# Patient Record
Sex: MALE | Race: WHITE | Employment: OTHER | URBAN - METROPOLITAN AREA
[De-identification: names, ages, dates, MRNs, and addresses within clinical notes are randomized per-mention and may not be internally consistent; named-entity substitution may affect disease eponyms.]

---

## 2018-04-24 ENCOUNTER — APPOINTMENT (OUTPATIENT)
Dept: CT IMAGING | Age: 63
DRG: 287 | End: 2018-04-24
Attending: PHYSICIAN ASSISTANT
Payer: MEDICARE

## 2018-04-24 ENCOUNTER — APPOINTMENT (OUTPATIENT)
Dept: GENERAL RADIOLOGY | Age: 63
DRG: 287 | End: 2018-04-24
Attending: EMERGENCY MEDICINE
Payer: MEDICARE

## 2018-04-24 ENCOUNTER — HOSPITAL ENCOUNTER (INPATIENT)
Age: 63
LOS: 3 days | Discharge: HOME | DRG: 287 | End: 2018-04-27
Attending: EMERGENCY MEDICINE | Admitting: INTERNAL MEDICINE
Payer: MEDICARE

## 2018-04-24 DIAGNOSIS — I21.4 NSTEMI (NON-ST ELEVATED MYOCARDIAL INFARCTION) (HCC): Primary | ICD-10-CM

## 2018-04-24 DIAGNOSIS — R79.89 ELEVATED SERUM CREATININE: ICD-10-CM

## 2018-04-24 PROBLEM — N18.30 CKD (CHRONIC KIDNEY DISEASE) STAGE 3, GFR 30-59 ML/MIN (HCC): Status: ACTIVE | Noted: 2018-04-24

## 2018-04-24 PROBLEM — R77.8 ELEVATED TROPONIN: Status: ACTIVE | Noted: 2018-04-24

## 2018-04-24 PROBLEM — J45.909 ASTHMA: Status: ACTIVE | Noted: 2018-04-24

## 2018-04-24 LAB
ALBUMIN SERPL-MCNC: 3.3 G/DL (ref 3.4–5)
ALBUMIN/GLOB SERPL: 0.9 {RATIO} (ref 0.8–1.7)
ALP SERPL-CCNC: 126 U/L (ref 45–117)
ALT SERPL-CCNC: 38 U/L (ref 16–61)
ANION GAP SERPL CALC-SCNC: 7 MMOL/L (ref 3–18)
APTT PPP: 27.4 SEC (ref 23–36.4)
AST SERPL-CCNC: 34 U/L (ref 15–37)
BASOPHILS # BLD: 0 K/UL (ref 0–0.06)
BASOPHILS NFR BLD: 0 % (ref 0–2)
BILIRUB SERPL-MCNC: 0.6 MG/DL (ref 0.2–1)
BNP SERPL-MCNC: 112 PG/ML (ref 0–900)
BUN SERPL-MCNC: 27 MG/DL (ref 7–18)
BUN/CREAT SERPL: 17 (ref 12–20)
CALCIUM SERPL-MCNC: 8.5 MG/DL (ref 8.5–10.1)
CHLORIDE SERPL-SCNC: 104 MMOL/L (ref 100–108)
CK MB CFR SERPL CALC: 0.9 % (ref 0–4)
CK MB SERPL-MCNC: 1.4 NG/ML (ref 5–25)
CK SERPL-CCNC: 160 U/L (ref 39–308)
CO2 SERPL-SCNC: 30 MMOL/L (ref 21–32)
CREAT SERPL-MCNC: 1.6 MG/DL (ref 0.6–1.3)
DIFFERENTIAL METHOD BLD: ABNORMAL
EOSINOPHIL # BLD: 0.1 K/UL (ref 0–0.4)
EOSINOPHIL NFR BLD: 1 % (ref 0–5)
ERYTHROCYTE [DISTWIDTH] IN BLOOD BY AUTOMATED COUNT: 14.4 % (ref 11.6–14.5)
EST. AVERAGE GLUCOSE BLD GHB EST-MCNC: 134 MG/DL
GLOBULIN SER CALC-MCNC: 3.6 G/DL (ref 2–4)
GLUCOSE SERPL-MCNC: 167 MG/DL (ref 74–99)
HBA1C MFR BLD: 6.3 % (ref 4.5–5.6)
HCT VFR BLD AUTO: 41.2 % (ref 36–48)
HGB BLD-MCNC: 13.8 G/DL (ref 13–16)
LYMPHOCYTES # BLD: 1.4 K/UL (ref 0.9–3.6)
LYMPHOCYTES NFR BLD: 18 % (ref 21–52)
MCH RBC QN AUTO: 30.5 PG (ref 24–34)
MCHC RBC AUTO-ENTMCNC: 33.5 G/DL (ref 31–37)
MCV RBC AUTO: 91.2 FL (ref 74–97)
MONOCYTES # BLD: 0.9 K/UL (ref 0.05–1.2)
MONOCYTES NFR BLD: 12 % (ref 3–10)
NEUTS SEG # BLD: 5.2 K/UL (ref 1.8–8)
NEUTS SEG NFR BLD: 69 % (ref 40–73)
PLATELET # BLD AUTO: 204 K/UL (ref 135–420)
PMV BLD AUTO: 9.2 FL (ref 9.2–11.8)
POTASSIUM SERPL-SCNC: 4.2 MMOL/L (ref 3.5–5.5)
PROT SERPL-MCNC: 6.9 G/DL (ref 6.4–8.2)
RBC # BLD AUTO: 4.52 M/UL (ref 4.7–5.5)
SODIUM SERPL-SCNC: 141 MMOL/L (ref 136–145)
TROPONIN I SERPL-MCNC: 0.59 NG/ML (ref 0–0.06)
WBC # BLD AUTO: 7.5 K/UL (ref 4.6–13.2)

## 2018-04-24 PROCEDURE — 65660000000 HC RM CCU STEPDOWN

## 2018-04-24 PROCEDURE — 80053 COMPREHEN METABOLIC PANEL: CPT | Performed by: EMERGENCY MEDICINE

## 2018-04-24 PROCEDURE — 85025 COMPLETE CBC W/AUTO DIFF WBC: CPT | Performed by: EMERGENCY MEDICINE

## 2018-04-24 PROCEDURE — 71045 X-RAY EXAM CHEST 1 VIEW: CPT

## 2018-04-24 PROCEDURE — 83036 HEMOGLOBIN GLYCOSYLATED A1C: CPT | Performed by: INTERNAL MEDICINE

## 2018-04-24 PROCEDURE — 74011250637 HC RX REV CODE- 250/637: Performed by: PHYSICIAN ASSISTANT

## 2018-04-24 PROCEDURE — 83880 ASSAY OF NATRIURETIC PEPTIDE: CPT | Performed by: PHYSICIAN ASSISTANT

## 2018-04-24 PROCEDURE — 74011636320 HC RX REV CODE- 636/320: Performed by: EMERGENCY MEDICINE

## 2018-04-24 PROCEDURE — 71275 CT ANGIOGRAPHY CHEST: CPT

## 2018-04-24 PROCEDURE — 80061 LIPID PANEL: CPT | Performed by: INTERNAL MEDICINE

## 2018-04-24 PROCEDURE — 99285 EMERGENCY DEPT VISIT HI MDM: CPT

## 2018-04-24 PROCEDURE — 84484 ASSAY OF TROPONIN QUANT: CPT | Performed by: EMERGENCY MEDICINE

## 2018-04-24 PROCEDURE — 74011250636 HC RX REV CODE- 250/636: Performed by: PHYSICIAN ASSISTANT

## 2018-04-24 PROCEDURE — 85730 THROMBOPLASTIN TIME PARTIAL: CPT | Performed by: PHYSICIAN ASSISTANT

## 2018-04-24 PROCEDURE — 36415 COLL VENOUS BLD VENIPUNCTURE: CPT | Performed by: INTERNAL MEDICINE

## 2018-04-24 PROCEDURE — 93005 ELECTROCARDIOGRAM TRACING: CPT

## 2018-04-24 RX ORDER — ACETAMINOPHEN 325 MG/1
650 TABLET ORAL
Status: DISCONTINUED | OUTPATIENT
Start: 2018-04-24 | End: 2018-04-27 | Stop reason: HOSPADM

## 2018-04-24 RX ORDER — SODIUM CHLORIDE 9 MG/ML
100 INJECTION, SOLUTION INTRAVENOUS CONTINUOUS
Status: DISCONTINUED | OUTPATIENT
Start: 2018-04-25 | End: 2018-04-27

## 2018-04-24 RX ORDER — NALOXONE HYDROCHLORIDE 0.4 MG/ML
0.4 INJECTION, SOLUTION INTRAMUSCULAR; INTRAVENOUS; SUBCUTANEOUS AS NEEDED
Status: DISCONTINUED | OUTPATIENT
Start: 2018-04-24 | End: 2018-04-27 | Stop reason: HOSPADM

## 2018-04-24 RX ORDER — DOCUSATE SODIUM 100 MG/1
100 CAPSULE, LIQUID FILLED ORAL
Status: DISCONTINUED | OUTPATIENT
Start: 2018-04-24 | End: 2018-04-27 | Stop reason: HOSPADM

## 2018-04-24 RX ORDER — ASPIRIN 81 MG/1
81 TABLET ORAL DAILY
Status: DISCONTINUED | OUTPATIENT
Start: 2018-04-25 | End: 2018-04-27 | Stop reason: HOSPADM

## 2018-04-24 RX ORDER — AMLODIPINE BESYLATE 5 MG/1
5 TABLET ORAL DAILY
Status: DISCONTINUED | OUTPATIENT
Start: 2018-04-25 | End: 2018-04-27 | Stop reason: HOSPADM

## 2018-04-24 RX ORDER — ONDANSETRON 2 MG/ML
4 INJECTION INTRAMUSCULAR; INTRAVENOUS
Status: DISCONTINUED | OUTPATIENT
Start: 2018-04-24 | End: 2018-04-27 | Stop reason: HOSPADM

## 2018-04-24 RX ORDER — MORPHINE SULFATE 4 MG/ML
1 INJECTION INTRAVENOUS
Status: DISCONTINUED | OUTPATIENT
Start: 2018-04-24 | End: 2018-04-27 | Stop reason: HOSPADM

## 2018-04-24 RX ORDER — NITROGLYCERIN 0.4 MG/1
0.4 TABLET SUBLINGUAL
Status: DISCONTINUED | OUTPATIENT
Start: 2018-04-24 | End: 2018-04-27 | Stop reason: HOSPADM

## 2018-04-24 RX ORDER — ASPIRIN 325 MG
325 TABLET ORAL
Status: COMPLETED | OUTPATIENT
Start: 2018-04-24 | End: 2018-04-24

## 2018-04-24 RX ORDER — HYDROCODONE BITARTRATE AND ACETAMINOPHEN 5; 325 MG/1; MG/1
1 TABLET ORAL
Status: DISCONTINUED | OUTPATIENT
Start: 2018-04-24 | End: 2018-04-27 | Stop reason: HOSPADM

## 2018-04-24 RX ORDER — HEPARIN SODIUM 10000 [USP'U]/100ML
18-36 INJECTION, SOLUTION INTRAVENOUS
Status: DISCONTINUED | OUTPATIENT
Start: 2018-04-24 | End: 2018-04-26

## 2018-04-24 RX ORDER — FLUTICASONE FUROATE AND VILANTEROL 100; 25 UG/1; UG/1
1 POWDER RESPIRATORY (INHALATION) DAILY
Status: DISCONTINUED | OUTPATIENT
Start: 2018-04-25 | End: 2018-04-27 | Stop reason: HOSPADM

## 2018-04-24 RX ORDER — SIMVASTATIN 20 MG/1
20 TABLET, FILM COATED ORAL
Status: DISCONTINUED | OUTPATIENT
Start: 2018-04-25 | End: 2018-04-27 | Stop reason: HOSPADM

## 2018-04-24 RX ORDER — HEPARIN SODIUM 1000 [USP'U]/ML
80 INJECTION, SOLUTION INTRAVENOUS; SUBCUTANEOUS ONCE
Status: COMPLETED | OUTPATIENT
Start: 2018-04-24 | End: 2018-04-24

## 2018-04-24 RX ORDER — MONTELUKAST SODIUM 10 MG/1
10 TABLET ORAL DAILY
Status: DISCONTINUED | OUTPATIENT
Start: 2018-04-25 | End: 2018-04-27 | Stop reason: HOSPADM

## 2018-04-24 RX ORDER — PANTOPRAZOLE SODIUM 40 MG/1
40 TABLET, DELAYED RELEASE ORAL DAILY
Status: DISCONTINUED | OUTPATIENT
Start: 2018-04-25 | End: 2018-04-27 | Stop reason: HOSPADM

## 2018-04-24 RX ADMIN — HEPARIN SODIUM 6170 UNITS: 1000 INJECTION, SOLUTION INTRAVENOUS; SUBCUTANEOUS at 21:18

## 2018-04-24 RX ADMIN — IOPAMIDOL 100 ML: 755 INJECTION, SOLUTION INTRAVENOUS at 22:36

## 2018-04-24 RX ADMIN — HEPARIN SODIUM 18 UNITS/KG/HR: 10000 INJECTION, SOLUTION INTRAVENOUS at 21:18

## 2018-04-24 RX ADMIN — SODIUM CHLORIDE 1000 ML: 900 INJECTION, SOLUTION INTRAVENOUS at 20:55

## 2018-04-24 RX ADMIN — ASPIRIN 325 MG ORAL TABLET 325 MG: 325 PILL ORAL at 20:10

## 2018-04-24 NOTE — IP AVS SNAPSHOT
[address] [address] 
[phone] Patient: Mansi Manuel MRN: [id number] A check chivo indicates which time of day the medication should be taken. My Medications CHANGE how you take these medications Instructions Each Dose to Equal  
 Morning Noon Evening Bedtime  
 amLODIPine 5 mg tablet Commonly known as:  Remonia Grates Start taking on:  4/28/2018 What changed:   
- medication strength 
- how much to take - when to take this Your next dose is:  Tomorrow morning Take 1 Tab by mouth daily. 5 mg CONTINUE taking these medications Instructions Each Dose to Equal  
 Morning Noon Evening Bedtime  
 aspirin 81 mg tablet Take 81 mg by mouth. 81 mg Biotin 2,500 mcg Cap Take  by mouth. FISH OIL 1,000 mg Cap Generic drug:  omega-3 fatty acids-vitamin e Take 2 Caps by mouth. 2 Cap  
    
   
   
   
  
 meclizine 25 mg tablet Commonly known as:  ANTIVERT Take  by mouth three (3) times daily as needed. NITROSTAT 0.4 mg SL tablet Generic drug:  nitroglycerin  
   
 by SubLINGual route every five (5) minutes as needed for Chest Pain. PROTONIX 40 mg tablet Generic drug:  pantoprazole Your next dose is:  Tomorrow morning Take 40 mg by mouth daily. 40 mg  
    
  
   
   
   
  
 SIMVASTATIN PO Take 40 mg by mouth. 40 mg  
    
   
   
   
  
 SINGULAIR 10 mg tablet Generic drug:  montelukast  
Your next dose is:  Tomorrow morning Take 10 mg by mouth daily. 10 mg  
    
  
   
   
   
  
 SPIRIVA WITH HANDIHALER 18 mcg inhalation capsule Generic drug:  tiotropium Your next dose is:  Tomorrow morning Take 1 Cap by inhalation daily. 1 Cap SYMBICORT 160-4.5 mcg/actuation Hfaa Generic drug:  budesonide-formoterol Your next dose is: Tonight Take 2 Puffs by inhalation two (2) times a day. 2 Puff VITAMIN D3 PO Take  by mouth. STOP taking these medications CeleBREX 100 mg capsule Generic drug:  celecoxib Where to Get Your Medications These medications were sent to [address] [place] [address] Hours:  24-hours Phone:  [phone]  
  amLODIPine 5 mg tablet

## 2018-04-24 NOTE — IP AVS SNAPSHOT
[address] [place] [address] 
[phone] Patient: Adonis Garzon MRN: [id number] About your hospitalization You were admitted on:  April 24, 2018 You last received care in the:  [address] [place] [address] You were discharged on:  April 27, 2018 Why you were hospitalized Your primary diagnosis was:  Chest Pain Your diagnoses also included:  Cad (Coronary Artery Disease), Chronic Obstructive Pulmonary Disease (Hcc), Hypertension, Elevated Troponin, Ckd (Chronic Kidney Disease) Stage 3, Gfr 30-59 Ml/Min, Asthma, Nstemi (Non-St Elevated Myocardial Infarction) ([facility]) Follow-up Information Follow up With Details Comments Contact Info Jeffery Mccracken MD Schedule an appointment as soon as possible for a visit in 1 week  [address] [place] [address] [address] [facility] [address] 
[phone] Danni Andrews MD Go on 5/25/2018 Follow up appointment scheduled for May 25, 2018 at 10:20 a.m. [address] [place] [address] [address] [place] [address] 
[phone] Care Mgmt contacted MD's office (Dr. Kristian Leyden) on 4/27/18 to assist with f/u appointmnet. Office not answering phones, unable to contact for appointment. Patient please contact MD's office on April 30, 2018 to arrange f/u appointment date/time. Discharge Orders None A check chivo indicates which time of day the medication should be taken. My Medications CHANGE how you take these medications Instructions Each Dose to Equal  
 Morning Noon Evening Bedtime  
 amLODIPine 5 mg tablet Commonly known as:  Unknown Jolley Start taking on:  4/28/2018 What changed:   
- medication strength 
- how much to take - when to take this Your next dose is:  Tomorrow morning Take 1 Tab by mouth daily. 5 mg CONTINUE taking these medications Instructions Each Dose to Equal  
 Morning Noon Evening Bedtime  
 aspirin 81 mg tablet Take 81 mg by mouth. 81 mg Biotin 2,500 mcg Cap Take  by mouth. FISH OIL 1,000 mg Cap Generic drug:  omega-3 fatty acids-vitamin e Take 2 Caps by mouth. 2 Cap  
    
   
   
   
  
 meclizine 25 mg tablet Commonly known as:  ANTIVERT Take  by mouth three (3) times daily as needed. NITROSTAT 0.4 mg SL tablet Generic drug:  nitroglycerin  
   
 by SubLINGual route every five (5) minutes as needed for Chest Pain. PROTONIX 40 mg tablet Generic drug:  pantoprazole Your next dose is:  Tomorrow morning Take 40 mg by mouth daily. 40 mg  
    
  
   
   
   
  
 SIMVASTATIN PO Take 40 mg by mouth. 40 mg  
    
   
   
   
  
 SINGULAIR 10 mg tablet Generic drug:  montelukast  
Your next dose is:  Tomorrow morning Take 10 mg by mouth daily. 10 mg  
    
  
   
   
   
  
 SPIRIVA WITH HANDIHALER 18 mcg inhalation capsule Generic drug:  tiotropium Your next dose is:  Tomorrow morning Take 1 Cap by inhalation daily. 1 Cap SYMBICORT 160-4.5 mcg/actuation Hfaa Generic drug:  budesonide-formoterol Your next dose is: Tonight Take 2 Puffs by inhalation two (2) times a day. 2 Puff VITAMIN D3 PO Take  by mouth. STOP taking these medications CeleBREX 100 mg capsule Generic drug:  celecoxib Where to Get Your Medications These medications were sent to [address] [place] [address] Hours:  24-hours Phone:  [phone]  
  amLODIPine 5 mg tablet Discharge Instructions Chest Pain: Care Instructions Your Care Instructions There are many things that can cause chest pain. Some are not serious and will get better on their own in a few days. But some kinds of chest pain need more testing and treatment. Your doctor may have recommended a follow-up visit in the next 8 to 12 hours. If you are not getting better, you may need more tests or treatment. Even though your doctor has released you, you still need to watch for any problems. The doctor carefully checked you, but sometimes problems can develop later. If you have new symptoms or if your symptoms do not get better, get medical care right away. If you have worse or different chest pain or pressure that lasts more than 5 minutes or you passed out (lost consciousness), call 911 or seek other emergency help right away. A medical visit is only one step in your treatment. Even if you feel better, you still need to do what your doctor recommends, such as going to all suggested follow-up appointments and taking medicines exactly as directed. This will help you recover and help prevent future problems. How can you care for yourself at home? · Rest until you feel better. · Take your medicine exactly as prescribed. Call your doctor if you think you are having a problem with your medicine. · Do not drive after taking a prescription pain medicine. When should you call for help? Call 911 if: 
? · You passed out (lost consciousness). ? · You have severe difficulty breathing. ? · You have symptoms of a heart attack. These may include: ¨ Chest pain or pressure, or a strange feeling in your chest. 
¨ Sweating. ¨ Shortness of breath. ¨ Nausea or vomiting. ¨ Pain, pressure, or a strange feeling in your back, neck, jaw, or upper belly or in one or both shoulders or arms. ¨ Lightheadedness or sudden weakness. ¨ A fast or irregular heartbeat. After you call 911, the  may tell you to chew 1 adult-strength or 2 to 4 low-dose aspirin. Wait for an ambulance.  Do not try to drive yourself. ?Call your doctor today if: 
? · You have any trouble breathing. ? · Your chest pain gets worse. ? · You are dizzy or lightheaded, or you feel like you may faint. ? · You are not getting better as expected. ? · You are having new or different chest pain. Where can you learn more? Go to http://levi-hank.info/. Enter A120 in the search box to learn more about \"Chest Pain: Care Instructions. \" Current as of: March 20, 2017 Content Version: 11.4 © 9161-4182 [employer]. Care instructions adapted under license by [employer] (which disclaims liability or warranty for this information). If you have questions about a medical condition or this instruction, always ask your healthcare professional. [address] any warranty or liability for your use of this information. Chronic Obstructive Pulmonary Disease (COPD): Care Instructions Your Care Instructions Chronic obstructive pulmonary disease (COPD) is a general term for a group of lung diseases, including emphysema and chronic bronchitis. People with COPD have decreased airflow in and out of the lungs, which makes it hard to breathe. The airways also can get clogged with thick mucus. Cigarette smoking is a major cause of COPD. Although there is no cure for COPD, you can slow its progress. Following your treatment plan and taking care of yourself can help you feel better and live longer. Follow-up care is a key part of your treatment and safety. Be sure to make and go to all appointments, and call your doctor if you are having problems. It's also a good idea to know your test results and keep a list of the medicines you take. How can you care for yourself at home? ?Staying healthy ? · Do not smoke. This is the most important step you can take to prevent more damage to your lungs.  If you need help quitting, talk to your doctor about stop-smoking programs and medicines. These can increase your chances of quitting for good. ? · Avoid colds and flu. Get a pneumococcal vaccine shot. If you have had one before, ask your doctor whether you need a second dose. Get the flu vaccine every fall. If you must be around people with colds or the flu, wash your hands often. ? · Avoid secondhand smoke, air pollution, and high altitudes. Also avoid cold, dry air and hot, humid air. Stay at home with your windows closed when air pollution is bad. ?Medicines and oxygen therapy ? · Take your medicines exactly as prescribed. Call your doctor if you think you are having a problem with your medicine. ? · You may be taking medicines such as: ¨ Bronchodilators. These help open your airways and make breathing easier. Bronchodilators are either short-acting (work for 6 to 9 hours) or long-acting (work for 24 hours). You inhale most bronchodilators, so they start to act quickly. Always carry your quick-relief inhaler with you in case you need it while you are away from home. ¨ Corticosteroids (prednisone, budesonide). These reduce airway inflammation. They come in pill or inhaled form. You must take these medicines every day for them to work well. ? · A spacer may help you get more inhaled medicine to your lungs. Ask your doctor or pharmacist if a spacer is right for you. If it is, ask how to use it properly. ? · Do not take any vitamins, over-the-counter medicine, or herbal products without talking to your doctor first.  
? · If your doctor prescribed antibiotics, take them as directed. Do not stop taking them just because you feel better. You need to take the full course of antibiotics. ? · Oxygen therapy boosts the amount of oxygen in your blood and helps you breathe easier. Use the flow rate your doctor has recommended, and do not change it without talking to your doctor first.  
Activity ? · Get regular exercise. Walking is an easy way to get exercise. Start out slowly, and walk a little more each day. ? · Pay attention to your breathing. You are exercising too hard if you cannot talk while you are exercising. ? · Take short rest breaks when doing household chores and other activities. ? · Learn breathing methods-such as breathing through pursed lips-to help you become less short of breath. ? · If your doctor has not set you up with a pulmonary rehabilitation program, talk to him or her about whether rehab is right for you. Rehab includes exercise programs, education about your disease and how to manage it, help with diet and other changes, and emotional support. Diet ? · Eat regular, healthy meals. Use bronchodilators about 1 hour before you eat to make it easier to eat. Eat several small meals instead of three large ones. Drink beverages at the end of the meal. Avoid foods that are hard to chew. ? · Eat foods that contain protein so that you do not lose muscle mass. ? · Talk with your doctor if you gain too much weight or if you lose weight without trying. ?Mental health ? · Talk to your family, friends, or a therapist about your feelings. It is normal to feel frightened, angry, hopeless, helpless, and even guilty. Talking openly about bad feelings can help you cope. If these feelings last, talk to your doctor. When should you call for help? Call 911 anytime you think you may need emergency care. For example, call if: 
? · You have severe trouble breathing. ?Call your doctor now or seek immediate medical care if: 
? · You have new or worse trouble breathing. ? · You cough up blood. ? · You have a fever. ? Watch closely for changes in your health, and be sure to contact your doctor if: 
? · You cough more deeply or more often, especially if you notice more mucus or a change in the color of your mucus. ? · You have new or worse swelling in your legs or belly. ? · You are not getting better as expected. Where can you learn more? Go to http://levi-hank.info/. Jaylen Webb in the search box to learn more about \"Chronic Obstructive Pulmonary Disease (COPD): Care Instructions. \" Current as of: May 12, 2017 Content Version: 11.4 © 4708-3314 [employer]. Care instructions adapted under license by [employer] (which disclaims liability or warranty for this information). If you have questions about a medical condition or this instruction, always ask your healthcare professional. [address] any warranty or liability for your use of this information. DISCHARGE SUMMARY from Nurse PATIENT INSTRUCTIONS: 
 
 
F-face looks uneven A-arms unable to move or move unevenly S-speech slurred or non-existent T-time-call 911 as soon as signs and symptoms begin-DO NOT go Back to bed or wait to see if you get better-TIME IS BRAIN. Warning Signs of HEART ATTACK Call 911 if you have these symptoms: 
? Chest discomfort. Most heart attacks involve discomfort in the center of the chest that lasts more than a few minutes, or that goes away and comes back. It can feel like uncomfortable pressure, squeezing, fullness, or pain. ? Discomfort in other areas of the upper body. Symptoms can include pain or discomfort in one or both arms, the back, neck, jaw, or stomach. ? Shortness of breath with or without chest discomfort. ? Other signs may include breaking out in a cold sweat, nausea, or lightheadedness. Don't wait more than five minutes to call [address]! Fast action can save your life.  Calling 911 is almost always the fastest way to get lifesaving treatment. Emergency Medical Services staff can begin treatment when they arrive  up to an hour sooner than if someone gets to the hospital by car. The discharge information has been reviewed with the patient. The patient verbalized understanding. Discharge medications reviewed with the patient and appropriate educational materials and side effects teaching were provided. Patient armband removed and shredded. ___________________________________________________________________________________________________________________________________ Introducing [facility]! [facility] introduces [email or web address].tv patient portal. Now you can access parts of your medical record, email your doctor's office, and request medication refills online. 1. In your internet browser, go to [email or web address] 2. Click on the First Time User? Click Here link in the Sign In box. You will see the New Member Sign Up page. 3. Enter your [email or web address] Access Code exactly as it appears below. You will not need to use this code after youve completed the sign-up process. If you do not sign up before the expiration date, you must request a new code. · [email or web address] Access Code: [email or web address] Expires: 7/23/2018  6:23 PM 
 
4. Enter the last four digits of your Social Security Number (xxxx) and Date of Birth (mm/dd/yyyy) as indicated and click Submit. You will be taken to the next sign-up page. 5. Create a SnappCloudt ID. This will be your SnappCloudt login ID and cannot be changed, so think of one that is secure and easy to remember. 6. Create a SnappCloudt password. You can change your password at any time. 7. Enter your Password Reset Question and Answer. This can be used at a later time if you forget your password. 8. Enter your e-mail address. You will receive e-mail notification when new information is available in [address] 9. Click Sign Up. You can now view and download portions of your medical record. 10. Click the Download Summary menu link to download a portable copy of your medical information. If you have questions, please visit the Frequently Asked Questions section of the [employer] website. Remember, [employer] is NOT to be used for urgent needs. For medical emergencies, dial 911. Now available from your iPhone and Android! Introducing Blane Mendes As a [facility] patient, I wanted to make you aware of our electronic visit tool called Blane Mendes. Remitly 24/7 allows you to connect within minutes with a medical provider 24 hours a day, seven days a week via a mobile device or tablet or logging into a secure website from your computer. You can access Blane Mendes from anywhere in the [place]. A virtual visit might be right for you when you have a simple condition and feel like you just dont want to get out of bed, or cant get away from work for an appointment, when your regular [facility] provider is not available (evenings, weekends or holidays), or when youre out of town and need minor care. Electronic visits cost only $49 and if the Remitly 24/7 provider determines a prescription is needed to treat your condition, one can be electronically transmitted to a nearby pharmacy*. Please take a moment to enroll today if you have not already done so. The enrollment process is free and takes just a few minutes. To enroll, please download the Remitly 24/[employer] erasmo to your tablet or phone, or visit [email or web address] to enroll on your computer. And, as an [address] [place] [address] patient with a [employer] account, the results of your visits will be scanned into your electronic medical record and your primary care provider will be able to view the scanned results.    
We urge you to continue to see your regular [facility] provider for your ongoing medical care. And while your primary care provider may not be the one available when you seek a Blane Ramsesfin virtual visit, the peace of mind you get from getting a real diagnosis real time can be priceless. For more information on Blane YouRenewcrowfin, view our Frequently Asked Questions (FAQs) at [email or web address]. Sincerely, 
 
Carmen Botello MD 
Chief Medical Officer Chi Alexandre *:  certain medications cannot be prescribed via [employer]hel[employer] Unresulted Labs-Please follow up with your PCP about these lab tests Order Current Status EKG, 12 LEAD, INITIAL Preliminary result EKG, 12 LEAD, INITIAL Preliminary result Providers Seen During Your Hospitalization Provider Specialty Primary office phone Starleen Goodpasture, MD Emergency Medicine [phone] Maddy Negron MD Internal Medicine [phone] Your Primary Care Physician (PCP) Primary Care Physician Office Phone Office Fax Liam Gomez, [phone] You are allergic to the following Allergen Reactions Ciprofloxacin Other (comments) PT states that he turns red. Tape (Adhesive) Itching Recent Documentation Height Weight BMI Smoking Status 1.651 m 81.5 kg 29.9 kg/m2 Former Smoker Emergency Contacts Name Discharge Info Relation Home Work Mobile Baldemar Bright DISCHARGE CAREGIVER [3] Brother [24] [phone] JeovanyDina DISCHARGE CAREGIVER [3] Sister [23] [phone] Patient Belongings The following personal items are in your possession at time of discharge: 
  Dental Appliances: None  Visual Aid: None      Home Medications: None   Jewelry: None  Clothing: Footwear, Jacket/Coat, Pants, Shirt    Other Valuables: [employer], Money (comment) ($100/credit cards, pt will give to family in am, ) Please provide this summary of care documentation to your next provider. Signatures-by signing, you are acknowledging that this After Visit Summary has been reviewed with you and you have received a copy. Patient Signature:  ____________________________________________________________ Date:  ____________________________________________________________  
  
Sigmund [place] Provider Signature:  ____________________________________________________________ Date:  ____________________________________________________________

## 2018-04-24 NOTE — ED NOTES
Verbal shift change report given to Chadwick Reveles RN (oncoming nurse) by Cortney Lim (offgoing nurse). Report included the following information SBAR.

## 2018-04-24 NOTE — ED PROVIDER NOTES
EMERGENCY DEPARTMENT HISTORY AND PHYSICAL EXAM    Date: 4/24/2018  Patient Name: Saul Clay    History of Presenting Illness     Chief Complaint   Patient presents with    Abnormal Lab Results         History Provided By: Patient    Chief Complaint: CP  Duration: 3 Days  Timing:  Acute  Location: central CP  Quality: Aching  Severity: Moderate  Modifying Factors: Hx of MI  Associated Symptoms: nausea and cold sweats    Additional History (Context):   7:26 PM  Saul Clay is a 57 y.o. male with PMHX of MI who presents to the emergency department C/O CP onset 3 days ago after cutting grass. Associated sxs include nausea and cold sweats. Pt took 2 nitros right after CP started. Pt went to [facility] after sxs onset but due to 1.5 hour wait he left. Pt was seen by PCP today and blood was drawn, d-dimer was . 88 so pt was called tonight and told to come to ED. Pt is on baby ASA daily. Today pt states the chest pressure is 3/10. Pt denies taking nitro today, leg swelling, smoking, and any other sxs or complaints.      PCP: Kunal Salazar MD   Cardiologist:     Current Facility-Administered Medications   Medication Dose Route Frequency Provider Last Rate Last Dose    nitroglycerin (NITROBID) 2 % ointment 1 Inch  1 Inch Topical NOW SURENDRA Barnett        umeclidinium (INCRUSE ELLIPTA) 62.5 mcg/actuation  1 Puff Inhalation DAILY Yareli Burnette MD        pharmacy and Nursing- Vitamin D please clarify strength & frrequency  1 Each Other DAILY Yareli Burnette MD        aspirin delayed-release tablet 81 mg  81 mg Oral DAILY Yareli Burnette MD        simvastatin (ZOCOR) tablet 20 mg  20 mg Oral QHS Leonel Socrates Fritz MD   Stopped at 04/25/18 0000    pantoprazole (PROTONIX) tablet 40 mg  40 mg Oral DAILY Yareli Burnette MD        nitroglycerin (NITROSTAT) tablet 0.4 mg  0.4 mg SubLINGual Q5MIN PRN Yareli Burnette MD        fluticasone-vilanterol (BREO ELLIPTA) 100mcg-25mcg/puff  1 Puff Inhalation DAILY MD Rose Hurtado montelukast (SINGULAIR) tablet 10 mg  10 mg Oral DAILY Antoinette Clemons MD        amLODIPine (NORVASC) tablet 5 mg  5 mg Oral DAILY Antoinette Clemons MD        0.9% sodium chloride infusion  100 mL/hr IntraVENous CONTINUOUS Antoinette Clemons  mL/hr at 04/25/18 0004 100 mL/hr at 04/25/18 0004    acetaminophen (TYLENOL) tablet 650 mg  650 mg Oral Q4H PRN Antoinette Clemons MD        HYDROcodone-acetaminophen (NORCO) 5-325 mg per tablet 1 Tab  1 Tab Oral Q4H PRN Antoinette Clemons MD        morphine injection 1 mg  1 mg IntraVENous Q4H PRN Antoinette Clemons MD        naloxone (NARCAN) injection 0.4 mg  0.4 mg IntraVENous PRN Antoinette Clemons MD        ondansetron (ZOFRAN) injection 4 mg  4 mg IntraVENous Q4H PRN Antoinette Clemons MD        docusate sodium (COLACE) capsule 100 mg  100 mg Oral BID PRN Antoinette Clemons MD        heparin 25,000 units in D5W 250 ml infusion  18-36 Units/kg/hr IntraVENous TITRATE SURENDRA Vega 13.9 mL/hr at 04/24/18 2257 18 Units/kg/hr at 04/24/18 2257       Past History     Past Medical History:  Past Medical History:   Diagnosis Date    Asthma     CAD (coronary artery disease)     COPD     Hypertension        Past Surgical History:  Past Surgical History:   Procedure Laterality Date    ABDOMEN SURGERY PROC UNLISTED      instestinal rupture    HX COLOSTOMY      HX COLOSTOMY TAKE DOWN      HX CORONARY STENT PLACEMENT         Family History:  No family history on file. Social History:  Social History   Substance Use Topics    Smoking status: Former Smoker    Smokeless tobacco: Not on file    Alcohol use Not on file       Allergies: Allergies   Allergen Reactions    Ciprofloxacin Other (comments)     PT states that he turns red. Review of Systems   Review of Systems   Constitutional: Positive for diaphoresis. Cardiovascular: Positive for chest pain. Negative for leg swelling. Gastrointestinal: Positive for nausea. All other systems reviewed and are negative.       Physical Exam     Vitals: 04/24/18 1930 04/24/18 2130 04/24/18 2200 04/24/18 2340   BP: (!) 146/111 151/86 142/82 (!) 143/96   Pulse: 71 68 67 71   Resp: 15 19 19 16   Temp:    98.2 °F (36.8 °C)   SpO2: 99% 98% 98% 97%   Weight:    81.2 kg (179 lb)   Height:    5' 5\" (1.651 m)     Physical Exam   Constitutional: He is oriented to person, place, and time. He appears well-developed and well-nourished. No distress. Alert, interactive, NAD, no increased work of breathing    HENT:   Head: Normocephalic and atraumatic. Neck: Normal range of motion. Neck supple. Cardiovascular: Normal rate, regular rhythm, normal heart sounds and intact distal pulses. No murmur heard. Pulmonary/Chest: Effort normal and breath sounds normal. No respiratory distress. He has no wheezes. He has no rales. Abdominal: Soft. Bowel sounds are normal. There is no tenderness. Musculoskeletal:   No leg swelling or tenderness    Neurological: He is alert and oriented to person, place, and time. Skin: Skin is warm and dry. He is not diaphoretic. Psychiatric: He has a normal mood and affect. Judgment normal.   Nursing note and vitals reviewed.         Diagnostic Study Results     Labs -     Recent Results (from the past 12 hour(s))   EKG, 12 LEAD, INITIAL    Collection Time: 04/24/18  6:44 PM   Result Value Ref Range    Ventricular Rate 69 BPM    Atrial Rate 69 BPM    P-R Interval 142 ms    QRS Duration 80 ms    Q-T Interval 384 ms    QTC Calculation (Bezet) 411 ms    Calculated P Axis 50 degrees    Calculated R Axis 41 degrees    Calculated T Axis 65 degrees    Diagnosis       Sinus rhythm with premature atrial complexes  Otherwise normal ECG  When compared with ECG of 14-AUG-2016 09:26,  premature atrial complexes are now present     CBC WITH AUTOMATED DIFF    Collection Time: 04/24/18  7:00 PM   Result Value Ref Range    WBC 7.5 4.6 - 13.2 K/uL    RBC 4.52 (L) 4.70 - 5.50 M/uL    HGB 13.8 13.0 - 16.0 g/dL    HCT 41.2 36.0 - 48.0 %    MCV 91.2 74.0 - 97.0 FL MCH 30.5 24.0 - 34.0 PG    MCHC 33.5 31.0 - 37.0 g/dL    RDW 14.4 11.6 - 14.5 %    PLATELET 745 246 - 572 K/uL    MPV 9.2 9.2 - 11.8 FL    NEUTROPHILS 69 40 - 73 %    LYMPHOCYTES 18 (L) 21 - 52 %    MONOCYTES 12 (H) 3 - 10 %    EOSINOPHILS 1 0 - 5 %    BASOPHILS 0 0 - 2 %    ABS. NEUTROPHILS 5.2 1.8 - 8.0 K/UL    ABS. LYMPHOCYTES 1.4 0.9 - 3.6 K/UL    ABS. MONOCYTES 0.9 0.05 - 1.2 K/UL    ABS. EOSINOPHILS 0.1 0.0 - 0.4 K/UL    ABS. BASOPHILS 0.0 0.0 - 0.06 K/UL    DF AUTOMATED     CARDIAC PANEL,(CK, CKMB & TROPONIN)    Collection Time: 04/24/18  7:00 PM   Result Value Ref Range     39 - 308 U/L    CK - MB 1.4 <3.6 ng/ml    CK-MB Index 0.9 0.0 - 4.0 %    Troponin-I, Qt. 0.59 (H) 0.00 - 7.55 NG/ML   METABOLIC PANEL, COMPREHENSIVE    Collection Time: 04/24/18  7:00 PM   Result Value Ref Range    Sodium 141 136 - 145 mmol/L    Potassium 4.2 3.5 - 5.5 mmol/L    Chloride 104 100 - 108 mmol/L    CO2 30 21 - 32 mmol/L    Anion gap 7 3.0 - 18 mmol/L    Glucose 167 (H) 74 - 99 mg/dL    BUN 27 (H) 7.0 - 18 MG/DL    Creatinine 1.60 (H) 0.6 - 1.3 MG/DL    BUN/Creatinine ratio 17 12 - 20      GFR est AA 53 (L) >60 ml/min/1.73m2    GFR est non-AA 44 (L) >60 ml/min/1.73m2    Calcium 8.5 8.5 - 10.1 MG/DL    Bilirubin, total 0.6 0.2 - 1.0 MG/DL    ALT (SGPT) 38 16 - 61 U/L    AST (SGOT) 34 15 - 37 U/L    Alk.  phosphatase 126 (H) 45 - 117 U/L    Protein, total 6.9 6.4 - 8.2 g/dL    Albumin 3.3 (L) 3.4 - 5.0 g/dL    Globulin 3.6 2.0 - 4.0 g/dL    A-G Ratio 0.9 0.8 - 1.7     NT-PRO BNP    Collection Time: 04/24/18  7:00 PM   Result Value Ref Range    NT pro- 0 - 900 PG/ML   PTT    Collection Time: 04/24/18  7:00 PM   Result Value Ref Range    aPTT 27.4 23.0 - 36.4 SEC   LIPID PANEL    Collection Time: 04/24/18 11:20 PM   Result Value Ref Range    LIPID PROFILE          Cholesterol, total 111 <200 MG/DL    Triglyceride 18 <150 MG/DL    HDL Cholesterol 80 (H) 40 - 60 MG/DL    LDL, calculated 27.4 0 - 100 MG/DL    VLDL, calculated 3.6 MG/DL    CHOL/HDL Ratio 1.4 0 - 5.0     HEMOGLOBIN A1C WITH EAG    Collection Time: 04/24/18 11:20 PM   Result Value Ref Range    Hemoglobin A1c 6.3 (H) 4.5 - 5.6 %    Est. average glucose 134 mg/dL   CARDIAC PANEL,(CK, CKMB & TROPONIN)    Collection Time: 04/24/18 11:20 PM   Result Value Ref Range     39 - 308 U/L    CK - MB 1.3 <3.6 ng/ml    CK-MB Index 1.0 0.0 - 4.0 %    Troponin-I, Qt. 0.63 (H) 0.00 - 0.06 NG/ML       Radiologic Studies -   8:42 PM  RADIOLOGY FINDINGS  chest X-ray shows NAP  Pending review by Radiologist  Recorded by Fidelia Bonilla ED Scribe, as dictated by Anna Valle PA-C   CTA CHEST W OR W WO CONT   Final Result      XR CHEST PORT    (Results Pending)     CT Results  (Last 48 hours)               04/24/18 2248  CTA CHEST W OR W WO CONT Final result    Impression:  IMPRESSION:       No evidence of pulmonary embolus or other acute cardiopulmonary process. Narrative:  CTA CHEST WITH IV CONTRAST       INDICATION: Chest pain       TECHNIQUE: Volumetric scanning with IV contrast. Thin overlapping coronal and   sagittal MIP reconstructions. One or more dose reduction techniques were used on   this CT: automated exposure control, adjustment of the mAs and/or kVp according   to patient's size, and iterative reconstruction techniques. The specific   techniques utilized on this CT exam have been documented in the patient's   electronic medical record. COMPARISON: Chest x-ray of the same day       FINDINGS:       General comment regarding exam quality:   Overall quality: Satisfactory. Adequacy of bolus: Satisfactory. Adequacy of suspended respiration: Satisfactory. Any other factors affecting exam quality: None. Heart and vasculature: No evidence of pulmonary embolus. No aortic aneurysm. No   pericardial effusion. Aortic and coronary artery calcifications       Remaining mediastinum: No endobronchial lesions. No thyroid masses are seen.  No   enlarged mediastinal lymph nodes. Lungs and pleural spaces: No infiltrates, effusions or pneumothorax. Chest wall: No acute or aggressive bony abnormalities. Mild spondylosis. Visualized upper abdomen: No acute findings. Small low density masses of the   liver. The largest is at the left lobe and measures 2 cm, the attenuation of a   simple cyst. Status post cholecystectomy.                CXR Results  (Last 48 hours)    None          Medications given in the ED-  Medications   nitroglycerin (NITROBID) 2 % ointment 1 Inch (1 Inch Topical Refused 4/24/18 1943)   umeclidinium (INCRUSE ELLIPTA) 62.5 mcg/actuation (not administered)   pharmacy and Nursing- Vitamin D please clarify strength & frrequency (not administered)   aspirin delayed-release tablet 81 mg (not administered)   simvastatin (ZOCOR) tablet 20 mg (0 mg Oral Held 4/25/18 0000)   pantoprazole (PROTONIX) tablet 40 mg (not administered)   nitroglycerin (NITROSTAT) tablet 0.4 mg (not administered)   fluticasone-vilanterol (BREO ELLIPTA) 100mcg-25mcg/puff (not administered)   montelukast (SINGULAIR) tablet 10 mg (not administered)   amLODIPine (NORVASC) tablet 5 mg (not administered)   0.9% sodium chloride infusion (100 mL/hr IntraVENous New Bag 4/25/18 0004)   acetaminophen (TYLENOL) tablet 650 mg (not administered)   HYDROcodone-acetaminophen (NORCO) 5-325 mg per tablet 1 Tab (not administered)   morphine injection 1 mg (not administered)   naloxone (NARCAN) injection 0.4 mg (not administered)   ondansetron (ZOFRAN) injection 4 mg (not administered)   docusate sodium (COLACE) capsule 100 mg (not administered)   heparin 25,000 units in D5W 250 ml infusion (18 Units/kg/hr × 77.1 kg IntraVENous Rate Verify 4/24/18 2257)   aspirin (ASPIRIN) tablet 325 mg (325 mg Oral Given 4/24/18 2010)   sodium chloride 0.9 % bolus infusion 1,000 mL (1,000 mL IntraVENous New Bag 4/24/18 2055)   heparin (porcine) 1,000 unit/mL injection 6,170 Units (6,170 Units IntraVENous Given 4/24/18 2118)   iopamidol (ISOVUE-370) 76 % injection 100 mL (100 mL IntraVENous Given 4/24/18 2236)         Medical Decision Making   I am the first provider for this patient. I reviewed the vital signs, available nursing notes, past medical history, past surgical history, family history and social history. Vital Signs-Reviewed the patient's vital signs. Pulse Oximetry Analysis - 98% on RA     Cardiac Monitor:  Rate: 69 bpm  Rhythm: sinus rhythm with premature atrial complexes    EKG interpretation: (Preliminary)  7:23 PM   Rate 69 bpm. Sinus rhythm with premature atrial complexes  EKG read by Louis Ba PA-C at 18:44     Records Reviewed: Nursing Notes and Old Medical Records    Provider Notes (Medical Decision Making):  ACS, MI, Arrhthymia, pericarditis, pneumonia, bronchitis, musculoskeletal pain, Ptx, PE       Procedures:  Procedures    ED Course:   7:26 PM Initial assessment performed. The patients presenting problems have been discussed, and they are in agreement with the care plan formulated and outlined with them. I have encouraged them to ask questions as they arise throughout their visit.    8:01 PM Discussed patient's history, exam, and available diagnostics results with Susie Shay. Bee Muhammad MD, ED attending, who agree with admitting pt.     8:02 Pm Pt refused nitro because he does not want headache    8:25 PM Discussed patient's history, exam, and available diagnostics results with Jason Heredia MD, hospitalist, who would with admitting. He would like cardiologist consulted. He recommends heparin drip for NSTEMI pending CTA.    8:31 PM Discussed patient's history, exam, and available diagnostics results with Kasi Fisher MD, cardiology, who agree with consulting. He reccomends heparin drip and bolus according to PE protocol.      Diagnosis and Disposition     Critical Care Time:   8:30 PM  I have spent 60 minutes of critical care time involved in lab review, consultations with specialist, family decision-making, and documentation. During this entire length of time I was immediately available to the patient. Critical Care: The reason for providing this level of medical care for this critically ill patient was due a critical illness that impaired one or more vital organ systems such that there was a high probability of imminent or life threatening deterioration in the patients condition. This care involved high complexity decision making to assess, manipulate, and support vital system functions, to treat this degreee vital organ system failure and to prevent further life threatening deterioration of the patients condition. Core Measures:  For Hospitalized Patients:    1. Hospitalization Decision Time:  The decision to hospitalize the patient was made by Sara Grady PA-C at 8:00 PM on 4/24/2018    2. Aspirin: Aspirin was given    8:30 PM  Patient is being admitted to the hospital by Rocco Merrill MD. The results of their tests and reasons for their admission have been discussed with them and/or available family. They convey agreement and understanding for the need to be admitted and for their admission diagnosis. CONDITIONS ON ADMISSION:  Sepsis is not present at the time of admission. Urinary Tract Infection is not present at the time of admission. Pneumonia is not present at the time of admission. MRSA is not present at the time of admission. Wound infection is not present at the time of admission. Pressure Ulcer is not present at the time of admission. CLINICAL IMPRESSION:    1. NSTEMI (non-ST elevated myocardial infarction) (HCC)    2. Elevated serum creatinine      _______________________________    Attestations: This note is prepared by Breanna Evans, acting as Scribe for Sara Grady PA-C . Sara Grady PA-C:  The scribe's documentation has been prepared under my direction and personally reviewed by me in its entirety.   I confirm that the note above accurately reflects all work, treatment, procedures, and medical decision making performed by me.  _______________________________

## 2018-04-24 NOTE — ED TRIAGE NOTES
Sent here today by his family doctor in [place] for elevated d dimer: 0.88 (per pt) after having blood work done today. Pt states he is short of breath after walking to ED from [address] parking lot. Pt states he was having chest pain, nausea and cold sweats on Saturday, went to [facility] ED but left after waiting for 1.5 hours. Sepsis Screening completed    (  )Patient meets SIRS criteria. (x  )Patient does not meet SIRS criteria.       SIRS Criteria is achieved when two or more of the following are present   Temperature < 96.8°F (36°C) or > 100.9°F (38.3°C)   Heart Rate > 90 beats per minute   Respiratory Rate > 20 breaths per minute   WBC count > 12,000 or <4,000 or > 10% bands

## 2018-04-25 LAB
ALBUMIN SERPL-MCNC: 2.6 G/DL (ref 3.4–5)
ALBUMIN/GLOB SERPL: 0.9 {RATIO} (ref 0.8–1.7)
ALP SERPL-CCNC: 92 U/L (ref 45–117)
ALT SERPL-CCNC: 32 U/L (ref 16–61)
ANION GAP SERPL CALC-SCNC: 5 MMOL/L (ref 3–18)
APTT PPP: 71.1 SEC (ref 23–36.4)
APTT PPP: >180 SEC (ref 23–36.4)
APTT PPP: >180 SEC (ref 23–36.4)
AST SERPL-CCNC: 29 U/L (ref 15–37)
BILIRUB SERPL-MCNC: 0.5 MG/DL (ref 0.2–1)
BUN SERPL-MCNC: 22 MG/DL (ref 7–18)
BUN/CREAT SERPL: 16 (ref 12–20)
CALCIUM SERPL-MCNC: 8.1 MG/DL (ref 8.5–10.1)
CHLORIDE SERPL-SCNC: 107 MMOL/L (ref 100–108)
CHOLEST SERPL-MCNC: 111 MG/DL
CK MB CFR SERPL CALC: 1 % (ref 0–4)
CK MB CFR SERPL CALC: 1 % (ref 0–4)
CK MB SERPL-MCNC: 1.1 NG/ML (ref 5–25)
CK MB SERPL-MCNC: 1.3 NG/ML (ref 5–25)
CK SERPL-CCNC: 115 U/L (ref 39–308)
CK SERPL-CCNC: 129 U/L (ref 39–308)
CO2 SERPL-SCNC: 28 MMOL/L (ref 21–32)
CREAT SERPL-MCNC: 1.35 MG/DL (ref 0.6–1.3)
ERYTHROCYTE [DISTWIDTH] IN BLOOD BY AUTOMATED COUNT: 14.3 % (ref 11.6–14.5)
GLOBULIN SER CALC-MCNC: 3 G/DL (ref 2–4)
GLUCOSE SERPL-MCNC: 118 MG/DL (ref 74–99)
HCT VFR BLD AUTO: 37.5 % (ref 36–48)
HDLC SERPL-MCNC: 80 MG/DL (ref 40–60)
HDLC SERPL: 1.4 {RATIO} (ref 0–5)
HGB BLD-MCNC: 12.2 G/DL (ref 13–16)
LDLC SERPL CALC-MCNC: 27.4 MG/DL (ref 0–100)
LIPID PROFILE,FLP: ABNORMAL
MCH RBC QN AUTO: 29.8 PG (ref 24–34)
MCHC RBC AUTO-ENTMCNC: 32.5 G/DL (ref 31–37)
MCV RBC AUTO: 91.5 FL (ref 74–97)
PLATELET # BLD AUTO: 183 K/UL (ref 135–420)
PMV BLD AUTO: 9.2 FL (ref 9.2–11.8)
POTASSIUM SERPL-SCNC: 4.8 MMOL/L (ref 3.5–5.5)
PROT SERPL-MCNC: 5.6 G/DL (ref 6.4–8.2)
RBC # BLD AUTO: 4.1 M/UL (ref 4.7–5.5)
SODIUM SERPL-SCNC: 140 MMOL/L (ref 136–145)
TRIGL SERPL-MCNC: 18 MG/DL (ref ?–150)
TROPONIN I SERPL-MCNC: 0.48 NG/ML (ref 0–0.06)
TROPONIN I SERPL-MCNC: 0.63 NG/ML (ref 0–0.06)
VLDLC SERPL CALC-MCNC: 3.6 MG/DL
WBC # BLD AUTO: 8.1 K/UL (ref 4.6–13.2)

## 2018-04-25 PROCEDURE — 85730 THROMBOPLASTIN TIME PARTIAL: CPT | Performed by: INTERNAL MEDICINE

## 2018-04-25 PROCEDURE — 74011250636 HC RX REV CODE- 250/636: Performed by: INTERNAL MEDICINE

## 2018-04-25 PROCEDURE — 82550 ASSAY OF CK (CPK): CPT | Performed by: INTERNAL MEDICINE

## 2018-04-25 PROCEDURE — 80053 COMPREHEN METABOLIC PANEL: CPT | Performed by: INTERNAL MEDICINE

## 2018-04-25 PROCEDURE — 85027 COMPLETE CBC AUTOMATED: CPT | Performed by: INTERNAL MEDICINE

## 2018-04-25 PROCEDURE — 65660000000 HC RM CCU STEPDOWN

## 2018-04-25 PROCEDURE — 74011250637 HC RX REV CODE- 250/637: Performed by: INTERNAL MEDICINE

## 2018-04-25 PROCEDURE — 36415 COLL VENOUS BLD VENIPUNCTURE: CPT | Performed by: INTERNAL MEDICINE

## 2018-04-25 PROCEDURE — C8929 TTE W OR WO FOL WCON,DOPPLER: HCPCS

## 2018-04-25 PROCEDURE — 85730 THROMBOPLASTIN TIME PARTIAL: CPT | Performed by: PHYSICIAN ASSISTANT

## 2018-04-25 PROCEDURE — 93005 ELECTROCARDIOGRAM TRACING: CPT

## 2018-04-25 PROCEDURE — 74011000250 HC RX REV CODE- 250: Performed by: INTERNAL MEDICINE

## 2018-04-25 RX ORDER — HEPARIN SODIUM 1000 [USP'U]/ML
40 INJECTION, SOLUTION INTRAVENOUS; SUBCUTANEOUS ONCE
Status: COMPLETED | OUTPATIENT
Start: 2018-04-25 | End: 2018-04-25

## 2018-04-25 RX ADMIN — SODIUM CHLORIDE 1 ML: 9 INJECTION INTRAMUSCULAR; INTRAVENOUS; SUBCUTANEOUS at 11:12

## 2018-04-25 RX ADMIN — SODIUM CHLORIDE 100 ML/HR: 900 INJECTION, SOLUTION INTRAVENOUS at 21:14

## 2018-04-25 RX ADMIN — MONTELUKAST SODIUM 10 MG: 10 TABLET, FILM COATED ORAL at 18:30

## 2018-04-25 RX ADMIN — ASPIRIN 81 MG: 81 TABLET, COATED ORAL at 12:15

## 2018-04-25 RX ADMIN — AMLODIPINE BESYLATE 5 MG: 5 TABLET ORAL at 12:15

## 2018-04-25 RX ADMIN — SIMVASTATIN 20 MG: 20 TABLET, FILM COATED ORAL at 21:08

## 2018-04-25 RX ADMIN — PANTOPRAZOLE SODIUM 40 MG: 40 TABLET, DELAYED RELEASE ORAL at 11:36

## 2018-04-25 RX ADMIN — SODIUM CHLORIDE 100 ML/HR: 900 INJECTION, SOLUTION INTRAVENOUS at 00:04

## 2018-04-25 RX ADMIN — HEPARIN SODIUM 3250 UNITS: 1000 INJECTION, SOLUTION INTRAVENOUS; SUBCUTANEOUS at 21:00

## 2018-04-25 RX ADMIN — SODIUM CHLORIDE 100 ML/HR: 900 INJECTION, SOLUTION INTRAVENOUS at 11:17

## 2018-04-25 NOTE — PROGRESS NOTES
Hospitalist Progress Note    Patient: Samantha Colin MRN: [id number]  CSN: [id number]    YOB: 1955  Age: 57 y.o. Sex: male    DOA: 4/24/2018 LOS:  LOS: 1 day          Chief Complaint:  acs        Assessment/Plan       Disposition :  Patient Active Problem List   Diagnosis Code    CAD (coronary artery disease) I25.10    Hypertension I10    Chronic obstructive pulmonary disease (HCC) J44.9    Chest pain R07.9    Elevated troponin R74.8    CKD (chronic kidney disease) stage 3, GFR 30-59 ml/min N18.3    Asthma J45.909    NSTEMI (non-ST elevated myocardial infarction) ([facility] [address] I21.4     1. Atypical Chest Pain; NSTEMI and/or PE. 2. CAD s/p Multiple Stents and MI.  3. Elevated Trops . 4. Hx of CKD stage III. 5. Hx of Asthma . 6. HTN. Continue heparin gtt. Hydrate via iv for impending cath. DVTpx. Dispo TBD. Subjective:    No cp. No sob. On heparin gtt and ivf. Cath planned for tomorrow.     Review of systems:    Constitutional: denies fevers, chills, myalgias  Respiratory: denies SOB, cough  Cardiovascular: denies chest pain, palpitations  Gastrointestinal: denies nausea, vomiting, diarrhea      Vital signs/Intake and Output:  Visit Vitals    /87    Pulse 68    Temp 97.3 °F (36.3 °C)    Resp 18    Ht 5' 5\" (1.651 m)    Wt 81.2 kg (179 lb)    SpO2 97%    BMI 29.79 kg/m2     Current Shift:  04/25 0701 - 04/25 1900  In: -   Out: 200 [Urine:200]  Last three shifts:  04/23 1901 - 04/25 0700  In: -   Out: 300 [Urine:300]    Exam:    General: nad  Head/Neck: mmm  CVS:s1s2  Lungs:Ctab/l  Abdomen: Soft, bs+  Extremities: No C/C/E,                Labs: Results:       Chemistry Recent Labs      04/25/18   0423  04/24/18 1900   GLU  118*  167*   NA  140  141   K  4.8  4.2   CL  107  104   CO2  28  30   BUN  22*  27*   CREA  1.35*  1.60*   CA  8.1*  8.5   AGAP  5  7   BUCR  16  17   AP  92  126*   TP  5.6*  6.9   ALB  2.6*  3.3*   GLOB  3.0  3.6   AGRAT  0.9  0.9      CBC w/Diff Recent Labs      04/25/18   0423  04/24/18   1900   WBC  8.1  7.5   RBC  4.10*  4.52*   HGB  12.2*  13.8   HCT  37.5  41.2   PLT  183  204   GRANS   --   69   LYMPH   --   18*   EOS   --   1      Cardiac Enzymes Recent Labs      04/25/18   0423  04/24/18   2320   CPK  115  129   CKND1  1.0  1.0      Coagulation Recent Labs      04/25/18   1006  04/25/18 0423   APTT  >180.0*  >180.0*       Lipid Panel Lab Results   Component Value Date/Time    Cholesterol, total 111 04/24/2018 11:20 PM    HDL Cholesterol 80 (H) 04/24/2018 11:20 PM    LDL, calculated 27.4 04/24/2018 11:20 PM    VLDL, calculated 3.6 04/24/2018 11:20 PM    Triglyceride 18 04/24/2018 11:20 PM    CHOL/HDL Ratio 1.4 04/24/2018 11:20 PM      BNP No results for input(s): BNPP in the last 72 hours.    Liver Enzymes Recent Labs      04/25/18 0423   TP  5.6*   ALB  2.6*   AP  92   SGOT  29      Thyroid Studies Lab Results   Component Value Date/Time    TSH 1.730 11/07/2014 04:23 AM        Procedures/imaging: see electronic medical records for all procedures/Xrays and details which were not copied into this note but were reviewed prior to creation of Rachelle Hickman MD

## 2018-04-25 NOTE — PROGRESS NOTES
Problem: Falls - Risk of  Goal: *Absence of Falls  Document Karyn Fall Risk and appropriate interventions in the flowsheet.    Outcome: Progressing Towards Goal  Fall Risk Interventions:            Medication Interventions: Patient to call before getting OOB    Elimination Interventions: Call light in reach, Patient to call for help with toileting needs, Toileting schedule/hourly rounds, Urinal in reach    History of Falls Interventions: Door open when patient unattended

## 2018-04-25 NOTE — CONSULTS
[facility] Consult Note      Patient: Kimberly Roy MRN: [id number]  SSN: [id number]    YOB: 1955  Age: 57 y.o. Sex: male    Date of Consultation: 04/25/2018  Referring Physician: SURENDRA Pathak  Reason for Consultation: chest pain and elevated troponin and history multiple stents in past and recent was in 2014    HPI:  I was asked ER to see this pleasant gentleman with chest pain, elevated troponin and now pt is chest pain  Kimberly Roy is a 57 y.o. male who came to the ER with chest pain. Patient states he had been having pressure like chest pain for the past 4 days. IChest pain started on Saturday after cutting grass at home, pressure 10/10 with exertional sob and nausea. pain was in center of chest without radiation. He went to [facility] ED but due to long wait he returned home. He had taken Nitro x2 prior to going to the ED therefore his pain had decreased from 10/10 to 3/10. At that time he decided to wait and see his PCP on Tuesday. During this time his pain persisted at 3/10 without any exacerbating factors. Today at his PCP, routine lab work along with CXR was done but his D Dimer came back elevated therefore was sent to the ED. Pt CTA chest is negative for PE  Trop elevated. CKD stage III noted.     Patient has had mutliple MI's in the past with stents. He tells me he had a major MI back in 2005 causing him to have cardiac arrest and subsequently getting multiple stents. He used to follow with Dr Jenny Dukes. His last stress was about a year ago. His last MI leading to have his last stent was back in 2014, when he actually had similar symptoms of chest pain.    Pt denied fever, cough, pleuritic chest pain and leg swelling                 Past Medical History:   Diagnosis Date    Asthma     CAD (coronary artery disease)     COPD     Hypertension      Past Surgical History:   Procedure Laterality Date    ABDOMEN SURGERY PROC UNLISTED      instestinal rupture    HX COLOSTOMY      HX COLOSTOMY TAKE DOWN      HX CORONARY STENT PLACEMENT       Current Facility-Administered Medications   Medication Dose Route Frequency    umeclidinium (INCRUSE ELLIPTA) 62.5 mcg/actuation  1 Puff Inhalation DAILY    pharmacy and Nursing- Vitamin D please clarify strength & frrequency  1 Each Other DAILY    aspirin delayed-release tablet 81 mg  81 mg Oral DAILY    simvastatin (ZOCOR) tablet 20 mg  20 mg Oral QHS    pantoprazole (PROTONIX) tablet 40 mg  40 mg Oral DAILY    nitroglycerin (NITROSTAT) tablet 0.4 mg  0.4 mg SubLINGual Q5MIN PRN    fluticasone-vilanterol (BREO ELLIPTA) 100mcg-25mcg/puff  1 Puff Inhalation DAILY    montelukast (SINGULAIR) tablet 10 mg  10 mg Oral DAILY    amLODIPine (NORVASC) tablet 5 mg  5 mg Oral DAILY    0.9% sodium chloride infusion  100 mL/hr IntraVENous CONTINUOUS    acetaminophen (TYLENOL) tablet 650 mg  650 mg Oral Q4H PRN    HYDROcodone-acetaminophen (NORCO) 5-325 mg per tablet 1 Tab  1 Tab Oral Q4H PRN    morphine injection 1 mg  1 mg IntraVENous Q4H PRN    naloxone (NARCAN) injection 0.4 mg  0.4 mg IntraVENous PRN    ondansetron (ZOFRAN) injection 4 mg  4 mg IntraVENous Q4H PRN    docusate sodium (COLACE) capsule 100 mg  100 mg Oral BID PRN    heparin 25,000 units in D5W 250 ml infusion  18-36 Units/kg/hr IntraVENous TITRATE       Allergies and Intolerances: Allergies   Allergen Reactions    Ciprofloxacin Other (comments)     PT states that he turns red. Family History:   No family history on file. Social History:   He  reports that he has quit smoking. He does not have any smokeless tobacco history on file. He  has no alcohol history on file. Review of Systems:     Review of Systems  Gen: No fever, chills, malaise, weight loss/gain. Heent: No headache, rhinorrhea, epistaxis, ear pain, hearing loss, sinus pain, neck pain/stiffness, sore throat. Heart: No chest pain, palpitations, VIRK, pnd, or orthopnea.    Resp: No cough, hemoptysis, wheezing and shortness of breath. GI: No nausea, vomiting, diarrhea, constipation, melena or hematochezia. : No urinary obstruction, dysuria or hematuria. Derm: No rash, new skin lesion or pruritis. Musc/skeletal: no bone or joint complains. Vasc: No edema, cyanosis or claudication. Endo: No heat/cold intolerance, no polyuria,polydipsia or polyphagia. Neuro: No unilateral weakness, numbness, tingling. No seizures. Heme: No easy bruising or bleeding. Physical:   Patient Vitals for the past 6 hrs:   Temp Pulse Resp BP SpO2   04/25/18 0755 97.6 °F (36.4 °C) 74 17 112/75 98 %   04/25/18 0349 98.2 °F (36.8 °C) 68 16 148/86 -         Exam:   General Appearance: Comfortable, not using accessory muscles of respiration. HEENT: VERNON. HEAD: Atraumatic  NECK: No JVD, no thyroidomeglay. CAROTIDS:  LUNGS: Clear bilaterally. HEART: S1+S2     ABD: Non-tender, BS Audible    EXT: No edema, and no cysnosis. VASCULAR EXAM: Pulses are intact. PSYCHIATRIC EXAM: Mood is appropriate. MUSCULOSKELETAL: Grossly no joint deformity. NEUROLOGICAL: Motor and sensory sytem intact and Cranial nerves II-XII intact.     Review of Data:   LABS:   Lab Results   Component Value Date/Time    WBC 8.1 04/25/2018 04:23 AM    HGB 12.2 (L) 04/25/2018 04:23 AM    HCT 37.5 04/25/2018 04:23 AM    PLATELET 150 48/33/4044 04:23 AM     Lab Results   Component Value Date/Time    Sodium 140 04/25/2018 04:23 AM    Potassium 4.8 04/25/2018 04:23 AM    Chloride 107 04/25/2018 04:23 AM    CO2 28 04/25/2018 04:23 AM    Glucose 118 (H) 04/25/2018 04:23 AM    BUN 22 (H) 04/25/2018 04:23 AM    Creatinine 1.35 (H) 04/25/2018 04:23 AM     Lab Results   Component Value Date/Time    Cholesterol, total 111 04/24/2018 11:20 PM    HDL Cholesterol 80 (H) 04/24/2018 11:20 PM    LDL, calculated 27.4 04/24/2018 11:20 PM    Triglyceride 18 04/24/2018 11:20 PM     No results found for: GPT  Lab Results   Component Value Date/Time    Hemoglobin A1c 6.3 (H) 04/24/2018 11:20 PM         Cardiology Procedures:   Results for orders placed or performed during the hospital encounter of 04/24/18   EKG, 12 LEAD, INITIAL   Result Value Ref Range    Ventricular Rate 68 BPM    Atrial Rate 68 BPM    P-R Interval 156 ms    QRS Duration 86 ms    Q-T Interval 438 ms    QTC Calculation (Bezet) 465 ms    Calculated P Axis 72 degrees    Calculated R Axis 82 degrees    Calculated T Axis 74 degrees    Diagnosis       Normal sinus rhythm  Normal ECG  When compared with ECG of 24-APR-2018 18:44,  premature atrial complexes are no longer present  QT has lengthened             Impression / Plan:    Patient Active Problem List   Diagnosis Code    CAD (coronary artery disease) I25.10    Hypertension I10    Chronic obstructive pulmonary disease ([facility]) J44.9    Chest pain R07.9    Elevated troponin R74.8    CKD (chronic kidney disease) stage 3, GFR 30-59 ml/min N18.3    Asthma J45.909    NSTEMI (non-ST elevated myocardial infarction) ([facility]) I21.4       A/P  1- NSTEMI, PE is ruled out with CTA  2- CAD with multiple stents  3- acute on chronic renal failure    Plan:  1- start Heparin  2- start Hydration  Pt is chest pain free at this, trop trending down  3- will get echo  [facility] possible PCI tomorrow if renal function stable.   Discussed with patient and agree with above        Signed By: Anirudh Calderon MD     April 25, 2018

## 2018-04-25 NOTE — PROGRESS NOTES
Problem: Falls - Risk of  Goal: *Absence of Falls  Document Karyn Fall Risk and appropriate interventions in the flowsheet.   Outcome: Progressing Towards Goal  Fall Risk Interventions:

## 2018-04-25 NOTE — ED NOTES
Primary Nurse Javed Hernandez and Talha Valenzuela, RN performed a dual skin assessment on this patient No impairment noted  Alirio score is 23

## 2018-04-25 NOTE — H&P
History & Physical    Patient: Moises Muniz MRN: [id number]  CSN: [id number]    YOB: 1955  Age: 57 y.o. Sex: male      DOA: 4/24/2018  Primary Care Provider:  Betsy Banks MD      Assessment/Plan     Patient Active Problem List   Diagnosis Code    CAD (coronary artery disease) I25.10    Hypertension I10    Chronic obstructive pulmonary disease ([facility] J44.9    Chest pain R07.9    Elevated troponin R74.8    CKD (chronic kidney disease) stage 3, GFR 30-59 ml/min N18.3    Asthma J45.909    NSTEMI (non-ST elevated myocardial infarction) ([facility] I21.4       1. Atypical Chest Pain; NSTEMI and/or PE  2. CAD s/p Multiple Stents and MI  3. Elevated Trops   4. Hx of CKD stage III  5. Hx of Asthma   6. HTN  FULL CODE     -admit for further care to tele   -IVF, trend CE, check lipid panel and A1c  -ASA given in ED, heparin drip started - should help with PE/ACS. -Will keep him npo in case if he needs further cardiac intervention tomorrow. If not, should atleast get echo.   -repeat ekg in am.   -CTA chest ordered, pending at this time. IVF for hydration given his renal function   -closely monitor Cr.   -patient refusing nitro for chest pain. Possible needs to be on Ranexa as outpatient if he has chronic angina- defer to cardiology   -supportive care     Estimated length of stay : 2-3 days     CC: chest pain        HPI:     Moises Muniz is a 57 y.o. male who came to the ed with complaints of chest pain. Patient states he had been having pressure like chest pain for the past 4 days. It started on Saturday after cutting grass at home. His pain at the time was 10/10 with exertional sob and nausea. He went to [facility] ED but due to long wait he returned home. He had taken Nitro x2 prior to going to the ED therefore his pain had decreased from 10/10 to 3/10. At that time he decided to wait and see his PCP on Tuesday. During this time his pain persisted at 3/10 without any exacerbating factors.  Today at his PCP, routine lab work along with CXR was done but his D Dimer came back elevated therefore was sent to the ED. His chest pain has persisted and reports of exertional sob. His vitals have been stable, ekg - no acute changes. Trop elevated. CKD stage III noted. CTA chest ordered. ASA ordered and patient refused nitro. Patient has had mutliple MI's in the past with stents. He tells me he had a major MI back in 2005 causing him to have cardiac arrest and subsequently getting multiple stents. He used to follow with Dr Fadi Myles but later started following Dr Dayanna Payton here at [facility].. His last stress was about a year ago. His last MI leading to have his last stent was back in 2014, when he actually had similar symptoms of chest pain. Quit smoking 25 yrs ago, no alcohol or IVDA. Past Medical History:   Diagnosis Date    Asthma     CAD (coronary artery disease)     COPD     Hypertension        Past Surgical History:   Procedure Laterality Date    ABDOMEN SURGERY PROC UNLISTED      instestinal rupture    HX COLOSTOMY      HX COLOSTOMY TAKE DOWN      HX CORONARY STENT PLACEMENT         No family history on file. Social History     Social History    Marital status:      Spouse name: N/A    Number of children: N/A    Years of education: N/A     Social History Main Topics    Smoking status: Former Smoker    Smokeless tobacco: Not on file    Alcohol use Not on file    Drug use: Not on file    Sexual activity: Not on file     Other Topics Concern    Not on file     Social History Narrative       Prior to Admission medications    Medication Sig Start Date End Date Taking? Authorizing Provider   amlodipine besylate (NORVASC PO) Take  by mouth. Yes Moira Denney MD   Biotin 2,500 mcg cap Take  by mouth. Yes Historical Provider   budesonide-formoterol (SYMBICORT) 160-4.5 mcg/actuation HFA inhaler Take 2 Puffs by inhalation two (2) times a day.     Moira Denney MD   celecoxib (CELEBREX) 100 mg capsule Take 100 mg by mouth two (2) times a day. Moira Denney MD   montelukast (SINGULAIR) 10 mg tablet Take 10 mg by mouth daily. Moira Denney MD   pantoprazole (PROTONIX) 40 mg tablet Take 40 mg by mouth daily. Historical Provider   meclizine (ANTIVERT) 25 mg tablet Take  by mouth three (3) times daily as needed. Historical Provider   nitroglycerin (NITROSTAT) 0.4 mg SL tablet by SubLINGual route every five (5) minutes as needed for Chest Pain. Historical Provider   omega-3 fatty acids-vitamin e (FISH OIL) 1,000 mg cap Take 1 Cap by mouth. Historical Provider   tiotropium (SPIRIVA WITH HANDIHALER) 18 mcg inhalation capsule Take 1 Cap by inhalation daily. Moira Denney MD   CHOLECALCIFEROL, VITAMIN D3, (VITAMIN D3 PO) Take  by mouth. Moira Denney MD   aspirin 81 mg tablet Take 81 mg by mouth. Moira Denney MD   SIMVASTATIN PO Take  by mouth. Moira Denney MD       Allergies   Allergen Reactions    Ciprofloxacin Other (comments)     PT states that he turns red. Review of Systems  Gen: No fever, chills, malaise, weight loss/gain. Heent: No headache, rhinorrhea, epistaxis, ear pain, hearing loss, sinus pain, neck pain/stiffness, sore throat. Heart: No palpitations,  pnd, or orthopnea. Resp: No cough, hemoptysis, wheezing and shortness of breath. GI: No nausea, vomiting, diarrhea, constipation, melena or hematochezia. : No urinary obstruction, dysuria or hematuria. Derm: No rash, new skin lesion or pruritis. Musc/skeletal: no bone or joint complains. Vasc: No edema, cyanosis or claudication. Endo: No heat/cold intolerance, no polyuria,polydipsia or polyphagia. Neuro: No unilateral weakness, numbness, tingling. No seizures. Heme: No easy bruising or bleeding.           Physical Exam:     Physical Exam:  Visit Vitals    /82    Pulse 67    Temp 99 °F (37.2 °C)    Resp 19    Ht 5' 5\" (1.651 m)    Wt 77.1 kg (170 lb)    SpO2 98%    BMI 28.29 kg/m2 O2 Device: Room air    Temp (24hrs), Av °F (37.2 °C), Min:99 °F (37.2 °C), Max:99 °F (37.2 °C)             General:  Awake, cooperative, no distress. Head:  Normocephalic, without obvious abnormality, atraumatic. Eyes:  Conjunctivae/corneas clear, sclera anicteric, PERRL, EOMs intact. Nose: Nares normal. No drainage or sinus tenderness. Throat: Lips, mucosa, and tongue normal.    Neck: Supple, symmetrical, trachea midline, no adenopathy. Lungs:   Clear to auscultation bilaterally. Heart:  Regular rate and rhythm, S1, S2 normal, no murmur, click, rub or gallop. Abdomen: Soft, non-tender. Bowel sounds normal. No masses,  No organomegaly. Extremities: Extremities normal, atraumatic, no cyanosis or edema. Capillary refill normal.   Pulses: 2+ and symmetric all extremities. Skin: Skin color pink/pale/mottled/flushed, turgor normal. No rashes or lesions   Neurologic: CNII-XII intact. No focal motor or sensory deficit.        Labs Reviewed:      Recent Results (from the past 24 hour(s))   EKG, 12 LEAD, INITIAL    Collection Time: 18  6:44 PM   Result Value Ref Range    Ventricular Rate 69 BPM    Atrial Rate 69 BPM    P-R Interval 142 ms    QRS Duration 80 ms    Q-T Interval 384 ms    QTC Calculation (Bezet) 411 ms    Calculated P Axis 50 degrees    Calculated R Axis 41 degrees    Calculated T Axis 65 degrees    Diagnosis       Sinus rhythm with premature atrial complexes  Otherwise normal ECG  When compared with ECG of 14-AUG-2016 09:26,  premature atrial complexes are now present     CBC WITH AUTOMATED DIFF    Collection Time: 18  7:00 PM   Result Value Ref Range    WBC 7.5 4.6 - 13.2 K/uL    RBC 4.52 (L) 4.70 - 5.50 M/uL    HGB 13.8 13.0 - 16.0 g/dL    HCT 41.2 36.0 - 48.0 %    MCV 91.2 74.0 - 97.0 FL    MCH 30.5 24.0 - 34.0 PG    MCHC 33.5 31.0 - 37.0 g/dL    RDW 14.4 11.6 - 14.5 %    PLATELET 236 215 - 832 K/uL    MPV 9.2 9.2 - 11.8 FL    NEUTROPHILS 69 40 - 73 % LYMPHOCYTES 18 (L) 21 - 52 %    MONOCYTES 12 (H) 3 - 10 %    EOSINOPHILS 1 0 - 5 %    BASOPHILS 0 0 - 2 %    ABS. NEUTROPHILS 5.2 1.8 - 8.0 K/UL    ABS. LYMPHOCYTES 1.4 0.9 - 3.6 K/UL    ABS. MONOCYTES 0.9 0.05 - 1.2 K/UL    ABS. EOSINOPHILS 0.1 0.0 - 0.4 K/UL    ABS. BASOPHILS 0.0 0.0 - 0.06 K/UL    DF AUTOMATED     CARDIAC PANEL,(CK, CKMB & TROPONIN)    Collection Time: 04/24/18  7:00 PM   Result Value Ref Range     39 - 308 U/L    CK - MB 1.4 <3.6 ng/ml    CK-MB Index 0.9 0.0 - 4.0 %    Troponin-I, Qt. 0.59 (H) 0.00 - 0.32 NG/ML   METABOLIC PANEL, COMPREHENSIVE    Collection Time: 04/24/18  7:00 PM   Result Value Ref Range    Sodium 141 136 - 145 mmol/L    Potassium 4.2 3.5 - 5.5 mmol/L    Chloride 104 100 - 108 mmol/L    CO2 30 21 - 32 mmol/L    Anion gap 7 3.0 - 18 mmol/L    Glucose 167 (H) 74 - 99 mg/dL    BUN 27 (H) 7.0 - 18 MG/DL    Creatinine 1.60 (H) 0.6 - 1.3 MG/DL    BUN/Creatinine ratio 17 12 - 20      GFR est AA 53 (L) >60 ml/min/1.73m2    GFR est non-AA 44 (L) >60 ml/min/1.73m2    Calcium 8.5 8.5 - 10.1 MG/DL    Bilirubin, total 0.6 0.2 - 1.0 MG/DL    ALT (SGPT) 38 16 - 61 U/L    AST (SGOT) 34 15 - 37 U/L    Alk. phosphatase 126 (H) 45 - 117 U/L    Protein, total 6.9 6.4 - 8.2 g/dL    Albumin 3.3 (L) 3.4 - 5.0 g/dL    Globulin 3.6 2.0 - 4.0 g/dL    A-G Ratio 0.9 0.8 - 1.7     NT-PRO BNP    Collection Time: 04/24/18  7:00 PM   Result Value Ref Range    NT pro- 0 - 900 PG/ML   PTT    Collection Time: 04/24/18  7:00 PM   Result Value Ref Range    aPTT 27.4 23.0 - 36.4 SEC       Procedures/imaging: see electronic medical records for all procedures/Xrays and details which were not copied into this note but were reviewed prior to creation of Plan    50 minutes ofcare time spent in the direct evaluation and treatment of this high risk patient.        CC: Rachel Guan MD

## 2018-04-25 NOTE — ED NOTES
Pt received from previous nurse. Pt alert and oriented x4. No signs of acute distress. Pt on cardiac monitor. VS stable. No dyspnea at this time. Pt with dyspnea on exertion. Pt denies any chest pain at this time. COntinue frequent monitoring.

## 2018-04-25 NOTE — PROGRESS NOTES
Pt with increased bruising to right arm, tiny open area with scant bleeding, less than 1/4 inch in size, clean area and applied band aid, notified Dr. Shannen Ingram, orders to continue to monitor site,elevated arm on pillow.

## 2018-04-25 NOTE — ED NOTES
TRANSFER - OUT REPORT:    Verbal report given to Yola Ann RN(name) on Waqas Bin  being transferred to tele(unit) for routine progression of care       Report consisted of patients Situation, Background, Assessment and   Recommendations(SBAR). Information from the following report(s) SBAR, ED Summary, Procedure Summary, MAR, Recent Results and Cardiac Rhythm nsr was reviewed with the receiving nurse. Lines:   Peripheral IV 04/24/18 Left Antecubital (Active)        Opportunity for questions and clarification was provided.       Patient transported with:   Monitor  Registered Nurse  Tech

## 2018-04-25 NOTE — ROUTINE PROCESS
6152  Assumed care of pt after bedside verbal report was given by off going nurse, pt resting in bed awake, no acute distress noted, heparin gtt infusing at 15 units/kg/hr, normal saline infusing at 100 ml/hr, will monitor     0957 pt off unit for 2D-echo, no distress noted    1121 pt back form echo, no distress noted, will continue to monitor     1132 rcved notification of aPTT >180 by , held heparin gtt as per protocol    1237 resumed heparin gtt at 12 units per hour, next aPTT check at 1830 as per protocol    1440 pt resting in bed quietly, no changes noted    1642 Bedside and Verbal shift change report given to RUI Weber (oncoming nurse) by Blane Mendes RN (offgoing nurse). Report included the following information SBAR, Kardex and MAR.

## 2018-04-25 NOTE — PROGRESS NOTES
Reason for Admission:   Dayton Angulo is a 57 y.o. male with PMHX of MI who presents to the emergency department C/O CP onset 3 days ago after cutting grass. Associated sxs include nausea and cold sweats. Pt took 2 nitros right after CP started. Pt went to [facility] after sxs onset but due to 1.5 hour wait he left. Pt was seen by PCP today and blood was drawn, d-dimer was . 88 so pt was called tonight and told to come to ED. Pt is on baby ASA daily. Today pt states the chest pressure is 3/10. Pt denies taking nitro today, leg swelling, smoking, and any other sxs or complaints. Patient admitted for medical management of chest pain               RRAT Score:     22             Resources/supports as identified by patient/family:   Lives with son.states he is independent                 Top Challenges facing patient (as identified by patient/family and CM): Finances/Medication cost?      Patient has medicare for medical needs              Transportation? Son will pick him up when ready for d/c              Support system or lack thereof? Living arrangements? Lives with son            Self-care/ADLs/Cognition?  independent          Current Advanced Directive/Advance Care Plan:                            Plan for utilizing home health:    TBD                      Likelihood of readmission: TBD                 Transition of Care Plan:        Patient lives with his son and would like to return home when d/c    Met with patient at bedside. patient lives with son stat heis independent denies needs from cm.  Patient to have cardiac cath tomorrow

## 2018-04-25 NOTE — PROGRESS NOTES
26 Pt received from offgoing nurse without any signs or symptoms of distress. Pt vitals are stable and within normal limits. Pt bed in low position with wheels locked and call bell within reach. 1630 Assessment completed and documented in flow sheet. Pt denies any further needs at this time. Pt in NAD with bed in low position, wheels locked and call bell within reach. 1830 Scheduled medications administered as ordered. 2059   IV heparin rate has been adjusted based on the most recent PTT results. Lab Results   Component Value Date/Time    aPTT 71.1 (H) 04/25/2018 07:36 PM     2101 Assessment completed and documented in flow sheet. Pt denies any further needs at this time. Pt in NAD with bed in low position, wheels locked and call bell within reach. 2108 Scheduled medications administered as ordered. 2230 Shift summary: Patient spent uneventful shift. No issues noted. See flow sheet and MAR.    0000 Bedside and Verbal shift change report given to [facility] (oncoming nurse) by Loretta Laird RN (offgoing nurse). Report given with SBAR, Intake/Output, MAR and Recent Results.

## 2018-04-25 NOTE — PROGRESS NOTES
NUTRITION SCREENING    Recommendations: Adv diet per MD    RD ASSESSMENT/PLAN:     Diet:  NPO Height: 5' 5\" (165.1 cm)     Food Allergies: NKFA  Weight: 81.2 kg (179 lb)    PO Intake:  No data found. BMI: 29.8 kg/m^2 is  overweight (25.0%-29.9% BMI)      PMH: asthma, COPD, CAD, HTN    Current Hospital Problems: Pt admitted w/ chest pain and SOB. Nutrition intervention not currently indicated. Pt is not at nutritional risk at this time. Will rescreen per policy.      REASON FOR ASSESSMENT:   []  RN Referral:    [x] MST score >/=2  Malnutrition Screening Tool (MST):  Recently Lost Weight Without Trying: No  If Yes, How Much Weight Loss: Unsure  Eating Poorly Due to Decreased Appetite: No  MST Score: Kathie Gotti [address], RD  Pager: [phone]

## 2018-04-26 LAB
ACT BLD: 149 SECS (ref 79–138)
ACT BLD: 485 SECS (ref 79–138)
ANION GAP SERPL CALC-SCNC: 6 MMOL/L (ref 3–18)
APTT PPP: >180 SEC (ref 23–36.4)
BUN SERPL-MCNC: 16 MG/DL (ref 7–18)
BUN/CREAT SERPL: 11 (ref 12–20)
CALCIUM SERPL-MCNC: 8.9 MG/DL (ref 8.5–10.1)
CHLORIDE SERPL-SCNC: 109 MMOL/L (ref 100–108)
CO2 SERPL-SCNC: 27 MMOL/L (ref 21–32)
CREAT SERPL-MCNC: 1.45 MG/DL (ref 0.6–1.3)
GLUCOSE BLD STRIP.AUTO-MCNC: 83 MG/DL (ref 70–110)
GLUCOSE SERPL-MCNC: 120 MG/DL (ref 74–99)
POTASSIUM SERPL-SCNC: 5.3 MMOL/L (ref 3.5–5.5)
SODIUM SERPL-SCNC: 142 MMOL/L (ref 136–145)

## 2018-04-26 PROCEDURE — 85347 COAGULATION TIME ACTIVATED: CPT

## 2018-04-26 PROCEDURE — 36415 COLL VENOUS BLD VENIPUNCTURE: CPT | Performed by: INTERNAL MEDICINE

## 2018-04-26 PROCEDURE — 74011250636 HC RX REV CODE- 250/636

## 2018-04-26 PROCEDURE — B2111ZZ FLUOROSCOPY OF MULTIPLE CORONARY ARTERIES USING LOW OSMOLAR CONTRAST: ICD-10-PCS | Performed by: INTERNAL MEDICINE

## 2018-04-26 PROCEDURE — 82962 GLUCOSE BLOOD TEST: CPT

## 2018-04-26 PROCEDURE — 80048 BASIC METABOLIC PNL TOTAL CA: CPT | Performed by: INTERNAL MEDICINE

## 2018-04-26 PROCEDURE — 74011250636 HC RX REV CODE- 250/636: Performed by: INTERNAL MEDICINE

## 2018-04-26 PROCEDURE — 74011000250 HC RX REV CODE- 250: Performed by: INTERNAL MEDICINE

## 2018-04-26 PROCEDURE — 74011250637 HC RX REV CODE- 250/637: Performed by: INTERNAL MEDICINE

## 2018-04-26 PROCEDURE — 85730 THROMBOPLASTIN TIME PARTIAL: CPT | Performed by: INTERNAL MEDICINE

## 2018-04-26 PROCEDURE — 74011000258 HC RX REV CODE- 258: Performed by: INTERNAL MEDICINE

## 2018-04-26 PROCEDURE — 4A033BC MEASUREMENT OF ARTERIAL PRESSURE, CORONARY, PERCUTANEOUS APPROACH: ICD-10-PCS | Performed by: INTERNAL MEDICINE

## 2018-04-26 PROCEDURE — 74011636320 HC RX REV CODE- 636/320: Performed by: INTERNAL MEDICINE

## 2018-04-26 PROCEDURE — 4A023N7 MEASUREMENT OF CARDIAC SAMPLING AND PRESSURE, LEFT HEART, PERCUTANEOUS APPROACH: ICD-10-PCS | Performed by: INTERNAL MEDICINE

## 2018-04-26 PROCEDURE — 74011250636 HC RX REV CODE- 250/636: Performed by: PHYSICIAN ASSISTANT

## 2018-04-26 PROCEDURE — C1894 INTRO/SHEATH, NON-LASER: HCPCS

## 2018-04-26 PROCEDURE — 74011000250 HC RX REV CODE- 250

## 2018-04-26 PROCEDURE — 65660000000 HC RM CCU STEPDOWN

## 2018-04-26 RX ORDER — FENTANYL CITRATE 50 UG/ML
INJECTION, SOLUTION INTRAMUSCULAR; INTRAVENOUS
Status: COMPLETED
Start: 2018-04-26 | End: 2018-04-26

## 2018-04-26 RX ORDER — SODIUM CHLORIDE 0.9 % (FLUSH) 0.9 %
5-10 SYRINGE (ML) INJECTION EVERY 8 HOURS
Status: DISCONTINUED | OUTPATIENT
Start: 2018-04-26 | End: 2018-04-27 | Stop reason: HOSPADM

## 2018-04-26 RX ORDER — ADENOSINE 3 MG/ML
0.03 INJECTION, SOLUTION INTRAVENOUS
Status: DISCONTINUED | OUTPATIENT
Start: 2018-04-26 | End: 2018-04-26 | Stop reason: HOSPADM

## 2018-04-26 RX ORDER — HEPARIN SODIUM 1000 [USP'U]/ML
1000-5000 INJECTION, SOLUTION INTRAVENOUS; SUBCUTANEOUS
Status: DISCONTINUED | OUTPATIENT
Start: 2018-04-26 | End: 2018-04-26 | Stop reason: HOSPADM

## 2018-04-26 RX ORDER — LIDOCAINE HYDROCHLORIDE 10 MG/ML
3-20 INJECTION INFILTRATION; PERINEURAL
Status: DISCONTINUED | OUTPATIENT
Start: 2018-04-26 | End: 2018-04-26 | Stop reason: HOSPADM

## 2018-04-26 RX ORDER — SODIUM CHLORIDE 900 MG/100ML
INJECTION INTRAVENOUS
Status: DISPENSED
Start: 2018-04-26 | End: 2018-04-27

## 2018-04-26 RX ORDER — FENTANYL CITRATE 50 UG/ML
25-100 INJECTION, SOLUTION INTRAMUSCULAR; INTRAVENOUS
Status: DISCONTINUED | OUTPATIENT
Start: 2018-04-26 | End: 2018-04-26 | Stop reason: HOSPADM

## 2018-04-26 RX ORDER — HEPARIN SODIUM 200 [USP'U]/100ML
INJECTION, SOLUTION INTRAVENOUS
Status: COMPLETED
Start: 2018-04-26 | End: 2018-04-26

## 2018-04-26 RX ORDER — ENOXAPARIN SODIUM 100 MG/ML
40 INJECTION SUBCUTANEOUS EVERY 24 HOURS
Status: DISCONTINUED | OUTPATIENT
Start: 2018-04-26 | End: 2018-04-27 | Stop reason: HOSPADM

## 2018-04-26 RX ORDER — ONDANSETRON 2 MG/ML
INJECTION INTRAMUSCULAR; INTRAVENOUS
Status: DISPENSED
Start: 2018-04-26 | End: 2018-04-27

## 2018-04-26 RX ORDER — ADENOSINE 3 MG/ML
140 INJECTION, SOLUTION INTRAVENOUS ONCE
Status: DISCONTINUED | OUTPATIENT
Start: 2018-04-26 | End: 2018-04-26 | Stop reason: HOSPADM

## 2018-04-26 RX ORDER — LORAZEPAM 2 MG/ML
1 INJECTION INTRAMUSCULAR
Status: COMPLETED | OUTPATIENT
Start: 2018-04-26 | End: 2018-04-26

## 2018-04-26 RX ORDER — BIVALIRUDIN 250 MG/5ML
INJECTION, POWDER, LYOPHILIZED, FOR SOLUTION INTRAVENOUS
Status: DISPENSED
Start: 2018-04-26 | End: 2018-04-27

## 2018-04-26 RX ORDER — MIDAZOLAM HYDROCHLORIDE 1 MG/ML
INJECTION, SOLUTION INTRAMUSCULAR; INTRAVENOUS
Status: COMPLETED
Start: 2018-04-26 | End: 2018-04-26

## 2018-04-26 RX ORDER — HEPARIN SODIUM 1000 [USP'U]/ML
INJECTION, SOLUTION INTRAVENOUS; SUBCUTANEOUS
Status: COMPLETED
Start: 2018-04-26 | End: 2018-04-26

## 2018-04-26 RX ORDER — HEPARIN SODIUM 200 [USP'U]/100ML
500 INJECTION, SOLUTION INTRAVENOUS
Status: DISCONTINUED | OUTPATIENT
Start: 2018-04-26 | End: 2018-04-26 | Stop reason: HOSPADM

## 2018-04-26 RX ORDER — LIDOCAINE HYDROCHLORIDE 10 MG/ML
INJECTION, SOLUTION EPIDURAL; INFILTRATION; INTRACAUDAL; PERINEURAL
Status: COMPLETED
Start: 2018-04-26 | End: 2018-04-26

## 2018-04-26 RX ORDER — VERAPAMIL HYDROCHLORIDE 2.5 MG/ML
INJECTION, SOLUTION INTRAVENOUS
Status: COMPLETED
Start: 2018-04-26 | End: 2018-04-26

## 2018-04-26 RX ORDER — LIDOCAINE HYDROCHLORIDE 10 MG/ML
30-100 INJECTION, SOLUTION EPIDURAL; INFILTRATION; INTRACAUDAL; PERINEURAL ONCE
Status: COMPLETED | OUTPATIENT
Start: 2018-04-26 | End: 2018-04-26

## 2018-04-26 RX ORDER — MIDAZOLAM HYDROCHLORIDE 1 MG/ML
.5-2 INJECTION, SOLUTION INTRAMUSCULAR; INTRAVENOUS
Status: DISCONTINUED | OUTPATIENT
Start: 2018-04-26 | End: 2018-04-26 | Stop reason: HOSPADM

## 2018-04-26 RX ORDER — ADENOSINE 3 MG/ML
INJECTION, SOLUTION INTRAVENOUS
Status: COMPLETED
Start: 2018-04-26 | End: 2018-04-26

## 2018-04-26 RX ORDER — SODIUM CHLORIDE 0.9 % (FLUSH) 0.9 %
5-10 SYRINGE (ML) INJECTION AS NEEDED
Status: DISCONTINUED | OUTPATIENT
Start: 2018-04-26 | End: 2018-04-27 | Stop reason: HOSPADM

## 2018-04-26 RX ADMIN — ONDANSETRON 4 MG: 2 INJECTION INTRAMUSCULAR; INTRAVENOUS at 12:39

## 2018-04-26 RX ADMIN — SIMVASTATIN 20 MG: 20 TABLET, FILM COATED ORAL at 21:20

## 2018-04-26 RX ADMIN — MIDAZOLAM HYDROCHLORIDE 1 MG: 1 INJECTION, SOLUTION INTRAMUSCULAR; INTRAVENOUS at 13:28

## 2018-04-26 RX ADMIN — Medication 1000 UNITS: at 13:00

## 2018-04-26 RX ADMIN — MIDAZOLAM HYDROCHLORIDE 1 MG: 1 INJECTION, SOLUTION INTRAMUSCULAR; INTRAVENOUS at 12:55

## 2018-04-26 RX ADMIN — VERAPAMIL HYDROCHLORIDE 3 ML: 2.5 INJECTION INTRAVENOUS at 12:38

## 2018-04-26 RX ADMIN — FENTANYL CITRATE 50 MCG: 50 INJECTION, SOLUTION INTRAMUSCULAR; INTRAVENOUS at 12:43

## 2018-04-26 RX ADMIN — MONTELUKAST SODIUM 10 MG: 10 TABLET, FILM COATED ORAL at 17:20

## 2018-04-26 RX ADMIN — FENTANYL CITRATE 50 MCG: 50 INJECTION, SOLUTION INTRAMUSCULAR; INTRAVENOUS at 12:33

## 2018-04-26 RX ADMIN — HEPARIN SODIUM 14 UNITS/KG/HR: 10000 INJECTION, SOLUTION INTRAVENOUS at 00:10

## 2018-04-26 RX ADMIN — FENTANYL CITRATE 50 MCG: 50 INJECTION, SOLUTION INTRAMUSCULAR; INTRAVENOUS at 13:27

## 2018-04-26 RX ADMIN — LIDOCAINE HYDROCHLORIDE 5 ML: 10 INJECTION, SOLUTION EPIDURAL; INFILTRATION; INTRACAUDAL; PERINEURAL at 12:33

## 2018-04-26 RX ADMIN — MIDAZOLAM HYDROCHLORIDE 1 MG: 1 INJECTION, SOLUTION INTRAMUSCULAR; INTRAVENOUS at 12:33

## 2018-04-26 RX ADMIN — PANTOPRAZOLE SODIUM 40 MG: 40 TABLET, DELAYED RELEASE ORAL at 08:38

## 2018-04-26 RX ADMIN — ADENOSINE 0.01 MG: 3 INJECTION, SOLUTION INTRAVENOUS at 13:27

## 2018-04-26 RX ADMIN — BIVALIRUDIN 1.75 MG/KG/HR: 250 INJECTION, POWDER, LYOPHILIZED, FOR SOLUTION INTRAVENOUS at 12:56

## 2018-04-26 RX ADMIN — FENTANYL CITRATE 50 MCG: 50 INJECTION, SOLUTION INTRAMUSCULAR; INTRAVENOUS at 13:15

## 2018-04-26 RX ADMIN — ASPIRIN 81 MG: 81 TABLET, COATED ORAL at 08:38

## 2018-04-26 RX ADMIN — ENOXAPARIN SODIUM 40 MG: 40 INJECTION SUBCUTANEOUS at 21:38

## 2018-04-26 RX ADMIN — MIDAZOLAM HYDROCHLORIDE 1 MG: 1 INJECTION, SOLUTION INTRAMUSCULAR; INTRAVENOUS at 13:15

## 2018-04-26 RX ADMIN — FENTANYL CITRATE 50 MCG: 50 INJECTION, SOLUTION INTRAMUSCULAR; INTRAVENOUS at 12:55

## 2018-04-26 RX ADMIN — Medication 10 ML: at 22:00

## 2018-04-26 RX ADMIN — LORAZEPAM 1 MG: 2 INJECTION INTRAMUSCULAR at 11:41

## 2018-04-26 RX ADMIN — BIVALIRUDIN 1.75 MG/KG/HR: 250 INJECTION, POWDER, LYOPHILIZED, FOR SOLUTION INTRAVENOUS at 13:22

## 2018-04-26 RX ADMIN — ADENOSINE 0.01 MG: 3 INJECTION, SOLUTION INTRAVENOUS at 13:08

## 2018-04-26 RX ADMIN — Medication 1000 UNITS: at 13:20

## 2018-04-26 RX ADMIN — FENTANYL CITRATE 50 MCG: 50 INJECTION, SOLUTION INTRAMUSCULAR; INTRAVENOUS at 12:38

## 2018-04-26 RX ADMIN — AMLODIPINE BESYLATE 5 MG: 5 TABLET ORAL at 08:38

## 2018-04-26 RX ADMIN — Medication 1000 UNITS: at 13:10

## 2018-04-26 RX ADMIN — ADENOSINE 0.01 MG: 3 INJECTION, SOLUTION INTRAVENOUS at 13:07

## 2018-04-26 RX ADMIN — HEPARIN SODIUM 4000 UNITS: 1000 INJECTION, SOLUTION INTRAVENOUS; SUBCUTANEOUS at 12:39

## 2018-04-26 RX ADMIN — IOPAMIDOL 125 ML: 510 INJECTION, SOLUTION INTRAVASCULAR at 13:32

## 2018-04-26 RX ADMIN — MIDAZOLAM HYDROCHLORIDE 1 MG: 1 INJECTION, SOLUTION INTRAMUSCULAR; INTRAVENOUS at 12:38

## 2018-04-26 RX ADMIN — VERAPAMIL HYDROCHLORIDE 2.5 MG: 2.5 INJECTION INTRAVENOUS at 12:39

## 2018-04-26 NOTE — PROGRESS NOTES
Tr band removed without incident, 2x2 and tegaderm applied to site,  Neuro/vasc assessment of right hand and arm WNL

## 2018-04-26 NOTE — PROGRESS NOTES
0710 Assumed patient care from off going nurse BRE Durán RN. Patient is resting quietly in bed and appears to be in no sign of distress. Heparin drip rate verified with off going nurse. Whiteboard updated, bed left in lowset position with call bell left within reach. 1620 Patient returned from care unit. Right radial cath site is clean, dry, and intact with immobilizer present. Bed left in lowest position, patient instructed to call for assistance.

## 2018-04-26 NOTE — ROUTINE PROCESS
Bedside and Verbal shift change report given to MICHAEL Soto (oncoming nurse) by DERIK Andrea (offgoing nurse). Report included the following information SBAR, Kardex, Intake/Output and MAR.

## 2018-04-26 NOTE — PROGRESS NOTES
1734: Shift Summary: Vital signs remained stable; No C/o pain or discomfort; Cardiac rhythm: NSR; Heparin gtt infusing per guidelines; Pt. NPO overnight for scheduled cath this afternoon; CHG wipe down performed; Call bell left at reach; bed at lowest position; and wheels locked    0742: Bedside shift change report given to DERIK Marie  (oncoming nurse) by Jory Ureña (offgoing nurse). Report included the following information SBAR and Kardex.

## 2018-04-26 NOTE — PROGRESS NOTES
Returned from cath lab. Pt sleeping but arousable.  Tr band intact to right wirst area, neuro/vasc assessment WNL

## 2018-04-26 NOTE — ROUTINE PROCESS
TRANSFER - OUT REPORT:  Verbal report given to Rere FABIAN(name) on Reatha Najjar being transferred to care(unit) for routine progression of care   Report consisted of patients Situation, Background, Assessment and   Recommendations(SBAR). Information from the following report(s) Procedure Summary, Med Rec Status and Cardiac Rhythm NSR was reviewed with the receiving nurse.     Cath Lab Report:    Procedure:  Hannah ] LHC  [ ] RHC  [ ] PTCA   [ ] Peripheral   [ ] Pacemaker [ ] ALEKS  [ ] [facility]    Access site:   Hannah ] Right [ ] Left  SharriMary ] Radial [ ] Brachial [ ] Femoral [ ] Jugular [ ] Chest Wall      Sheath:           [X] Pulled in Cath Lab   [ ] In place   [ ] To be pulled after:         Closure:          Hannah ] Radial Band  [ ] Manual Pressure     [ ] Krystin Matters     [ ] Star Close    [ ] Per Close    [ ] Safe Guard    Site Assessment:   Hannah ] Clean, Dry, No bleeding    [ ] Minor oozing          [ ] Hematoma: Description:    Stents(s) Placement:  [ ] Left Main:                 [ ] LAD:                [ ] Circ:                [ ] RCA:                [ ] EF:     [ ] Peripheral:      Hannah ] N/A    Infusion [X ]Angiomax d/c'd: @1046    Intra procedure Medications:    Fentanyl:300 MCG  Versed:5 MG  Heparin:4000 U    Lines:        Peripheral IV 04/24/18 Left Antecubital (Active)   Site Assessment Clean, dry, & intact 4/26/2018  8:00 AM   Phlebitis Assessment 0 4/26/2018  8:00 AM   Infiltration Assessment 0 4/26/2018  8:00 AM   Dressing Status Clean, dry, & intact 4/26/2018  8:00 AM   Dressing Type Tape;Transparent 4/26/2018  8:00 AM   Hub Color/Line Status Pink;Flushed;Capped 4/26/2018  8:00 AM   Action Taken Open ports on tubing capped 4/26/2018  8:00 AM   Alcohol Cap Used Yes 4/26/2018  8:00 AM       Peripheral IV 04/25/18 Right Wrist (Active)   Site Assessment Clean, dry, & intact 4/26/2018  8:00 AM   Phlebitis Assessment 0 4/26/2018  8:00 AM   Infiltration Assessment 0 4/26/2018  8:00 AM   Dressing Status Clean, dry, & intact 4/26/2018  8:00 AM   Dressing Type Tape;Transparent 4/26/2018  8:00 AM   Hub Color/Line Status Blue;Flushed; Infusing 4/26/2018  8:00 AM   Action Taken Open ports on tubing capped 4/26/2018  8:00 AM   Alcohol Cap Used Yes 4/26/2018  8:00 AM          Patient Vitals for the past 4 hrs:   Temp Pulse Resp BP SpO2   04/26/18 1329 98 °F (36.7 °C) 65 20 121/80 98 %   04/26/18 1200 - 69 17 140/78 94 %   04/26/18 1145 - 69 17 141/79 94 %   04/26/18 1130 - 69 14 143/82 96 %   04/26/18 1115 - 63 17 128/74 94 %   04/26/18 1100 - 66 18 136/76 94 %   04/26/18 1045 - 67 19 141/82 95 %   04/26/18 1030 - 66 8 140/84 97 %   04/26/18 1015 - 65 16 142/81 96 %   04/26/18 1000 - 69 20 144/80 -   04/26/18 0959 98.4 °F (36.9 °C) 68 18 146/84 96 %   04/26/18 0945 - 70 11 146/84 96 %   04/26/18 0936 98 °F (36.7 °C) - - - -         Extended / Orthostatic Vitals:    Vital Signs  Level of Consciousness: Alert (04/26/18 1329)  Temp: 98 °F (36.7 °C) (04/26/18 1329)  Temp Source: Oral (04/26/18 1329)  Pulse (Heart Rate): 65 (04/26/18 1329)  Heart Rate Source: Monitor (04/26/18 1329)  Cardiac Rhythm: Normal sinus rhythm (04/26/18 1329)  Resp Rate: 20 (04/26/18 1329)  BP: 121/80 (04/26/18 1329)  MAP (Monitor): 93 (04/26/18 1200)  MAP (Calculated): 94 (04/26/18 1329)  BP 1 Location: Left arm (04/26/18 1329)  BP 1 Method: Automatic (04/26/18 1329)  BP Patient Position: At rest (04/26/18 1329)  MEWS Score: 1 (04/26/18 1329)         Oxygen Therapy  O2 Sat (%): 98 % (04/26/18 1329)  Pulse via Oximetry: 66 beats per minute (04/26/18 1200)  O2 Device: Room air (04/26/18 1200)          Opportunity for questions and clarification was provided.

## 2018-04-26 NOTE — ROUTINE PROCESS
Cardiac Cath Lab:  Pre Procedure Chart Check     Patients chart was accessed and reviewed for possible and/or scheduled procedure. Creatinine Clearance:  CREATININE: 1.45 MG/DL ABNORMAL (04/26/18 0305)  Estimated creatinine clearance: 51 mL/min    Total Contrast  Load:  3 x estimated clearance amount=  153ml    75% of Contrast Load:  0.75 x Total Contrast Load=   114.7ml    Recent Labs      04/26/18   0305   04/25/18   0423   WBC   --    --   8.1   RBC   --    --   4.10*   HCT   --    --   37.5   HGB   --    --   12.2*   PLT   --    --   183   APTT  >180.0*   < >  >180.0*   NA  142   --   140   K  5.3   --   4.8   BUN  16   --   22*   CREA  1.45*   --   1.35*   GFRAA  59*   --   >60   GFRNA  49*   --   53*   CA  8.9   --   8.1*   CPK   --    --   115   CKMB   --    --   1.1   CKND1   --    --   1.0   TROIQ   --    --   0.48*    < > = values in this interval not displayed. BMI: Body mass index is 30.34 kg/(m^2). ALLERGIES:   Allergies   Allergen Reactions    Ciprofloxacin Other (comments)     PT states that he turns red. Lines:        Peripheral IV 04/24/18 Left Antecubital (Active)   Site Assessment Intact 4/26/2018 12:10 AM   Phlebitis Assessment 0 4/26/2018 12:10 AM   Infiltration Assessment 0 4/26/2018 12:10 AM   Dressing Status Old drainage 4/26/2018 12:10 AM   Dressing Type Transparent 4/26/2018 12:10 AM   Hub Color/Line Status Pink;Capped;Flushed 4/26/2018 12:10 AM   Action Taken Open ports on tubing capped 4/25/2018  8:32 AM   Alcohol Cap Used Yes 4/25/2018  4:30 PM       Peripheral IV 04/25/18 Right Wrist (Active)   Site Assessment Clean, dry, & intact 4/26/2018 12:10 AM   Phlebitis Assessment 0 4/26/2018 12:10 AM   Infiltration Assessment 0 4/26/2018 12:10 AM   Dressing Status Intact 4/26/2018 12:10 AM   Dressing Type Transparent 4/26/2018 12:10 AM   Hub Color/Line Status Blue; Infusing 4/26/2018 12:10 AM   Alcohol Cap Used Yes 4/26/2018 12:10 AM          History:    Past Medical History:   Diagnosis Date    Asthma     CAD (coronary artery disease)     COPD     Hypertension      Past Surgical History:   Procedure Laterality Date    ABDOMEN SURGERY PROC UNLISTED      instestinal rupture    HX COLOSTOMY      HX COLOSTOMY TAKE DOWN      HX CORONARY STENT PLACEMENT       Patient Active Problem List   Diagnosis Code    CAD (coronary artery disease) I25.10    Hypertension I10    Chronic obstructive pulmonary disease (HCC) J44.9    Chest pain R07.9    Elevated troponin R74.8    CKD (chronic kidney disease) stage 3, GFR 30-59 ml/min N18.3    Asthma J45.909    NSTEMI (non-ST elevated myocardial infarction) ([facility] [address] I21.4

## 2018-04-26 NOTE — PROGRESS NOTES
Hospitalist Progress Note    Patient: Kristen Odonnell MRN: [id number]  CSN: [id number]    YOB: 1955  Age: 57 y.o. Sex: male    DOA: 4/24/2018 LOS:  LOS: 2 days          Chief Complaint:    ACS      Assessment/Plan     1. ACS  2. CAD s/p Multiple Stents and MI in past  3. Elevated Troponins   4. CKD stage III  5. Asthma /COPD  6.  HTN    S/p cath  No PTCA    Final recs per cardiology    Continue statin, asa, norvasc for now    To tele, ambulate when tolerated    Follow up with PCP regarding memory issues  Disposition :  Patient Active Problem List   Diagnosis Code    CAD (coronary artery disease) I25.10    Hypertension I10    Chronic obstructive pulmonary disease ([facility] [address] J44.9    Chest pain R07.9    Elevated troponin R74.8    CKD (chronic kidney disease) stage 3, GFR 30-59 ml/min N18.3    Asthma J45.909    NSTEMI (non-ST elevated myocardial infarction) ([facility] [address] I21.4       Subjective:    Feeling ok    Worried about his memory issues  States he saw neuro about it and PCP but feels he has something else going on  Now he has no new complaints, resting in recovery area    Review of systems:      Respiratory: denies SOB, cough  Cardiovascular: denies chest pain, palpitations  Gastrointestinal: denies nausea, vomiting, diarrhea      Vital signs/Intake and Output:  Visit Vitals    BP (!) 127/92    Pulse 70    Temp 98 °F (36.7 °C)    Resp 16    Ht 5' 5\" (1.651 m)    Wt 82.7 kg (182 lb 4.8 oz)    SpO2 98%    BMI 30.34 kg/m2     Current Shift:  04/26 0701 - 04/26 1900  In: 0   Out: 700 [Urine:700]  Last three shifts:  04/24 1901 - 04/26 0700  In: 1889.9 [P.O.:360; I.V.:1529.9]  Out: 2925 [Urine:2925]    Exam:    General: Well developed, alert, NAD, OX3  Head/Neck: NCAT, supple, No masses, No lymphadenopathy  CVS:Regular rate and rhythm, no M/R/G, S1/S2 heard, no thrill  Lungs:Clear to auscultation bilaterally, no wheezes, rhonchi, or rales  Abdomen: Soft, Nontender, No distention, Normal Bowel sounds, No hepatomegaly  Extremities: No C/C/E, pulses palpable 2+  Neuro:grossly normal , follows commands  Psych:appropriate                Labs: Results:       Chemistry Recent Labs      04/26/18   0305 04/25/18 0423 04/24/18   1900   GLU  120*  118*  167*   NA  142  140  141   K  5.3  4.8  4.2   CL  109*  107  104   CO2  27  28  30   BUN  16  22*  27*   CREA  1.45*  1.35*  1.60*   CA  8.9  8.1*  8.5   AGAP  6  5  7   BUCR  11*  16  17   AP   --   92  126*   TP   --   5.6*  6.9   ALB   --   2.6*  3.3*   GLOB   --   3.0  3.6   AGRAT   --   0.9  0.9      CBC w/Diff Recent Labs      04/25/18 0423 04/24/18   1900   WBC  8.1  7.5   RBC  4.10*  4.52*   HGB  12.2*  13.8   HCT  37.5  41.2   PLT  183  204   GRANS   --   69   LYMPH   --   18*   EOS   --   1      Cardiac Enzymes Recent Labs      04/25/18   0423  04/24/18   2320   CPK  115  129   CKND1  1.0  1.0      Coagulation Recent Labs      04/26/18   0305 04/25/18   1936   APTT  >180.0*  71.1*       Lipid Panel Lab Results   Component Value Date/Time    Cholesterol, total 111 04/24/2018 11:20 PM    HDL Cholesterol 80 (H) 04/24/2018 11:20 PM    LDL, calculated 27.4 04/24/2018 11:20 PM    VLDL, calculated 3.6 04/24/2018 11:20 PM    Triglyceride 18 04/24/2018 11:20 PM    CHOL/HDL Ratio 1.4 04/24/2018 11:20 PM      BNP No results for input(s): BNPP in the last 72 hours.    Liver Enzymes Recent Labs      04/25/18 0423   TP  5.6*   ALB  2.6*   AP  92   SGOT  29      Thyroid Studies Lab Results   Component Value Date/Time    TSH 1.730 11/07/2014 04:23 AM        Procedures/imaging: see electronic medical records for all procedures/Xrays and details which were not copied into this note but were reviewed prior to creation of Alejandro Enamorado MD

## 2018-04-26 NOTE — PROGRESS NOTES
Cardiology Progress Note        Patient: Dellis Councilman        Sex: male          DOA: 4/24/2018  YOB: 1955      Age:  57 y.o.        LOS:  LOS: 2 days   Assessment/Plan     Principal Problem:    Chest pain (8/14/2016)    Active Problems:    CAD (coronary artery disease) ()      Hypertension ()      Chronic obstructive pulmonary disease (HCC) ()      Elevated troponin (4/24/2018)      CKD (chronic kidney disease) stage 3, GFR 30-59 ml/min (4/24/2018)      Asthma (4/24/2018)      NSTEMI (non-ST elevated myocardial infarction) ([facility] [address] (4/24/2018)        Plan:  NSTEMI with history of multiple stents  Plan cath possible PCI  R/B/A discussed & patient agree  Risk of contrast nephropathy discussed with elevated risk                    Subjective:    cc:  CP  NSTEMI        REVIEW OF SYSTEMS:     General: No fevers or chills. Cardiovascular: No chest pain or pressure. No palpitations. No ankle swelling  Pulmonary: No SOB, orthopnea, PND  Gastrointestinal: No nausea, vomiting or diarrhea      Objective:      Visit Vitals    /84 (BP 1 Location: Left arm, BP Patient Position: At rest)    Pulse 68    Temp 98 °F (36.7 °C)    Resp 18    Ht 5' 5\" (1.651 m)    Wt 82.7 kg (182 lb 4.8 oz)    SpO2 96%    BMI 30.34 kg/m2     Body mass index is 30.34 kg/(m^2). Physical Exam:  General Appearance: Comfortable, not using accessory muscles of respiration. NECK: No JVD, no thyroidomeglay. LUNGS: Clear bilaterally. HEART: S1+S2 audible,    ABD: Non-tender, BS Audible    EXT: No edema, and no cysnosis. VASCULAR EXAM: Pulses are intact. PSYCHIATRIC EXAM: Mood is appropriate. MUSCULOSKELETAL: Grossly no joint deformity. NEUROLOGICAL: No motor and sensory deficit, Cranial nerves II-XII intact.   Medication:  Current Facility-Administered Medications   Medication Dose Route Frequency    umeclidinium (INCRUSE ELLIPTA) 62.5 mcg/actuation  1 Puff Inhalation DAILY    aspirin delayed-release tablet 81 mg  81 mg Oral DAILY    simvastatin (ZOCOR) tablet 20 mg  20 mg Oral QHS    pantoprazole (PROTONIX) tablet 40 mg  40 mg Oral DAILY    nitroglycerin (NITROSTAT) tablet 0.4 mg  0.4 mg SubLINGual Q5MIN PRN    fluticasone-vilanterol (BREO ELLIPTA) 100mcg-25mcg/puff  1 Puff Inhalation DAILY    montelukast (SINGULAIR) tablet 10 mg  10 mg Oral DAILY    amLODIPine (NORVASC) tablet 5 mg  5 mg Oral DAILY    0.9% sodium chloride infusion  100 mL/hr IntraVENous CONTINUOUS    acetaminophen (TYLENOL) tablet 650 mg  650 mg Oral Q4H PRN    HYDROcodone-acetaminophen (NORCO) 5-325 mg per tablet 1 Tab  1 Tab Oral Q4H PRN    morphine injection 1 mg  1 mg IntraVENous Q4H PRN    naloxone (NARCAN) injection 0.4 mg  0.4 mg IntraVENous PRN    ondansetron (ZOFRAN) injection 4 mg  4 mg IntraVENous Q4H PRN    docusate sodium (COLACE) capsule 100 mg  100 mg Oral BID PRN    heparin 25,000 units in D5W 250 ml infusion  18-36 Units/kg/hr IntraVENous TITRATE               Lab/Data Reviewed:  Procedures/imaging: see electronic medical records for all procedures/Xrays   and details which were not copied into this note but were reviewed prior to creation of Plan       All lab results for the last 24 hours reviewed.      Recent Labs      04/25/18   0423  04/24/18   1900   WBC  8.1  7.5   HGB  12.2*  13.8   HCT  37.5  41.2   PLT  183  204     Recent Labs      04/26/18   0305  04/25/18   0423  04/24/18   1900   NA  142  140  141   K  5.3  4.8  4.2   CL  109*  107  104   CO2  27  28  30   GLU  120*  118*  167*   BUN  16  22*  27*   CREA  1.45*  1.35*  1.60*   CA  8.9  8.1*  8.5       Signed By: Lv Ramirez MD     April 26, 2018

## 2018-04-27 VITALS
BODY MASS INDEX: 29.94 KG/M2 | SYSTOLIC BLOOD PRESSURE: 135 MMHG | RESPIRATION RATE: 20 BRPM | DIASTOLIC BLOOD PRESSURE: 77 MMHG | OXYGEN SATURATION: 97 % | HEIGHT: 65 IN | HEART RATE: 74 BPM | WEIGHT: 179.68 LBS | TEMPERATURE: 98.3 F

## 2018-04-27 PROCEDURE — 74011250637 HC RX REV CODE- 250/637: Performed by: INTERNAL MEDICINE

## 2018-04-27 PROCEDURE — 74011250636 HC RX REV CODE- 250/636: Performed by: INTERNAL MEDICINE

## 2018-04-27 RX ORDER — AMLODIPINE BESYLATE 5 MG/1
5 TABLET ORAL DAILY
Qty: 30 TAB | Refills: 0 | Status: SHIPPED | OUTPATIENT
Start: 2018-04-28 | End: 2019-06-17

## 2018-04-27 RX ADMIN — PANTOPRAZOLE SODIUM 40 MG: 40 TABLET, DELAYED RELEASE ORAL at 09:38

## 2018-04-27 RX ADMIN — Medication 10 ML: at 06:14

## 2018-04-27 RX ADMIN — SODIUM CHLORIDE 100 ML/HR: 900 INJECTION, SOLUTION INTRAVENOUS at 06:14

## 2018-04-27 RX ADMIN — ASPIRIN 81 MG: 81 TABLET, COATED ORAL at 09:38

## 2018-04-27 RX ADMIN — AMLODIPINE BESYLATE 5 MG: 5 TABLET ORAL at 09:38

## 2018-04-27 NOTE — ROUTINE PROCESS
Dual AVS reviewed with Vipin Parmar RN. All medications reviewed individually with patient. Opportunities for questions and concerns provided. Patient discharged via (mode of transport ie. Car, ambulance or air transport) car. Patient's arm band appropriately discarded.

## 2018-04-27 NOTE — DISCHARGE SUMMARY
[address]    Pam Yarbrough.  MR#: [id number]  : 1955  ACCOUNT #: [de-identified]   ADMIT DATE: 2018  DISCHARGE DATE: 2018    DIAGNOSES AT DISCHARGE:  1. Coronary disease, status post cardiac cath. 2.  Acute coronary syndrome. 3.  Chronic kidney disease stage III. 4.  Asthma and COPD. 5.  Hypertension. HOSPITAL CONSULTANTS:  Dr. Burt Cuevas, cardiology. PROCEDURES:  Cardiac catheterization. HOSPITAL SUMMARY:  This is a 55-year-old gentleman who has a history of MI in the past.  He has stent placement. His last major MI was in . He apparently had a cardiac arrest subsequent to that. He follows with Dr. Kolby Rhodes. His last stress test was about a year ago. His last stent was placed back in  and he came into the hospital with chest pain, it was pressure like in sensation and it started after cutting grass at home. He had exertional shortness of breath and nausea associated. He had gone to [facility] Emergency Room, but due to a long wait time he returned home. He took nitro, but then had recurrent pain and so came over to [facility] Emergency Room. Apparently on the day of admission to the Emergency Room here, he had been at his PCP, he had had routine lab work along with a chest x-ray, but his D-dimer came back elevated and that is when he was actually sent to the Emergency Room. A CT angiogram was negative for PE. He was seen in consultation by cardiology. His serial troponins were elevated. No further chest pain since admission and he was set up for cardiac cath. Overview of his labs, blood glucose was normal.  CBC was within normal range. He was placed on a heparin drip and we followed his PTTs. His creatinine is 1.45. His GFR is 49 consistent with his history of chronic kidney disease. His hemoglobin A1c is 6.3%, which is elevated.   CT angiogram of the chest as noted already showed no evidence for acute cardiopulmonary process or pulmonary embolism. He had a chest x-ray which showed no acute pulmonary process and ultimately underwent the cardiac cath which did not show any new obstructive lesions. No stent placements or procedures were performed and he was stable to discharge home per cardiology recommendation on his same medications he had been on at home. His EF shows 55-60% on echo with grade I diastolic dysfunction and of note, his troponins were at 0.59, 0.63 and 0.48 on admission from 04/24. He has had no further chest pain, shortness of breath since admission. Last night, he did well post procedure. He had no chest pain or shortness of breath overnight. He feels well today. Denies any new complaints. PHYSICAL EXAMINATION:  VITAL SIGNS:  His temp is 98.3, pulse 74, blood pressure 135/77, respiratory rate 20, SaO2 97%. LUNGS:  Clear. CARDIAC:  Regular rate and rhythm. No murmur, rub or gallop. ABDOMEN:  Benign, soft, nontender. LOWER EXTREMITIES:  Distal pulses palpable. No edema. PLAN:  Discharge home today to follow up with Dr. Micki Mccoy on his scheduled appointment, which I believe is on 05/25. Follow up with Dr. Justino Boas his PCP in 1 week. He will resume his usual medications per cardiology's recommendations which is  Norvasc 5 mg daily, aspirin 81 mg daily, Symbicort 1 puff daily, vitamin D 1 tablet daily, Antivert 25 mg 3 times daily as needed, Singulair 10 mg daily, Nitrostat tablets 0.4 mg every 5 minutes as needed for chest pain, Protonix 40 mg daily, Zocor 20 mg every night time, Spiriva 1 puff inhalation daily. He is stable for discharge from the hospital today. I have reviewed the case with Dr. Micki Mccoy who stated no new plans for medications or interventions. Otherwise, he feels good and is ready to discharge. He has a followup with Dr. Micki Mccoy scheduled already.       MD TAINA Carter/  D: 04/27/2018 10:40     T: 04/27/2018 16:07  JOB #: [id number]

## 2018-04-27 NOTE — DISCHARGE INSTRUCTIONS
Chest Pain: Care Instructions  Your Care Instructions    There are many things that can cause chest pain. Some are not serious and will get better on their own in a few days. But some kinds of chest pain need more testing and treatment. Your doctor may have recommended a follow-up visit in the next 8 to 12 hours. If you are not getting better, you may need more tests or treatment. Even though your doctor has released you, you still need to watch for any problems. The doctor carefully checked you, but sometimes problems can develop later. If you have new symptoms or if your symptoms do not get better, get medical care right away. If you have worse or different chest pain or pressure that lasts more than 5 minutes or you passed out (lost consciousness), call 911 or seek other emergency help right away. A medical visit is only one step in your treatment. Even if you feel better, you still need to do what your doctor recommends, such as going to all suggested follow-up appointments and taking medicines exactly as directed. This will help you recover and help prevent future problems. How can you care for yourself at home? · Rest until you feel better. · Take your medicine exactly as prescribed. Call your doctor if you think you are having a problem with your medicine. · Do not drive after taking a prescription pain medicine. When should you call for help? Call 911 if:  ? · You passed out (lost consciousness). ? · You have severe difficulty breathing. ? · You have symptoms of a heart attack. These may include:  ¨ Chest pain or pressure, or a strange feeling in your chest.  ¨ Sweating. ¨ Shortness of breath. ¨ Nausea or vomiting. ¨ Pain, pressure, or a strange feeling in your back, neck, jaw, or upper belly or in one or both shoulders or arms. ¨ Lightheadedness or sudden weakness. ¨ A fast or irregular heartbeat.   After you call 911, the  may tell you to chew 1 adult-strength or 2 to 4 low-dose aspirin. Wait for an ambulance. Do not try to drive yourself. ?Call your doctor today if:  ? · You have any trouble breathing. ? · Your chest pain gets worse. ? · You are dizzy or lightheaded, or you feel like you may faint. ? · You are not getting better as expected. ? · You are having new or different chest pain. Where can you learn more? Go to http://levi-hank.info/. Enter A120 in the search box to learn more about \"Chest Pain: Care Instructions. \"  Current as of: March 20, 2017  Content Version: 11.4  © 9142-6328 [employer]. Care instructions adapted under license by [employer] (which disclaims liability or warranty for this information). If you have questions about a medical condition or this instruction, always ask your healthcare professional. [address] any warranty or liability for your use of this information. Chronic Obstructive Pulmonary Disease (COPD): Care Instructions  Your Care Instructions    Chronic obstructive pulmonary disease (COPD) is a general term for a group of lung diseases, including emphysema and chronic bronchitis. People with COPD have decreased airflow in and out of the lungs, which makes it hard to breathe. The airways also can get clogged with thick mucus. Cigarette smoking is a major cause of COPD. Although there is no cure for COPD, you can slow its progress. Following your treatment plan and taking care of yourself can help you feel better and live longer. Follow-up care is a key part of your treatment and safety. Be sure to make and go to all appointments, and call your doctor if you are having problems. It's also a good idea to know your test results and keep a list of the medicines you take. How can you care for yourself at home? ?Staying healthy  ? · Do not smoke. This is the most important step you can take to prevent more damage to your lungs.  If you need help quitting, talk to your doctor about stop-smoking programs and medicines. These can increase your chances of quitting for good. ? · Avoid colds and flu. Get a pneumococcal vaccine shot. If you have had one before, ask your doctor whether you need a second dose. Get the flu vaccine every fall. If you must be around people with colds or the flu, wash your hands often. ? · Avoid secondhand smoke, air pollution, and high altitudes. Also avoid cold, dry air and hot, humid air. Stay at home with your windows closed when air pollution is bad. ?Medicines and oxygen therapy  ? · Take your medicines exactly as prescribed. Call your doctor if you think you are having a problem with your medicine. ? · You may be taking medicines such as:  ¨ Bronchodilators. These help open your airways and make breathing easier. Bronchodilators are either short-acting (work for 6 to 9 hours) or long-acting (work for 24 hours). You inhale most bronchodilators, so they start to act quickly. Always carry your quick-relief inhaler with you in case you need it while you are away from home. ¨ Corticosteroids (prednisone, budesonide). These reduce airway inflammation. They come in pill or inhaled form. You must take these medicines every day for them to work well. ? · A spacer may help you get more inhaled medicine to your lungs. Ask your doctor or pharmacist if a spacer is right for you. If it is, ask how to use it properly. ? · Do not take any vitamins, over-the-counter medicine, or herbal products without talking to your doctor first.   ? · If your doctor prescribed antibiotics, take them as directed. Do not stop taking them just because you feel better. You need to take the full course of antibiotics. ? · Oxygen therapy boosts the amount of oxygen in your blood and helps you breathe easier. Use the flow rate your doctor has recommended, and do not change it without talking to your doctor first.   Activity  ? · Get regular exercise.  Walking is an easy way to get exercise. Start out slowly, and walk a little more each day. ? · Pay attention to your breathing. You are exercising too hard if you cannot talk while you are exercising. ? · Take short rest breaks when doing household chores and other activities. ? · Learn breathing methods-such as breathing through pursed lips-to help you become less short of breath. ? · If your doctor has not set you up with a pulmonary rehabilitation program, talk to him or her about whether rehab is right for you. Rehab includes exercise programs, education about your disease and how to manage it, help with diet and other changes, and emotional support. Diet  ? · Eat regular, healthy meals. Use bronchodilators about 1 hour before you eat to make it easier to eat. Eat several small meals instead of three large ones. Drink beverages at the end of the meal. Avoid foods that are hard to chew. ? · Eat foods that contain protein so that you do not lose muscle mass. ? · Talk with your doctor if you gain too much weight or if you lose weight without trying. ?Mental health  ? · Talk to your family, friends, or a therapist about your feelings. It is normal to feel frightened, angry, hopeless, helpless, and even guilty. Talking openly about bad feelings can help you cope. If these feelings last, talk to your doctor. When should you call for help? Call 911 anytime you think you may need emergency care. For example, call if:  ? · You have severe trouble breathing. ?Call your doctor now or seek immediate medical care if:  ? · You have new or worse trouble breathing. ? · You cough up blood. ? · You have a fever. ? Watch closely for changes in your health, and be sure to contact your doctor if:  ? · You cough more deeply or more often, especially if you notice more mucus or a change in the color of your mucus. ? · You have new or worse swelling in your legs or belly. ? · You are not getting better as expected. Where can you learn more? Go to http://levi-hank.info/. Andres Sanchez in the search box to learn more about \"Chronic Obstructive Pulmonary Disease (COPD): Care Instructions. \"  Current as of: May 12, 2017  Content Version: 11.4  © 6886-1479 [employer]. Care instructions adapted under license by [employer] (which disclaims liability or warranty for this information). If you have questions about a medical condition or this instruction, always ask your healthcare professional. [place] [address] any warranty or liability for your use of this information. DISCHARGE SUMMARY from Nurse    PATIENT INSTRUCTIONS:    After general anesthesia or intravenous sedation, for 24 hours or while taking prescription Narcotics:  · Limit your activities  · Do not drive and operate hazardous machinery  · Do not make important personal or business decisions  · Do  not drink alcoholic beverages  · If you have not urinated within 8 hours after discharge, please contact your surgeon on call. Report the following to your surgeon:  · Excessive pain, swelling, redness or odor of or around the surgical area  · Temperature over 100.5  · Nausea and vomiting lasting longer than 4 hours or if unable to take medications  · Any signs of decreased circulation or nerve impairment to extremity: change in color, persistent  numbness, tingling, coldness or increase pain  · Any questions    What to do at Home:  Recommended activity: Activity as tolerated. If you experience any of the following symptoms new or worsening symtoms, please follow up with your primary care provider. *  Please give a list of your current medications to your Primary Care Provider. *  Please update this list whenever your medications are discontinued, doses are      changed, or new medications (including over-the-counter products) are added.     *  Please carry medication information at all times in case of emergency situations. These are general instructions for a healthy lifestyle:    No smoking/ No tobacco products/ Avoid exposure to second hand smoke  Surgeon General's Warning:  Quitting smoking now greatly reduces serious risk to your health. Obesity, smoking, and sedentary lifestyle greatly increases your risk for illness    A healthy diet, regular physical exercise & weight monitoring are important for maintaining a healthy lifestyle    You may be retaining fluid if you have a history of heart failure or if you experience any of the following symptoms:  Weight gain of 3 pounds or more overnight or 5 pounds in a week, increased swelling in our hands or feet or shortness of breath while lying flat in bed. Please call your doctor as soon as you notice any of these symptoms; do not wait until your next office visit. Recognize signs and symptoms of STROKE:    F-face looks uneven    A-arms unable to move or move unevenly    S-speech slurred or non-existent    T-time-call 911 as soon as signs and symptoms begin-DO NOT go       Back to bed or wait to see if you get better-TIME IS BRAIN. Warning Signs of HEART ATTACK     Call 911 if you have these symptoms:   Chest discomfort. Most heart attacks involve discomfort in the center of the chest that lasts more than a few minutes, or that goes away and comes back. It can feel like uncomfortable pressure, squeezing, fullness, or pain.  Discomfort in other areas of the upper body. Symptoms can include pain or discomfort in one or both arms, the back, neck, jaw, or stomach.  Shortness of breath with or without chest discomfort.  Other signs may include breaking out in a cold sweat, nausea, or lightheadedness. Don't wait more than five minutes to call 911 - MINUTES MATTER! Fast action can save your life. Calling 911 is almost always the fastest way to get lifesaving treatment.  Emergency Medical Services staff can begin treatment when they arrive -- up to an hour sooner than if someone gets to the hospital by car. The discharge information has been reviewed with the patient. The patient verbalized understanding. Discharge medications reviewed with the patient and appropriate educational materials and side effects teaching were provided. Patient armband removed and shredded.     ___________________________________________________________________________________________________________________________________

## 2018-04-27 NOTE — PROGRESS NOTES
Problem: Falls - Risk of  Goal: *Absence of Falls  Document Karyn Fall Risk and appropriate interventions in the flowsheet.    Outcome: Progressing Towards Goal  Fall Risk Interventions:            Medication Interventions: Patient to call before getting OOB    Elimination Interventions: Urinal in reach    History of Falls Interventions: Bed/chair exit alarm

## 2018-04-27 NOTE — PROGRESS NOTES
Transition of Care ([facility]) Plan:  Discharge home with family assistance and MD follow up    [place]:     How is patient being transported at discharge? He will drive     When? Is transport scheduled? no    Follow-up appointment and transportation:     PCP/Specialist? PCP Charles Kelley, Cardiology MD Guerrero     Time/Date? Who is transporting to the follow-up appointment? Patient will drive      Is transport for follow up appointment scheduled? Communication plan (with patient/family): Who is being called? Patient or Next of Kin? Responsible party? What number(s) is to be used? What service provider is calling for [facility] services? When are they calling? Readmission Risk? (Green/Low; Yellow/Moderate; Red/High): red, high      1015  Met with patient at bedside. Chart reviewed. Discharge order noted on chart. Pt states he will drive himself home at discharge. Pt lives with his son (states son does not drive). Pt is independent. Pt states he has an appt with MD Guerrero (cardiology). James Sinclair will assist in confirming follow up appt. Pt refuses home health. Pt has no concerns expressed pertaining to discharge. CM will cont to be available.

## 2018-04-27 NOTE — PROCEDURES
[facility] [place]  TILT TABLE    Nai Miller  MR#: [id number]  : 1955  ACCOUNT #: [de-identified]   DATE OF SERVICE: 2018    PROCEDURES PERFORMED:  1. Left heart catheterization. 2.  Selective coronary angiogram.  3.  FFR of the left circumflex artery, which was nonischemic. 4.  FFR of the mid RCA, which was also nonischemic. INDICATIONS FOR PROCEDURE:  Chest pain with a history of multiple cardiac stents and troponin elevation and mild non-STEMI.    BLOOD LOSS:  Less than 100 mL    SPECIMEN REMOVED:  None. PROCEDURE AND TECHNIQUE:  The patient was brought to the cardiac catheterization lab after the informed consent. Risks, benefits and alternatives were discussed in detail with the patient. The patient agreed to proceed for the above procedure. The patient was brought to the cardiac catheterization lab in a fasting and non-sedated state. The patient was prepped and draped in the usual sterilized fashion. The right radial area was anesthetized with local anesthetic. A 6-Tristanian sheath was placed under fluoroscopic guidance without any complication and 5-Tristanian JR4 catheter was used to perform the selective angiography of the right coronary artery and same catheter was used to perform the left heart catheterization. LVEDP was 10 mmHg. A 5-Tristanian JL3 diagnostic catheter was used to perform the selective angiography of the left coronary system and multiple angiographic views were acquired. The patient remained hemodynamically stable. After diagnostic angiography we decided to proceed with FFR of the left circumflex system and RCA. 6-Tristanian EBU 3.0 guide catheter was used to perform the FFR of the left circumflex system. Standard protocol was used. The patient's IFFR was measured, then FFR with the incremental dose of intracoronary adenosine was used, that was nonischemic, 0.95 on the left circumflex artery.   Please note that the FFR wire was placed in the OM branch, that was also nonischemic and also in the left circumflex artery, after giving the OM branch, which had 40-50% disease, nonischemic FFR. A 6-Occitan JR4 guide catheter was used to perform the FFR of the mid RCA. A standard protocol was used. Intracoronary adenosine was given. Patient had 0.87 FFR, nonischemic and post-FFR angiography was normal without any complication. Radial sheath was pulled out, TR band was applied. FINDINGS:  This is a right dominant coronary anatomy. LVEDP is 10 mmHg. Left main artery is short in length and medium size in diameter, patent without any disease. Divides into LAD and left circumflex artery. LAD is patent ostium proximal area. In the mid area there is a stent with mild in-stent restenosis, mild LAD bridging with minimal luminal irregularity. D1, D2 are also patent without any disease. Small vessel, tortuous vessel. Left circumflex artery having the ostium at 20-30% lesion, which is nonsignificant, gives rise to high OM, which has a proximal stent, which is widely patent. Distally OM branch is patent. After giving the high OM branch, the left circumflex artery had 50% disease angiographically and then distal stent in the left circumflex artery was also patent. Very distal mild luminal irregularity. RCA has mild luminal irregularity in the ostium and mid RCA stent is patent. Distal mid RCA has 40-50% lesion with nonischemic FFR. PDA and PLV branches are small branches and patent without any significant disease. CONCLUSION:  1. Mild to moderate nonobstructive coronary artery disease. 2.  Patent left main and patent LAD system with mild in-stent restenosis in the mid LAD stent. 3.  Mild disease in the ostial left circumflex artery. 4.  Patent stent in the high OM1 branch.   5.  Moderate disease in the proximal left circumflex artery 50% with nonischemic FFR of 0.95.  6.  Mild to moderate disease in the distal mid RCA 40-50% with nonischemic FFR of 0.86%.    RECOMMENDATIONS:  The patient's LV function is normal.  Continue aggressive risk factor management.         Carola Amos MD MA / [place]  D: 04/26/2018 18:25     T: 04/26/2018 21:36  JOB #: [id number]

## 2018-04-27 NOTE — PROGRESS NOTES
1930 Received bedside report from Rosa Garcia, Assumed patient care. Patient arm immobilised after cath today. No sign of hematoma or bleeding. Family with patient. 2000 Assessment completed and meds administered. No other complains reported or observed. 0600 Patient had uneventful night. Bedside and Verbal shift change report given to Joya Velasco RN (oncoming nurse) by Tamara Rodriguez (offgoing nurse). Report included the following information SBAR, Kardex and MAR.

## 2018-04-29 LAB
ATRIAL RATE: 68 BPM
ATRIAL RATE: 69 BPM
CALCULATED P AXIS, ECG09: 50 DEGREES
CALCULATED P AXIS, ECG09: 72 DEGREES
CALCULATED R AXIS, ECG10: 41 DEGREES
CALCULATED R AXIS, ECG10: 82 DEGREES
CALCULATED T AXIS, ECG11: 65 DEGREES
CALCULATED T AXIS, ECG11: 74 DEGREES
DIAGNOSIS, 93000: NORMAL
DIAGNOSIS, 93000: NORMAL
P-R INTERVAL, ECG05: 142 MS
P-R INTERVAL, ECG05: 156 MS
Q-T INTERVAL, ECG07: 384 MS
Q-T INTERVAL, ECG07: 438 MS
QRS DURATION, ECG06: 80 MS
QRS DURATION, ECG06: 86 MS
QTC CALCULATION (BEZET), ECG08: 411 MS
QTC CALCULATION (BEZET), ECG08: 465 MS
VENTRICULAR RATE, ECG03: 68 BPM
VENTRICULAR RATE, ECG03: 69 BPM

## 2018-08-06 ENCOUNTER — APPOINTMENT (OUTPATIENT)
Dept: GENERAL RADIOLOGY | Age: 63
End: 2018-08-06
Attending: PHYSICIAN ASSISTANT
Payer: MEDICARE

## 2018-08-06 ENCOUNTER — HOSPITAL ENCOUNTER (EMERGENCY)
Age: 63
Discharge: HOME | End: 2018-08-06
Attending: EMERGENCY MEDICINE
Payer: MEDICARE

## 2018-08-06 VITALS
BODY MASS INDEX: 29.16 KG/M2 | HEART RATE: 62 BPM | WEIGHT: 175 LBS | DIASTOLIC BLOOD PRESSURE: 80 MMHG | RESPIRATION RATE: 15 BRPM | TEMPERATURE: 98.7 F | OXYGEN SATURATION: 97 % | HEIGHT: 65 IN | SYSTOLIC BLOOD PRESSURE: 144 MMHG

## 2018-08-06 DIAGNOSIS — R53.83 FATIGUE, UNSPECIFIED TYPE: Primary | ICD-10-CM

## 2018-08-06 DIAGNOSIS — R19.5 LOOSE STOOLS: ICD-10-CM

## 2018-08-06 DIAGNOSIS — R06.09 DYSPNEA ON EXERTION: ICD-10-CM

## 2018-08-06 DIAGNOSIS — E87.6 HYPOKALEMIA: ICD-10-CM

## 2018-08-06 LAB
ALBUMIN SERPL-MCNC: 3.1 G/DL (ref 3.4–5)
ALBUMIN/GLOB SERPL: 0.9 {RATIO} (ref 0.8–1.7)
ALP SERPL-CCNC: 90 U/L (ref 45–117)
ALT SERPL-CCNC: 50 U/L (ref 16–61)
ANION GAP SERPL CALC-SCNC: 4 MMOL/L (ref 3–18)
APPEARANCE UR: CLEAR
AST SERPL-CCNC: 38 U/L (ref 15–37)
BASOPHILS # BLD: 0 K/UL (ref 0–0.1)
BASOPHILS NFR BLD: 0 % (ref 0–2)
BILIRUB DIRECT SERPL-MCNC: 0.2 MG/DL (ref 0–0.2)
BILIRUB SERPL-MCNC: 0.8 MG/DL (ref 0.2–1)
BILIRUB UR QL: NEGATIVE
BNP SERPL-MCNC: 82 PG/ML (ref 0–900)
BUN SERPL-MCNC: 29 MG/DL (ref 7–18)
BUN/CREAT SERPL: 19 (ref 12–20)
CALCIUM SERPL-MCNC: 8.8 MG/DL (ref 8.5–10.1)
CHLORIDE SERPL-SCNC: 101 MMOL/L (ref 100–108)
CK MB CFR SERPL CALC: NORMAL % (ref 0–4)
CK MB SERPL-MCNC: <1 NG/ML (ref 5–25)
CK SERPL-CCNC: 73 U/L (ref 39–308)
CO2 SERPL-SCNC: 32 MMOL/L (ref 21–32)
COLOR UR: YELLOW
CREAT SERPL-MCNC: 1.55 MG/DL (ref 0.6–1.3)
DIFFERENTIAL METHOD BLD: ABNORMAL
EOSINOPHIL # BLD: 0.1 K/UL (ref 0–0.4)
EOSINOPHIL NFR BLD: 1 % (ref 0–5)
ERYTHROCYTE [DISTWIDTH] IN BLOOD BY AUTOMATED COUNT: 14 % (ref 11.6–14.5)
GLOBULIN SER CALC-MCNC: 3.6 G/DL (ref 2–4)
GLUCOSE SERPL-MCNC: 126 MG/DL (ref 74–99)
GLUCOSE UR STRIP.AUTO-MCNC: NEGATIVE MG/DL
HCT VFR BLD AUTO: 44.3 % (ref 36–48)
HGB BLD-MCNC: 15 G/DL (ref 13–16)
HGB UR QL STRIP: NEGATIVE
KETONES UR QL STRIP.AUTO: NEGATIVE MG/DL
LEUKOCYTE ESTERASE UR QL STRIP.AUTO: NEGATIVE
LYMPHOCYTES # BLD: 1 K/UL (ref 0.9–3.6)
LYMPHOCYTES NFR BLD: 13 % (ref 21–52)
MAGNESIUM SERPL-MCNC: 1.9 MG/DL (ref 1.6–2.6)
MCH RBC QN AUTO: 30.5 PG (ref 24–34)
MCHC RBC AUTO-ENTMCNC: 33.9 G/DL (ref 31–37)
MCV RBC AUTO: 90 FL (ref 74–97)
MONOCYTES # BLD: 1.2 K/UL (ref 0.05–1.2)
MONOCYTES NFR BLD: 15 % (ref 3–10)
NEUTS SEG # BLD: 5.6 K/UL (ref 1.8–8)
NEUTS SEG NFR BLD: 71 % (ref 40–73)
NITRITE UR QL STRIP.AUTO: NEGATIVE
PH UR STRIP: 5 [PH] (ref 5–8)
PLATELET # BLD AUTO: 167 K/UL (ref 135–420)
PMV BLD AUTO: 9.1 FL (ref 9.2–11.8)
POTASSIUM SERPL-SCNC: 3.3 MMOL/L (ref 3.5–5.5)
PROT SERPL-MCNC: 6.7 G/DL (ref 6.4–8.2)
PROT UR STRIP-MCNC: NEGATIVE MG/DL
RBC # BLD AUTO: 4.92 M/UL (ref 4.7–5.5)
SODIUM SERPL-SCNC: 137 MMOL/L (ref 136–145)
SP GR UR REFRACTOMETRY: 1.01 (ref 1–1.03)
TROPONIN I SERPL-MCNC: <0.02 NG/ML (ref 0–0.06)
UROBILINOGEN UR QL STRIP.AUTO: 0.2 EU/DL (ref 0.2–1)
WBC # BLD AUTO: 7.8 K/UL (ref 4.6–13.2)

## 2018-08-06 PROCEDURE — 74011250637 HC RX REV CODE- 250/637: Performed by: PHYSICIAN ASSISTANT

## 2018-08-06 PROCEDURE — 80048 BASIC METABOLIC PNL TOTAL CA: CPT | Performed by: PHYSICIAN ASSISTANT

## 2018-08-06 PROCEDURE — 81003 URINALYSIS AUTO W/O SCOPE: CPT | Performed by: PHYSICIAN ASSISTANT

## 2018-08-06 PROCEDURE — 83880 ASSAY OF NATRIURETIC PEPTIDE: CPT | Performed by: PHYSICIAN ASSISTANT

## 2018-08-06 PROCEDURE — 85025 COMPLETE CBC W/AUTO DIFF WBC: CPT | Performed by: PHYSICIAN ASSISTANT

## 2018-08-06 PROCEDURE — 83735 ASSAY OF MAGNESIUM: CPT | Performed by: PHYSICIAN ASSISTANT

## 2018-08-06 PROCEDURE — 96360 HYDRATION IV INFUSION INIT: CPT

## 2018-08-06 PROCEDURE — 74011250636 HC RX REV CODE- 250/636: Performed by: PHYSICIAN ASSISTANT

## 2018-08-06 PROCEDURE — 80076 HEPATIC FUNCTION PANEL: CPT | Performed by: PHYSICIAN ASSISTANT

## 2018-08-06 PROCEDURE — 93005 ELECTROCARDIOGRAM TRACING: CPT

## 2018-08-06 PROCEDURE — 82550 ASSAY OF CK (CPK): CPT | Performed by: PHYSICIAN ASSISTANT

## 2018-08-06 PROCEDURE — 74011250637 HC RX REV CODE- 250/637: Performed by: EMERGENCY MEDICINE

## 2018-08-06 PROCEDURE — 71045 X-RAY EXAM CHEST 1 VIEW: CPT

## 2018-08-06 PROCEDURE — 99285 EMERGENCY DEPT VISIT HI MDM: CPT

## 2018-08-06 RX ORDER — CELECOXIB 200 MG/1
200 CAPSULE ORAL 2 TIMES DAILY
COMMUNITY
End: 2019-09-03

## 2018-08-06 RX ORDER — POTASSIUM CHLORIDE 20 MEQ/1
40 TABLET, EXTENDED RELEASE ORAL
Status: COMPLETED | OUTPATIENT
Start: 2018-08-06 | End: 2018-08-06

## 2018-08-06 RX ORDER — GUAIFENESIN 100 MG/5ML
243 LIQUID (ML) ORAL
Status: DISCONTINUED | OUTPATIENT
Start: 2018-08-06 | End: 2018-08-06

## 2018-08-06 RX ORDER — GUAIFENESIN 100 MG/5ML
243 LIQUID (ML) ORAL
Status: COMPLETED | OUTPATIENT
Start: 2018-08-06 | End: 2018-08-06

## 2018-08-06 RX ADMIN — POTASSIUM CHLORIDE 40 MEQ: 1500 TABLET, EXTENDED RELEASE ORAL at 15:36

## 2018-08-06 RX ADMIN — SODIUM CHLORIDE 250 ML: 900 INJECTION, SOLUTION INTRAVENOUS at 14:16

## 2018-08-06 RX ADMIN — ASPIRIN 81 MG 243 MG: 81 TABLET ORAL at 14:16

## 2018-08-06 NOTE — DISCHARGE INSTRUCTIONS
Fatigue: Care Instructions  Your Care Instructions    Fatigue is a feeling of tiredness, exhaustion, or lack of energy. You may feel fatigue because of too much or not enough activity. It can also come from stress, lack of sleep, boredom, and poor diet. Many medical problems, such as viral infections, can cause fatigue. Emotional problems, especially depression, are often the cause of fatigue. Fatigue is most often a symptom of another problem. Treatment for fatigue depends on the cause. For example, if you have fatigue because you have a certain health problem, treating this problem also treats your fatigue. If depression or anxiety is the cause, treatment may help. Follow-up care is a key part of your treatment and safety. Be sure to make and go to all appointments, and call your doctor if you are having problems. It's also a good idea to know your test results and keep a list of the medicines you take. How can you care for yourself at home? · Get regular exercise. But don't overdo it. Go back and forth between rest and exercise. · Get plenty of rest.  · Eat a healthy diet. Do not skip meals, especially breakfast.  · Reduce your use of caffeine, tobacco, and alcohol. Caffeine is most often found in coffee, tea, cola drinks, and chocolate. · Limit medicines that can cause fatigue. This includes tranquilizers and cold and allergy medicines. When should you call for help? Watch closely for changes in your health, and be sure to contact your doctor if:    · You have new symptoms such as fever or a rash.     · Your fatigue gets worse.     · You have been feeling down, depressed, or hopeless. Or you may have lost interest in things that you usually enjoy.     · You are not getting better as expected. Where can you learn more? Go to http://levi-hank.info/. Enter C385 in the search box to learn more about \"Fatigue: Care Instructions. \"  Current as of: November 20, 2017  Content Version: 11.7  © 9696-3211 [employer]. Care instructions adapted under license by [employer] (which disclaims liability or warranty for this information). If you have questions about a medical condition or this instruction, always ask your healthcare professional. [address] any warranty or liability for your use of this information. Hypokalemia: Care Instructions  Your Care Instructions    Hypokalemia (say \"pf-sl-gib-REFUGIO-crystal-uh\") is a low level of potassium. The heart, muscles, kidneys, and nervous system all need potassium to work well. This problem has many different causes. Kidney problems, diet, and medicines like diuretics and laxatives can cause it. So can vomiting or diarrhea. In some cases, cancer is the cause. Your doctor may do tests to find the cause of your low potassium levels. You may need medicines to bring your potassium levels back to normal. You may also need regular blood tests to check your potassium. If you have very low potassium, you may need intravenous (IV) medicines. You also may need tests to check the electrical activity of your heart. Heart problems caused by low potassium levels can be very serious. Follow-up care is a key part of your treatment and safety. Be sure to make and go to all appointments, and call your doctor if you are having problems. It's also a good idea to know your test results and keep a list of the medicines you take. How can you care for yourself at home? · If your doctor recommends it, eat foods that have a lot of potassium. These include fresh fruits, juices, and vegetables. They also include nuts, beans, and milk. · Be safe with medicines. If your doctor prescribes medicines or potassium supplements, take them exactly as directed. Call your doctor if you have any problems with your medicines. · Get your potassium levels tested as often as your doctor tells you. When should you call for help?   Call 911 anytime you think you may need emergency care. For example, call if:    · You feel like your heart is missing beats. Heart problems caused by low potassium can cause death.     · You passed out (lost consciousness).     · You have a seizure.    Call your doctor now or seek immediate medical care if:    · You feel weak or unusually tired.     · You have severe arm or leg cramps.     · You have tingling or numbness.     · You feel sick to your stomach, or you vomit.     · You have belly cramps.     · You feel bloated or constipated.     · You have to urinate a lot.     · You feel very thirsty most of the time.     · You are dizzy or lightheaded, or you feel like you may faint.     · You feel depressed, or you lose touch with reality.    Watch closely for changes in your health, and be sure to contact your doctor if:    · You do not get better as expected. Where can you learn more? Go to http://levi-hank.info/. Enter G358 in the search box to learn more about \"Hypokalemia: Care Instructions. \"  Current as of: May 12, 2017  Content Version: 11.7  © 3457-7238 [employer]. Care instructions adapted under license by [employer] (which disclaims liability or warranty for this information). If you have questions about a medical condition or this instruction, always ask your healthcare professional. [place] [address] any warranty or liability for your use of this information. Shortness of Breath: Care Instructions  Your Care Instructions  Shortness of breath has many causes. Sometimes conditions such as anxiety can lead to shortness of breath. Some people get mild shortness of breath when they exercise. Trouble breathing also can be a symptom of a serious problem, such as asthma, lung disease, emphysema, heart problems, and pneumonia. If your shortness of breath continues, you may need tests and treatment.  Watch for any changes in your breathing and other symptoms. Follow-up care is a key part of your treatment and safety. Be sure to make and go to all appointments, and call your doctor if you are having problems. It's also a good idea to know your test results and keep a list of the medicines you take. How can you care for yourself at home? · Do not smoke or allow others to smoke around you. If you need help quitting, talk to your doctor about stop-smoking programs and medicines. These can increase your chances of quitting for good. · Get plenty of rest and sleep. · Take your medicines exactly as prescribed. Call your doctor if you think you are having a problem with your medicine. · Find healthy ways to deal with stress. ¨ Exercise daily. ¨ Get plenty of sleep. ¨ Eat regularly and well. When should you call for help? Call 911 anytime you think you may need emergency care. For example, call if:    · You have severe shortness of breath.     · You have symptoms of a heart attack. These may include:  ¨ Chest pain or pressure, or a strange feeling in the chest.  ¨ Sweating. ¨ Shortness of breath. ¨ Nausea or vomiting. ¨ Pain, pressure, or a strange feeling in the back, neck, jaw, or upper belly or in one or both shoulders or arms. ¨ Lightheadedness or sudden weakness. ¨ A fast or irregular heartbeat. After you call 911, the  may tell you to chew 1 adult-strength or 2 to 4 low-dose aspirin. Wait for an ambulance. Do not try to drive yourself.    Call your doctor now or seek immediate medical care if:    · Your shortness of breath gets worse or you start to wheeze. Wheezing is a high-pitched sound when you breathe.     · You wake up at night out of breath or have to prop your head up on several pillows to breathe.     · You are short of breath after only light activity or while at rest.    Watch closely for changes in your health, and be sure to contact your doctor if:    · You do not get better over the next 1 to 2 days.    Where can you learn more? Go to http://levi-hank.info/. Enter S780 in the search box to learn more about \"Shortness of Breath: Care Instructions. \"  Current as of: December 6, 2017  Content Version: 11.7  © 5511-5935 [employer]. Care instructions adapted under license by [employer] (which disclaims liability or warranty for this information). If you have questions about a medical condition or this instruction, always ask your healthcare professional. [place] [address] any warranty or liability for your use of this information.

## 2018-08-06 NOTE — ED PROVIDER NOTES
EMERGENCY DEPARTMENT HISTORY AND PHYSICAL EXAM    Date: 8/6/2018  Patient Name: Shelley Fenton    History of Presenting Illness     Chief Complaint   Patient presents with    Fatigue         History Provided By: Patient    Chief Complaint: Fatigue  Duration: 1 Days  Timing:  Worsening  Severity: N/A  Modifying Factors: No modifying factors   Associated Symptoms: SOB, diarrhea, rhinorrhea, chest pressure ( different from hx of MI)    Additional History (Context):   1:44 PM  Shelley Fenton is a 57 y.o. male with PMHx of HTN, COPD, asthma, CAD, MI and SHx of coronary stent placement x 5, history of ventricular fibrillation who presents to the emergency department C/O fatigue onset yesterday. Associated sxs include SOB that has been ongoing for \"a while now\" but worsened yesterday, loose stools (8 episodes since yesterday and usually only has 1 BM per day), rhinorrhea, chest pressure (different from hx of MI). Pt states that he has been feeling \"wiped out\" today. Pt indicates that he initially started to feel fatigue yesterday and it has worsened since then. Additionally, the patient has been feeling SOB that is abnormal from his hx of COPD and asthma as well as chest pressure that the patient associates with his SOB. Also reporting VIRK with walking that worsened yesterday (but has been occurring for some time now). Pt is currently taking 81 mg aspirin daily, has taken his dose today. Pt denies cough, change in activity,orthopnea, leg swelling, sore throat, recent abx use, hx of CHF, recent travels, fever, dysuria, wheezing, recent prolonged travel, and any other sxs or complaints.      PCP: Bouchra Richardson MD    Current Facility-Administered Medications   Medication Dose Route Frequency Provider Last Rate Last Dose    sodium chloride 0.9 % bolus infusion 250 mL  250 mL IntraVENous CONTINUOUS Harry Gonzáles PA-C   Stopped at 08/06/18 4329     Current Outpatient Prescriptions   Medication Sig Dispense Refill    celecoxib (CELEBREX) 200 mg capsule Take 200 mg by mouth two (2) times a day.  amLODIPine (NORVASC) 5 mg tablet Take 1 Tab by mouth daily. 30 Tab 0    budesonide-formoterol (SYMBICORT) 160-4.5 mcg/actuation HFA inhaler Take 2 Puffs by inhalation two (2) times a day.  montelukast (SINGULAIR) 10 mg tablet Take 10 mg by mouth daily.  pantoprazole (PROTONIX) 40 mg tablet Take 40 mg by mouth daily.  nitroglycerin (NITROSTAT) 0.4 mg SL tablet by SubLINGual route every five (5) minutes as needed for Chest Pain.  Biotin 2,500 mcg cap Take  by mouth.  aspirin 81 mg tablet Take 81 mg by mouth.  SIMVASTATIN PO Take 40 mg by mouth.  meclizine (ANTIVERT) 25 mg tablet Take  by mouth three (3) times daily as needed.  omega-3 fatty acids-vitamin e (FISH OIL) 1,000 mg cap Take 2 Caps by mouth.  CHOLECALCIFEROL, VITAMIN D3, (VITAMIN D3 PO) Take  by mouth. Past History     Past Medical History:  Past Medical History:   Diagnosis Date    Asthma     CAD (coronary artery disease)     COPD     Hypertension        Past Surgical History:  Past Surgical History:   Procedure Laterality Date    ABDOMEN SURGERY PROC UNLISTED      instestinal rupture    FRACTIONAL FLOW RESERVE  4/29/2018    FRACTIONAL FLOW RESERVE ADDL  4/29/2018    HX COLOSTOMY      HX COLOSTOMY TAKE DOWN      HX CORONARY STENT PLACEMENT      HX ORTHOPAEDIC  2013    Back surgery    LEFT HEART PERCUTANEOUS  4/29/2018    JH CORONARY ARTERIOGRAPH  4/29/2018       Family History:  History reviewed. No pertinent family history. Social History:  Social History   Substance Use Topics    Smoking status: Former Smoker    Smokeless tobacco: Never Used    Alcohol use No       Allergies: Allergies   Allergen Reactions    Ciprofloxacin Other (comments)     PT states that he turns red.     Tape [Adhesive] Other (comments)     Pt states, \"it rips my skin\"         Review of Systems   Review of Systems Constitutional: Positive for fatigue. Negative for chills and fever. HENT: Positive for rhinorrhea. Negative for sore throat. Respiratory: Positive for shortness of breath. Negative for cough and wheezing. Cardiovascular: Positive for chest pain (pressure). Negative for leg swelling. Gastrointestinal: Positive for diarrhea. Negative for nausea and vomiting. Genitourinary: Negative for dysuria. All other systems reviewed and are negative. Physical Exam     Vitals:    08/06/18 1317 08/06/18 1430   BP: 135/90 144/80   Pulse: 64 62   Resp: 16 15   Temp: 98.7 °F (37.1 °C)    SpO2: 99% 97%   Weight: 79.4 kg (175 lb)    Height: 5' 5\" (1.651 m)      Physical Exam   Constitutional: He appears well-developed and well-nourished. No distress. HENT:   Head: Normocephalic and atraumatic. Right Ear: External ear normal.   Left Ear: External ear normal.   Nose: Nose normal.   Eyes: Conjunctivae are normal.   Neck: Normal range of motion. Cardiovascular: Normal rate, regular rhythm and normal heart sounds. Pulmonary/Chest: Effort normal and breath sounds normal. No respiratory distress. He has no wheezes. He has no rales. Abdominal: Soft. Bowel sounds are normal. There is no tenderness. There is no rebound and no guarding. Neurological: He is alert. Skin: Skin is warm and dry. He is not diaphoretic. Psychiatric: He has a normal mood and affect. Nursing note and vitals reviewed.         Diagnostic Study Results     Labs -     Recent Results (from the past 12 hour(s))   URINALYSIS W/ RFLX MICROSCOPIC    Collection Time: 08/06/18  1:45 PM   Result Value Ref Range    Color YELLOW      Appearance CLEAR      Specific gravity 1.013 1.005 - 1.030      pH (UA) 5.0 5.0 - 8.0      Protein NEGATIVE  NEG mg/dL    Glucose NEGATIVE  NEG mg/dL    Ketone NEGATIVE  NEG mg/dL    Bilirubin NEGATIVE  NEG      Blood NEGATIVE  NEG      Urobilinogen 0.2 0.2 - 1.0 EU/dL    Nitrites NEGATIVE  NEG      Leukocyte Esterase NEGATIVE  NEG     EKG, 12 LEAD, INITIAL    Collection Time: 08/06/18  1:51 PM   Result Value Ref Range    Ventricular Rate 61 BPM    Atrial Rate 61 BPM    P-R Interval 134 ms    QRS Duration 84 ms    Q-T Interval 420 ms    QTC Calculation (Bezet) 422 ms    Calculated P Axis 69 degrees    Calculated R Axis 51 degrees    Calculated T Axis 71 degrees    Diagnosis       Normal sinus rhythm  Normal ECG  When compared with ECG of 25-APR-2018 07:07,  No significant change was found     CBC WITH AUTOMATED DIFF    Collection Time: 08/06/18  2:09 PM   Result Value Ref Range    WBC 7.8 4.6 - 13.2 K/uL    RBC 4.92 4.70 - 5.50 M/uL    HGB 15.0 13.0 - 16.0 g/dL    HCT 44.3 36.0 - 48.0 %    MCV 90.0 74.0 - 97.0 FL    MCH 30.5 24.0 - 34.0 PG    MCHC 33.9 31.0 - 37.0 g/dL    RDW 14.0 11.6 - 14.5 %    PLATELET 785 797 - 330 K/uL    MPV 9.1 (L) 9.2 - 11.8 FL    NEUTROPHILS 71 40 - 73 %    LYMPHOCYTES 13 (L) 21 - 52 %    MONOCYTES 15 (H) 3 - 10 %    EOSINOPHILS 1 0 - 5 %    BASOPHILS 0 0 - 2 %    ABS. NEUTROPHILS 5.6 1.8 - 8.0 K/UL    ABS. LYMPHOCYTES 1.0 0.9 - 3.6 K/UL    ABS. MONOCYTES 1.2 0.05 - 1.2 K/UL    ABS. EOSINOPHILS 0.1 0.0 - 0.4 K/UL    ABS.  BASOPHILS 0.0 0.0 - 0.1 K/UL    DF AUTOMATED     METABOLIC PANEL, BASIC    Collection Time: 08/06/18  2:09 PM   Result Value Ref Range    Sodium 137 136 - 145 mmol/L    Potassium 3.3 (L) 3.5 - 5.5 mmol/L    Chloride 101 100 - 108 mmol/L    CO2 32 21 - 32 mmol/L    Anion gap 4 3.0 - 18 mmol/L    Glucose 126 (H) 74 - 99 mg/dL    BUN 29 (H) 7.0 - 18 MG/DL    Creatinine 1.55 (H) 0.6 - 1.3 MG/DL    BUN/Creatinine ratio 19 12 - 20      GFR est AA 55 (L) >60 ml/min/1.73m2    GFR est non-AA 45 (L) >60 ml/min/1.73m2    Calcium 8.8 8.5 - 10.1 MG/DL   CARDIAC PANEL,(CK, CKMB & TROPONIN)    Collection Time: 08/06/18  2:09 PM   Result Value Ref Range    CK 73 39 - 308 U/L    CK - MB <1.0 <3.6 ng/ml    CK-MB Index  0.0 - 4.0 %     CALCULATION NOT PERFORMED WHEN RESULT IS BELOW LINEAR LIMIT Troponin-I, Qt. <0.02 0.00 - 0.06 NG/ML   NT-PRO BNP    Collection Time: 08/06/18  2:09 PM   Result Value Ref Range    NT pro-BNP 82 0 - 900 PG/ML   HEPATIC FUNCTION PANEL    Collection Time: 08/06/18  2:09 PM   Result Value Ref Range    Protein, total 6.7 6.4 - 8.2 g/dL    Albumin 3.1 (L) 3.4 - 5.0 g/dL    Globulin 3.6 2.0 - 4.0 g/dL    A-G Ratio 0.9 0.8 - 1.7      Bilirubin, total 0.8 0.2 - 1.0 MG/DL    Bilirubin, direct 0.2 0.0 - 0.2 MG/DL    Alk. phosphatase 90 45 - 117 U/L    AST (SGOT) 38 (H) 15 - 37 U/L    ALT (SGPT) 50 16 - 61 U/L   MAGNESIUM    Collection Time: 08/06/18  2:09 PM   Result Value Ref Range    Magnesium 1.9 1.6 - 2.6 mg/dL       Radiologic Studies -   XR CHEST PORT   Final Result        CT Results  (Last 48 hours)    None        CXR Results  (Last 48 hours)               08/06/18 1435  XR CHEST PORT Final result    Impression:  Impression:       1. No acute pulmonary disease. Narrative:  Portable chest 8/6/2018. History: Weakness with progressive severity over the past 2 days. History of   prior coronary artery stent placement x5. Technique: A semierect portable radiograph of the chest was obtained. Comparison:  4/24/2018. Findings: The cardiomediastinal contours are unchanged. Inspiratory lung   volumes are slightly decreased but the lungs remain clear. There is no   pneumothorax or pleural effusion. Medical Decision Making   I am the first provider for this patient. I reviewed the vital signs, available nursing notes, past medical history, past surgical history, family history and social history. Vital Signs-Reviewed the patient's vital signs.     Pulse Oximetry Analysis - 99% on RA     Cardiac Monitor:  Rate: 64 bpm  Rhythm: NSR    EKG interpretation: (Preliminary)  1:51 PM   61 bpm NSR No STEMI  EKG read by Natalie Steven PA-C at 1:51 PM     Records Reviewed: Nursing Notes and Old Medical Records    Provider Notes (Medical Decision Making): Appears well and non-toxic, presenting with report of fatigue and loose stool onset yesterday. Labs are reassuring here, negative cardiac panel and normal EKG, normal BNP, CXR without acute finding, no leukocytosis or bands, VSS. Perhaps early presentation of viral etiology? Will advise he follow up with cardiology as instructed. Based on today's assessment, I feel the patient is stable for discharge to home with outpatient follow up. Return precautions and referrals provided. Procedures:  Procedures    ED Course:   1:44 PM Initial assessment performed. The patients presenting problems have been discussed, and they are in agreement with the care plan formulated and outlined with them. I have encouraged them to ask questions as they arise throughout their visit. 3:11 PM Pt updated on all labs. Upon entry to the room he is lying flat with 1 pillow, resting comfortably in no evidence of respiratory distress, oxygen 98% on RA. Pt states that he has felt this way before when his BP was lower than systolic of 947. Pt has just had an appointment with his cardiologist, Kenia Estrella MD and is not supposed to see him until September or October. Pt states that he is feeling better now and is anxious to go home. Currently, the patient is giving a urine sample now. Pt ambulated to the bathroom with assistance or difficulty. Diagnosis and Disposition       DISCHARGE NOTE:  3:52 PM  Beckie Bright's  results have been reviewed with him. He has been counseled regarding his diagnosis, treatment, and plan. He verbally conveys understanding and agreement of the signs, symptoms, diagnosis, treatment and prognosis and additionally agrees to follow up as discussed. He also agrees with the care-plan and conveys that all of his questions have been answered.   I have also provided discharge instructions for him that include: educational information regarding their diagnosis and treatment, and list of reasons why they would want to return to the ED prior to their follow-up appointment, should his condition change. He has been provided with education for proper emergency department utilization. CLINICAL IMPRESSION:    1. Fatigue, unspecified type    2. Hypokalemia    3. Dyspnea on exertion    4. Loose stools        Discussion:    PLAN:  1. D/C Home  2. Current Discharge Medication List        3. Follow-up Information     Follow up With Details Comments Contact Info    Pedro Keenan MD Schedule an appointment as soon as possible for a visit  Lash[address] Alexandre  [phone]      Johnie Rolon MD Schedule an appointment as soon as possible for a visit  [address] [place] [address]  [address] [place] [address] [address]      [facility] EMERGENCY DEPT  As needed, If symptoms worsen [address] [id number]  [phone]        _______________________________    Attestations: This note is prepared by Yulia Rene, acting as Scribe for Cyn Hilton PA-C. Cyn Hilton PA-C:  The scribe's documentation has been prepared under my direction and personally reviewed by me in its entirety.   I confirm that the note above accurately reflects all work, treatment, procedures, and medical decision making performed by me.  _______________________________

## 2018-08-24 LAB
ATRIAL RATE: 61 BPM
CALCULATED P AXIS, ECG09: 69 DEGREES
CALCULATED R AXIS, ECG10: 51 DEGREES
CALCULATED T AXIS, ECG11: 71 DEGREES
DIAGNOSIS, 93000: NORMAL
P-R INTERVAL, ECG05: 134 MS
Q-T INTERVAL, ECG07: 420 MS
QRS DURATION, ECG06: 84 MS
QTC CALCULATION (BEZET), ECG08: 422 MS
VENTRICULAR RATE, ECG03: 61 BPM

## 2018-11-15 ENCOUNTER — DOCUMENTATION ONLY (OUTPATIENT)
Dept: PULMONOLOGY | Age: 63
End: 2018-11-15

## 2018-11-15 NOTE — PROGRESS NOTES
PATIENT:       Judie Hahn   YOB: 1955  DATE:       11/14/2018 10:30 AM  VISIT TYPE:       Office Visit      Completed Orders (this encounter)  Order Interpretation Result Next Lab Date   Giving encouragement to exercise      Dietary management education, guidance, and counseling        Assessment/Plan  # Detail Type Description    1. Assessment COPD (J44.9). Impression Patient with extensive smoking history, normal PFT, normal spirometry for obstruction however reduction in the DLCO suggest emphysema. Overall clinical picture suggests possible exertional bronchospasm related to obstructive airway disease of emphysema. Restriction on the PFT noted however similar restrictions was noted in the past with normal TLC suggest extra parenchymal restriction. Chest x-ray did not demonstrate any evidence of interstitial lung disease. Will get a 6 minute walk test in the office today to evaluate for any exertional hypoxemia and also do lexi 1 level in the office.     10/5/2017:  -Patient's PFT did not reveal significant obstructive airway disease to explain patient's symptoms  -We will continue with the Symbicort/Spiriva/albuterol  -Symptoms are out of proportion to the PFT or objective findings  -F SHAILESH done in the office today also did not reveal any evidence of active inflammation  -Cardio pulmonary exercise test/chest CT offered to evaluate for it  -Patient is to be reevaluated by cardiologist to see whether this is angina equivalent symptoms (negative nuclear stress test and recent past)  5-2018:  -PFT done August 2017  -FEV1 of 1.76 or 67% predicted  FEV1 to FVC ratio of 72  TLC of 99% predicted  DLCO was slightly low but corrected two-volume is normal  -PFT not suggestive Dipika obstructive airway disease or restrictive airway disease  -Echo did not reveal significant pulmonary hypertension  -Angiogram was essentially okay    Plan:  -Symptoms are out of proportion to the PFT, CT imaging, cardiac workup  -Patient has extensive smoking in the past 22-pack-year however she he tells me that he had asthma in the past, also has cough as a predominant symptom  -Robbie in office today is 10  -We'll consider de-escalating therapy as currently he is on all the inhalers  -We'll DC Spiriva, continue with Symbicort/Singulair/albuterol when necessary  11/14/2018  -Came for follow-up  -Still has all of shortness of breath  -As per the patient after she stops using Spiriva he started having more symptoms  -[facility] 2018: Normal  -PFT November 2018:  -FEV1 to FVC ratio of 63 with FEV1 of 1.74 or 71% predicted  -TLC of 4.59 or 89% per dictated slightly reduced from before  -DLCO of 11.0 or 52% predicted decrease from before    Results and on moderate obstructive airway disease/COPD/emphysema  -Myke 1 level done in the past was MM  -Eosinophil levels in the past was less than 150  -Again patient's symptoms are out of proportion however current PFT does suggest moderate COPD  -We'll continue with Symbicort/Spiriva/albuterol  -Patient has received flu shot  -Follow-up in 6 months will notify anything changes  -Patient does not qualify for lung nodule screening  -Plan of care discussed with the patient at length  -Pulmonary we have referral discussed with the patient patient declined. 2. Assessment SOB (shortness of breath) (R06.02). Impression Extensive cardiac workup has been done is negative, echo did not demonstrate any significant pulmonary hypertension, if patient continues to have issues will recommend another echo to specifically looking at the right-sided pressure and will ask cardiology to see if any further workup needed even with a negative nuclear stress test.    Continue close follow-up with cardiology if continued to have issues will need further workup to evaluate for pulmonary hypertension plus minus coronary artery disease. .    Patient Plan 10/5/2017:  Elijah Castillo with cardiologist we will reevaluate the patient  -Echo was done in recent past however if continued to have symptoms we might need to repeat to look for it RIGHT at the pulmonary pressures  -If continued to have issues will benefit from cardiology pulmonary exercise testing. November 2018-Status post extensive cardiac workup including echo, stress test, angiogram  -Still complains of chest pain with exertion  -continue inhaler as outlined above         3. Assessment Fatigue (R53.83). Impression Ongoing fatigue noted, history of some snoring but no Dipika and excessive daytime sleepiness. Plan:  Consider evaluating thyroid function if it's not done in recent past will defer to primary care physician however at times hyperthyroidism can demonstrate similar exertional shortness of breath and fatigue as a primary symptoms. .    Patient Plan 10/5/2017:  -Some fatigue might be related to elevated hemoglobin A1c  -Currently not on any medication for diabetes  -ESS is essentially okay  -Denies any other symptoms of sleep apnea however report patient continues to have symptoms might require further evaluation    November 2018: Tells me that his ex-girlfriend used to tell him that he used to snore a lot however does not snore anymore  -Does notwish to pursuesleep apnea evaluation         4. Assessment Body mass index (BMI) 32.0-32.9, adult (W56.31). Plan Orders Today's instructions / counseling include(s) Dietary management education, guidance, and counseling. Giving encouragement to exercise            Assessment  -Cough/22-pack-year smoker/chest pain and shortness of breath with exertion  -Moderate COPD with FEV1 of 1.76 or 67 predicted%    Plan  -We'll resume Spiriva continue Symbicort and albuterol and Singulair  -Close follow-up with cardiology however all the workup looks okay  -Follow-up in 6 months  I reviewed patient's prior studies available to me including sleep studies, compliance download, PFT if available, chest x-ray if available. I reviewed patient's medication and adjusted the medication. All the questions related to patient's management plan discussed and answered to patient's satisfaction. I [id number] of my time, more than 50% of time spent on face-to-face evaluation, for diagnosis pathology and discussion of various treatment options patient understood the plan all questions were on start. Voice-recognition software may have been used to generate this report, which may have resulted in some phonetic-based errors in grammar and contents. Even though attempts were made to correct all the mistakes, some may have been missed, and remained in the body of the document. This 57year old male presents for HPI and COPD (follow up). History of Present Illness:  1.  HPI   Patient referred for abnormal spirometric:    Patient with a 22-pack-year smoking history. Since 24 years history of occasional cough mainly shortness of breath especially with exertion mainly in summertime. No history of any GERD symptoms sputum production fever or weight loss, no history of any recent exacerbation or history of mechanical ventilation. No history of any connective tissue disease lupus scleroderma no history of sarcoidosis on no history of any chemotherapy or radiation of bouts of 5 animals. Lives with her son no history of any sick contact, worked as a  in the past any things that there was some asbestos exposure for the same no history of any TB any big region history. Up-to-date with influenza and pneumococcal vaccine.     10/5/2017:  -As per the patient he still having shortness of breath with exertion  -Occasional dry cough denies any wheezing  -Denies any chest pain  -Extensive workup as outlined above were negative    5-2-18  -Came for 6 month follow-up  -Was elevated by cardiology November 2017 status post stress test and echo was okay  -Apri of 2018 ended up in [facility] with chest pain, status post angiogram normal coronaries as per the notes  -Still complains of exertional chest pain, currently on Symbicort/Spiriva  -Occasional cough no wheezing no shortness of breath no symptoms of emergency room visit or exacerbation of obstructive airway disease  -Does complain of asthma-like symptoms since he was Child     11/14/2018  -Came for follow-up  -Still has all of shortness of breath  -As per the patient after she stops using Spiriva he started having more symptoms  -[facility]ray August 2018: Normal  -PFT November 2018:  -FEV1 to FVC ratio of 63 with FEV1 of 1.74 or 71% predicted  -TLC of 4.59 or 89% per dictated slightly reduced from before  -DLCO of 11.0 or 52% predicted decrease from before    Results and on moderate obstructive airway disease/COPD/emphysema    Investigation:    -6 minute walk test May 2017 good distance no evidence of hypoxemia  -PFT done August 2017  -FEV1 of 1.76 or 67% predicted  FEV1 to FVC ratio of 72  TLC of 99% predicted  DLCO was slightly low but corrected two-volume is normal    -Essentially normal PFT, mild obstructive disease cannot be excluded    -F SHAILESH done in the office today was normal 13 suggest no evidence of airway inflammation  -Hemoglobin A1c was 6.6, TSH was 2    Spirometry 2017: FEV1 of 67% predicted FVC of 66% predicted lung edge of more than 84 years. New    PFT done 2014 FEV1 of 1.92 or 64% predicted FVC of 2.54 or 64% predicted no Dipika  response TLC of 5.12 or 85% predicted DLCO of 13 or 50% predicted. -CT chest Apri 2018:[facility]  Lungs and pleural spaces: No infiltrates, effusions or pneumothorax. Chest wall: No acute or aggressive bony abnormalities. Mild spondylosis. -Echo Apri 2018 [facility]:  Left ventricle: Systolic function was normal by EF (biplane method of disks).    Ejection fraction was estimated to be 58  % in the range of 55 % to 60 %. There were no regional wall motion   abnormalities. Doppler parameters were consistent  with abnormal left ventricular relaxation (grade 1 diastolic dysfunction). Right ventricle: The size was normal. Systolic function was normal.    -As per the note from [facility] angiogram of the heart was essentially okay, or visual report not available    Echo 2016 normal EF with normal RV and RA. New    Stress test normal with EF of 60%. New    Chest x-ray 3/29/2017 normal no evidence of any air space disease questionable emphysema.

## 2019-05-14 ENCOUNTER — HOSPITAL ENCOUNTER (OUTPATIENT)
Age: 64
Setting detail: OUTPATIENT SURGERY
End: 2019-05-14
Attending: ORTHOPAEDIC SURGERY | Admitting: ORTHOPAEDIC SURGERY

## 2019-05-14 DIAGNOSIS — M17.12 PRIMARY LOCALIZED OSTEOARTHRITIS OF LEFT KNEE: Primary | Chronic | ICD-10-CM

## 2019-06-17 ENCOUNTER — HOSPITAL ENCOUNTER (OUTPATIENT)
Dept: INFUSION THERAPY | Age: 64
Discharge: HOME | End: 2019-06-17
Payer: MEDICARE

## 2019-06-17 ENCOUNTER — HOSPITAL ENCOUNTER (OUTPATIENT)
Dept: CT IMAGING | Age: 64
Discharge: HOME | End: 2019-06-17
Attending: ORTHOPAEDIC SURGERY
Payer: MEDICARE

## 2019-06-17 VITALS
SYSTOLIC BLOOD PRESSURE: 135 MMHG | BODY MASS INDEX: 30.32 KG/M2 | WEIGHT: 182 LBS | DIASTOLIC BLOOD PRESSURE: 86 MMHG | OXYGEN SATURATION: 93 % | HEART RATE: 71 BPM | RESPIRATION RATE: 20 BRPM | TEMPERATURE: 98.1 F | HEIGHT: 65 IN

## 2019-06-17 DIAGNOSIS — M17.12 OSTEOARTHRITIS OF LEFT KNEE: ICD-10-CM

## 2019-06-17 PROCEDURE — 74011250636 HC RX REV CODE- 250/636: Performed by: INTERNAL MEDICINE

## 2019-06-17 PROCEDURE — 96372 THER/PROPH/DIAG INJ SC/IM: CPT

## 2019-06-17 PROCEDURE — 73700 CT LOWER EXTREMITY W/O DYE: CPT

## 2019-06-17 RX ORDER — LEFLUNOMIDE 10 MG/1
10 TABLET ORAL DAILY
COMMUNITY
End: 2019-09-08 | Stop reason: DRUGHIGH

## 2019-06-17 RX ORDER — PROPRANOLOL HYDROCHLORIDE 20 MG/1
20 TABLET ORAL 2 TIMES DAILY
COMMUNITY
End: 2019-09-08 | Stop reason: DRUGHIGH

## 2019-06-17 RX ADMIN — MEPOLIZUMAB 100 MG: 100 INJECTION, POWDER, FOR SOLUTION SUBCUTANEOUS at 14:33

## 2019-06-17 NOTE — PROGRESS NOTES
[facility] Progress Note    Date: 2019    Name: Verona Orr    MRN: [id number]         : 1955     [address]       Mr. Jam Carter was assessed and education was provided. Mr. Collier vitals were reviewed and patient was observed for 5 minutes prior to treatment. Visit Vitals  /86 (BP 1 Location: Left arm, BP Patient Position: Sitting)   Pulse 71   Temp 98.1 °F (36.7 °C)   Resp 20   Ht 5' 5\" (1.651 m)   Wt 82.6 kg (182 lb)   SpO2 93%   BMI 30.29 kg/m²         Nucala 100 mg was administered subcutaneously to the back of patient's right arm. Band aid applied to site. Patient was observed for 30 minutes post injection and found to have no allergic reactions. Nucala care notes given to patient. Full understanding noted. Mr. Jam Carter tolerated well, and had no complaints. Patient armband removed and shredded. Mr. Jam Carter was discharged from [place] [address] in stable condition at 1530. He is to return on 7/15/19 at 1400 for his next appointment.     Relda Midget  2019  3:38 PM

## 2019-06-21 ENCOUNTER — HOSPITAL ENCOUNTER (OUTPATIENT)
Dept: PREADMISSION TESTING | Age: 64
Discharge: HOME | End: 2019-06-21
Attending: ORTHOPAEDIC SURGERY
Payer: MEDICARE

## 2019-06-21 DIAGNOSIS — Z01.818 OTHER SPECIFIED PRE-OPERATIVE EXAMINATION: ICD-10-CM

## 2019-06-21 DIAGNOSIS — M17.12 UNILATERAL PRIMARY OSTEOARTHRITIS, LEFT KNEE: ICD-10-CM

## 2019-06-21 DIAGNOSIS — M17.12 OSTEOARTHRITIS OF LEFT KNEE: ICD-10-CM

## 2019-06-21 DIAGNOSIS — Z01.812 BLOOD TESTS PRIOR TO TREATMENT OR PROCEDURE: ICD-10-CM

## 2019-06-21 LAB
ALBUMIN SERPL-MCNC: 3.3 G/DL (ref 3.4–5)
ALBUMIN/GLOB SERPL: 1 {RATIO} (ref 0.8–1.7)
ALP SERPL-CCNC: 105 U/L (ref 45–117)
ALT SERPL-CCNC: 33 U/L (ref 16–61)
ANION GAP SERPL CALC-SCNC: 5 MMOL/L (ref 3–18)
APPEARANCE UR: CLEAR
APTT PPP: 24.3 SEC (ref 23–36.4)
AST SERPL-CCNC: 39 U/L (ref 15–37)
ATRIAL RATE: 86 BPM
BACTERIA SPEC CULT: NORMAL
BASOPHILS # BLD: 0 K/UL (ref 0–0.1)
BASOPHILS NFR BLD: 0 % (ref 0–2)
BILIRUB SERPL-MCNC: 0.6 MG/DL (ref 0.2–1)
BILIRUB UR QL: NEGATIVE
BUN SERPL-MCNC: 29 MG/DL (ref 7–18)
BUN/CREAT SERPL: 17 (ref 12–20)
CALCIUM SERPL-MCNC: 9.1 MG/DL (ref 8.5–10.1)
CALCULATED P AXIS, ECG09: 67 DEGREES
CALCULATED R AXIS, ECG10: 53 DEGREES
CALCULATED T AXIS, ECG11: 91 DEGREES
CHLORIDE SERPL-SCNC: 97 MMOL/L (ref 100–108)
CO2 SERPL-SCNC: 35 MMOL/L (ref 21–32)
COLOR UR: YELLOW
CREAT SERPL-MCNC: 1.73 MG/DL (ref 0.6–1.3)
DIAGNOSIS, 93000: NORMAL
DIFFERENTIAL METHOD BLD: ABNORMAL
EOSINOPHIL # BLD: 0.1 K/UL (ref 0–0.4)
EOSINOPHIL NFR BLD: 1 % (ref 0–5)
ERYTHROCYTE [DISTWIDTH] IN BLOOD BY AUTOMATED COUNT: 15.7 % (ref 11.6–14.5)
ERYTHROCYTE [SEDIMENTATION RATE] IN BLOOD: 32 MM/HR (ref 0–20)
EST. AVERAGE GLUCOSE BLD GHB EST-MCNC: 137 MG/DL
GLOBULIN SER CALC-MCNC: 3.4 G/DL (ref 2–4)
GLUCOSE SERPL-MCNC: 112 MG/DL (ref 74–99)
GLUCOSE UR STRIP.AUTO-MCNC: NEGATIVE MG/DL
HBA1C MFR BLD: 6.4 % (ref 4.2–5.6)
HCT VFR BLD AUTO: 42 % (ref 36–48)
HGB BLD-MCNC: 13.9 G/DL (ref 13–16)
HGB UR QL STRIP: NEGATIVE
INR PPP: 0.9 (ref 0.8–1.2)
KETONES UR QL STRIP.AUTO: NEGATIVE MG/DL
LEUKOCYTE ESTERASE UR QL STRIP.AUTO: NEGATIVE
LYMPHOCYTES # BLD: 1.5 K/UL (ref 0.9–3.6)
LYMPHOCYTES NFR BLD: 17 % (ref 21–52)
MCH RBC QN AUTO: 30.3 PG (ref 24–34)
MCHC RBC AUTO-ENTMCNC: 33.1 G/DL (ref 31–37)
MCV RBC AUTO: 91.7 FL (ref 74–97)
MONOCYTES # BLD: 1.5 K/UL (ref 0.05–1.2)
MONOCYTES NFR BLD: 17 % (ref 3–10)
NEUTS SEG # BLD: 5.7 K/UL (ref 1.8–8)
NEUTS SEG NFR BLD: 65 % (ref 40–73)
NITRITE UR QL STRIP.AUTO: NEGATIVE
P-R INTERVAL, ECG05: 164 MS
PH UR STRIP: 5 [PH] (ref 5–8)
PLATELET # BLD AUTO: 219 K/UL (ref 135–420)
PMV BLD AUTO: 8.9 FL (ref 9.2–11.8)
POTASSIUM SERPL-SCNC: 3.2 MMOL/L (ref 3.5–5.5)
PROT SERPL-MCNC: 6.7 G/DL (ref 6.4–8.2)
PROT UR STRIP-MCNC: NEGATIVE MG/DL
PROTHROMBIN TIME: 11.9 SEC (ref 11.5–15.2)
Q-T INTERVAL, ECG07: 348 MS
QRS DURATION, ECG06: 90 MS
QTC CALCULATION (BEZET), ECG08: 416 MS
RBC # BLD AUTO: 4.58 M/UL (ref 4.7–5.5)
SERVICE CMNT-IMP: NORMAL
SODIUM SERPL-SCNC: 137 MMOL/L (ref 136–145)
SP GR UR REFRACTOMETRY: 1.01 (ref 1–1.03)
UROBILINOGEN UR QL STRIP.AUTO: 0.2 EU/DL (ref 0.2–1)
VENTRICULAR RATE, ECG03: 86 BPM
WBC # BLD AUTO: 8.8 K/UL (ref 4.6–13.2)

## 2019-06-21 PROCEDURE — 85730 THROMBOPLASTIN TIME PARTIAL: CPT

## 2019-06-21 PROCEDURE — 85025 COMPLETE CBC W/AUTO DIFF WBC: CPT

## 2019-06-21 PROCEDURE — 83036 HEMOGLOBIN GLYCOSYLATED A1C: CPT

## 2019-06-21 PROCEDURE — 87641 MR-STAPH DNA AMP PROBE: CPT

## 2019-06-21 PROCEDURE — 93005 ELECTROCARDIOGRAM TRACING: CPT

## 2019-06-21 PROCEDURE — 80053 COMPREHEN METABOLIC PANEL: CPT

## 2019-06-21 PROCEDURE — 85652 RBC SED RATE AUTOMATED: CPT

## 2019-06-21 PROCEDURE — 81003 URINALYSIS AUTO W/O SCOPE: CPT

## 2019-06-21 PROCEDURE — 36415 COLL VENOUS BLD VENIPUNCTURE: CPT

## 2019-06-21 PROCEDURE — 85610 PROTHROMBIN TIME: CPT

## 2019-07-12 ENCOUNTER — APPOINTMENT (OUTPATIENT)
Dept: INFUSION THERAPY | Age: 64
End: 2019-07-12

## 2019-07-12 PROBLEM — M17.12 PRIMARY LOCALIZED OSTEOARTHRITIS OF LEFT KNEE: Chronic | Status: ACTIVE | Noted: 2019-07-12

## 2019-07-12 RX ORDER — ACETAMINOPHEN 325 MG/1
650 TABLET ORAL EVERY 6 HOURS
Status: CANCELLED | OUTPATIENT
Start: 2019-07-12

## 2019-07-12 RX ORDER — PROPRANOLOL HYDROCHLORIDE 20 MG/1
20 TABLET ORAL 2 TIMES DAILY
Status: CANCELLED | OUTPATIENT
Start: 2019-07-12

## 2019-07-12 RX ORDER — SODIUM CHLORIDE 0.9 % (FLUSH) 0.9 %
5-40 SYRINGE (ML) INJECTION EVERY 8 HOURS
Status: CANCELLED | OUTPATIENT
Start: 2019-07-12

## 2019-07-12 RX ORDER — LANOLIN ALCOHOL/MO/W.PET/CERES
1 CREAM (GRAM) TOPICAL 3 TIMES DAILY
Status: CANCELLED | OUTPATIENT
Start: 2019-07-12

## 2019-07-12 RX ORDER — OXYCODONE HYDROCHLORIDE 5 MG/1
5-10 TABLET ORAL
Status: CANCELLED | OUTPATIENT
Start: 2019-07-12

## 2019-07-12 RX ORDER — DOCUSATE SODIUM 100 MG/1
100 CAPSULE, LIQUID FILLED ORAL 2 TIMES DAILY
Status: CANCELLED | OUTPATIENT
Start: 2019-07-12

## 2019-07-12 RX ORDER — NALOXONE HYDROCHLORIDE 0.4 MG/ML
0.4 INJECTION, SOLUTION INTRAMUSCULAR; INTRAVENOUS; SUBCUTANEOUS AS NEEDED
Status: CANCELLED | OUTPATIENT
Start: 2019-07-12

## 2019-07-12 RX ORDER — LEFLUNOMIDE 10 MG/1
10 TABLET ORAL DAILY
Status: CANCELLED | OUTPATIENT
Start: 2019-07-12

## 2019-07-12 RX ORDER — PANTOPRAZOLE SODIUM 40 MG/1
40 TABLET, DELAYED RELEASE ORAL DAILY
Status: CANCELLED | OUTPATIENT
Start: 2019-07-12

## 2019-07-12 RX ORDER — MECLIZINE HCL 12.5 MG 12.5 MG/1
25 TABLET ORAL
Status: CANCELLED | OUTPATIENT
Start: 2019-07-12

## 2019-07-12 RX ORDER — MONTELUKAST SODIUM 10 MG/1
10 TABLET ORAL
Status: CANCELLED | OUTPATIENT
Start: 2019-07-12

## 2019-07-12 RX ORDER — NITROGLYCERIN 0.4 MG/1
0.4 TABLET SUBLINGUAL
Status: CANCELLED | OUTPATIENT
Start: 2019-07-12

## 2019-07-12 RX ORDER — SODIUM CHLORIDE 9 MG/ML
125 INJECTION, SOLUTION INTRAVENOUS CONTINUOUS
Status: CANCELLED | OUTPATIENT
Start: 2019-07-12 | End: 2019-07-13

## 2019-07-12 RX ORDER — SODIUM CHLORIDE 9 MG/ML
300 INJECTION, SOLUTION INTRAVENOUS CONTINUOUS
Status: CANCELLED | OUTPATIENT
Start: 2019-07-12 | End: 2019-07-12

## 2019-07-12 RX ORDER — DEXAMETHASONE SODIUM PHOSPHATE 4 MG/ML
8 INJECTION, SOLUTION INTRA-ARTICULAR; INTRALESIONAL; INTRAMUSCULAR; INTRAVENOUS; SOFT TISSUE ONCE
Status: CANCELLED | OUTPATIENT
Start: 2019-07-12 | End: 2019-07-12

## 2019-07-12 RX ORDER — ONDANSETRON 2 MG/ML
4 INJECTION INTRAMUSCULAR; INTRAVENOUS
Status: CANCELLED | OUTPATIENT
Start: 2019-07-12

## 2019-07-12 RX ORDER — ZOLPIDEM TARTRATE 5 MG/1
5-10 TABLET ORAL
Status: CANCELLED | OUTPATIENT
Start: 2019-07-12

## 2019-07-12 RX ORDER — DIPHENHYDRAMINE HYDROCHLORIDE 50 MG/ML
12.5 INJECTION, SOLUTION INTRAMUSCULAR; INTRAVENOUS
Status: CANCELLED | OUTPATIENT
Start: 2019-07-12

## 2019-07-12 RX ORDER — SODIUM CHLORIDE 0.9 % (FLUSH) 0.9 %
5-40 SYRINGE (ML) INJECTION AS NEEDED
Status: CANCELLED | OUTPATIENT
Start: 2019-07-12

## 2019-07-12 RX ORDER — METOCLOPRAMIDE HYDROCHLORIDE 5 MG/ML
10 INJECTION INTRAMUSCULAR; INTRAVENOUS
Status: CANCELLED | OUTPATIENT
Start: 2019-07-12

## 2019-07-12 RX ORDER — CEFAZOLIN SODIUM/WATER 2 G/20 ML
2 SYRINGE (ML) INTRAVENOUS EVERY 8 HOURS
Status: CANCELLED | OUTPATIENT
Start: 2019-07-12 | End: 2019-07-12

## 2019-07-12 RX ORDER — TRANEXAMIC ACID 650 1/1
1950 TABLET ORAL ONCE
Status: CANCELLED | OUTPATIENT
Start: 2019-07-12 | End: 2019-07-12

## 2019-07-12 NOTE — DISCHARGE INSTRUCTIONS
[address] [place] [address] Sports Medicine   Patient Discharge Instructions    Adriana Sweet / [id number] : 1955    Admitted (Not on file) Discharged: 2019     IF YOU HAVE ANY PROBLEMS ONCE YOU ARE  [facility] FOLLOWING NUMBERS:   Main office number: [phone]    Your follow up appointment to see either Dr. Damaris Hameed PA-C, or Bing Arroyo PA-C as scheduled in 2 weeks. If you are unsure of your appointment date call the office at [phone]. Medication Instructions     · Resume your home medictions as directed, you may have directed not to resume supplements until after your follow up. · A prescription for pain medication has been given   · It is important that you take the medication exactly as they are prescribed. · Keep your medication in the bottles provided by the pharmacist and keep a list of the medication names, dosages, and times to be taken in your wallet. · Do not take other medications without consulting your doctor. What to do at [address] [place] [address] Ave your prehospital diet. If you have excessive nausea or vomitting call your doctor's office. Be sure to maintain adequate fluid intake. Some pain medications may cause constipation. Remember to drink fluids, stay as active as possible, and eat plenty of fiber-rich foods. Begin In-Home Physical Therapy; 3 times a week to work on gait training, range of motion, strengthening, and weight bearing exercises as tolerable. Continue to use your walker or cane when walking. May progress from the walker to a cane to complete total bearing as tolerable. Patient may shower. Wrap incision with plastic wrap/covering to prevent incision from getting wet. Avoid complete immersion. YOUR DRESSING SHOULD BE CHANGED IN 3-5 DAYS BY [facility] NURSE.       When to Call    - Call if you have a temperature greater then 101  - Unable to keep food down  - Are unable to bear any wieght   - Need a pain medication refill     Information obtained by :  I understand that if any problems occur once I am at home I am to contact my physician. I understand and acknowledge receipt of the instructions indicated above.                                                                                                                                            Physician's or R.N.'s Signature                                                                  Date/Time                                                                                                                                              Patient or Representative Signature                                                          Date/Time

## 2019-07-12 NOTE — H&P
[address] [facility] [address] Medicine  History and Physical Exam    Patient: Belkis Reyes MRN: [id number]  SSN: [id number]    YOB: 1955  Age: 70 y.o. Sex: male      Subjective:      Chief Complaint: Left knee pain    History of Present Illness:  Patient complains of pain to the Left knee and difficulty ambulating, which has progressively worsened over several months. X-rays showed osteoarthritis of the joint, localized primarily to the lateral compartment. The patient's pain has persisted and progressed despite conservative treatments and therapies. The patient has been previously treated with nsaids. The patient has at this time opted for surgical intervention.        Past Medical History:   Diagnosis Date    Arthritis     Asthma     CAD (coronary artery disease)     stents x 5    Cancer ([address]     melanoma on left side of face    Chronic pain     left knee    COPD     DVT (deep venous thrombosis) ([facility] 2001    left leg    Hypertension     Myocardial infarction [facility]) 2005    Primary localized osteoarthritis of left knee 7/12/2019    Pulmonary embolism ([facility] [address] [facility] 2017    Rheumatoid arthritis ([facility] [address]      Past Surgical History:   Procedure Laterality Date    ABDOMEN SURGERY PROC UNLISTED      Laparotomy bowel resection instestinal rupture, colostomy    ABDOMEN SURGERY PROC UNLISTED  1999    Revision of colostomy    FRACTIONAL FLOW RESERVE  4/29/2018    FRACTIONAL FLOW RESERVE ADDL  4/29/2018    HX COLOSTOMY  1999    HX COLOSTOMY TAKE DOWN      HX CORONARY STENT PLACEMENT      HX HEART CATHETERIZATION  2005    stents x 4    HX HEART CATHETERIZATION  2014    stent x 1    HX HERNIA REPAIR      x 4    HX LUMBAR DISKECTOMY  2014    LEFT HEART PERCUTANEOUS  4/29/2018    JH CORONARY ARTERIOGRAPH  4/29/2018     Social History     Occupational History    Not on file   Tobacco Use    Smoking status: Former Smoker     Last attempt to quit: 1/1/1992     Years since quitting: 27.5    Smokeless tobacco: Never Used   Substance and Sexual Activity    Alcohol use: Not Currently     Comment: last 1996    Drug use: Never    Sexual activity: Not Currently     Prior to Admission medications    Medication Sig Start Date End Date Taking? Authorizing Provider   mepolizumab (NUCALA) 100 mg solr 100 mg by SubCUTAneous route once. Given in infusion clinic    Provider, Historical   leflunomide (ARAVA) 10 mg tablet Take 10 mg by mouth daily. Provider, Historical   propranolol (INDERAL) 20 mg tablet Take 20 mg by mouth two (2) times a day. Provider, Historical   celecoxib (CELEBREX) 200 mg capsule Take 200 mg by mouth two (2) times a day. OtherMoira MD   montelukast (SINGULAIR) 10 mg tablet Take 10 mg by mouth nightly. Moira Denney MD   pantoprazole (PROTONIX) 40 mg tablet Take 40 mg by mouth daily. Provider, Historical   meclizine (ANTIVERT) 25 mg tablet Take  by mouth three (3) times daily as needed. Provider, Historical   nitroglycerin (NITROSTAT) 0.4 mg SL tablet by SubLINGual route every five (5) minutes as needed for Chest Pain. Provider, Historical   Biotin 2,500 mcg cap Take 5,000 mcg by mouth daily. Provider, Historical   omega-3 fatty acids-vitamin e (FISH OIL) 1,000 mg cap Take 2 Caps by mouth daily. Provider, Historical   aspirin 81 mg tablet Take 81 mg by mouth daily. Moira Denney MD   SIMVASTATIN PO Take 40 mg by mouth nightly. Moira Denney MD       Allergies: Allergies   Allergen Reactions    Ciprofloxacin Other (comments)     PT states that he turns red.  Tape [Adhesive] Other (comments)     Pt states, \"it rips my skin\"        Review of Systems:  A comprehensive review of systems was negative except for that written in the History of Present Illness.     Objective:       Physical Exam:  HEENT: Normocephalic, atraumatic  Lungs:  Clear to auscultation  Heart:   Regular rate and rhythm  Abdomen: Soft  Extremities:  Pain with range of motion of the left knee. Active extension full, active flexion full. Tenderness medial knee. No deformity. No effusion. positive crepitus. Antalgic gait. Assessment:      Arthritis of the left knee. Plan:       Proceed with scheduled LEFT LATERAL UNICONDYLAR VS TOTAL KNEE ARTHROPLASTY . The various methods of treatment have been discussed with the patient and family. After consideration of risks, benefits, and other options for treatment, the patient has consented to surgical interventions. Questions were answered and preoperative teaching was done by Dr Chandrakant Segal.      Signed By: SURENDRA Delgado     July 12, 2019

## 2019-07-14 ENCOUNTER — ANESTHESIA EVENT (OUTPATIENT)
Dept: SURGERY | Age: 64
End: 2019-07-14

## 2019-07-14 RX ORDER — DIPHENHYDRAMINE HYDROCHLORIDE 50 MG/ML
12.5 INJECTION, SOLUTION INTRAMUSCULAR; INTRAVENOUS
Status: CANCELLED | OUTPATIENT
Start: 2019-07-14

## 2019-07-14 RX ORDER — ONDANSETRON 2 MG/ML
4 INJECTION INTRAMUSCULAR; INTRAVENOUS
Status: CANCELLED | OUTPATIENT
Start: 2019-07-14

## 2019-07-14 RX ORDER — ACETAMINOPHEN 500 MG
1000 TABLET ORAL
Status: CANCELLED | OUTPATIENT
Start: 2019-07-14 | End: 2019-07-14

## 2019-07-14 RX ORDER — SODIUM CHLORIDE 0.9 % (FLUSH) 0.9 %
5-40 SYRINGE (ML) INJECTION AS NEEDED
Status: CANCELLED | OUTPATIENT
Start: 2019-07-14

## 2019-07-14 RX ORDER — MAGNESIUM SULFATE 100 %
4 CRYSTALS MISCELLANEOUS AS NEEDED
Status: CANCELLED | OUTPATIENT
Start: 2019-07-14

## 2019-07-14 RX ORDER — FLUMAZENIL 0.1 MG/ML
0.2 INJECTION INTRAVENOUS
Status: CANCELLED | OUTPATIENT
Start: 2019-07-14

## 2019-07-14 RX ORDER — HYDROMORPHONE HYDROCHLORIDE 2 MG/ML
0.5 INJECTION, SOLUTION INTRAMUSCULAR; INTRAVENOUS; SUBCUTANEOUS
Status: CANCELLED | OUTPATIENT
Start: 2019-07-14

## 2019-07-14 RX ORDER — SODIUM CHLORIDE 0.9 % (FLUSH) 0.9 %
5-40 SYRINGE (ML) INJECTION EVERY 8 HOURS
Status: CANCELLED | OUTPATIENT
Start: 2019-07-14

## 2019-07-14 RX ORDER — CELECOXIB 100 MG/1
400 CAPSULE ORAL
Status: CANCELLED | OUTPATIENT
Start: 2019-07-14 | End: 2019-07-14

## 2019-07-14 RX ORDER — FENTANYL CITRATE 50 UG/ML
50 INJECTION, SOLUTION INTRAMUSCULAR; INTRAVENOUS AS NEEDED
Status: CANCELLED | OUTPATIENT
Start: 2019-07-14 | End: 2019-07-18

## 2019-07-14 RX ORDER — PREGABALIN 25 MG/1
25 CAPSULE ORAL
Status: CANCELLED | OUTPATIENT
Start: 2019-07-15 | End: 2019-07-15

## 2019-07-14 RX ORDER — NALOXONE HYDROCHLORIDE 0.4 MG/ML
0.1 INJECTION, SOLUTION INTRAMUSCULAR; INTRAVENOUS; SUBCUTANEOUS AS NEEDED
Status: CANCELLED | OUTPATIENT
Start: 2019-07-14

## 2019-07-14 RX ORDER — LORATADINE 10 MG/1
10 TABLET ORAL DAILY PRN
Status: CANCELLED | OUTPATIENT
Start: 2019-07-14

## 2019-07-15 ENCOUNTER — ANESTHESIA (OUTPATIENT)
Dept: SURGERY | Age: 64
End: 2019-07-15

## 2019-08-29 ENCOUNTER — HOSPITAL ENCOUNTER (INPATIENT)
Age: 64
LOS: 5 days | Discharge: HOME HEALTH | DRG: 202 | End: 2019-09-03
Attending: EMERGENCY MEDICINE | Admitting: INTERNAL MEDICINE
Payer: MEDICARE

## 2019-08-29 ENCOUNTER — APPOINTMENT (OUTPATIENT)
Dept: CT IMAGING | Age: 64
DRG: 202 | End: 2019-08-29
Attending: EMERGENCY MEDICINE
Payer: MEDICARE

## 2019-08-29 DIAGNOSIS — R06.09 DYSPNEA ON EXERTION: Primary | ICD-10-CM

## 2019-08-29 DIAGNOSIS — R00.0 TACHYCARDIA: ICD-10-CM

## 2019-08-29 DIAGNOSIS — R07.9 CHEST PAIN AT REST: ICD-10-CM

## 2019-08-29 DIAGNOSIS — I25.10 CAD (CORONARY ARTERY DISEASE): ICD-10-CM

## 2019-08-29 DIAGNOSIS — R06.09 DOE (DYSPNEA ON EXERTION): ICD-10-CM

## 2019-08-29 DIAGNOSIS — I25.119 CORONARY ARTERY DISEASE INVOLVING NATIVE HEART WITH ANGINA PECTORIS, UNSPECIFIED VESSEL OR LESION TYPE (HCC): ICD-10-CM

## 2019-08-29 PROBLEM — R10.13 DYSPEPSIA: Status: ACTIVE | Noted: 2019-08-29

## 2019-08-29 LAB
ALBUMIN SERPL-MCNC: 3.2 G/DL (ref 3.4–5)
ALBUMIN/GLOB SERPL: 1.1 {RATIO} (ref 0.8–1.7)
ALP SERPL-CCNC: 84 U/L (ref 45–117)
ALT SERPL-CCNC: 26 U/L (ref 16–61)
ANION GAP SERPL CALC-SCNC: 9 MMOL/L (ref 3–18)
ARTERIAL PATENCY WRIST A: ABNORMAL
AST SERPL-CCNC: 41 U/L (ref 10–38)
BASE EXCESS BLD CALC-SCNC: 3 MMOL/L
BASOPHILS # BLD: 0 K/UL (ref 0–0.1)
BASOPHILS NFR BLD: 1 % (ref 0–2)
BDY SITE: ABNORMAL
BILIRUB SERPL-MCNC: 0.8 MG/DL (ref 0.2–1)
BODY TEMPERATURE: 97.6
BUN SERPL-MCNC: 14 MG/DL (ref 7–18)
BUN/CREAT SERPL: 9 (ref 12–20)
CALCIUM SERPL-MCNC: 8.4 MG/DL (ref 8.5–10.1)
CHLORIDE SERPL-SCNC: 100 MMOL/L (ref 100–111)
CK MB CFR SERPL CALC: 1.5 % (ref 0–4)
CK MB SERPL-MCNC: 1.4 NG/ML (ref 5–25)
CK SERPL-CCNC: 93 U/L (ref 39–308)
CO2 SERPL-SCNC: 28 MMOL/L (ref 21–32)
CREAT SERPL-MCNC: 1.6 MG/DL (ref 0.6–1.3)
DIFFERENTIAL METHOD BLD: ABNORMAL
EOSINOPHIL # BLD: 0.1 K/UL (ref 0–0.4)
EOSINOPHIL NFR BLD: 2 % (ref 0–5)
ERYTHROCYTE [DISTWIDTH] IN BLOOD BY AUTOMATED COUNT: 15.7 % (ref 11.6–14.5)
GAS FLOW.O2 O2 DELIVERY SYS: ABNORMAL L/MIN
GLOBULIN SER CALC-MCNC: 3 G/DL (ref 2–4)
GLUCOSE SERPL-MCNC: 157 MG/DL (ref 74–99)
HCO3 BLD-SCNC: 27 MMOL/L (ref 22–26)
HCT VFR BLD AUTO: 39.1 % (ref 36–48)
HGB BLD-MCNC: 12.5 G/DL (ref 13–16)
LYMPHOCYTES # BLD: 1.1 K/UL (ref 0.9–3.6)
LYMPHOCYTES NFR BLD: 20 % (ref 21–52)
MAGNESIUM SERPL-MCNC: 1.5 MG/DL (ref 1.6–2.6)
MCH RBC QN AUTO: 30.9 PG (ref 24–34)
MCHC RBC AUTO-ENTMCNC: 32 G/DL (ref 31–37)
MCV RBC AUTO: 96.5 FL (ref 74–97)
MONOCYTES # BLD: 0.9 K/UL (ref 0.05–1.2)
MONOCYTES NFR BLD: 16 % (ref 3–10)
NEUTS SEG # BLD: 3.5 K/UL (ref 1.8–8)
NEUTS SEG NFR BLD: 61 % (ref 40–73)
O2/TOTAL GAS SETTING VFR VENT: 0.21 %
PCO2 BLD: 38.4 MMHG (ref 35–45)
PH BLD: 7.45 [PH] (ref 7.35–7.45)
PLATELET # BLD AUTO: 192 K/UL (ref 135–420)
PMV BLD AUTO: 9.9 FL (ref 9.2–11.8)
PO2 BLD: 75 MMHG (ref 80–100)
POTASSIUM SERPL-SCNC: 3.2 MMOL/L (ref 3.5–5.5)
PROT SERPL-MCNC: 6.2 G/DL (ref 6.4–8.2)
RBC # BLD AUTO: 4.05 M/UL (ref 4.7–5.5)
SAO2 % BLD: 96 % (ref 92–97)
SERVICE CMNT-IMP: ABNORMAL
SODIUM SERPL-SCNC: 137 MMOL/L (ref 136–145)
SPECIMEN TYPE: ABNORMAL
TOTAL RESP. RATE, ITRR: 16
TROPONIN I SERPL-MCNC: <0.02 NG/ML (ref 0–0.04)
WBC # BLD AUTO: 5.6 K/UL (ref 4.6–13.2)

## 2019-08-29 PROCEDURE — 71275 CT ANGIOGRAPHY CHEST: CPT

## 2019-08-29 PROCEDURE — 80053 COMPREHEN METABOLIC PANEL: CPT

## 2019-08-29 PROCEDURE — 82803 BLOOD GASES ANY COMBINATION: CPT

## 2019-08-29 PROCEDURE — 85025 COMPLETE CBC W/AUTO DIFF WBC: CPT

## 2019-08-29 PROCEDURE — 74011636320 HC RX REV CODE- 636/320: Performed by: EMERGENCY MEDICINE

## 2019-08-29 PROCEDURE — 36600 WITHDRAWAL OF ARTERIAL BLOOD: CPT

## 2019-08-29 PROCEDURE — 65270000029 HC RM PRIVATE

## 2019-08-29 PROCEDURE — 93005 ELECTROCARDIOGRAM TRACING: CPT

## 2019-08-29 PROCEDURE — 99285 EMERGENCY DEPT VISIT HI MDM: CPT

## 2019-08-29 PROCEDURE — 82550 ASSAY OF CK (CPK): CPT

## 2019-08-29 PROCEDURE — 83735 ASSAY OF MAGNESIUM: CPT

## 2019-08-29 RX ADMIN — IOPAMIDOL 100 ML: 755 INJECTION, SOLUTION INTRAVENOUS at 21:24

## 2019-08-29 NOTE — Clinical Note
Contrast Dose Calculator:  
Patient's age: 70.  
Patient's sex: Male. Patient weight (kg) = 83. Creatinine level (mg/dL) = 1.6. Creatinine clearance (mL/min): 54. Max Contrast dose per Creatinine Cl calculator = 121.5 mL.

## 2019-08-29 NOTE — Clinical Note
TRANSFER - OUT REPORT:  
 
Verbal report given to: Kimber FABIAN. Report consisted of patient's Situation, Background, Assessment and  
Recommendations(SBAR). Opportunity for questions and clarification was provided. Patient transported with a Registered Nurse and Monitor.

## 2019-08-29 NOTE — Clinical Note
Bilateral groin, right radial and right brachial clipped, prepped with ChloraPrep and draped. Wet prep solution applied at: 953. Wet prep solution dried at: 959. Wet prep elapsed drying time: 6 mins.

## 2019-08-29 NOTE — ED PROVIDER NOTES
EMERGENCY DEPARTMENT HISTORY AND PHYSICAL EXAM    Date: 8/29/2019  Patient Name: Charito Witt    History of Presenting Illness     Chief Complaint   Patient presents with    Shortness of Breath         History Provided By: Patient    Chief Complaint: SOB  Duration: Few Days  Timing:  Gradual  Location: Lungs  Quality: Pressure  Severity: Mild  Modifying Factors: No modifying factors  Associated Symptoms: dry cough, lower leg soreness, and generalized weakness    Additional History (Context):   7:28 PM  Charito Witt is a 70 y.o. male with PMHX of HTN, COPD, Asthma, Arthritis, Cancer, CAD, MI, DVT, PE. Rheumatoid arthritis,  who presents via EMS to the emergency department C/O SOB onset few days ago. Associated sxs include dry cough, lower leg soreness, and generalized weakness. The pt states that his sxs worsened today when he was taking a shower. Pt states he has to stop and catch his breath every 10 feet. Pt denies fever, sore throat, nausea, vomiting, dysuria, blood in stool, sick contact, and any other sxs or complaints. PCP: Alex Valadez MD    Current Outpatient Medications   Medication Sig Dispense Refill    mepolizumab (NUCALA) 100 mg solr 100 mg by SubCUTAneous route once. Given in infusion clinic      leflunomide (ARAVA) 10 mg tablet Take 10 mg by mouth daily.  propranolol (INDERAL) 20 mg tablet Take 20 mg by mouth two (2) times a day.  celecoxib (CELEBREX) 200 mg capsule Take 200 mg by mouth two (2) times a day.  montelukast (SINGULAIR) 10 mg tablet Take 10 mg by mouth nightly.  pantoprazole (PROTONIX) 40 mg tablet Take 40 mg by mouth daily.  meclizine (ANTIVERT) 25 mg tablet Take  by mouth three (3) times daily as needed.  nitroglycerin (NITROSTAT) 0.4 mg SL tablet by SubLINGual route every five (5) minutes as needed for Chest Pain.  Biotin 2,500 mcg cap Take 5,000 mcg by mouth daily.       omega-3 fatty acids-vitamin e (FISH OIL) 1,000 mg cap Take 2 Caps by mouth daily.  aspirin 81 mg tablet Take 81 mg by mouth daily.  SIMVASTATIN PO Take 40 mg by mouth nightly. Past History     Past Medical History:  Past Medical History:   Diagnosis Date    Arthritis     Asthma     CAD (coronary artery disease)     stents x 5    Cancer ([facility] [address]     melanoma on left side of face    Chronic pain     left knee    COPD     DVT (deep venous thrombosis) ([facility] [address] 2001    left leg    Hypertension     Myocardial infarction [facility])     Primary localized osteoarthritis of left knee 2019    Pulmonary embolism ([facility] [address] 2017    Rheumatoid arthritis ([facility] [address]        Past Surgical History:  Past Surgical History:   Procedure Laterality Date    ABDOMEN SURGERY PROC UNLISTED      Laparotomy bowel resection instestinal rupture, colostomy    ABDOMEN SURGERY PROC UNLISTED      Revision of colostomy    FRACTIONAL FLOW RESERVE  2018    FRACTIONAL FLOW RESERVE ADDL  2018    HX COLOSTOMY      HX COLOSTOMY TAKE DOWN      HX CORONARY STENT PLACEMENT      HX HEART CATHETERIZATION  2005    stents x 4    HX HEART CATHETERIZATION  2014    stent x 1    HX HERNIA REPAIR      x 4    HX LUMBAR DISKECTOMY  2014    LEFT HEART PERCUTANEOUS  2018    [facility] CORONARY ARTERIOGRAPH  2018       Family History:  History reviewed. No pertinent family history. Social History:  Social History     Tobacco Use    Smoking status: Former Smoker     Last attempt to quit: 1992     Years since quittin.6    Smokeless tobacco: Never Used   Substance Use Topics    Alcohol use: Not Currently     Comment: last     Drug use: Never       Allergies: Allergies   Allergen Reactions    Ciprofloxacin Other (comments)     PT states that he turns red.  Tape [Adhesive] Other (comments)     Pt states, \"it rips my skin\"         Review of Systems   Review of Systems   Constitutional: Negative for fever. HENT: Negative for sore throat.     Respiratory: Positive for cough and shortness of breath. Gastrointestinal: Negative for blood in stool, nausea and vomiting. Genitourinary: Negative for dysuria. Musculoskeletal: Positive for myalgias (bilateral leg swelling). Neurological: Positive for weakness (generalized). All other systems reviewed and are negative. Physical Exam     Vitals:    08/29/19 2136 08/29/19 2200 08/29/19 2300 08/29/19 2330   BP:  132/82 134/84 (!) 138/94   Pulse: 86 79 83 83   Resp: 16 13 11 8   Temp:       SpO2: 97% 95% 94% 94%   Weight:       Height:         Physical Exam   Constitutional: He is oriented to person, place, and time. He appears well-developed and well-nourished. No distress. Overweight well appearing nontoxic but dyspneic with speech and after respiratory exam   speech varies between 3 and 7 word sentences with accessory muscle use. HENT:   Head: Normocephalic and atraumatic. Right Ear: External ear normal.   Left Ear: External ear normal.   Mouth/Throat: Oropharynx is clear and moist. No oropharyngeal exudate. Tongue mild pallor    Eyes: Pupils are equal, round, and reactive to light. Conjunctivae and EOM are normal. No scleral icterus. No pallor   Neck: Normal range of motion. Neck supple. No JVD present. No tracheal deviation present. No thyromegaly present. Cardiovascular: Normal rate, regular rhythm and normal heart sounds. Pulmonary/Chest: Effort normal and breath sounds normal. No stridor. No respiratory distress. Audible bronchospastic cough   Abdominal: Soft. Bowel sounds are normal. He exhibits no distension. There is no tenderness. There is no rebound and no guarding. Musculoskeletal: Normal range of motion. He exhibits no edema or tenderness. No soft tissue injuries   Lymphadenopathy:     He has no cervical adenopathy. Neurological: He is alert and oriented to person, place, and time. He has normal reflexes. No cranial nerve deficit. Coordination normal.   Skin: Skin is warm and dry.  No rash noted. He is not diaphoretic. No erythema. Psychiatric: He has a normal mood and affect. His behavior is normal. Judgment and thought content normal.   Nursing note and vitals reviewed. Diagnostic Study Results     Labs -     Recent Results (from the past 12 hour(s))   EKG, 12 LEAD, INITIAL    Collection Time: 08/29/19  7:09 PM   Result Value Ref Range    Ventricular Rate 78 BPM    Atrial Rate 78 BPM    P-R Interval 160 ms    QRS Duration 80 ms    Q-T Interval 386 ms    QTC Calculation (Bezet) 440 ms    Calculated P Axis 65 degrees    Calculated R Axis 51 degrees    Calculated T Axis 95 degrees    Diagnosis       Normal sinus rhythm  Normal ECG  When compared with ECG of 21-JUN-2019 13:12,  No significant change was found     CBC WITH AUTOMATED DIFF    Collection Time: 08/29/19  7:14 PM   Result Value Ref Range    WBC 5.6 4.6 - 13.2 K/uL    RBC 4.05 (L) 4.70 - 5.50 M/uL    HGB 12.5 (L) 13.0 - 16.0 g/dL    HCT 39.1 36.0 - 48.0 %    MCV 96.5 74.0 - 97.0 FL    MCH 30.9 24.0 - 34.0 PG    MCHC 32.0 31.0 - 37.0 g/dL    RDW 15.7 (H) 11.6 - 14.5 %    PLATELET 594 838 - 644 K/uL    MPV 9.9 9.2 - 11.8 FL    NEUTROPHILS 61 40 - 73 %    LYMPHOCYTES 20 (L) 21 - 52 %    MONOCYTES 16 (H) 3 - 10 %    EOSINOPHILS 2 0 - 5 %    BASOPHILS 1 0 - 2 %    ABS. NEUTROPHILS 3.5 1.8 - 8.0 K/UL    ABS. LYMPHOCYTES 1.1 0.9 - 3.6 K/UL    ABS. MONOCYTES 0.9 0.05 - 1.2 K/UL    ABS. EOSINOPHILS 0.1 0.0 - 0.4 K/UL    ABS.  BASOPHILS 0.0 0.0 - 0.1 K/UL    DF AUTOMATED     CARDIAC PANEL,(CK, CKMB & TROPONIN)    Collection Time: 08/29/19  7:14 PM   Result Value Ref Range    CK 93 39 - 308 U/L    CK - MB 1.4 <3.6 ng/ml    CK-MB Index 1.5 0.0 - 4.0 %    Troponin-I, QT <0.02 0.0 - 7.059 NG/ML   METABOLIC PANEL, COMPREHENSIVE    Collection Time: 08/29/19  7:14 PM   Result Value Ref Range    Sodium 137 136 - 145 mmol/L    Potassium 3.2 (L) 3.5 - 5.5 mmol/L    Chloride 100 100 - 111 mmol/L    CO2 28 21 - 32 mmol/L    Anion gap 9 3.0 - 18 mmol/L Glucose 157 (H) 74 - 99 mg/dL    BUN 14 7.0 - 18 MG/DL    Creatinine 1.60 (H) 0.6 - 1.3 MG/DL    BUN/Creatinine ratio 9 (L) 12 - 20      GFR est AA 53 (L) >60 ml/min/1.73m2    GFR est non-AA 44 (L) >60 ml/min/1.73m2    Calcium 8.4 (L) 8.5 - 10.1 MG/DL    Bilirubin, total 0.8 0.2 - 1.0 MG/DL    ALT (SGPT) 26 16 - 61 U/L    AST (SGOT) 41 (H) 10 - 38 U/L    Alk. phosphatase 84 45 - 117 U/L    Protein, total 6.2 (L) 6.4 - 8.2 g/dL    Albumin 3.2 (L) 3.4 - 5.0 g/dL    Globulin 3.0 2.0 - 4.0 g/dL    A-G Ratio 1.1 0.8 - 1.7     MAGNESIUM    Collection Time: 08/29/19  7:14 PM   Result Value Ref Range    Magnesium 1.5 (L) 1.6 - 2.6 mg/dL   POC G3    Collection Time: 08/29/19  8:16 PM   Result Value Ref Range    Device: ROOM AIR      FIO2 (POC) 0.21 %    pH (POC) 7.452 (H) 7.35 - 7.45      pCO2 (POC) 38.4 35.0 - 45.0 MMHG    pO2 (POC) 75 (L) 80 - 100 MMHG    HCO3 (POC) 27.0 (H) 22 - 26 MMOL/L    sO2 (POC) 96 92 - 97 %    Base excess (POC) 3 mmol/L    Allens test (POC) N/A      Total resp. rate 16      Site RIGHT RADIAL      Patient temp. 97.6      Specimen type (POC) ARTERIAL      Performed by Wyatt Cum[employer]icyess[employer]    EKG, 12 LEAD, SUBSEQUENT    Collection Time: 08/29/19 11:44 PM   Result Value Ref Range    Ventricular Rate 83 BPM    Atrial Rate 83 BPM    P-R Interval 172 ms    QRS Duration 76 ms    Q-T Interval 376 ms    QTC Calculation (Bezet) 441 ms    Calculated P Axis 72 degrees    Calculated R Axis 60 degrees    Calculated T Axis 98 degrees    Diagnosis       Normal sinus rhythm  Normal ECG  When compared with ECG of 29-AUG-2019 19:09,  No significant change was found         Radiologic Studies -   CTA CHEST W OR W WO CONT   Final Result   IMPRESSION:      1. No evidence of pulmonary embolism. CT Results  (Last 48 hours)               08/29/19 2132  CTA CHEST W OR W WO CONT Final result    Impression:  IMPRESSION:       1. No evidence of pulmonary embolism.                Narrative:  EXAM: CTA CHEST W OR W WO CONT       CLINICAL INDICATION/HISTORY: Chest pain, acute, pulmonary embolism (PE)   suspected       COMPARISON: April 24, 2018. TECHNIQUE: Axial CT imaging from the thoracic inlet through the diaphragm with   intravenous contrast. Coronal and sagittal MIP reformats were generated. One or   more dose reduction techniques were used on this CT: automated exposure control,   adjustment of the mAs and/or kVp according to patient size, and iterative   reconstruction techniques. The specific techniques used on this CT exam have   been documented in the patient's electronic medical record. Digital Imaging and   Communications in Medicine (DICOM) format image data are available to   nonaffiliated external healthcare facilities or entities on a secure, media   free, reciprocally searchable basis with patient authorization for at least a   12-month period after this study. _______________       FINDINGS:       EXAM QUALITY: Non-diagnostic/Adequate/Excellent       PULMONARY ARTERIES: No evidence of pulmonary embolism. MEDIASTINUM: Normal heart size. No evidence of right heart strain. Aorta is   unremarkable. No pericardial effusion. Multiple coronary artery calcifications   are noted. LUNGS: No suspicious nodule or mass. Bilateral areas of scarring and   subsegmental atelectasis. PLEURA: No effusion or pneumothorax. AIRWAY: Normal.       LYMPH NODES: No enlarged nodes. UPPER ABDOMEN: Several low-density hepatic cysts are noted, unchanged. Prior   cholecystectomy. Diverticulosis. OTHER: No acute or aggressive osseous abnormalities identified. Mild   spondylosis. _______________               CXR Results  (Last 48 hours)    None          Medications given in the ED-  Medications   iopamidol (ISOVUE-370) 76 % injection  mL (100 mL IntraVENous Given 8/29/19 2124)         Medical Decision Making   I am the first provider for this patient.     I reviewed the vital signs, available nursing notes, past medical history, past surgical history, family history and social history. Vital Signs-Reviewed the patient's vital signs. Pulse Oximetry Analysis - 97% on RA     Cardiac Monitor:  Rate: 78 bpm  Rhythm: NSR    EKG interpretation: (Preliminary)  7:09 PM   NSR, 78 bpm, QTc 440 ms  EKG read by Cristal. Chrissie Lopez MD at 9:16 PM     EKG interpretation: (Preliminary)  12:07 AM   NSR, 83 bpm, QTc 441 ms  EKG read by Cristal. Chrissie Lopez MD at 11:44 PM      Records Reviewed: Nursing Notes and Old Medical Records    Provider Notes (Medical Decision Making): Ddx: VIRK would consider CHF, PE, PNA, anemia, bronchitis. However, exam and history suggest a combination of multi-factorial issues creating a synergistic compliant of VIRK. Procedures:  Procedures    ED Course:   7:28 PM Initial assessment performed. The patients presenting problems have been discussed, and they are in agreement with the care plan formulated and outlined with them. I have encouraged them to ask questions as they arise throughout their visit. 11:39 PM Discussed patient's history, exam, and available diagnostics results with Dr. Godwin Castellano MD (Hospitalist), internal medicine, who agrees with admission. Diagnosis and Disposition     Critical Care Time: none    Core Measures:  For Hospitalized Patients:    1. Hospitalization Decision Time:  The decision to hospitalize the patient was made by Cristal. Chrissie Lopez MD at 11:59 PM on 8/29/2019    2. Aspirin: Pt takes ASA    12:00 AM  Patient is being admitted to the hospital by Dr. Godwin Castellano MD (Hospitalist). The results of their tests and reasons for their admission have been discussed with them and/or available family. They convey agreement and understanding for the need to be admitted and for their admission diagnosis. CONDITIONS ON ADMISSION:  Sepsis is not present at the time of admission. Deep Vein Thrombosis is not present at the time of admission. Thrombosis is not present at the time of admission. Urinary Tract Infection is not present at the time of admission. Pneumonia is not present at the time of admission. MRSA is not present at the time of admission. Wound infection is not present at the time of admission. Pressure Ulcer is not present at the time of admission. CLINICAL IMPRESSION:    1. Dyspnea on exertion    2. Tachycardia    3. Coronary artery disease involving native heart with angina pectoris, unspecified vessel or lesion type ([address]        PLAN:  1. ADMIT TO TELEMETRY  _______________________________    Attestations: This note is prepared by [facility], acting as Scribe for Cristal. Meliza Harrison MD.    Cristal. Meliza Harrison MD:  The scribe's documentation has been prepared under my direction and personally reviewed by me in its entirety.   I confirm that the note above accurately reflects all work, treatment, procedures, and medical decision making performed by me.  _______________________________

## 2019-08-29 NOTE — Clinical Note
Sheath #1: Closed using manual compression. Site secured by transparent dressing. Pressure held for: 6 minutes.

## 2019-08-29 NOTE — Clinical Note
TRANSFER - IN REPORT:  
 
Verbal report received from: KELLY. Report consisted of patient's Situation, Background, Assessment and  
Recommendations(SBAR). Opportunity for questions and clarification was provided. Assessment completed upon patient's arrival to unit and care assumed. Patient transported with a Registered Nurse and [address] [place] [address] / [facility].

## 2019-08-29 NOTE — ED TRIAGE NOTES
Pt arrives per EMS from home w/ c/o VIRK x5 days. Pt reports \"feeling worn ou and not right. \" Pt reports starting Anoro Ellipta x1 week ago and started feeling worse and stopped and notified Dr. Harish Mueller. Pt able to speak in complete sentences and in NAD.

## 2019-08-30 ENCOUNTER — APPOINTMENT (OUTPATIENT)
Dept: NON INVASIVE DIAGNOSTICS | Age: 64
DRG: 202 | End: 2019-08-30
Attending: INTERNAL MEDICINE
Payer: MEDICARE

## 2019-08-30 PROBLEM — J20.9 COPD (CHRONIC OBSTRUCTIVE PULMONARY DISEASE) WITH ACUTE BRONCHITIS (HCC): Status: ACTIVE | Noted: 2019-08-30

## 2019-08-30 PROBLEM — J44.0 COPD (CHRONIC OBSTRUCTIVE PULMONARY DISEASE) WITH ACUTE BRONCHITIS (HCC): Status: ACTIVE | Noted: 2019-08-30

## 2019-08-30 PROBLEM — J45.901 ASTHMA WITH COPD WITH EXACERBATION (HCC): Status: ACTIVE | Noted: 2019-08-30

## 2019-08-30 PROBLEM — R06.09 EXERTIONAL DYSPNEA: Status: ACTIVE | Noted: 2019-08-30

## 2019-08-30 PROBLEM — J96.91 RESPIRATORY FAILURE WITH HYPOXIA (HCC): Status: ACTIVE | Noted: 2019-08-30

## 2019-08-30 PROBLEM — M06.9 RHEUMATOID ARTHRITIS (HCC): Status: ACTIVE | Noted: 2019-08-30

## 2019-08-30 PROBLEM — J44.1 ASTHMA WITH COPD WITH EXACERBATION (HCC): Status: ACTIVE | Noted: 2019-08-30

## 2019-08-30 LAB
CK MB CFR SERPL CALC: 1.3 % (ref 0–4)
CK MB CFR SERPL CALC: 1.6 % (ref 0–4)
CK MB CFR SERPL CALC: 1.8 % (ref 0–4)
CK MB SERPL-MCNC: 1.1 NG/ML (ref 5–25)
CK MB SERPL-MCNC: 1.2 NG/ML (ref 5–25)
CK MB SERPL-MCNC: 1.4 NG/ML (ref 5–25)
CK SERPL-CCNC: 74 U/L (ref 39–308)
CK SERPL-CCNC: 77 U/L (ref 39–308)
CK SERPL-CCNC: 82 U/L (ref 39–308)
ECHO AO ASC DIAM: 3.1 CM
ECHO AO ROOT DIAM: 3.2 CM
ECHO AV AREA PEAK VELOCITY: 3.5 CM2
ECHO AV AREA VTI: 3.9 CM2
ECHO AV AREA/BSA PEAK VELOCITY: 1.8 CM2/M2
ECHO AV AREA/BSA VTI: 2 CM2/M2
ECHO AV MEAN GRADIENT: 2.5 MMHG
ECHO AV PEAK GRADIENT: 3.8 MMHG
ECHO AV PEAK VELOCITY: 97.59 CM/S
ECHO AV VTI: 21.17 CM
ECHO LA MAJOR AXIS: 3.85 CM
ECHO LV E' LATERAL VELOCITY: 5 CM/S
ECHO LV E' SEPTAL VELOCITY: 6 CM/S
ECHO LV EDV A2C: 41.8 ML
ECHO LV EDV A4C: 38.4 ML
ECHO LV EDV BP: 41.8 ML (ref 67–155)
ECHO LV EDV INDEX A4C: 20.2 ML/M2
ECHO LV EDV INDEX BP: 21.9 ML/M2
ECHO LV EDV NDEX A2C: 21.9 ML/M2
ECHO LV EJECTION FRACTION A2C: 74 %
ECHO LV EJECTION FRACTION A4C: 60 %
ECHO LV EJECTION FRACTION BIPLANE: 68 % (ref 55–100)
ECHO LV ESV A2C: 11 ML
ECHO LV ESV A4C: 15.2 ML
ECHO LV ESV BP: 13.4 ML (ref 22–58)
ECHO LV ESV INDEX A2C: 5.8 ML/M2
ECHO LV ESV INDEX A4C: 8 ML/M2
ECHO LV ESV INDEX BP: 7 ML/M2
ECHO LV INTERNAL DIMENSION DIASTOLIC: 4.76 CM (ref 4.2–5.9)
ECHO LV INTERNAL DIMENSION SYSTOLIC: 2.96 CM
ECHO LV IVSD: 0.84 CM (ref 0.6–1)
ECHO LV MASS 2D: 150.5 G (ref 88–224)
ECHO LV MASS INDEX 2D: 79 G/M2 (ref 49–115)
ECHO LV POSTERIOR WALL DIASTOLIC: 0.83 CM (ref 0.6–1)
ECHO LVOT DIAM: 2.29 CM
ECHO LVOT PEAK GRADIENT: 2.7 MMHG
ECHO LVOT PEAK VELOCITY: 82.57 CM/S
ECHO LVOT VTI: 19.98 CM
ECHO MV A VELOCITY: 99.03 CM/S
ECHO MV AREA PHT: 4.4 CM2
ECHO MV E DECELERATION TIME (DT): 171.4 MS
ECHO MV E VELOCITY: 64.73 CM/S
ECHO MV E/A RATIO: 0.65
ECHO MV E/E' LATERAL: 12.95
ECHO MV E/E' RATIO (AVERAGED): 11.87
ECHO MV E/E' SEPTAL: 10.79
ECHO MV PRESSURE HALF TIME (PHT): 49.7 MS
ECHO PV MAX VELOCITY: 117.48 CM/S
ECHO PV PEAK GRADIENT: 5.5 MMHG
ECHO RA AREA 4C: 17.52 CM2
ECHO RV INTERNAL DIMENSION: 3.73 CM
ECHO TRICUSPID ANNULAR PEAK SYSTOLIC VELOCITY: 2.2 CM/S
EST. AVERAGE GLUCOSE BLD GHB EST-MCNC: 114 MG/DL
GLUCOSE BLD STRIP.AUTO-MCNC: 142 MG/DL (ref 70–110)
GLUCOSE BLD STRIP.AUTO-MCNC: 195 MG/DL (ref 70–110)
GLUCOSE BLD STRIP.AUTO-MCNC: 264 MG/DL (ref 70–110)
GLUCOSE BLD STRIP.AUTO-MCNC: 304 MG/DL (ref 70–110)
HBA1C MFR BLD: 5.6 % (ref 4.2–5.6)
TROPONIN I SERPL-MCNC: <0.02 NG/ML (ref 0–0.04)

## 2019-08-30 PROCEDURE — 74011000258 HC RX REV CODE- 258: Performed by: INTERNAL MEDICINE

## 2019-08-30 PROCEDURE — 83036 HEMOGLOBIN GLYCOSYLATED A1C: CPT

## 2019-08-30 PROCEDURE — 93306 TTE W/DOPPLER COMPLETE: CPT

## 2019-08-30 PROCEDURE — 65660000000 HC RM CCU STEPDOWN

## 2019-08-30 PROCEDURE — 74011000250 HC RX REV CODE- 250: Performed by: INTERNAL MEDICINE

## 2019-08-30 PROCEDURE — 36415 COLL VENOUS BLD VENIPUNCTURE: CPT

## 2019-08-30 PROCEDURE — 74011250637 HC RX REV CODE- 250/637: Performed by: INTERNAL MEDICINE

## 2019-08-30 PROCEDURE — 82962 GLUCOSE BLOOD TEST: CPT

## 2019-08-30 PROCEDURE — 82550 ASSAY OF CK (CPK): CPT

## 2019-08-30 PROCEDURE — 77010033678 HC OXYGEN DAILY

## 2019-08-30 PROCEDURE — 74011636637 HC RX REV CODE- 636/637: Performed by: INTERNAL MEDICINE

## 2019-08-30 PROCEDURE — 94760 N-INVAS EAR/PLS OXIMETRY 1: CPT

## 2019-08-30 PROCEDURE — 94640 AIRWAY INHALATION TREATMENT: CPT

## 2019-08-30 PROCEDURE — 74011250636 HC RX REV CODE- 250/636: Performed by: INTERNAL MEDICINE

## 2019-08-30 RX ORDER — LEFLUNOMIDE 10 MG/1
10 TABLET ORAL DAILY
Status: DISCONTINUED | OUTPATIENT
Start: 2019-08-30 | End: 2019-09-03 | Stop reason: HOSPADM

## 2019-08-30 RX ORDER — MAGNESIUM SULFATE 100 %
4 CRYSTALS MISCELLANEOUS AS NEEDED
Status: DISCONTINUED | OUTPATIENT
Start: 2019-08-30 | End: 2019-09-03 | Stop reason: HOSPADM

## 2019-08-30 RX ORDER — PANTOPRAZOLE SODIUM 40 MG/1
40 TABLET, DELAYED RELEASE ORAL DAILY
Status: DISCONTINUED | OUTPATIENT
Start: 2019-08-30 | End: 2019-09-03 | Stop reason: HOSPADM

## 2019-08-30 RX ORDER — FUROSEMIDE 20 MG/1
20 TABLET ORAL DAILY
COMMUNITY

## 2019-08-30 RX ORDER — MECLIZINE HCL 12.5 MG 12.5 MG/1
25 TABLET ORAL
Status: DISCONTINUED | OUTPATIENT
Start: 2019-08-30 | End: 2019-09-03 | Stop reason: HOSPADM

## 2019-08-30 RX ORDER — DULOXETIN HYDROCHLORIDE 20 MG/1
20 CAPSULE, DELAYED RELEASE ORAL DAILY
COMMUNITY
End: 2019-09-08 | Stop reason: DRUGHIGH

## 2019-08-30 RX ORDER — IPRATROPIUM BROMIDE AND ALBUTEROL SULFATE 2.5; .5 MG/3ML; MG/3ML
3 SOLUTION RESPIRATORY (INHALATION)
Status: DISCONTINUED | OUTPATIENT
Start: 2019-08-30 | End: 2019-08-31

## 2019-08-30 RX ORDER — DULOXETIN HYDROCHLORIDE 20 MG/1
20 CAPSULE, DELAYED RELEASE ORAL
Status: DISCONTINUED | OUTPATIENT
Start: 2019-08-30 | End: 2019-08-30

## 2019-08-30 RX ORDER — SIMVASTATIN 40 MG/1
40 TABLET, FILM COATED ORAL
Status: DISCONTINUED | OUTPATIENT
Start: 2019-08-30 | End: 2019-09-03 | Stop reason: HOSPADM

## 2019-08-30 RX ORDER — CELECOXIB 100 MG/1
200 CAPSULE ORAL 2 TIMES DAILY
Status: DISCONTINUED | OUTPATIENT
Start: 2019-08-30 | End: 2019-09-03 | Stop reason: HOSPADM

## 2019-08-30 RX ORDER — DULOXETIN HYDROCHLORIDE 20 MG/1
20 CAPSULE, DELAYED RELEASE ORAL DAILY
Status: DISCONTINUED | OUTPATIENT
Start: 2019-08-31 | End: 2019-09-03 | Stop reason: HOSPADM

## 2019-08-30 RX ORDER — PROPRANOLOL HYDROCHLORIDE 20 MG/1
40 TABLET ORAL 2 TIMES DAILY
Status: DISCONTINUED | OUTPATIENT
Start: 2019-08-30 | End: 2019-09-03 | Stop reason: HOSPADM

## 2019-08-30 RX ORDER — HEPARIN SODIUM 5000 [USP'U]/ML
5000 INJECTION, SOLUTION INTRAVENOUS; SUBCUTANEOUS EVERY 8 HOURS
Status: DISCONTINUED | OUTPATIENT
Start: 2019-08-30 | End: 2019-09-03 | Stop reason: HOSPADM

## 2019-08-30 RX ORDER — NITROGLYCERIN 0.4 MG/1
0.4 TABLET SUBLINGUAL AS NEEDED
Status: DISCONTINUED | OUTPATIENT
Start: 2019-08-30 | End: 2019-09-03 | Stop reason: HOSPADM

## 2019-08-30 RX ORDER — GUAIFENESIN 100 MG/5ML
81 LIQUID (ML) ORAL DAILY
Status: DISCONTINUED | OUTPATIENT
Start: 2019-08-30 | End: 2019-09-03 | Stop reason: HOSPADM

## 2019-08-30 RX ORDER — PROPRANOLOL HYDROCHLORIDE 20 MG/1
20 TABLET ORAL 2 TIMES DAILY
Status: DISCONTINUED | OUTPATIENT
Start: 2019-08-30 | End: 2019-08-30

## 2019-08-30 RX ORDER — MONTELUKAST SODIUM 10 MG/1
10 TABLET ORAL
Status: DISCONTINUED | OUTPATIENT
Start: 2019-08-30 | End: 2019-09-03 | Stop reason: HOSPADM

## 2019-08-30 RX ORDER — INSULIN LISPRO 100 [IU]/ML
INJECTION, SOLUTION INTRAVENOUS; SUBCUTANEOUS
Status: DISCONTINUED | OUTPATIENT
Start: 2019-08-30 | End: 2019-09-03 | Stop reason: HOSPADM

## 2019-08-30 RX ADMIN — MONTELUKAST 10 MG: 10 TABLET, FILM COATED ORAL at 02:24

## 2019-08-30 RX ADMIN — INSULIN LISPRO 8 UNITS: 100 INJECTION, SOLUTION INTRAVENOUS; SUBCUTANEOUS at 17:17

## 2019-08-30 RX ADMIN — INSULIN LISPRO 2 UNITS: 100 INJECTION, SOLUTION INTRAVENOUS; SUBCUTANEOUS at 12:40

## 2019-08-30 RX ADMIN — PIPERACILLIN, TAZOBACTAM 4.5 G: 4; .5 INJECTION, POWDER, LYOPHILIZED, FOR SOLUTION INTRAVENOUS at 09:14

## 2019-08-30 RX ADMIN — PANTOPRAZOLE SODIUM 40 MG: 40 TABLET, DELAYED RELEASE ORAL at 09:14

## 2019-08-30 RX ADMIN — INSULIN LISPRO 6 UNITS: 100 INJECTION, SOLUTION INTRAVENOUS; SUBCUTANEOUS at 22:18

## 2019-08-30 RX ADMIN — METHYLPREDNISOLONE SODIUM SUCCINATE 60 MG: 40 INJECTION, POWDER, FOR SOLUTION INTRAMUSCULAR; INTRAVENOUS at 21:02

## 2019-08-30 RX ADMIN — HEPARIN SODIUM 5000 UNITS: 5000 INJECTION INTRAVENOUS; SUBCUTANEOUS at 02:25

## 2019-08-30 RX ADMIN — METHYLPREDNISOLONE SODIUM SUCCINATE 60 MG: 40 INJECTION, POWDER, FOR SOLUTION INTRAMUSCULAR; INTRAVENOUS at 09:14

## 2019-08-30 RX ADMIN — METHYLPREDNISOLONE SODIUM SUCCINATE 60 MG: 40 INJECTION, POWDER, FOR SOLUTION INTRAMUSCULAR; INTRAVENOUS at 14:43

## 2019-08-30 RX ADMIN — IPRATROPIUM BROMIDE AND ALBUTEROL SULFATE 3 ML: .5; 3 SOLUTION RESPIRATORY (INHALATION) at 07:35

## 2019-08-30 RX ADMIN — SIMVASTATIN 40 MG: 40 TABLET, FILM COATED ORAL at 22:19

## 2019-08-30 RX ADMIN — LEFLUNOMIDE 10 MG: 10 TABLET ORAL at 09:21

## 2019-08-30 RX ADMIN — IPRATROPIUM BROMIDE AND ALBUTEROL SULFATE 3 ML: .5; 3 SOLUTION RESPIRATORY (INHALATION) at 20:43

## 2019-08-30 RX ADMIN — PROPRANOLOL HYDROCHLORIDE 20 MG: 20 TABLET ORAL at 09:14

## 2019-08-30 RX ADMIN — HEPARIN SODIUM 5000 UNITS: 5000 INJECTION INTRAVENOUS; SUBCUTANEOUS at 09:14

## 2019-08-30 RX ADMIN — IPRATROPIUM BROMIDE AND ALBUTEROL SULFATE 3 ML: .5; 3 SOLUTION RESPIRATORY (INHALATION) at 15:23

## 2019-08-30 RX ADMIN — ASPIRIN 81 MG 81 MG: 81 TABLET ORAL at 09:14

## 2019-08-30 RX ADMIN — PIPERACILLIN, TAZOBACTAM 4.5 G: 4; .5 INJECTION, POWDER, LYOPHILIZED, FOR SOLUTION INTRAVENOUS at 02:25

## 2019-08-30 RX ADMIN — METHYLPREDNISOLONE SODIUM SUCCINATE 60 MG: 40 INJECTION, POWDER, FOR SOLUTION INTRAMUSCULAR; INTRAVENOUS at 02:24

## 2019-08-30 RX ADMIN — PROPRANOLOL HYDROCHLORIDE 40 MG: 20 TABLET ORAL at 21:02

## 2019-08-30 RX ADMIN — HEPARIN SODIUM 5000 UNITS: 5000 INJECTION INTRAVENOUS; SUBCUTANEOUS at 18:34

## 2019-08-30 RX ADMIN — CELECOXIB 200 MG: 100 CAPSULE ORAL at 22:16

## 2019-08-30 RX ADMIN — IPRATROPIUM BROMIDE AND ALBUTEROL SULFATE 3 ML: .5; 3 SOLUTION RESPIRATORY (INHALATION) at 11:12

## 2019-08-30 RX ADMIN — MONTELUKAST 10 MG: 10 TABLET, FILM COATED ORAL at 21:02

## 2019-08-30 NOTE — PROGRESS NOTES
Reason for Admission:   C/o SOB             RRAT Score:   22             TBD                Top Challenges facing patient (as identified by patient/family and CM): Finances/Medication cost?   Has medicare                 Transportation? Support system or lack thereof? Living arrangements? [address] daughter            Self-care/ADLs/Cognition?  independent        Current Advanced Directive/Advance Care Plan:  Not on chart                        Plan for utilizing home health: Would benefit from [facility]                 Chart reviewed pt arrived via EMS with c/i SOB    Hx includes asthma, COPD,DVT,MI, HTN , cardiology on case, PT order has been placed,cm will cont to review for d/c planning, at this time waiting on PT recommendations for safe d/c planning, weekend cm will be available for immediate needs,  Care Management Interventions  PCP Verified by CM: Yes  Physical Therapy Consult:  Yes

## 2019-08-30 NOTE — PROGRESS NOTES
0040: Patient arrives to telemetry with ED tech. Patient is alert and oriented and in no apparent distress. He has O2 nasal cannula at 2L/min. Admission assessment completed as well as admission database. 0730: Shift uneventful. Bedside and Verbal shift change report given to Angela Stanley RN (oncoming nurse) by Leonie Singh RN (offgoing nurse). Report included the following information SBAR, Kardex, Intake/Output, MAR, Accordion and Recent Results.

## 2019-08-30 NOTE — H&P
History & Physical    Patient: Geni Moreau MRN: [id number]  CSN: [id number]    YOB: 1955  Age: 70 y.o. Sex: male      DOA: 8/29/2019    Chief Complaint:   Chief Complaint   Patient presents with    Shortness of Breath          HPI:     Geni Moreau is a 70 y.o.  male who has hx of COPD,Asthma, CAD with stents presents to ER with complaints of dyspnea with minimal exertion  In ER attempted to discharge patient but he developed chest pain tachypnea, hypoxemia and tachycardia to 130  Asked to admit for further eval  Patient has had chronic dyspnea for over a year but recently saw his pulmonologist and had spiriva and symbicort changed to [address]  Patient noticed worsened dyspnea and has been off of inhalers  Altogether for 4 days. Patient has chronic cough in morning but noticed today some sputum production no f/c/n/s  Chest pain started when He was ambulating in ER   Currently no chest pain at rest and heart rate 80-90 asked to admit for further eval.    Last cardiac catherization done 4/2018  Mild to moderate nonobstructive coronary artery disease. 2.  Patent left main and patent LAD system with mild in-stent restenosis in the mid LAD stent. 3.  Mild disease in the ostial left circumflex artery. 4.  Patent stent in the high OM1 branch.   5.  Moderate disease in the proximal left circumflex artery 50% with nonischemic FFR of 0.95.  6.  Mild to moderate disease in the distal mid RCA 40-50% with nonischemic FFR of 0.86%.         Past Medical History:   Diagnosis Date    Arthritis     Asthma     CAD (coronary artery disease)     stents x 5    Cancer ([address]     melanoma on left side of face    Chronic pain     left knee    COPD     DVT (deep venous thrombosis) ([facility] [address] 2001    left leg    Hypertension     Myocardial infarction [facility]) 2005    Primary localized osteoarthritis of left knee 7/12/2019    Pulmonary embolism ([facility] [address] 2017    Rheumatoid arthritis [facility])        Past Surgical History:   Procedure Laterality Date    ABDOMEN SURGERY PROC UNLISTED      Laparotomy bowel resection instestinal rupture, colostomy    ABDOMEN SURGERY PROC UNLISTED      Revision of colostomy    FRACTIONAL FLOW RESERVE  2018    FRACTIONAL FLOW RESERVE ADDL  2018    HX COLOSTOMY      HX COLOSTOMY TAKE DOWN      HX CORONARY STENT PLACEMENT      HX HEART CATHETERIZATION  2005    stents x 4    HX HEART CATHETERIZATION  2014    stent x 1    HX HERNIA REPAIR      x 4    HX LUMBAR DISKECTOMY  2014    LEFT HEART PERCUTANEOUS  2018    [facility] CORONARY ARTERIOGRAPH  2018       History reviewed. No pertinent family history. Social History     Socioeconomic History    Marital status:      Spouse name: Not on file    Number of children: Not on file    Years of education: Not on file    Highest education level: Not on file   Tobacco Use    Smoking status: Former Smoker     Last attempt to quit: 1992     Years since quittin.6    Smokeless tobacco: Never Used   Substance and Sexual Activity    Alcohol use: Not Currently     Comment: last     Drug use: Never    Sexual activity: Not Currently   Other Topics Concern       Prior to Admission medications    Medication Sig Start Date End Date Taking? Authorizing Provider   mepolizumab (NUCALA) 100 mg solr 100 mg by SubCUTAneous route once. Given in infusion clinic    Provider, Historical   leflunomide (ARAVA) 10 mg tablet Take 10 mg by mouth daily. Provider, Historical   propranolol (INDERAL) 20 mg tablet Take 20 mg by mouth two (2) times a day. Provider, Historical   celecoxib (CELEBREX) 200 mg capsule Take 200 mg by mouth two (2) times a day. Other, MD Moira   montelukast (SINGULAIR) 10 mg tablet Take 10 mg by mouth nightly. Other, MD Moira   pantoprazole (PROTONIX) 40 mg tablet Take 40 mg by mouth daily.     Provider, Historical   meclizine (ANTIVERT) 25 mg tablet Take  by mouth three (3) times daily as needed. Provider, Historical   nitroglycerin (NITROSTAT) 0.4 mg SL tablet by SubLINGual route every five (5) minutes as needed for Chest Pain. Provider, Historical   Biotin 2,500 mcg cap Take 5,000 mcg by mouth daily. Provider, Historical   omega-3 fatty acids-vitamin e (FISH OIL) 1,000 mg cap Take 2 Caps by mouth daily. Provider, Historical   aspirin 81 mg tablet Take 81 mg by mouth daily. Олег, MD Moira   SIMVASTATIN PO Take 40 mg by mouth nightly. Moira Denney MD       Allergies   Allergen Reactions    Ciprofloxacin Other (comments)     PT states that he turns red.  Tape [Adhesive] Other (comments)     Pt states, \"it rips my skin\"         Review of Systems  GENERAL: Patient alert, awake and oriented times 3, able to communicate full sentences and not in distress. HEENT: No change in vision, no earache, tinnitus, sore throat or sinus congestion. NECK: No pain or stiffness. PULMONARY:+ shortness of breath, +cough or wheeze. Cardiovascular: no pnd or orthopnea, + CP  GASTROINTESTINAL: No abdominal pain, nausea, vomiting or diarrhea, melena or bright red blood per rectum. GENITOURINARY: No urinary frequency, urgency, hesitancy or dysuria. MUSCULOSKELETAL: No joint or muscle pain, no back pain, no recent trauma. DERMATOLOGIC: No rash, no itching, no lesions. ENDOCRINE: No polyuria, polydipsia, no heat or cold intolerance. No recent change in weight. HEMATOLOGICAL: + anemia or easy bruising or bleeding. NEUROLOGIC: No headache, seizures, numbness, tingling or weakness. Physical Exam:     Physical Exam:  Visit Vitals  BP (!) 138/94   Pulse 83   Temp 97.6 °F (36.4 °C)   Resp 8   Ht 5' 5\" (1.651 m)   Wt 81.6 kg (180 lb)   SpO2 94%   BMI 29.95 kg/m²      O2 Device: Room air    Temp (24hrs), Av.6 °F (36.4 °C), Min:97.6 °F (36.4 °C), Max:97.6 °F (36.4 °C)    No intake/output data recorded. No intake/output data recorded.     General:  Alert, cooperative, no distress, appears stated age. Head: Normocephalic, without obvious abnormality, atraumatic. Eyes:  Conjunctivae/corneas clear. PERRL, EOMs intact. Nose: Nares normal. No drainage or sinus tenderness. Neck: Supple, symmetrical, trachea midline, no adenopathy, thyroid: no enlargement, no carotid bruit and no JVD. Lungs:   Clear to auscultation bilaterally. Heart:  Regular rate and rhythm, S1, S2 normal.     Abdomen: Soft, non-tender. Bowel sounds normal.    Extremities: Extremities normal, atraumatic, no cyanosis or edema. Pulses: 2+ and symmetric all extremities. Skin:  No rashes or lesions   Neurologic: AAOx3, No focal motor or sensory deficit. CT Results  (Last 48 hours)               08/29/19 2132  CTA CHEST W OR W WO CONT Final result    Impression:  IMPRESSION:       1. No evidence of pulmonary embolism. Narrative:  EXAM: CTA CHEST W OR W WO CONT       CLINICAL INDICATION/HISTORY: Chest pain, acute, pulmonary embolism (PE)   suspected       COMPARISON: April 24, 2018. TECHNIQUE: Axial CT imaging from the thoracic inlet through the diaphragm with   intravenous contrast. Coronal and sagittal MIP reformats were generated. One or   more dose reduction techniques were used on this CT: automated exposure control,   adjustment of the mAs and/or kVp according to patient size, and iterative   reconstruction techniques. The specific techniques used on this CT exam have   been documented in the patient's electronic medical record. Digital Imaging and   Communications in Medicine (DICOM) format image data are available to   nonaffiliated external healthcare facilities or entities on a secure, media   free, reciprocally searchable basis with patient authorization for at least a   12-month period after this study. _______________       FINDINGS:       EXAM QUALITY: Non-diagnostic/Adequate/Excellent       PULMONARY ARTERIES: No evidence of pulmonary embolism. MEDIASTINUM: Normal heart size. No evidence of right heart strain. Aorta is   unremarkable. No pericardial effusion. Multiple coronary artery calcifications   are noted. LUNGS: No suspicious nodule or mass. Bilateral areas of scarring and   subsegmental atelectasis. PLEURA: No effusion or pneumothorax. AIRWAY: Normal.       LYMPH NODES: No enlarged nodes. UPPER ABDOMEN: Several low-density hepatic cysts are noted, unchanged. Prior   cholecystectomy. Diverticulosis. OTHER: No acute or aggressive osseous abnormalities identified. Mild   spondylosis. _______________               Labs Reviewed: All lab results for the last 24 hours reviewed. Recent Results (from the past 24 hour(s))   EKG, 12 LEAD, INITIAL    Collection Time: 08/29/19  7:09 PM   Result Value Ref Range    Ventricular Rate 78 BPM    Atrial Rate 78 BPM    P-R Interval 160 ms    QRS Duration 80 ms    Q-T Interval 386 ms    QTC Calculation (Bezet) 440 ms    Calculated P Axis 65 degrees    Calculated R Axis 51 degrees    Calculated T Axis 95 degrees    Diagnosis       Normal sinus rhythm  Normal ECG  When compared with ECG of 21-JUN-2019 13:12,  No significant change was found     CBC WITH AUTOMATED DIFF    Collection Time: 08/29/19  7:14 PM   Result Value Ref Range    WBC 5.6 4.6 - 13.2 K/uL    RBC 4.05 (L) 4.70 - 5.50 M/uL    HGB 12.5 (L) 13.0 - 16.0 g/dL    HCT 39.1 36.0 - 48.0 %    MCV 96.5 74.0 - 97.0 FL    MCH 30.9 24.0 - 34.0 PG    MCHC 32.0 31.0 - 37.0 g/dL    RDW 15.7 (H) 11.6 - 14.5 %    PLATELET 993 615 - 732 K/uL    MPV 9.9 9.2 - 11.8 FL    NEUTROPHILS 61 40 - 73 %    LYMPHOCYTES 20 (L) 21 - 52 %    MONOCYTES 16 (H) 3 - 10 %    EOSINOPHILS 2 0 - 5 %    BASOPHILS 1 0 - 2 %    ABS. NEUTROPHILS 3.5 1.8 - 8.0 K/UL    ABS. LYMPHOCYTES 1.1 0.9 - 3.6 K/UL    ABS. MONOCYTES 0.9 0.05 - 1.2 K/UL    ABS. EOSINOPHILS 0.1 0.0 - 0.4 K/UL    ABS.  BASOPHILS 0.0 0.0 - 0.1 K/UL    DF AUTOMATED     CARDIAC PANEL,(CK, CKMB & TROPONIN)    Collection Time: 08/29/19  7:14 PM   Result Value Ref Range    CK 93 39 - 308 U/L    CK - MB 1.4 <3.6 ng/ml    CK-MB Index 1.5 0.0 - 4.0 %    Troponin-I, QT <0.02 0.0 - 0.054 NG/ML   METABOLIC PANEL, COMPREHENSIVE    Collection Time: 08/29/19  7:14 PM   Result Value Ref Range    Sodium 137 136 - 145 mmol/L    Potassium 3.2 (L) 3.5 - 5.5 mmol/L    Chloride 100 100 - 111 mmol/L    CO2 28 21 - 32 mmol/L    Anion gap 9 3.0 - 18 mmol/L    Glucose 157 (H) 74 - 99 mg/dL    BUN 14 7.0 - 18 MG/DL    Creatinine 1.60 (H) 0.6 - 1.3 MG/DL    BUN/Creatinine ratio 9 (L) 12 - 20      GFR est AA 53 (L) >60 ml/min/1.73m2    GFR est non-AA 44 (L) >60 ml/min/1.73m2    Calcium 8.4 (L) 8.5 - 10.1 MG/DL    Bilirubin, total 0.8 0.2 - 1.0 MG/DL    ALT (SGPT) 26 16 - 61 U/L    AST (SGOT) 41 (H) 10 - 38 U/L    Alk. phosphatase 84 45 - 117 U/L    Protein, total 6.2 (L) 6.4 - 8.2 g/dL    Albumin 3.2 (L) 3.4 - 5.0 g/dL    Globulin 3.0 2.0 - 4.0 g/dL    A-G Ratio 1.1 0.8 - 1.7     MAGNESIUM    Collection Time: 08/29/19  7:14 PM   Result Value Ref Range    Magnesium 1.5 (L) 1.6 - 2.6 mg/dL   POC G3    Collection Time: 08/29/19  8:16 PM   Result Value Ref Range    Device: ROOM AIR      FIO2 (POC) 0.21 %    pH (POC) 7.452 (H) 7.35 - 7.45      pCO2 (POC) 38.4 35.0 - 45.0 MMHG    pO2 (POC) 75 (L) 80 - 100 MMHG    HCO3 (POC) 27.0 (H) 22 - 26 MMOL/L    sO2 (POC) 96 92 - 97 %    Base excess (POC) 3 mmol/L    Allens test (POC) N/A      Total resp.  rate 16      Site RIGHT RADIAL      Patient temp. 97.6      Specimen type (POC) ARTERIAL      Performed by [facility] [employer]    EKG, 12 LEAD, SUBSEQUENT    Collection Time: 08/29/19 11:44 PM   Result Value Ref Range    Ventricular Rate 83 BPM    Atrial Rate 83 BPM    P-R Interval 172 ms    QRS Duration 76 ms    Q-T Interval 376 ms    QTC Calculation (Bezet) 441 ms    Calculated P Axis 72 degrees    Calculated R Axis 60 degrees    Calculated T Axis 98 degrees    Diagnosis       Normal sinus rhythm  Normal ECG  When compared with ECG of 29-AUG-2019 19:09,  No significant change was found      and EKG    Procedures/imaging: see electronic medical records for all procedures/Xrays and details which were not copied into this note but were reviewed prior to creation of Plan  CT Results  (Last 48 hours)               08/29/19 2132  CTA CHEST W OR W WO CONT Final result    Impression:  IMPRESSION:       1. No evidence of pulmonary embolism. Narrative:  EXAM: CTA CHEST W OR W WO CONT       CLINICAL INDICATION/HISTORY: Chest pain, acute, pulmonary embolism (PE)   suspected       COMPARISON: April 24, 2018. TECHNIQUE: Axial CT imaging from the thoracic inlet through the diaphragm with   intravenous contrast. Coronal and sagittal MIP reformats were generated. One or   more dose reduction techniques were used on this CT: automated exposure control,   adjustment of the mAs and/or kVp according to patient size, and iterative   reconstruction techniques. The specific techniques used on this CT exam have   been documented in the patient's electronic medical record. Digital Imaging and   Communications in Medicine (DICOM) format image data are available to   nonaffiliated external healthcare facilities or entities on a secure, media   free, reciprocally searchable basis with patient authorization for at least a   12-month period after this study. _______________       FINDINGS:       EXAM QUALITY: Non-diagnostic/Adequate/Excellent       PULMONARY ARTERIES: No evidence of pulmonary embolism. MEDIASTINUM: Normal heart size. No evidence of right heart strain. Aorta is   unremarkable. No pericardial effusion. Multiple coronary artery calcifications   are noted. LUNGS: No suspicious nodule or mass. Bilateral areas of scarring and   subsegmental atelectasis. PLEURA: No effusion or pneumothorax. AIRWAY: Normal.       LYMPH NODES: No enlarged nodes.        UPPER ABDOMEN: Several low-density hepatic cysts are noted, unchanged. Prior   cholecystectomy. Diverticulosis. OTHER: No acute or aggressive osseous abnormalities identified. Mild   spondylosis. _______________                 Assessment/Plan     Active Problems:    * No active hospital problems. *  1. COPD/Asthma exerbation  Start Steroid/Duoneb/Zosyn for now as well as singulair  Hx of NUCALA no improvement    Obtain pulmonary consult    2. Chest pain/cad  With hx of multiple stents  Will consult cardio  Check enzymes  And check echo  Continue on cardiac regimen aspirin/propanol/simvastatin       3. Hypokalemia  Will replace with po    GERD  Resume home Protonix    RA   Continue home leflunominde    Hx of DVT and PE  Negative CTA  Will use heparin for prevention    DVT/GI Prophylaxis: Hep SQ    Discussed with patient at bedside about hospital admission and my plan care, who understood and agree with my plan care.     Michael Craft MD  8/29/2019 11:46 PM

## 2019-08-30 NOTE — PROGRESS NOTES
Problem: Falls - Risk of  Goal: *Absence of Falls  Description  Document Ghazal Martinez Fall Risk and appropriate interventions in the flowsheet. Outcome: Progressing Towards Goal  Note:   Fall Risk Interventions:            Medication Interventions: Teach patient to arise slowly, Patient to call before getting OOB                   Problem: Patient Education: Go to Patient Education Activity  Goal: Patient/Family Education  Outcome: Progressing Towards Goal     Problem: Pain  Goal: *Control of Pain  Outcome: Progressing Towards Goal  Goal: *PALLIATIVE CARE:  Alleviation of Pain  Outcome: Progressing Towards Goal     Problem: Patient Education: Go to Patient Education Activity  Goal: Patient/Family Education  Outcome: Progressing Towards Goal     Problem:  Activity Intolerance  Goal: *Oxygen saturation during activity within specified parameters  Outcome: Progressing Towards Goal  Goal: *Able to remain out of bed as prescribed  Outcome: Progressing Towards Goal     Problem: Patient Education: Go to Patient Education Activity  Goal: Patient/Family Education  Outcome: Progressing Towards Goal

## 2019-08-30 NOTE — PROGRESS NOTES
Pharmacy Dosing Services: Renal    Zosyn 4.5 g IV every 8 hours was automatically dose-adjusted to Zosyn 4.5 g IV every 6 hours per [facility] P&T Committee Protocol, with respect to renal function. Dosing provided for this   70 y.o. , male , for the indication of Upper respiratory infection. Pt Weight:   Wt Readings from Last 1 Encounters:   08/29/19 81.6 kg (180 lb)       Serum Creatinine Lab Results   Component Value Date/Time    Creatinine 1.60 (H) 08/29/2019 07:14 PM         Creatinine Clearance Estimated Creatinine Clearance: 45.2 mL/min (A) (based on SCr of 1.6 mg/dL (H)). BUN Lab Results   Component Value Date/Time    BUN 14 08/29/2019 07:14 PM           Pharmacy to continue to monitor patient daily. Will make dosage adjustments based upon changing renal function.   Signed Mnoa Aleman

## 2019-08-30 NOTE — CONSULTS
[facility]  Pulmonary, Critical Care, and Sleep Medicine    Initial Patient Consult    Name: Stephon Lewis MRN: [id number]   : 1955 Hospital: [facility] [place]   Date: 2019  Room: [facility]     Subjective: This patient has been seen and evaluated at the request of Dr. Renetta Winston for worsening dyspnea. Patient is a 70 y.o. male - known to me, with hx of Asthma/COPD. He had been tried on several inhalers - either they do not work, or expensive. Recently, tried nebulized meds - perforomist and yupelri as well - patient could not afford. He had elevated eosinophils and tried anti-IL injection immunotherapy with Nucala injections - patient developed headaches. Patient has come to ER with worsening SOB on minimal exertion. CTA chest was neg for PEs. Patient developed tachycardia in the ER with heart rate in the 130s. Patient feels better now. C/o normal breathing at rest, but SOB on walking. No CP or hemoptysis. Cough + and mostly non-productive. Hx of CAD and stents in past.   Hx of chronic RA on Arava med.        Past Medical History:   Diagnosis Date    Arthritis     Asthma     CAD (coronary artery disease)     stents x 5    Cancer ([address]     melanoma on left side of face    Chronic pain     left knee    COPD     DVT (deep venous thrombosis) ([address]     left leg    Hypertension     Myocardial infarction [facility]) 2005    Primary localized osteoarthritis of left knee 2019    Pulmonary embolism ([address] 2017    Rheumatoid arthritis ([address]       Past Surgical History:   Procedure Laterality Date    ABDOMEN SURGERY PROC UNLISTED      Laparotomy bowel resection instestinal rupture, colostomy    ABDOMEN SURGERY PROC UNLISTED      Revision of colostomy    FRACTIONAL FLOW RESERVE  2018    FRACTIONAL FLOW RESERVE ADDL  2018    HX COLOSTOMY      HX COLOSTOMY TAKE DOWN      HX CORONARY STENT PLACEMENT      [address] HEART CATHETERIZATION   stents x 4    HX HEART CATHETERIZATION  2014    stent x 1    HX HERNIA REPAIR      x 4    HX LUMBAR DISKECTOMY  2014    LEFT HEART PERCUTANEOUS  4/29/2018    [facility] CORONARY ARTERIOGRAPH  4/29/2018      Prior to Admission medications    Medication Sig Start Date End Date Taking? Authorizing Provider   DULoxetine (CYMBALTA) 20 mg capsule Take 20 mg by mouth daily. Yes Provider, Historical   furosemide (LASIX) 20 mg tablet Take  by mouth daily. Yes Provider, Historical   leflunomide (ARAVA) 10 mg tablet Take 10 mg by mouth daily. Yes Provider, Historical   propranolol (INDERAL) 20 mg tablet Take 20 mg by mouth two (2) times a day. Yes Provider, Historical   celecoxib (CELEBREX) 200 mg capsule Take 200 mg by mouth two (2) times a day. Yes Other, MD Moira   montelukast (SINGULAIR) 10 mg tablet Take 10 mg by mouth nightly. Yes Other, MD Moira   pantoprazole (PROTONIX) 40 mg tablet Take 40 mg by mouth daily. Yes Provider, Historical   meclizine (ANTIVERT) 25 mg tablet Take  by mouth three (3) times daily as needed. Yes Provider, Historical   omega-3 fatty acids-vitamin e (FISH OIL) 1,000 mg cap Take 2 Caps by mouth daily. Yes Provider, Historical   aspirin 81 mg tablet Take 81 mg by mouth daily. Yes Other, MD Moira   SIMVASTATIN PO Take 40 mg by mouth nightly. Yes Other, MD Moira   mepolizumab (NUCALA) 100 mg solr 100 mg by SubCUTAneous route once. Given in infusion clinic    Provider, Historical   nitroglycerin (NITROSTAT) 0.4 mg SL tablet by SubLINGual route every five (5) minutes as needed for Chest Pain. Provider, Historical   Biotin 2,500 mcg cap Take 5,000 mcg by mouth daily. Provider, Historical     Allergies   Allergen Reactions    Ciprofloxacin Other (comments)     PT states that he turns red.     Tape [Adhesive] Other (comments)     Pt states, \"it rips my skin\"      Social History     Tobacco Use    Smoking status: Former Smoker     Last attempt to quit: 1/1/1992     Years since quittin.6    Smokeless tobacco: Never Used   Substance Use Topics    Alcohol use: Not Currently     Comment: last       History reviewed. No pertinent family history.      Current Facility-Administered Medications   Medication Dose Route Frequency    albuterol-ipratropium (DUO-NEB) 2.5 MG-0.5 MG/3 ML  3 mL Nebulization Q4H RT    methylPREDNISolone (PF) (SOLU-MEDROL) injection 60 mg  60 mg IntraVENous Q6H    piperacillin-tazobactam (ZOSYN) 4.5 g in 0.9% sodium chloride (MBP/ADV) 100 mL MBP  4.5 g IntraVENous Q6H    aspirin chewable tablet 81 mg  81 mg Oral DAILY    leflunomide (ARAVA) tablet 10 mg  10 mg Oral DAILY    montelukast (SINGULAIR) tablet 10 mg  10 mg Oral QHS    pantoprazole (PROTONIX) tablet 40 mg  40 mg Oral DAILY    propranolol (INDERAL) tablet 20 mg  20 mg Oral BID    simvastatin (ZOCOR) tablet 40 mg  40 mg Oral QHS    insulin lispro (HUMALOG) injection   SubCUTAneous AC&HS    heparin (porcine) injection 5,000 Units  5,000 Units SubCUTAneous Q8H       Review of Systems:  Ears, nose, mouth, throat, and face: No epistaxis, No nasal drainage, no difficulty in swallowing  Respiratory: as above  Cardiovascular: no chest pain or palpitations, no chronic leg edema, no syncope  Gastrointestinal: no abd pain, vomitting or diarrhea, no bleeding symptoms  Genitourinary: No urinary symptoms  Integument/breast: No ulcers  Musculoskeletal:Neg  Neurological: No focal weakness or seizures or headaches  Behvioral/Psych: No anxiety or depression  Constitutional: No fever or chills or weight loss or night sweats       Objective:   Vital Signs:    Visit Vitals  /63   Pulse 81   Temp 97.9 °F (36.6 °C)   Resp 12   Ht 5' 5\" (1.651 m)   Wt 83 kg (183 lb)   SpO2 97%   BMI 30.45 kg/m²       O2 Device: Nasal cannula   O2 Flow Rate (L/min): 1 l/min   Temp (24hrs), Av.7 °F (36.5 °C), Min:97.1 °F (36.2 °C), Max:98.2 °F (36.8 °C)       Intake/Output:   Last shift:      No intake/output data recorded. Last 3 shifts: 08/28 1901 - 08/30 0700  In: 340 [P.O.:240; I.V.:100]  Out: -     Intake/Output Summary (Last 24 hours) at 8/30/2019 1129  Last data filed at 8/30/2019 0435  Gross per 24 hour   Intake 340 ml   Output --   Net 340 ml         Physical Exam:   Comfortable; on nc O2; acyanotic  HEENT: pupils not dilated, no scleral jaundice, moist oral mucosa  Neck: No adenopathy or thyroid swelling  CVS: S1S2 no murmurs; JVD not elevated  RS: Mod air entry bilaterally, decreased BS at bases with some crackle, no wheezes, normal respirations  Abd: soft, non tender, no hepatosplenomegaly, no abd distension  Neuro: awake, alert, non focal exam  Extrm: no leg edema or swelling or clubbing  Skin: no rash  Lymphatic: no cervical or supraclavicular adenopathy    Data review:     Recent Results (from the past 24 hour(s))   EKG, 12 LEAD, INITIAL    Collection Time: 08/29/19  7:09 PM   Result Value Ref Range    Ventricular Rate 78 BPM    Atrial Rate 78 BPM    P-R Interval 160 ms    QRS Duration 80 ms    Q-T Interval 386 ms    QTC Calculation (Bezet) 440 ms    Calculated P Axis 65 degrees    Calculated R Axis 51 degrees    Calculated T Axis 95 degrees    Diagnosis       Normal sinus rhythm  Normal ECG  When compared with ECG of 21-JUN-2019 13:12,  No significant change was found     CBC WITH AUTOMATED DIFF    Collection Time: 08/29/19  7:14 PM   Result Value Ref Range    WBC 5.6 4.6 - 13.2 K/uL    RBC 4.05 (L) 4.70 - 5.50 M/uL    HGB 12.5 (L) 13.0 - 16.0 g/dL    HCT 39.1 36.0 - 48.0 %    MCV 96.5 74.0 - 97.0 FL    MCH 30.9 24.0 - 34.0 PG    MCHC 32.0 31.0 - 37.0 g/dL    RDW 15.7 (H) 11.6 - 14.5 %    PLATELET 946 926 - 059 K/uL    MPV 9.9 9.2 - 11.8 FL    NEUTROPHILS 61 40 - 73 %    LYMPHOCYTES 20 (L) 21 - 52 %    MONOCYTES 16 (H) 3 - 10 %    EOSINOPHILS 2 0 - 5 %    BASOPHILS 1 0 - 2 %    ABS. NEUTROPHILS 3.5 1.8 - 8.0 K/UL    ABS. LYMPHOCYTES 1.1 0.9 - 3.6 K/UL    ABS. MONOCYTES 0.9 0.05 - 1.2 K/UL    ABS.  EOSINOPHILS 0.1 0.0 - 0.4 K/UL    ABS. BASOPHILS 0.0 0.0 - 0.1 K/UL    DF AUTOMATED     CARDIAC PANEL,(CK, CKMB & TROPONIN)    Collection Time: 08/29/19  7:14 PM   Result Value Ref Range    CK 93 39 - 308 U/L    CK - MB 1.4 <3.6 ng/ml    CK-MB Index 1.5 0.0 - 4.0 %    Troponin-I, QT <0.02 0.0 - 2.806 NG/ML   METABOLIC PANEL, COMPREHENSIVE    Collection Time: 08/29/19  7:14 PM   Result Value Ref Range    Sodium 137 136 - 145 mmol/L    Potassium 3.2 (L) 3.5 - 5.5 mmol/L    Chloride 100 100 - 111 mmol/L    CO2 28 21 - 32 mmol/L    Anion gap 9 3.0 - 18 mmol/L    Glucose 157 (H) 74 - 99 mg/dL    BUN 14 7.0 - 18 MG/DL    Creatinine 1.60 (H) 0.6 - 1.3 MG/DL    BUN/Creatinine ratio 9 (L) 12 - 20      GFR est AA 53 (L) >60 ml/min/1.73m2    GFR est non-AA 44 (L) >60 ml/min/1.73m2    Calcium 8.4 (L) 8.5 - 10.1 MG/DL    Bilirubin, total 0.8 0.2 - 1.0 MG/DL    ALT (SGPT) 26 16 - 61 U/L    AST (SGOT) 41 (H) 10 - 38 U/L    Alk. phosphatase 84 45 - 117 U/L    Protein, total 6.2 (L) 6.4 - 8.2 g/dL    Albumin 3.2 (L) 3.4 - 5.0 g/dL    Globulin 3.0 2.0 - 4.0 g/dL    A-G Ratio 1.1 0.8 - 1.7     MAGNESIUM    Collection Time: 08/29/19  7:14 PM   Result Value Ref Range    Magnesium 1.5 (L) 1.6 - 2.6 mg/dL   POC G3    Collection Time: 08/29/19  8:16 PM   Result Value Ref Range    Device: ROOM AIR      FIO2 (POC) 0.21 %    pH (POC) 7.452 (H) 7.35 - 7.45      pCO2 (POC) 38.4 35.0 - 45.0 MMHG    pO2 (POC) 75 (L) 80 - 100 MMHG    HCO3 (POC) 27.0 (H) 22 - 26 MMOL/L    sO2 (POC) 96 92 - 97 %    Base excess (POC) 3 mmol/L    Allens test (POC) N/A      Total resp.  rate 16      Site RIGHT RADIAL      Patient temp. 97.6      Specimen type (POC) ARTERIAL      Performed by Trey Gabriel    EKG, 12 LEAD, SUBSEQUENT    Collection Time: 08/29/19 11:44 PM   Result Value Ref Range    Ventricular Rate 83 BPM    Atrial Rate 83 BPM    P-R Interval 172 ms    QRS Duration 76 ms    Q-T Interval 376 ms    QTC Calculation (Bezet) 441 ms    Calculated P Axis 72 degrees Calculated R Axis 60 degrees    Calculated T Axis 98 degrees    Diagnosis       Normal sinus rhythm  Normal ECG  When compared with ECG of 29-AUG-2019 19:09,  No significant change was found     CARDIAC PANEL,(CK, CKMB & TROPONIN)    Collection Time: 08/30/19  1:30 AM   Result Value Ref Range    CK 82 39 - 308 U/L    CK - MB 1.1 <3.6 ng/ml    CK-MB Index 1.3 0.0 - 4.0 %    Troponin-I, QT <0.02 0.0 - 0.045 NG/ML   HEMOGLOBIN A1C WITH EAG    Collection Time: 08/30/19  1:30 AM   Result Value Ref Range    Hemoglobin A1c 5.6 4.2 - 5.6 %    Est. average glucose 114 mg/dL   GLUCOSE, POC    Collection Time: 08/30/19  6:11 AM   Result Value Ref Range    Glucose (POC) 142 (H) 70 - 110 mg/dL   ECHO ADULT COMPLETE    Collection Time: 08/30/19  8:45 AM   Result Value Ref Range    Right Atrial Area 4C 17.52 cm2    Ao Root D 3.20 cm    AO ASC D 3.10 cm    Aortic Valve Systolic Peak Velocity 28.37 cm/s    AoV VTI 21.17 cm    Aortic Valve Area by Continuity of Peak Velocity 3.5 cm2    Aortic Valve Area by Continuity of VTI 3.9 cm2    AoV PG 3.8 mmHg    LVIDd 4.76 4.2 - 5.9 cm    LVPWd 0.83 0.6 - 1.0 cm    LVIDs 2.96 cm    IVSd 0.84 0.6 - 1.0 cm    LV ED Vol A2C 41.8 mL    LV ES Vol A4C 15.2 mL    LV ES Vol BP 13.4 (A) 22 - 58 mL    LVOT d 2.29 cm    LVOT Peak Velocity 82.57 cm/s    LVOT Peak Gradient 2.7 mmHg    LVOT VTI 19.98 cm    LV E' Septal Velocity 6.00 cm/s    LV E' Lateral Velocity 5.00 cm/s    MVA (PHT) 4.4 cm2    MV A Lorne 99.03 cm/s    MV E Lorne 64.73 cm/s    MV E/A 0.65     RVIDd 3.73 cm    Aortic Valve Systolic Mean Gradient 2.5 mmHg    BP EF 68.0 55 - 100 %    LV Ejection Fraction MOD 4C 60 %    LV Ejection Fraction MOD 2C 74 %    LV Mass .5 88 - 224 g    LV Mass AL Index 79.0 49 - 115 g/m2    E/E' lateral 12.95     E/E' septal 10.79     TAPSV 2.2 cm/s    E/E' ratio (averaged) 11.87     LV ES Vol A2C 11.0 mL    LVES Vol Index BP 7.0 mL/m2    LV ED Vol A4C 38.4 mL    LVED Vol Index BP 21.9 mL/m2    Mitral Valve E Wave Deceleration Time 171.4 ms    Mitral Valve Pressure Half-time 49.7 ms    Left Atrium Major Axis 3.85 cm    Pulmonic Valve Max Velocity 117.48 cm/s    LV ED Vol BP 41.8 (A) 67 - 155 ml    LVED Vol Index A4C 20.2 mL/m2    LVED Vol Index A2C 21.9 mL/m2    LVES Vol Index A4C 8.0 mL/m2    LVES Vol Index A2C 5.8 mL/m2    FRANCK/BSA Pk Lorne 1.8 cm2/m2    FRANCK/BSA VTI 2.0 cm2/m2    PV peak gradient 5.5 mmHg   CARDIAC PANEL,(CK, CKMB & TROPONIN)    Collection Time: 08/30/19  8:50 AM   Result Value Ref Range    CK 77 39 - 308 U/L    CK - MB 1.4 <3.6 ng/ml    CK-MB Index 1.8 0.0 - 4.0 %    Troponin-I, QT <0.02 0.0 - 0.045 NG/ML           Recent Labs     08/29/19 2016   FIO2I 0.21   HCO3I 27.0*   PCO2I 38.4   PHI 7.452*   PO2I 75*       All Micro Results     None            Imaging:  [x]I have personally reviewed the patients chest radiographs images and report     Results from Hospital Encounter encounter on 08/06/18   XR CHEST PORT    Narrative Portable chest 8/6/2018. History: Weakness with progressive severity over the past 2 days. History of  prior coronary artery stent placement x5. Technique: A semierect portable radiograph of the chest was obtained. Comparison:  4/24/2018. Findings: The cardiomediastinal contours are unchanged. Inspiratory lung  volumes are slightly decreased but the lungs remain clear. There is no  pneumothorax or pleural effusion. Impression Impression:    1. No acute pulmonary disease. Results from Hospital Encounter encounter on 08/29/19   CTA CHEST W OR W WO CONT    Narrative EXAM: CTA CHEST W OR W WO CONT    CLINICAL INDICATION/HISTORY: Chest pain, acute, pulmonary embolism (PE)  suspected    COMPARISON: April 24, 2018. TECHNIQUE: Axial CT imaging from the thoracic inlet through the diaphragm with  intravenous contrast. Coronal and sagittal MIP reformats were generated.   One or  more dose reduction techniques were used on this CT: automated exposure control,  adjustment of the mAs and/or kVp according to patient size, and iterative  reconstruction techniques. The specific techniques used on this CT exam have  been documented in the patient's electronic medical record. Digital Imaging and  Communications in Medicine (DICOM) format image data are available to  nonaffiliated external healthcare facilities or entities on a secure, media  free, reciprocally searchable basis with patient authorization for at least a  12-month period after this study. _______________    FINDINGS:    EXAM QUALITY: Non-diagnostic/Adequate/Excellent    PULMONARY ARTERIES: No evidence of pulmonary embolism. MEDIASTINUM: Normal heart size. No evidence of right heart strain. Aorta is  unremarkable. No pericardial effusion. Multiple coronary artery calcifications  are noted. LUNGS: No suspicious nodule or mass. Bilateral areas of scarring and  subsegmental atelectasis. PLEURA: No effusion or pneumothorax. AIRWAY: Normal.    LYMPH NODES: No enlarged nodes. UPPER ABDOMEN: Several low-density hepatic cysts are noted, unchanged. Prior  cholecystectomy. Diverticulosis. OTHER: No acute or aggressive osseous abnormalities identified. Mild  spondylosis. _______________      Impression IMPRESSION:    1. No evidence of pulmonary embolism.               IMPRESSION:   · Asthma/COPD with exacerbation  · Exertional Dyspnea  · CAD    Patient Active Problem List   Diagnosis Code    CAD (coronary artery disease) I25.10    Hypertension I10    Chronic obstructive pulmonary disease ([facility] J44.9    Elevated troponin R74.8    CKD (chronic kidney disease) stage 3, GFR 30-59 ml/min ([facility]) N18.3    Asthma J45.909    NSTEMI (non-ST elevated [facility] infarction) [facility] I21.4    Asthma with COPD with exacerbation ([facility]) J44.1, J45.901    Exertional dyspnea R06.09    Rheumatoid arthritis ([facility] M06.9           RECOMMENDATIONS:   · On Jarrodoneb and IV steroids; respiratory symptoms are out of proportion to the lung disease; CT chest shows no significant emphysema; FEV1 of 1.64L.67%. · Ct chest shows no PEs or pneumonia; would stop Zosyn  · Trops neg; echo pending  · Recommend cardiology eval for CAD  · Continue montelukast 10 mg daily  · DVT and GI proph - sc heparin and PPI  · On Arava med  · D.w patient and updated  · Full code status  Will defer respective systems problem management to primary and other consultant and follow patient with primary and other medical team  Further recommendations will be based on the patient's response to recommended treatment and results of the investigation ordered.   · Lines/Tubes: PIVs  ADVANCE DIRECTIVE: Full Code    · Thank you for the consult      High complexity decision making was performed in this consultation and evaluation of this patient        Janki Solano MD

## 2019-08-30 NOTE — PROGRESS NOTES
Bedside and Verbal shift change report given to [address] (oncoming nurse) by Melvin Hull (offgoing nurse). Report included the following information SBAR, Kardex, Intake/Output, MAR and Recent Results.

## 2019-08-30 NOTE — CONSULTS
[facility] Consult Note      Patient: Mukesh Gaona MRN: [id number]  SSN: [id number]    YOB: 1955  Age: 70 y.o. Sex: male    Date of Consultation: 08/30/2019  Referring Physician: Dr. Tamika Arroyo  Reason for Consultation: SOB and CAD    HPI:  I was asked by Dr. Tamika Arroyo to see this pleasant patient for SOB and one episode of tachycardia and CP. Mukesh Gaona is a 70years old male with PMH of CAD, history of PCI in past , stable cardiac cath on 4/2018. Other medical history significant for   COPD/asthma, rheumatoid arthritis, anxiety came to hospital with SOB at rest and exertion and he is following with Dr. Gaby Rosenberg. Pt denied anginal symptoms, yesterday have episode of tachycardia ( 130 per min) with CP on walking ER. Patient noticed worsened dyspnea and has been off of inhalers  Altogether for 4 days. Patient has chronic cough in morning but noticed today some sputum production no f/c/n/s  Chest pain started when He was ambulating in ER . CTA chest negative for PE. Cardiac cath   Last cardiac catherization done 4/2018  Mild to moderate nonobstructive coronary artery disease. 2.  Patent left main and patent LAD system with mild in-stent restenosis in the mid LAD stent. 3.  Mild disease in the ostial left circumflex artery. 4.  Patent stent in the high OM1 branch. 5.  Moderate disease in the proximal left circumflex artery 50% with nonischemic FFR of 0.95.  6.  Mild to moderate disease in the distal mid RCA 40-50% with nonischemic FFR of 0.86%.           Past Medical History:   Diagnosis Date    Arthritis     Asthma     CAD (coronary artery disease)     stents x 5    Cancer ([address]     melanoma on left side of face    Chronic pain     left knee    COPD     DVT (deep venous thrombosis) ([address] 2001    left leg    Hypertension     Myocardial infarction [facility]) 2005    Primary localized osteoarthritis of left knee 7/12/2019    Pulmonary embolism ([address] 2017    Rheumatoid arthritis ([facility]      Past Surgical History:   Procedure Laterality Date    ABDOMEN SURGERY PROC UNLISTED      Laparotomy bowel resection instestinal rupture, colostomy    ABDOMEN SURGERY PROC UNLISTED  1999    Revision of colostomy    FRACTIONAL FLOW RESERVE  4/29/2018    FRACTIONAL FLOW RESERVE ADDL  4/29/2018    HX COLOSTOMY  1999    HX COLOSTOMY TAKE DOWN      HX CORONARY STENT PLACEMENT      HX HEART CATHETERIZATION  2005    stents x 4    HX HEART CATHETERIZATION  2014    stent x 1    HX HERNIA REPAIR      x 4    HX LUMBAR DISKECTOMY  2014    LEFT HEART PERCUTANEOUS  4/29/2018    JH CORONARY ARTERIOGRAPH  4/29/2018     Current Facility-Administered Medications   Medication Dose Route Frequency    albuterol-ipratropium (DUO-NEB) 2.5 MG-0.5 MG/3 ML  3 mL Nebulization Q4H RT    methylPREDNISolone (PF) (SOLU-MEDROL) injection 60 mg  60 mg IntraVENous Q6H    aspirin chewable tablet 81 mg  81 mg Oral DAILY    leflunomide (ARAVA) tablet 10 mg  10 mg Oral DAILY    montelukast (SINGULAIR) tablet 10 mg  10 mg Oral QHS    nitroglycerin (NITROSTAT) tablet 0.4 mg  0.4 mg SubLINGual PRN    meclizine (ANTIVERT) tablet 25 mg  25 mg Oral Q6H PRN    pantoprazole (PROTONIX) tablet 40 mg  40 mg Oral DAILY    simvastatin (ZOCOR) tablet 40 mg  40 mg Oral QHS    insulin lispro (HUMALOG) injection   SubCUTAneous AC&HS    glucose chewable tablet 16 g  4 Tab Oral PRN    glucagon (GLUCAGEN) injection 1 mg  1 mg IntraMUSCular PRN    heparin (porcine) injection 5,000 Units  5,000 Units SubCUTAneous Q8H    propranolol (INDERAL) tablet 40 mg  40 mg Oral BID       Allergies and Intolerances: Allergies   Allergen Reactions    Ciprofloxacin Other (comments)     PT states that he turns red.  Tape [Adhesive] Other (comments)     Pt states, \"it rips my skin\"       Family History:   History reviewed. No pertinent family history. Social History:   He  reports that he quit smoking about 27 years ago.  He has never used smokeless tobacco.  He reports that he drank alcohol. Review of Systems:     Review of Systems  Gen: No fever, chills, malaise, weight loss/gain. Heent: No headache, rhinorrhea, epistaxis, ear pain, hearing loss, sinus pain, neck pain/stiffness, sore throat. Heart: No chest pain, palpitations, +VIRK, pnd, or orthopnea. Resp: No cough, hemoptysis, wheezing and + shortness of breath. GI: No nausea, vomiting, diarrhea, constipation, melena or hematochezia. : No urinary obstruction, dysuria or hematuria. Derm: No rash, new skin lesion or pruritis. Musc/skeletal: no bone or joint complains. Vasc: No edema, cyanosis or claudication. Endo: No heat/cold intolerance, no polyuria,polydipsia or polyphagia. Neuro: No unilateral weakness, numbness, tingling. No seizures. Heme: No easy bruising or bleeding. Physical:   Patient Vitals for the past 6 hrs:   Temp Pulse Resp BP SpO2   08/30/19 1537 98.2 °F (36.8 °C) 93 15 129/81 95 %   08/30/19 1524 -- -- -- -- 95 %         Exam:   General Appearance: Comfortable, not using accessory muscles of respiration. HEENT: VERNON. HEAD: Atraumatic  NECK: No JVD, no thyroidomeglay. CAROTIDS:  LUNGS: Clear bilaterally. HEART: S1+S2     ABD: Non-tender, BS Audible    EXT: No edema, and no cysnosis. VASCULAR EXAM: Pulses are intact. PSYCHIATRIC EXAM: Mood is appropriate. MUSCULOSKELETAL: Grossly no joint deformity. NEUROLOGICAL: Motor and sensory sytem intact and Cranial nerves II-XII intact.     Review of Data:   LABS:   Lab Results   Component Value Date/Time    WBC 5.6 08/29/2019 07:14 PM    HGB 12.5 (L) 08/29/2019 07:14 PM    HCT 39.1 08/29/2019 07:14 PM    PLATELET 084 31/48/8554 07:14 PM     Lab Results   Component Value Date/Time    Sodium 137 08/29/2019 07:14 PM    Potassium 3.2 (L) 08/29/2019 07:14 PM    Chloride 100 08/29/2019 07:14 PM    CO2 28 08/29/2019 07:14 PM    Glucose 157 (H) 08/29/2019 07:14 PM    BUN 14 08/29/2019 07:14 PM    Creatinine 1.60 (H) 08/29/2019 07:14 PM     Lab Results   Component Value Date/Time    Cholesterol, total 111 04/24/2018 11:20 PM    HDL Cholesterol 80 (H) 04/24/2018 11:20 PM    LDL, calculated 27.4 04/24/2018 11:20 PM    Triglyceride 18 04/24/2018 11:20 PM     No results found for: GPT  Lab Results   Component Value Date/Time    Hemoglobin A1c 5.6 08/30/2019 01:30 AM         Cardiology Procedures:   Results for orders placed or performed during the hospital encounter of 08/29/19   EKG, 12 LEAD, INITIAL   Result Value Ref Range    Ventricular Rate 78 BPM    Atrial Rate 78 BPM    P-R Interval 160 ms    QRS Duration 80 ms    Q-T Interval 386 ms    QTC Calculation (Bezet) 440 ms    Calculated P Axis 65 degrees    Calculated R Axis 51 degrees    Calculated T Axis 95 degrees    Diagnosis       Normal sinus rhythm  Normal ECG  When compared with ECG of 21-JUN-2019 13:12,  No significant change was found             Impression / Plan:    Patient Active Problem List   Diagnosis Code    CAD (coronary artery disease) I25.10    Hypertension I10    Chronic obstructive pulmonary disease ([facility] J44.9    Elevated troponin R74.8    CKD (chronic kidney disease) stage 3, GFR 30-59 ml/min ([facility]) N18.3    Asthma J45.909    NSTEMI (non-ST elevated myocardial infarction) ([facility]) I21.4    Asthma with COPD with exacerbation ([facility]) J44.1, J45.901    Exertional dyspnea R06.09    Rheumatoid arthritis ([facility]) M06.9       A/P  SOB  Transient sinus tachycardia  HTN  CAD  COPD/Asthma    Plan:  EKG is stable , AMI ruled out negative troponin, echo is normal  symptoms are more consistent with COPD/ASthma  I will increase inderal form 20 mg po BID to 40 mg po BID  Continue with aspirin 81 mg and simvastatin 40 mg po daily  Discussed with patient.         Signed By: Tehlma Ivey MD     August 30, 2019

## 2019-08-31 LAB
ATRIAL RATE: 78 BPM
ATRIAL RATE: 83 BPM
CALCULATED P AXIS, ECG09: 65 DEGREES
CALCULATED P AXIS, ECG09: 72 DEGREES
CALCULATED R AXIS, ECG10: 51 DEGREES
CALCULATED R AXIS, ECG10: 60 DEGREES
CALCULATED T AXIS, ECG11: 95 DEGREES
CALCULATED T AXIS, ECG11: 98 DEGREES
DIAGNOSIS, 93000: NORMAL
DIAGNOSIS, 93000: NORMAL
GLUCOSE BLD STRIP.AUTO-MCNC: 203 MG/DL (ref 70–110)
GLUCOSE BLD STRIP.AUTO-MCNC: 218 MG/DL (ref 70–110)
GLUCOSE BLD STRIP.AUTO-MCNC: 225 MG/DL (ref 70–110)
GLUCOSE BLD STRIP.AUTO-MCNC: 263 MG/DL (ref 70–110)
P-R INTERVAL, ECG05: 160 MS
P-R INTERVAL, ECG05: 172 MS
Q-T INTERVAL, ECG07: 376 MS
Q-T INTERVAL, ECG07: 386 MS
QRS DURATION, ECG06: 76 MS
QRS DURATION, ECG06: 80 MS
QTC CALCULATION (BEZET), ECG08: 440 MS
QTC CALCULATION (BEZET), ECG08: 441 MS
VENTRICULAR RATE, ECG03: 78 BPM
VENTRICULAR RATE, ECG03: 83 BPM

## 2019-08-31 PROCEDURE — 77010033678 HC OXYGEN DAILY

## 2019-08-31 PROCEDURE — 74011250637 HC RX REV CODE- 250/637: Performed by: INTERNAL MEDICINE

## 2019-08-31 PROCEDURE — 74011000250 HC RX REV CODE- 250: Performed by: INTERNAL MEDICINE

## 2019-08-31 PROCEDURE — 65660000000 HC RM CCU STEPDOWN

## 2019-08-31 PROCEDURE — 74011250637 HC RX REV CODE- 250/637: Performed by: HOSPITALIST

## 2019-08-31 PROCEDURE — 74011250636 HC RX REV CODE- 250/636: Performed by: INTERNAL MEDICINE

## 2019-08-31 PROCEDURE — 82962 GLUCOSE BLOOD TEST: CPT

## 2019-08-31 PROCEDURE — 97116 GAIT TRAINING THERAPY: CPT

## 2019-08-31 PROCEDURE — 94640 AIRWAY INHALATION TREATMENT: CPT

## 2019-08-31 PROCEDURE — 74011636637 HC RX REV CODE- 636/637: Performed by: INTERNAL MEDICINE

## 2019-08-31 PROCEDURE — 94760 N-INVAS EAR/PLS OXIMETRY 1: CPT

## 2019-08-31 PROCEDURE — 97161 PT EVAL LOW COMPLEX 20 MIN: CPT

## 2019-08-31 RX ORDER — ACETAMINOPHEN 325 MG/1
650 TABLET ORAL
Status: DISCONTINUED | OUTPATIENT
Start: 2019-08-31 | End: 2019-09-03 | Stop reason: HOSPADM

## 2019-08-31 RX ORDER — LOPERAMIDE HYDROCHLORIDE 2 MG/1
2 CAPSULE ORAL AS NEEDED
Status: DISCONTINUED | OUTPATIENT
Start: 2019-08-31 | End: 2019-09-03 | Stop reason: HOSPADM

## 2019-08-31 RX ORDER — ARFORMOTEROL TARTRATE 15 UG/2ML
15 SOLUTION RESPIRATORY (INHALATION)
Status: DISCONTINUED | OUTPATIENT
Start: 2019-08-31 | End: 2019-09-03 | Stop reason: HOSPADM

## 2019-08-31 RX ORDER — BUDESONIDE 0.5 MG/2ML
500 INHALANT ORAL
Status: DISCONTINUED | OUTPATIENT
Start: 2019-08-31 | End: 2019-09-03 | Stop reason: HOSPADM

## 2019-08-31 RX ORDER — IPRATROPIUM BROMIDE AND ALBUTEROL SULFATE 2.5; .5 MG/3ML; MG/3ML
3 SOLUTION RESPIRATORY (INHALATION)
Status: DISCONTINUED | OUTPATIENT
Start: 2019-08-31 | End: 2019-09-03 | Stop reason: HOSPADM

## 2019-08-31 RX ORDER — IBUPROFEN 400 MG/1
400 TABLET ORAL ONCE
Status: COMPLETED | OUTPATIENT
Start: 2019-08-31 | End: 2019-08-31

## 2019-08-31 RX ORDER — IBUPROFEN 400 MG/1
400 TABLET ORAL 3 TIMES DAILY
Status: DISCONTINUED | OUTPATIENT
Start: 2019-08-31 | End: 2019-09-02

## 2019-08-31 RX ADMIN — IPRATROPIUM BROMIDE AND ALBUTEROL SULFATE 3 ML: .5; 3 SOLUTION RESPIRATORY (INHALATION) at 07:22

## 2019-08-31 RX ADMIN — IBUPROFEN 400 MG: 400 TABLET ORAL at 10:44

## 2019-08-31 RX ADMIN — BUDESONIDE 500 MCG: 0.5 INHALANT RESPIRATORY (INHALATION) at 21:08

## 2019-08-31 RX ADMIN — INSULIN LISPRO 6 UNITS: 100 INJECTION, SOLUTION INTRAVENOUS; SUBCUTANEOUS at 18:45

## 2019-08-31 RX ADMIN — METHYLPREDNISOLONE SODIUM SUCCINATE 60 MG: 40 INJECTION, POWDER, FOR SOLUTION INTRAMUSCULAR; INTRAVENOUS at 02:49

## 2019-08-31 RX ADMIN — INSULIN LISPRO 4 UNITS: 100 INJECTION, SOLUTION INTRAVENOUS; SUBCUTANEOUS at 21:33

## 2019-08-31 RX ADMIN — SIMVASTATIN 40 MG: 40 TABLET, FILM COATED ORAL at 21:33

## 2019-08-31 RX ADMIN — MONTELUKAST 10 MG: 10 TABLET, FILM COATED ORAL at 21:33

## 2019-08-31 RX ADMIN — PANTOPRAZOLE SODIUM 40 MG: 40 TABLET, DELAYED RELEASE ORAL at 09:16

## 2019-08-31 RX ADMIN — CELECOXIB 200 MG: 100 CAPSULE ORAL at 21:33

## 2019-08-31 RX ADMIN — HEPARIN SODIUM 5000 UNITS: 5000 INJECTION INTRAVENOUS; SUBCUTANEOUS at 17:22

## 2019-08-31 RX ADMIN — ARFORMOTEROL TARTRATE 15 MCG: 15 SOLUTION RESPIRATORY (INHALATION) at 21:08

## 2019-08-31 RX ADMIN — METHYLPREDNISOLONE SODIUM SUCCINATE 60 MG: 40 INJECTION, POWDER, FOR SOLUTION INTRAMUSCULAR; INTRAVENOUS at 13:49

## 2019-08-31 RX ADMIN — LOPERAMIDE HYDROCHLORIDE 2 MG: 2 CAPSULE ORAL at 18:46

## 2019-08-31 RX ADMIN — HEPARIN SODIUM 5000 UNITS: 5000 INJECTION INTRAVENOUS; SUBCUTANEOUS at 09:16

## 2019-08-31 RX ADMIN — ASPIRIN 81 MG 81 MG: 81 TABLET ORAL at 09:16

## 2019-08-31 RX ADMIN — INSULIN LISPRO 4 UNITS: 100 INJECTION, SOLUTION INTRAVENOUS; SUBCUTANEOUS at 07:22

## 2019-08-31 RX ADMIN — INSULIN LISPRO 4 UNITS: 100 INJECTION, SOLUTION INTRAVENOUS; SUBCUTANEOUS at 13:50

## 2019-08-31 RX ADMIN — METHYLPREDNISOLONE SODIUM SUCCINATE 60 MG: 40 INJECTION, POWDER, FOR SOLUTION INTRAMUSCULAR; INTRAVENOUS at 09:19

## 2019-08-31 RX ADMIN — HEPARIN SODIUM 5000 UNITS: 5000 INJECTION INTRAVENOUS; SUBCUTANEOUS at 02:50

## 2019-08-31 RX ADMIN — DULOXETINE 20 MG: 20 CAPSULE, DELAYED RELEASE ORAL at 09:15

## 2019-08-31 RX ADMIN — PROPRANOLOL HYDROCHLORIDE 40 MG: 20 TABLET ORAL at 09:16

## 2019-08-31 RX ADMIN — IBUPROFEN 400 MG: 400 TABLET ORAL at 21:33

## 2019-08-31 RX ADMIN — LOPERAMIDE HYDROCHLORIDE 2 MG: 2 CAPSULE ORAL at 23:38

## 2019-08-31 RX ADMIN — METHYLPREDNISOLONE SODIUM SUCCINATE 60 MG: 40 INJECTION, POWDER, FOR SOLUTION INTRAMUSCULAR; INTRAVENOUS at 21:32

## 2019-08-31 RX ADMIN — IPRATROPIUM BROMIDE AND ALBUTEROL SULFATE 3 ML: .5; 3 SOLUTION RESPIRATORY (INHALATION) at 11:17

## 2019-08-31 RX ADMIN — IBUPROFEN 400 MG: 400 TABLET ORAL at 18:46

## 2019-08-31 RX ADMIN — LEFLUNOMIDE 10 MG: 10 TABLET ORAL at 10:44

## 2019-08-31 RX ADMIN — PROPRANOLOL HYDROCHLORIDE 40 MG: 20 TABLET ORAL at 21:33

## 2019-08-31 RX ADMIN — CELECOXIB 200 MG: 100 CAPSULE ORAL at 09:16

## 2019-08-31 NOTE — PROGRESS NOTES
Hospitalist Progress Note    Patient: Mukesh Gaona MRN: [id number]  CSN: [id number]    YOB: 1955  Age: 70 y.o. Sex: male    DOA: 8/29/2019 LOS:  LOS: 1 day                Assessment/Plan     Patient Active Problem List   Diagnosis Code    CAD (coronary artery disease) I25.10    Hypertension I10    Chronic obstructive pulmonary disease (HCC) J44.9    Elevated troponin R74.8    CKD (chronic kidney disease) stage 3, GFR 30-59 ml/min ([facility]) N18.3    Asthma J45.909    NSTEMI (non-ST elevated myocardial infarction) ([facility] I21.4    Asthma with COPD with exacerbation ([facility]) J44.1, J45.901    Exertional dyspnea R06.09    Rheumatoid arthritis ([facility] M06.9            71 yo male with history of Asthma/COPD admitted for worsening SOB. Breathing better, cough better. Asthma/COPD exacerbation - continue with IV seroids, neb treatments. CT chest with significant emphysema  Cardiology consulted  CE negative  Follow echo      RA - on leflunomide     GERD - on protonix    DVT prophylaxis with heparin      Disposition : 2-3 days    Review of systems  General: No fevers or chills. Cardiovascular: No chest pain or pressure. No palpitations. Pulmonary: No shortness of breath. Gastrointestinal: No nausea, vomiting. Physical Exam:  General: Awake, cooperative, no acute distress    HEENT: NC, Atraumatic. PERRLA, anicteric sclerae. Lungs: Decreased breath sounds lower lungs bilaterally  Heart:  S1 S2,  No murmur, No Rubs, No Gallops  Abdomen: Soft, Non distended, Non tender.  +Bowel sounds,   Extremities: No c/c/e  Psych:   Not anxious or agitated. Neurologic:  No acute neurological deficit. Vital signs/Intake and Output:  Visit Vitals  /81 (BP 1 Location: Right arm, BP Patient Position: At rest)   Pulse 93   Temp 98.2 °F (36.8 °C)   Resp 15   Ht 5' 5\" (1.651 m)   Wt 83 kg (183 lb)   SpO2 95%   BMI 30.45 kg/m²     Current Shift:  No intake/output data recorded.   Last three shifts: 08/29 0701 - 08/30 1900  In: 440 [P.O.:240; I.V.:200]  Out: -             Labs: Results:       Chemistry Recent Labs     08/29/19 1914   *      K 3.2*      CO2 28   BUN 14   CREA 1.60*   CA 8.4*   AGAP 9   BUCR 9*   AP 84   TP 6.2*   ALB 3.2*   GLOB 3.0   AGRAT 1.1      CBC w/Diff Recent Labs     08/29/19 1914   WBC 5.6   RBC 4.05*   HGB 12.5*   HCT 39.1      GRANS 61   LYMPH 20*   EOS 2      Cardiac Enzymes Recent Labs     08/30/19  1437 08/30/19  0850   CPK 74 77   CKND1 1.6 1.8      Coagulation No results for input(s): PTP, INR, APTT in the last 72 hours. No lab exists for component: INREXT    Lipid Panel Lab Results   Component Value Date/Time    Cholesterol, total 111 04/24/2018 11:20 PM    HDL Cholesterol 80 (H) 04/24/2018 11:20 PM    LDL, calculated 27.4 04/24/2018 11:20 PM    VLDL, calculated 3.6 04/24/2018 11:20 PM    Triglyceride 18 04/24/2018 11:20 PM    CHOL/HDL Ratio 1.4 04/24/2018 11:20 PM      BNP No results for input(s): BNPP in the last 72 hours.    Liver Enzymes Recent Labs     08/29/19 1914   TP 6.2*   ALB 3.2*   AP 84   SGOT 41*      Thyroid Studies Lab Results   Component Value Date/Time    TSH 1.730 11/07/2014 04:23 AM        Procedures/imaging: see electronic medical records for all procedures/Xrays and details which were not copied into this note but were reviewed prior to creation of Plan

## 2019-08-31 NOTE — PROGRESS NOTES
PT and RT walked patient up and down reaves on RA with one pause near end to catch breath. Spo2 maintained 90-93%.  Taught patient pursed lip breathing

## 2019-08-31 NOTE — PROGRESS NOTES
Hospitalist Progress Note    Patient: Heather Kiser MRN: [id number]  CSN: [id number]    YOB: 1955  Age: 70 y.o. Sex: male    DOA: 8/29/2019 LOS:  LOS: 2 days                Assessment/Plan     Patient Active Problem List   Diagnosis Code    CAD (coronary artery disease) I25.10    Hypertension I10    Chronic obstructive pulmonary disease (HCC) J44.9    Elevated troponin R74.8    CKD (chronic kidney disease) stage 3, GFR 30-59 ml/min ([facility]) N18.3    Asthma J45.909    NSTEMI (non-ST elevated myocardial infarction) ([facility] I21.4    Asthma with COPD with exacerbation ([facility]) J44.1, J45.901    Exertional dyspnea R06.09    Rheumatoid arthritis ([facility] M06.9            71 yo male with history of Asthma/COPD admitted for worsening SOB. Breathing better, cough better. Asthma/COPD exacerbation - continue with IV seroids, neb treatments. CT chest with significant emphysema  Cardiology consulted  CE negative  Follow echo    CAD - no chest pain, cardiology following, for cath Monday. RA - on leflunomide     GERD - on protonix    DVT prophylaxis with heparin      Disposition : 2-3 days    Review of systems  General: No fevers or chills. Cardiovascular: No chest pain or pressure. No palpitations. Pulmonary: No shortness of breath. Gastrointestinal: No nausea, vomiting. Physical Exam:  General: Awake, cooperative, no acute distress    HEENT: NC, Atraumatic. PERRLA, anicteric sclerae. Lungs: Decreased breath sounds lower lungs bilaterally  Heart:  S1 S2,  No murmur, No Rubs, No Gallops  Abdomen: Soft, Non distended, Non tender.  +Bowel sounds,   Extremities: No c/c/e  Psych:   Not anxious or agitated. Neurologic:  No acute neurological deficit.          Vital signs/Intake and Output:  Visit Vitals  /85   Pulse 88   Temp 98 °F (36.7 °C)   Resp 16   Ht 5' 5\" (1.651 m)   Wt 83 kg (183 lb)   SpO2 92%   BMI 30.45 kg/m²     Current Shift:  08/31 0701 - 08/31 1900  In: 480 [P.O.:480]  Out: -   Last three shifts:  08/29 1901 - 08/31 0700  In: 440 [P.O.:240; I.V.:200]  Out: 200 [Urine:200]            Labs: Results:       Chemistry Recent Labs     08/29/19 1914   *      K 3.2*      CO2 28   BUN 14   CREA 1.60*   CA 8.4*   AGAP 9   BUCR 9*   AP 84   TP 6.2*   ALB 3.2*   GLOB 3.0   AGRAT 1.1      CBC w/Diff Recent Labs     08/29/19 1914   WBC 5.6   RBC 4.05*   HGB 12.5*   HCT 39.1      GRANS 61   LYMPH 20*   EOS 2      Cardiac Enzymes Recent Labs     08/30/19  1437 08/30/19  0850   CPK 74 77   CKND1 1.6 1.8      Coagulation No results for input(s): PTP, INR, APTT in the last 72 hours. No lab exists for component: INREXT, INREXT    Lipid Panel Lab Results   Component Value Date/Time    Cholesterol, total 111 04/24/2018 11:20 PM    HDL Cholesterol 80 (H) 04/24/2018 11:20 PM    LDL, calculated 27.4 04/24/2018 11:20 PM    VLDL, calculated 3.6 04/24/2018 11:20 PM    Triglyceride 18 04/24/2018 11:20 PM    CHOL/HDL Ratio 1.4 04/24/2018 11:20 PM      BNP No results for input(s): BNPP in the last 72 hours.    Liver Enzymes Recent Labs     08/29/19 1914   TP 6.2*   ALB 3.2*   AP 84   SGOT 41*      Thyroid Studies Lab Results   Component Value Date/Time    TSH 1.730 11/07/2014 04:23 AM        Procedures/imaging: see electronic medical records for all procedures/Xrays and details which were not copied into this note but were reviewed prior to creation of Plan

## 2019-08-31 NOTE — PROGRESS NOTES
Problem: Mobility Impaired (Adult and Pediatric)  Goal: *Acute Goals and Plan of Care (Insert Text)  Description  Physical Therapy Goals  Initiated 8/31/2019 and to be accomplished within 7 day(s)  1. Patient will move from supine to sit and sit to supine  in bed with supervision/set-up. 2.  Patient will transfer from bed to chair and chair to bed with supervision/set-up using the least restrictive device. 3.  Patient will perform sit to stand with supervision/set-up. 4.  Patient will ambulate with supervision/set-up for 250 feet with the least restrictive device. 5.  Patient will ascend/descend 3 stairs with 1 handrail(s) with supervision/set-up. Outcome: Progressing Towards Goal   PHYSICAL THERAPY EVALUATION    Patient: Arsenio Finn (53 y.o. male)  Date: 8/31/2019  Primary Diagnosis: Dyspepsia [R10.13]        Precautions:  Fall    ASSESSMENT :  Based on the objective data described below, the patient presents with lower extremity weakness, decreased gait quality and endurance, impaired bed mobility and transfers, decreased DARLIN LE ROM/flexibility, and overall limitations in functional mobility s/p above Dx. Pt performed supine to sit with CGA, sit to stand with CGA. Patient ambulated 150 ft with GB applied CGA/Jeremiah. RT present throughout to assess pt response to mobility without 02 and after breathing treatment. Pt amb with dec step clearance and path deviations. He demo'd DARLIN LE \"weakness\" at approx 100ft req Jeremiah and rest break to maintain upright posture. Sp02 remained above 90% throughout. Pt returned to new room, RT still present, vitals within safe ranges, sitting EOB, all needs met, nrs present. Patient would benefit from skilled inpatient physical therapy to address deficits, progress as tolerated to achieve long term goals and allow safe discharge. .    Patient will benefit from skilled intervention to address the above impairments.   Patients rehabilitation potential is considered to be Good  Factors which may influence rehabilitation potential include:   ? None noted  ? Mental ability/status  ? Medical condition  ? Home/family situation and support systems  ? Safety awareness  ? Pain tolerance/management  ? Other:      PLAN :  Recommendations and Planned Interventions:  ?           Bed Mobility Training             ? Neuromuscular Re-Education  ? Transfer Training                   ? Orthotic/Prosthetic Training  ? Gait Training                          ? Modalities  ? Therapeutic Exercises          ? Edema Management/Control  ? Therapeutic Activities            ? Patient and Family Training/Education  ? Other (comment):    Frequency/Duration: Patient will be followed by physical therapy 1-2 times per day/4-7 days per week to address goals. Discharge Recommendations: Home Health  Further Equipment Recommendations for Discharge: N/A     SUBJECTIVE:   Patient stated I just get weak if I'm up a long time.     OBJECTIVE DATA SUMMARY:     Past Medical History:   Diagnosis Date    Arthritis     Asthma     CAD (coronary artery disease)     stents x 5    Cancer ([facility]     melanoma on left side of face    Chronic pain     left knee    COPD     DVT (deep venous thrombosis) ([facility] 2001    left leg    Hypertension     Myocardial infarction [facility]) 2005    Primary localized osteoarthritis of left knee 7/12/2019    Pulmonary embolism ([facility] 2017    Rheumatoid arthritis ([facility]      Past Surgical History:   Procedure Laterality Date    ABDOMEN SURGERY PROC UNLISTED      Laparotomy bowel resection instestinal rupture, colostomy    ABDOMEN SURGERY PROC UNLISTED  1999    Revision of colostomy    FRACTIONAL FLOW RESERVE  4/29/2018    FRACTIONAL FLOW RESERVE ADDL  4/29/2018    HX COLOSTOMY  1999    HX COLOSTOMY TAKE DOWN      HX CORONARY STENT PLACEMENT      HX HEART CATHETERIZATION  2005    stents x 4 HX HEART CATHETERIZATION  2014    stent x 1    HX HERNIA REPAIR      x 4    HX LUMBAR DISKECTOMY  2014    LEFT HEART PERCUTANEOUS  4/29/2018    JH CORONARY ARTERIOGRAPH  4/29/2018     Barriers to Learning/Limitations: None  Compensate with: visual, verbal, tactile, kinesthetic cues/model  Prior Level of Function/Home Situation: Pt states amb without AD and Ind with ADL's but poor activity tolerance PTA. Home Situation  Home Environment: Rehabilitation facility  # Steps to Enter: 3  Rails to Enter: Yes  Hand Rails : Right  Wheelchair Ramp: No  One/Two Story Residence: One story  Living Alone: No  Support Systems: Spouse/Significant Other/Partner  Patient Expects to be Discharged to[de-identified] Private residence  Current DME Used/Available at Home: Walker, rolling  Tub or Shower Type: Tub  Critical Behavior:  Neurologic State: Alert  Orientation Level: Oriented X4  Cognition: Appropriate decision making; Appropriate for age attention/concentration; Appropriate safety awareness; Follows commands   Psychosocial  Purposeful Interaction: Yes  Pt Identified Daily Priority: Clinical issues (comment)  Caritas Process: Establish trust;Teaching/learning; Attend basic human needs;Create healing environment  Caring Interventions: Reassure; Therapeutic modalities  Reassure: Therapeutic listening; Informing;Caring rounds  Therapeutic Modalities: Deep breathing;Humor; Intentional therapeutic touch  Skin Condition/Temp: Dry;Warm   Skin Integrity: Laceration (comment); Scars (comment)(abd scars, R elbow scab)  Skin Integumentary  Skin Color: Appropriate for ethnicity  Skin Condition/Temp: Dry;Warm  Skin Integrity: Laceration (comment); Scars (comment)(abd scars, R elbow scab)  Turgor: Non-tenting  Hair Growth: Present  Varicosities: Absent   Strength:    Strength: Generally decreased, functional  Range Of Motion:  AROM: Generally decreased, functional  PROM: Generally decreased, functional  Functional Mobility:  Bed Mobility:   Supine to Sit: Contact guard assistance  Sit to Supine: Contact guard assistance  Scooting: Contact guard assistance  Transfers:  Sit to Stand: Contact guard assistance  Stand to Sit: Contact guard assistance  Balance:   Sitting: Intact  Standing: Intact; With support  Ambulation/Gait Training:  Distance (ft): 150 Feet (ft)  Assistive Device: Gait belt  Ambulation - Level of Assistance: Contact guard assistance;Minimal assistance   Gait Description (WDL): Exceptions to WDL  Gait Abnormalities: Decreased step clearance;Trunk sway increased; Path deviations  Base of Support: Widened   Speed/Arlette: Slow  Step Length: Right shortened;Left shortened  Interventions: Safety awareness training  Pain:  Pain Scale 1: Numeric (0 - 10)  Pain Intensity 1: 0  Activity Tolerance:   good  Please refer to the flowsheet for vital signs taken during this treatment. After treatment:   ?         Patient left in no apparent distress sitting up in chair  ? Patient left in no apparent distress in bed, sitting EOB, RT still present. ?         Call bell left within reach  ? Nursing notified  ? Caregiver present  ? Bed alarm activated    COMMUNICATION/EDUCATION:   ?         Fall prevention education was provided and the patient/caregiver indicated understanding. ? Patient/family have participated as able in goal setting and plan of care. ?         Patient/family agree to work toward stated goals and plan of care. ?         Patient understands intent and goals of therapy, but is neutral about his/her participation. ? Patient is unable to participate in goal setting and plan of care.     Thank you for this referral.  Nedra Elkins   Time Calculation: 24 mins  Eval Complexity: History: LOW Complexity : Zero comorbidities / personal factors that will impact the outcome / POCExam:LOW Complexity : 1-2 Standardized tests and measures addressing body structure, function, activity limitation and / or participation in recreation  Presentation: LOW Complexity : Stable, uncomplicated  Clinical Decision Making:Low Complexity d/t mobility today  Overall Complexity:LOW

## 2019-08-31 NOTE — PROGRESS NOTES
0700 Received bedside report from Delmi Roman RN    1800 Pt complained of diarrhea, stated he had 3 liquid stools that were small/medium brown. Shakira called Dr. Hannah Rodríguez ordered and given. No stool since.

## 2019-08-31 NOTE — PROGRESS NOTES
1935 Assumed care from Carlos Bautista RN.    2130 Spoke to provider an received orders for two home meds. Shift uneventul. Pt stable with no signs of distress noted. 0720 Bedside and Verbal shift change report given to Capo Thompson RN and Donita Dahl RN. (oncoming nurse) by Zach Green RN   (offgoing nurse). Report included the following information SBAR, Kardex, Procedure Summary, Intake/Output, MAR, Recent Results and Med Rec Status.

## 2019-08-31 NOTE — PROGRESS NOTES
Problem:  Activity Intolerance  Goal: *Oxygen saturation during activity within specified parameters  Outcome: Progressing Towards Goal  Goal: *Able to remain out of bed as prescribed  Outcome: Progressing Towards Goal     Problem: General Medical Care Plan  Goal: *Vital signs within specified parameters  Outcome: Progressing Towards Goal  Goal: *Labs within defined limits  Outcome: Progressing Towards Goal  Goal: *Absence of infection signs and symptoms  Outcome: Progressing Towards Goal  Goal: *Optimal pain control at patient's stated goal  Outcome: Progressing Towards Goal  Goal: *Skin integrity maintained  Outcome: Progressing Towards Goal  Goal: *Fluid volume balance  Outcome: Progressing Towards Goal  Goal: *Optimize nutritional status  Outcome: Progressing Towards Goal  Goal: *Anxiety reduced or absent  Outcome: Progressing Towards Goal  Goal: *Progressive mobility and function (eg: ADL's)  Outcome: Progressing Towards Goal

## 2019-08-31 NOTE — PROGRESS NOTES
[facility]  Pulmonary, Critical Care, and Sleep Medicine    Initial Patient Consult    Name: Geni Moreau MRN: [id number]   : 1955 Hospital: [facility] [place]   Date: 2019  Room: [facility]     Subjective: This patient has been seen and evaluated at the request of Dr. Renee Lawton for worsening dyspnea. Patient is a 70 y.o. male - known to me, with hx of Asthma/COPD. He had been tried on several inhalers - either they do not work, or expensive. Recently, tried nebulized meds - perforomist and yupelri as well - patient could not afford. He had elevated eosinophils and tried anti-IL injection immunotherapy with Nucala injections - patient developed headaches. Patient has come to ER with worsening SOB on minimal exertion. CTA chest was neg for PEs. Patient developed tachycardia in the ER with heart rate in the 130s.    19   Patient feels better overall  Still reports SOB walking beyond floor hallway distance. Normal breathing at rest. No CP or hemoptysis. Cough +, non-productive. No fever. Cardiology seen patient yesterday  Hx of CAD and stents in past.   Hx of chronic RA on Arava med.        Past Medical History:   Diagnosis Date    Arthritis     Asthma     CAD (coronary artery disease)     stents x 5    Cancer ([address]     melanoma on left side of face    Chronic pain     left knee    COPD     DVT (deep venous thrombosis) ([address]     left leg    Hypertension     Myocardial infarction [facility])     Primary localized osteoarthritis of left knee 2019    Pulmonary embolism ([address] 2017    Rheumatoid arthritis ([address]       Past Surgical History:   Procedure Laterality Date    ABDOMEN SURGERY PROC UNLISTED      Laparotomy bowel resection instestinal rupture, colostomy    ABDOMEN SURGERY PROC UNLISTED      Revision of colostomy    FRACTIONAL FLOW RESERVE  2018    FRACTIONAL FLOW RESERVE ADDL  2018    HX COLOSTOMY      HX COLOSTOMY TAKE DOWN      HX CORONARY STENT PLACEMENT      HX HEART CATHETERIZATION  2005    stents x 4    HX HEART CATHETERIZATION  2014    stent x 1    HX HERNIA REPAIR      x 4    HX LUMBAR DISKECTOMY  2014    LEFT HEART PERCUTANEOUS  4/29/2018    JH CORONARY ARTERIOGRAPH  4/29/2018      Prior to Admission medications    Medication Sig Start Date End Date Taking? Authorizing Provider   DULoxetine (CYMBALTA) 20 mg capsule Take 20 mg by mouth daily. Yes Provider, Historical   furosemide (LASIX) 20 mg tablet Take  by mouth daily. Yes Provider, Historical   leflunomide (ARAVA) 10 mg tablet Take 10 mg by mouth daily. Yes Provider, Historical   propranolol (INDERAL) 20 mg tablet Take 20 mg by mouth two (2) times a day. Yes Provider, Historical   celecoxib (CELEBREX) 200 mg capsule Take 200 mg by mouth two (2) times a day. Yes Other, MD Moira   montelukast (SINGULAIR) 10 mg tablet Take 10 mg by mouth nightly. Yes Other, MD Moira   pantoprazole (PROTONIX) 40 mg tablet Take 40 mg by mouth daily. Yes Provider, Historical   meclizine (ANTIVERT) 25 mg tablet Take  by mouth three (3) times daily as needed. Yes Provider, Historical   omega-3 fatty acids-vitamin e (FISH OIL) 1,000 mg cap Take 2 Caps by mouth daily. Yes Provider, Historical   aspirin 81 mg tablet Take 81 mg by mouth daily. Yes Other, MD Moira   SIMVASTATIN PO Take 40 mg by mouth nightly. Yes Other, MD Moira   mepolizumab (NUCALA) 100 mg solr 100 mg by SubCUTAneous route once. Given in infusion clinic    Provider, Historical   nitroglycerin (NITROSTAT) 0.4 mg SL tablet by SubLINGual route every five (5) minutes as needed for Chest Pain. Provider, Historical   Biotin 2,500 mcg cap Take 5,000 mcg by mouth daily. Provider, Historical     Allergies   Allergen Reactions    Ciprofloxacin Other (comments)     PT states that he turns red.     Tape [Adhesive] Other (comments)     Pt states, \"it rips my skin\"      Social History     Tobacco Use  Smoking status: Former Smoker     Last attempt to quit: 1992     Years since quittin.6    Smokeless tobacco: Never Used   Substance Use Topics    Alcohol use: Not Currently     Comment: last       History reviewed. No pertinent family history. Current Facility-Administered Medications   Medication Dose Route Frequency    methylPREDNISolone (PF) (SOLU-MEDROL) injection 60 mg  60 mg IntraVENous Q6H    aspirin chewable tablet 81 mg  81 mg Oral DAILY    leflunomide (ARAVA) tablet 10 mg  10 mg Oral DAILY    montelukast (SINGULAIR) tablet 10 mg  10 mg Oral QHS    pantoprazole (PROTONIX) tablet 40 mg  40 mg Oral DAILY    simvastatin (ZOCOR) tablet 40 mg  40 mg Oral QHS    insulin lispro (HUMALOG) injection   SubCUTAneous AC&HS    heparin (porcine) injection 5,000 Units  5,000 Units SubCUTAneous Q8H    propranolol (INDERAL) tablet 40 mg  40 mg Oral BID    celecoxib (CELEBREX) capsule 200 mg  200 mg Oral BID    DULoxetine (CYMBALTA) capsule 20 mg  20 mg Oral DAILY       Review of Systems:  Ears, nose, mouth, throat, and face: No epistaxis, No nasal drainage, no difficulty in swallowing  Respiratory: as above  Cardiovascular: no chest pain or palpitations, no chronic leg edema, no syncope  Gastrointestinal: no abd pain, vomitting or diarrhea, no bleeding symptoms  Genitourinary: No urinary symptoms  Constitutional: No fever or chills or weight loss or night sweats       Objective:   Vital Signs:    Visit Vitals  BP (!) 139/98   Pulse 83   Temp 97.7 °F (36.5 °C)   Resp 16   Ht 5' 5\" (1.651 m)   Wt 83 kg (183 lb)   SpO2 97%   BMI 30.45 kg/m²       O2 Device: Nasal cannula   O2 Flow Rate (L/min): 1 l/min   Temp (24hrs), Av.1 °F (36.7 °C), Min:97.5 °F (36.4 °C), Max:98.7 °F (37.1 °C)       Intake/Output:   Last shift:      701 - 1900  In: 480 [P.O.:480]  Out: -   Last 3 shifts: 1901 -  07  In: 440 [P.O.:240;  I.V.:200]  Out: 200 [Urine:200]    Intake/Output Summary (Last 24 hours) at 8/31/2019 1412  Last data filed at 8/31/2019 1238  Gross per 24 hour   Intake 480 ml   Output 200 ml   Net 280 ml         Physical Exam:   Gene: AAO x 3, comfortable; on NC O2  HEENT: pupils not dilated, no scleral jaundice, moist oral mucosa  Neck: No adenopathy or thyroid swelling  CVS: S1S2 no murmurs; JVD not elevated  RS: Mod air entry bilaterally, decreased BS at bases with some crackle, no wheezes, normal respirations  Abd: soft, non tender, no hepatosplenomegaly, no abd distension, obese  Neuro: awake, alert, O x 3, non focal exam  Extrm: no leg edema or swelling or clubbing  Lymphatic: no cervical or supraclavicular adenopathy    Data review:     Recent Results (from the past 24 hour(s))   CARDIAC PANEL,(CK, CKMB & TROPONIN)    Collection Time: 08/30/19  2:37 PM   Result Value Ref Range    CK 74 39 - 308 U/L    CK - MB 1.2 <3.6 ng/ml    CK-MB Index 1.6 0.0 - 4.0 %    Troponin-I, QT <0.02 0.0 - 0.045 NG/ML   GLUCOSE, POC    Collection Time: 08/30/19  4:18 PM   Result Value Ref Range    Glucose (POC) 304 (H) 70 - 110 mg/dL   GLUCOSE, POC    Collection Time: 08/30/19  9:08 PM   Result Value Ref Range    Glucose (POC) 264 (H) 70 - 110 mg/dL   GLUCOSE, POC    Collection Time: 08/31/19  7:00 AM   Result Value Ref Range    Glucose (POC) 203 (H) 70 - 110 mg/dL   GLUCOSE, POC    Collection Time: 08/31/19 11:48 AM   Result Value Ref Range    Glucose (POC) 225 (H) 70 - 110 mg/dL           Recent Labs     08/29/19 2016   FIO2I 0.21   HCO3I 27.0*   PCO2I 38.4   PHI 7.452*   PO2I 75*       All Micro Results     None            Imaging:  [x]I have personally reviewed the patients chest radiographs images and report     Results from Hospital Encounter encounter on 08/06/18   XR CHEST PORT    Narrative Portable chest 8/6/2018. History: Weakness with progressive severity over the past 2 days. History of  prior coronary artery stent placement x5.     Technique: A semierect portable radiograph of the chest was obtained. Comparison:  4/24/2018. Findings: The cardiomediastinal contours are unchanged. Inspiratory lung  volumes are slightly decreased but the lungs remain clear. There is no  pneumothorax or pleural effusion. Impression Impression:    1. No acute pulmonary disease. Results from Hospital Encounter encounter on 08/29/19   CTA CHEST W OR W WO CONT    Narrative EXAM: CTA CHEST W OR W WO CONT    CLINICAL INDICATION/HISTORY: Chest pain, acute, pulmonary embolism (PE)  suspected    COMPARISON: April 24, 2018. TECHNIQUE: Axial CT imaging from the thoracic inlet through the diaphragm with  intravenous contrast. Coronal and sagittal MIP reformats were generated. One or  more dose reduction techniques were used on this CT: automated exposure control,  adjustment of the mAs and/or kVp according to patient size, and iterative  reconstruction techniques. The specific techniques used on this CT exam have  been documented in the patient's electronic medical record. Digital Imaging and  Communications in Medicine (DICOM) format image data are available to  nonaffiliated external healthcare facilities or entities on a secure, media  free, reciprocally searchable basis with patient authorization for at least a  12-month period after this study. _______________    FINDINGS:    EXAM QUALITY: Non-diagnostic/Adequate/Excellent    PULMONARY ARTERIES: No evidence of pulmonary embolism. MEDIASTINUM: Normal heart size. No evidence of right heart strain. Aorta is  unremarkable. No pericardial effusion. Multiple coronary artery calcifications  are noted. LUNGS: No suspicious nodule or mass. Bilateral areas of scarring and  subsegmental atelectasis. PLEURA: No effusion or pneumothorax. AIRWAY: Normal.    LYMPH NODES: No enlarged nodes. UPPER ABDOMEN: Several low-density hepatic cysts are noted, unchanged. Prior  cholecystectomy. Diverticulosis. OTHER: No acute or aggressive osseous abnormalities identified. Mild  spondylosis. _______________      Impression IMPRESSION:    1. No evidence of pulmonary embolism. IMPRESSION:     Patient Active Problem List   Diagnosis Code    Asthma with COPD with exacerbation ([facility] J44.1, J45.901    Chronic obstructive pulmonary disease (HCC) J44.9    Exertional dyspnea R06.09    CAD (coronary artery disease) I25.10    Hypertension     Elevated troponin R74.8    CKD (chronic kidney disease) stage 3, GFR 30-59 ml/min ([facility]) N18.3    NSTEMI (non-ST elevated myocardial infarction) ([facility] I21.4    Rheumatoid arthritis ([facility] M06.9         RECOMMENDATIONS:   · Start Brovana and Budesonide  · On Duoneb PRN and IV steroids; respiratory symptoms are out of proportion to the lung disease; CT chest shows no significant emphysema; FEV1 of 1.64L.67%. · Ct chest shows no PEs or pneumonia; monitor off of antibiotics  · Per cardiology no concern for CHF, angina equivalent VIRK  · Continue montelukast 10 mg daily  · Patient may benefit from pulmonary rehab outpatient  · DVT and GI proph - sc heparin and PPI  · On Arava med  · D.w patient and answered all his questions  · Full code status  Will defer respective systems problem management to primary and other consultant and follow patient with primary and other medical team  Further recommendations will be based on the patient's response to recommended treatment and results of the investigation ordered.   · Lines/Tubes: PIVs  ADVANCE DIRECTIVE: Full Code     High complexity decision making was performed in this consultation and evaluation of this patient        Talia Thibodeaux MD

## 2019-08-31 NOTE — PROGRESS NOTES
Cardiology Progress Note        Patient: Ever Jassor        Sex: male          DOA: 8/29/2019  YOB: 1955      Age:  70 y.o.        LOS:  LOS: 2 days   Assessment/Plan     Active Problems:    Asthma (4/24/2018)      Asthma with COPD with exacerbation ([facility] (8/30/2019)      Exertional dyspnea (8/30/2019)      Rheumatoid arthritis ([facility] (8/30/2019)        Plan:  Feeling somewhat better  Still have dyspnea on mild exertion and intermittent Chest tightness  Heart rate is better  previous cath 4/2018 with mild to moderate disease  Echo unable to measure PA pressure and pt have history of rheumatoid arthritis  Patient was unable to tolerate CCB and isosorbide in past  Discussed with patient and recommended  RHC/LHC possible PCI. Will schedule cath procedure possibly on Monday                       Subjective:    cc:  VIRK  CP  tachycardia        REVIEW OF SYSTEMS:     General: No fevers or chills. Cardiovascular: No chest pain or pressure. No palpitations. No ankle swelling  Pulmonary: No SOB, orthopnea, PND  Gastrointestinal: No nausea, vomiting or diarrhea      Objective:      Visit Vitals  /85   Pulse 88   Temp 98 °F (36.7 °C)   Resp 16   Ht 5' 5\" (1.651 m)   Wt 83 kg (183 lb)   SpO2 92%   BMI 30.45 kg/m²     Body mass index is 30.45 kg/m². Physical Exam:  General Appearance: Comfortable, not using accessory muscles of respiration. NECK: No JVD, no thyroidomeglay. LUNGS: Clear bilaterally. HEART: S1+S2 audible,    ABD: Non-tender, BS Audible    EXT: No edema, and no cysnosis. VASCULAR EXAM: Pulses are intact. PSYCHIATRIC EXAM: Mood is appropriate.     Medication:  Current Facility-Administered Medications   Medication Dose Route Frequency    albuterol-ipratropium (DUO-NEB) 2.5 MG-0.5 MG/3 ML  3 mL Nebulization Q4H PRN    budesonide (PULMICORT) 500 mcg/2 ml nebulizer suspension  500 mcg Nebulization BID RT    arformoterol (BROVANA) neb solution 15 mcg  15 mcg Nebulization BID RT    methylPREDNISolone (PF) (SOLU-MEDROL) injection 60 mg  60 mg IntraVENous Q6H    aspirin chewable tablet 81 mg  81 mg Oral DAILY    leflunomide (ARAVA) tablet 10 mg  10 mg Oral DAILY    montelukast (SINGULAIR) tablet 10 mg  10 mg Oral QHS    nitroglycerin (NITROSTAT) tablet 0.4 mg  0.4 mg SubLINGual PRN    meclizine (ANTIVERT) tablet 25 mg  25 mg Oral Q6H PRN    pantoprazole (PROTONIX) tablet 40 mg  40 mg Oral DAILY    simvastatin (ZOCOR) tablet 40 mg  40 mg Oral QHS    insulin lispro (HUMALOG) injection   SubCUTAneous AC&HS    glucose chewable tablet 16 g  4 Tab Oral PRN    glucagon (GLUCAGEN) injection 1 mg  1 mg IntraMUSCular PRN    heparin (porcine) injection 5,000 Units  5,000 Units SubCUTAneous Q8H    propranolol (INDERAL) tablet 40 mg  40 mg Oral BID    celecoxib (CELEBREX) capsule 200 mg  200 mg Oral BID    DULoxetine (CYMBALTA) capsule 20 mg  20 mg Oral DAILY               Lab/Data Reviewed:  Procedures/imaging: see electronic medical records for all procedures/Xrays   and details which were not copied into this note but were reviewed prior to creation of Plan       All lab results for the last 24 hours reviewed.      Recent Labs     08/29/19 1914   WBC 5.6   HGB 12.5*   HCT 39.1        Recent Labs     08/29/19 1914      K 3.2*      CO2 28   *   BUN 14   CREA 1.60*   CA 8.4*       Signed By: Brigitte Carranza MD     August 31, 2019

## 2019-09-01 LAB
GLUCOSE BLD STRIP.AUTO-MCNC: 227 MG/DL (ref 70–110)
GLUCOSE BLD STRIP.AUTO-MCNC: 260 MG/DL (ref 70–110)
GLUCOSE BLD STRIP.AUTO-MCNC: 267 MG/DL (ref 70–110)
GLUCOSE BLD STRIP.AUTO-MCNC: 381 MG/DL (ref 70–110)

## 2019-09-01 PROCEDURE — 74011000250 HC RX REV CODE- 250: Performed by: INTERNAL MEDICINE

## 2019-09-01 PROCEDURE — 94640 AIRWAY INHALATION TREATMENT: CPT

## 2019-09-01 PROCEDURE — 74011250637 HC RX REV CODE- 250/637: Performed by: INTERNAL MEDICINE

## 2019-09-01 PROCEDURE — 74011636637 HC RX REV CODE- 636/637: Performed by: INTERNAL MEDICINE

## 2019-09-01 PROCEDURE — 97530 THERAPEUTIC ACTIVITIES: CPT

## 2019-09-01 PROCEDURE — 74011250636 HC RX REV CODE- 250/636: Performed by: INTERNAL MEDICINE

## 2019-09-01 PROCEDURE — 97116 GAIT TRAINING THERAPY: CPT

## 2019-09-01 PROCEDURE — 94760 N-INVAS EAR/PLS OXIMETRY 1: CPT

## 2019-09-01 PROCEDURE — 65660000000 HC RM CCU STEPDOWN

## 2019-09-01 PROCEDURE — 82962 GLUCOSE BLOOD TEST: CPT

## 2019-09-01 RX ADMIN — ASPIRIN 81 MG 81 MG: 81 TABLET ORAL at 08:57

## 2019-09-01 RX ADMIN — METHYLPREDNISOLONE SODIUM SUCCINATE 60 MG: 40 INJECTION, POWDER, FOR SOLUTION INTRAMUSCULAR; INTRAVENOUS at 02:47

## 2019-09-01 RX ADMIN — BUDESONIDE 500 MCG: 0.5 INHALANT RESPIRATORY (INHALATION) at 07:13

## 2019-09-01 RX ADMIN — METHYLPREDNISOLONE SODIUM SUCCINATE 40 MG: 40 INJECTION, POWDER, FOR SOLUTION INTRAMUSCULAR; INTRAVENOUS at 13:23

## 2019-09-01 RX ADMIN — SIMVASTATIN 40 MG: 40 TABLET, FILM COATED ORAL at 21:46

## 2019-09-01 RX ADMIN — LEFLUNOMIDE 10 MG: 10 TABLET ORAL at 08:59

## 2019-09-01 RX ADMIN — ARFORMOTEROL TARTRATE 15 MCG: 15 SOLUTION RESPIRATORY (INHALATION) at 07:13

## 2019-09-01 RX ADMIN — IBUPROFEN 400 MG: 400 TABLET ORAL at 08:58

## 2019-09-01 RX ADMIN — PANTOPRAZOLE SODIUM 40 MG: 40 TABLET, DELAYED RELEASE ORAL at 08:58

## 2019-09-01 RX ADMIN — INSULIN LISPRO 9 UNITS: 100 INJECTION, SOLUTION INTRAVENOUS; SUBCUTANEOUS at 21:47

## 2019-09-01 RX ADMIN — INSULIN LISPRO 10 UNITS: 100 INJECTION, SOLUTION INTRAVENOUS; SUBCUTANEOUS at 13:48

## 2019-09-01 RX ADMIN — CELECOXIB 200 MG: 100 CAPSULE ORAL at 09:46

## 2019-09-01 RX ADMIN — INSULIN LISPRO 4 UNITS: 100 INJECTION, SOLUTION INTRAVENOUS; SUBCUTANEOUS at 16:40

## 2019-09-01 RX ADMIN — PROPRANOLOL HYDROCHLORIDE 40 MG: 20 TABLET ORAL at 20:34

## 2019-09-01 RX ADMIN — CELECOXIB 200 MG: 100 CAPSULE ORAL at 20:33

## 2019-09-01 RX ADMIN — HEPARIN SODIUM 5000 UNITS: 5000 INJECTION INTRAVENOUS; SUBCUTANEOUS at 19:34

## 2019-09-01 RX ADMIN — METHYLPREDNISOLONE SODIUM SUCCINATE 40 MG: 40 INJECTION, POWDER, FOR SOLUTION INTRAMUSCULAR; INTRAVENOUS at 20:33

## 2019-09-01 RX ADMIN — MONTELUKAST 10 MG: 10 TABLET, FILM COATED ORAL at 21:46

## 2019-09-01 RX ADMIN — IBUPROFEN 400 MG: 400 TABLET ORAL at 16:41

## 2019-09-01 RX ADMIN — BUDESONIDE 500 MCG: 0.5 INHALANT RESPIRATORY (INHALATION) at 19:53

## 2019-09-01 RX ADMIN — PROPRANOLOL HYDROCHLORIDE 40 MG: 20 TABLET ORAL at 08:58

## 2019-09-01 RX ADMIN — HEPARIN SODIUM 5000 UNITS: 5000 INJECTION INTRAVENOUS; SUBCUTANEOUS at 02:47

## 2019-09-01 RX ADMIN — METHYLPREDNISOLONE SODIUM SUCCINATE 60 MG: 40 INJECTION, POWDER, FOR SOLUTION INTRAMUSCULAR; INTRAVENOUS at 08:58

## 2019-09-01 RX ADMIN — ARFORMOTEROL TARTRATE 15 MCG: 15 SOLUTION RESPIRATORY (INHALATION) at 19:53

## 2019-09-01 RX ADMIN — IBUPROFEN 400 MG: 400 TABLET ORAL at 21:46

## 2019-09-01 RX ADMIN — HEPARIN SODIUM 5000 UNITS: 5000 INJECTION INTRAVENOUS; SUBCUTANEOUS at 09:46

## 2019-09-01 RX ADMIN — DULOXETINE 20 MG: 20 CAPSULE, DELAYED RELEASE ORAL at 08:58

## 2019-09-01 RX ADMIN — INSULIN LISPRO 6 UNITS: 100 INJECTION, SOLUTION INTRAVENOUS; SUBCUTANEOUS at 06:58

## 2019-09-01 NOTE — PROGRESS NOTES
1920 Assumed care from Jo Mattson, RN and Ermelinda Morris RN.    0518 Bedside and Verbal shift change report given to Ermelinda Morris, RN and Jo Mattson RN (oncoming nurse) by Vasu Olivares RN   (offgoing nurse). Report included the following information SBAR, Kardex, Procedure Summary, Intake/Output, MAR, Recent Results and Med Rec Status.

## 2019-09-01 NOTE — PROGRESS NOTES
Cardiology Progress Note        Patient: Chandler Justin        Sex: male          DOA: 8/29/2019  YOB: 1955      Age:  70 y.o.        LOS:  LOS: 3 days   Assessment/Plan     Active Problems:    Asthma (4/24/2018)      Asthma with COPD with exacerbation ([facility] (8/30/2019)      Exertional dyspnea (8/30/2019)      Rheumatoid arthritis ([facility] (8/30/2019)        Plan: cardiac tele- sinus rhythm  dyspnea on mild exertion and intermittent Chest tightness  Heart rate have improved  previous cath 4/2018 with mild to moderate disease  Echo unable to measure PA pressure and pt have history of rheumatoid arthritis. Patient was unable to tolerate CCB and isosorbide in past  Discussed with patient and recommended  RHC/LHC possible PCI. Will schedule cath procedure tomorrow  Discussed with patient and sister. Subjective:    cc:  SOB  VIRK  CP  tachycardia      REVIEW OF SYSTEMS:     General: No fevers or chills. Cardiovascular: No chest pain or pressure. No palpitations. No ankle swelling  Pulmonary: No SOB, orthopnea, PND  Gastrointestinal: No nausea, vomiting or diarrhea      Objective:      Visit Vitals  /81   Pulse 77   Temp 97.9 °F (36.6 °C)   Resp 13   Ht 5' 5\" (1.651 m)   Wt 83 kg (183 lb)   SpO2 98%   BMI 30.45 kg/m²     Body mass index is 30.45 kg/m². Physical Exam:  General Appearance: Comfortable, not using accessory muscles of respiration. NECK: No JVD, no thyroidomeglay. LUNGS: Clear bilaterally. HEART: S1+S2 audible,    ABD: Non-tender, BS Audible    EXT: No edema, and no cysnosis. VASCULAR EXAM: Pulses are intact. PSYCHIATRIC EXAM: Mood is appropriate.     Medication:  Current Facility-Administered Medications   Medication Dose Route Frequency    methylPREDNISolone (PF) (SOLU-MEDROL) injection 40 mg  40 mg IntraVENous Q6H    albuterol-ipratropium (DUO-NEB) 2.5 MG-0.5 MG/3 ML  3 mL Nebulization Q4H PRN    budesonide (PULMICORT) 500 mcg/2 ml nebulizer suspension  500 mcg Nebulization BID RT    arformoterol (BROVANA) neb solution 15 mcg  15 mcg Nebulization BID RT    acetaminophen (TYLENOL) tablet 650 mg  650 mg Oral Q6H PRN    ibuprofen (MOTRIN) tablet 400 mg  400 mg Oral TID    loperamide (IMODIUM) capsule 2 mg  2 mg Oral PRN    aspirin chewable tablet 81 mg  81 mg Oral DAILY    leflunomide (ARAVA) tablet 10 mg  10 mg Oral DAILY    montelukast (SINGULAIR) tablet 10 mg  10 mg Oral QHS    nitroglycerin (NITROSTAT) tablet 0.4 mg  0.4 mg SubLINGual PRN    meclizine (ANTIVERT) tablet 25 mg  25 mg Oral Q6H PRN    pantoprazole (PROTONIX) tablet 40 mg  40 mg Oral DAILY    simvastatin (ZOCOR) tablet 40 mg  40 mg Oral QHS    insulin lispro (HUMALOG) injection   SubCUTAneous AC&HS    glucose chewable tablet 16 g  4 Tab Oral PRN    glucagon (GLUCAGEN) injection 1 mg  1 mg IntraMUSCular PRN    heparin (porcine) injection 5,000 Units  5,000 Units SubCUTAneous Q8H    propranolol (INDERAL) tablet 40 mg  40 mg Oral BID    celecoxib (CELEBREX) capsule 200 mg  200 mg Oral BID    DULoxetine (CYMBALTA) capsule 20 mg  20 mg Oral DAILY               Lab/Data Reviewed:  Procedures/imaging: see electronic medical records for all procedures/Xrays   and details which were not copied into this note but were reviewed prior to creation of Plan       All lab results for the last 24 hours reviewed.      Recent Labs     08/29/19 1914   WBC 5.6   HGB 12.5*   HCT 39.1        Recent Labs     08/29/19 1914      K 3.2*      CO2 28   *   BUN 14   CREA 1.60*   CA 8.4*       Signed By: Gomez Marquez MD     September 1, 2019

## 2019-09-01 NOTE — ROUTINE PROCESS
Bedside and Verbal shift change report given to Christy Ramirez RN (oncoming nurse) by Chilo Ndiaye. Catina Reed RN (offgoing nurse). Report included the following information SBAR, Kardex, Intake/Output and MAR.

## 2019-09-01 NOTE — ROUTINE PROCESS
Bedside and Verbal shift change report given to Mildred Torres RN (oncoming nurse) by Chilo Ndiaye. Catina Reed RN (offgoing nurse). Report included the following information SBAR, Kardex, Intake/Output and MAR.

## 2019-09-01 NOTE — PROGRESS NOTES
Problem: Falls - Risk of  Goal: *Absence of Falls  Description  Document Candida Lightning Fall Risk and appropriate interventions in the flowsheet. Outcome: Progressing Towards Goal  Note:   Fall Risk Interventions:   Wearing gripper socks, no falls during shift, bed in lowest position, wheels locked         Medication Interventions: Patient to call before getting OOB, Teach patient to arise slowly         History of Falls Interventions: Door open when patient unattended         Problem: Activity Intolerance  Goal: *Able to remain out of bed as prescribed  Outcome: Progressing Towards Goal  Note:   Pt got out of bed, states he became short of breath on exertion to bathroom.      Problem: General Medical Care Plan  Goal: *Absence of infection signs and symptoms  Outcome: Progressing Towards Goal     Problem: Asthma: Day 2  Goal: Activity/Safety  Outcome: Progressing Towards Goal  Note:   Pt trying to spend more time OOB, HOB elevated, ambulates on own to bathroom  Goal: Diagnostic Test/Procedures  Outcome: Progressing Towards Goal  Goal: Medications  Outcome: Progressing Towards Goal  Note:   Steroid being decreased in dosage  Goal: *Oxygen saturation returns to baseline  Outcome: Progressing Towards Goal

## 2019-09-01 NOTE — PROGRESS NOTES
0700 Received bedside report from Robby Ortiz RN    Pt states he has excoriation on buttocks due to diarrhea and wiping. Pt refuses to let nurse observe buttocks until he has showered. 1300 Pt had high blood sugar, did not notify physician, blood glucose taken soon after eating, waiting to see result of 1630 to notify physician.

## 2019-09-01 NOTE — PROGRESS NOTES
Problem: Pain  Goal: *Control of Pain  Outcome: Progressing Towards Goal  Note:   Motrin ordered tid     Problem: General Medical Care Plan  Goal: *Vital signs within specified parameters  Outcome: Progressing Towards Goal  Note:   VS within defined limits     Problem: Asthma: Day 3  Goal: Respiratory  Outcome: Progressing Towards Goal  Note:   Weaned off O2  Goal: Treatments/Interventions/Procedures  Outcome: Progressing Towards Goal

## 2019-09-01 NOTE — PROGRESS NOTES
Hospitalist Progress Note    Patient: Arsenio Finn MRN: [id number]  CSN: [id number]    YOB: 1955  Age: 70 y.o. Sex: male    DOA: 8/29/2019 LOS:  LOS: 3 days                Assessment/Plan     Patient Active Problem List   Diagnosis Code    CAD (coronary artery disease) I25.10    Hypertension I10    Chronic obstructive pulmonary disease ([facility]) J44.9    Elevated troponin R74.8    CKD (chronic kidney disease) stage 3, GFR 30-59 ml/min ([facility]) N18.3    Asthma J45.909    NSTEMI (non-ST elevated myocardial infarction) ([facility] I21.4    Asthma with COPD with exacerbation ([facility]) J44.1, J45.901    Exertional dyspnea R06.09    Rheumatoid arthritis ([facility] M06.9            71 yo male with history of Asthma/COPD admitted for worsening SOB. Breathing better, cough better. Asthma/COPD exacerbation - continue with IV seroids, neb treatments. CT chest with significant emphysema  Cardiology consulted  CE negative  Follow echo    CAD - no chest pain, cardiology following, for cath Monday. RA - on leflunomide     GERD - on protonix    DVT prophylaxis with heparin      Disposition : 2-3 days    Review of systems  General: No fevers or chills. Cardiovascular: No chest pain or pressure. No palpitations. Pulmonary: No shortness of breath. Gastrointestinal: No nausea, vomiting. Physical Exam:  General: Awake, cooperative, no acute distress    HEENT: NC, Atraumatic. PERRLA, anicteric sclerae. Lungs: Decreased breath sounds lower lungs bilaterally  Heart:  S1 S2,  No murmur, No Rubs, No Gallops  Abdomen: Soft, Non distended, Non tender.  +Bowel sounds,   Extremities: No c/c/e  Psych:   Not anxious or agitated. Neurologic:  No acute neurological deficit. Vital signs/Intake and Output:  Visit Vitals  /81   Pulse 77   Temp 97.9 °F (36.6 °C)   Resp 13   Ht 5' 5\" (1.651 m)   Wt 83 kg (183 lb)   SpO2 98%   BMI 30.45 kg/m²     Current Shift:  No intake/output data recorded.   Last three shifts:  08/30 1901 - 09/01 0700  In: 480 [P.O.:480]  Out: 200 [Urine:200]            Labs: Results:       Chemistry Recent Labs     08/29/19 1914   *      K 3.2*      CO2 28   BUN 14   CREA 1.60*   CA 8.4*   AGAP 9   BUCR 9*   AP 84   TP 6.2*   ALB 3.2*   GLOB 3.0   AGRAT 1.1      CBC w/Diff Recent Labs     08/29/19 1914   WBC 5.6   RBC 4.05*   HGB 12.5*   HCT 39.1      GRANS 61   LYMPH 20*   EOS 2      Cardiac Enzymes Recent Labs     08/30/19  1437 08/30/19  0850   CPK 74 77   CKND1 1.6 1.8      Coagulation No results for input(s): PTP, INR, APTT in the last 72 hours. No lab exists for component: INREXT, INREXT    Lipid Panel Lab Results   Component Value Date/Time    Cholesterol, total 111 04/24/2018 11:20 PM    HDL Cholesterol 80 (H) 04/24/2018 11:20 PM    LDL, calculated 27.4 04/24/2018 11:20 PM    VLDL, calculated 3.6 04/24/2018 11:20 PM    Triglyceride 18 04/24/2018 11:20 PM    CHOL/HDL Ratio 1.4 04/24/2018 11:20 PM      BNP No results for input(s): BNPP in the last 72 hours.    Liver Enzymes Recent Labs     08/29/19 1914   TP 6.2*   ALB 3.2*   AP 84   SGOT 41*      Thyroid Studies Lab Results   Component Value Date/Time    TSH 1.730 11/07/2014 04:23 AM        Procedures/imaging: see electronic medical records for all procedures/Xrays and details which were not copied into this note but were reviewed prior to creation of Plan

## 2019-09-01 NOTE — PROGRESS NOTES
[facility]  Pulmonary, Critical Care, and Sleep Medicine    Initial Patient Consult    Name: Ever Hassan MRN: [id number]   : 1955 Hospital: [facility] [place]   Date: 2019  Room: [facility]     Subjective: This patient has been seen and evaluated at the request of Dr. Cristofer Lam for worsening dyspnea. Patient is a 70 y.o. male - known to me, with hx of Asthma/COPD. He had been tried on several inhalers - either they do not work, or expensive. Recently, tried nebulized meds - perforomist and yupelri as well - patient could not afford. He had elevated eosinophils and tried anti-IL injection immunotherapy with Nucala injections - patient developed headaches. Patient has come to ER with worsening SOB on minimal exertion. CTA chest was neg for PEs. Patient developed tachycardia in the ER with heart rate in the 130s. 19   Patient reports unchanged SOB walking beyond floor hallway distance. Rare chest tightness. Tells me liking new nebulized bronchodilator  Normal breathing at rest. No CP or hemoptysis. Cough +, non-productive. No fever. Cardiology planning on RT/LT heart cath  Hx of CAD and stents in past.   Hx of chronic RA on Arava med.        Past Medical History:   Diagnosis Date    Arthritis     Asthma     CAD (coronary artery disease)     stents x 5    Cancer ([address]     melanoma on left side of face    Chronic pain     left knee    COPD     DVT (deep venous thrombosis) ([address] 2001    left leg    Hypertension     Myocardial infarction [facility])     Primary localized osteoarthritis of left knee 2019    Pulmonary embolism ([address] 2017    Rheumatoid arthritis ([address]       Past Surgical History:   Procedure Laterality Date    ABDOMEN SURGERY PROC UNLISTED      Laparotomy bowel resection instestinal rupture, colostomy    ABDOMEN SURGERY PROC UNLISTED      Revision of colostomy    FRACTIONAL FLOW RESERVE  2018    FRACTIONAL FLOW RESERVE ADDL  4/29/2018    HX COLOSTOMY  1999    HX COLOSTOMY TAKE DOWN      HX CORONARY STENT PLACEMENT      HX HEART CATHETERIZATION  2005    stents x 4    HX HEART CATHETERIZATION  2014    stent x 1    HX HERNIA REPAIR      x 4    HX LUMBAR DISKECTOMY  2014    LEFT HEART PERCUTANEOUS  4/29/2018    JH CORONARY ARTERIOGRAPH  4/29/2018      Prior to Admission medications    Medication Sig Start Date End Date Taking? Authorizing Provider   DULoxetine (CYMBALTA) 20 mg capsule Take 20 mg by mouth daily. Yes Provider, Historical   furosemide (LASIX) 20 mg tablet Take  by mouth daily. Yes Provider, Historical   leflunomide (ARAVA) 10 mg tablet Take 10 mg by mouth daily. Yes Provider, Historical   propranolol (INDERAL) 20 mg tablet Take 20 mg by mouth two (2) times a day. Yes Provider, Historical   celecoxib (CELEBREX) 200 mg capsule Take 200 mg by mouth two (2) times a day. Yes Other, MD Moira   montelukast (SINGULAIR) 10 mg tablet Take 10 mg by mouth nightly. Yes Other, MD Moira   pantoprazole (PROTONIX) 40 mg tablet Take 40 mg by mouth daily. Yes Provider, Historical   meclizine (ANTIVERT) 25 mg tablet Take  by mouth three (3) times daily as needed. Yes Provider, Historical   omega-3 fatty acids-vitamin e (FISH OIL) 1,000 mg cap Take 2 Caps by mouth daily. Yes Provider, Historical   aspirin 81 mg tablet Take 81 mg by mouth daily. Yes Other, MD Moira   SIMVASTATIN PO Take 40 mg by mouth nightly. Yes Other, MD Moira   mepolizumab (NUCALA) 100 mg solr 100 mg by SubCUTAneous route once. Given in infusion clinic    Provider, Historical   nitroglycerin (NITROSTAT) 0.4 mg SL tablet by SubLINGual route every five (5) minutes as needed for Chest Pain. Provider, Historical   Biotin 2,500 mcg cap Take 5,000 mcg by mouth daily. Provider, Historical     Allergies   Allergen Reactions    Ciprofloxacin Other (comments)     PT states that he turns red.     Tape [Adhesive] Other (comments)     Pt states, \"it rips my skin\"      Social History     Tobacco Use    Smoking status: Former Smoker     Last attempt to quit: 1992     Years since quittin.6    Smokeless tobacco: Never Used   Substance Use Topics    Alcohol use: Not Currently     Comment: last       History reviewed. No pertinent family history.      Current Facility-Administered Medications   Medication Dose Route Frequency    budesonide (PULMICORT) 500 mcg/2 ml nebulizer suspension  500 mcg Nebulization BID RT    arformoterol (BROVANA) neb solution 15 mcg  15 mcg Nebulization BID RT    ibuprofen (MOTRIN) tablet 400 mg  400 mg Oral TID    methylPREDNISolone (PF) (SOLU-MEDROL) injection 60 mg  60 mg IntraVENous Q6H    aspirin chewable tablet 81 mg  81 mg Oral DAILY    leflunomide (ARAVA) tablet 10 mg  10 mg Oral DAILY    montelukast (SINGULAIR) tablet 10 mg  10 mg Oral QHS    pantoprazole (PROTONIX) tablet 40 mg  40 mg Oral DAILY    simvastatin (ZOCOR) tablet 40 mg  40 mg Oral QHS    insulin lispro (HUMALOG) injection   SubCUTAneous AC&HS    heparin (porcine) injection 5,000 Units  5,000 Units SubCUTAneous Q8H    propranolol (INDERAL) tablet 40 mg  40 mg Oral BID    celecoxib (CELEBREX) capsule 200 mg  200 mg Oral BID    DULoxetine (CYMBALTA) capsule 20 mg  20 mg Oral DAILY       Review of Systems:  Ears, nose, mouth, throat, and face: No epistaxis, No nasal drainage, no difficulty in swallowing  Respiratory: as above  Cardiovascular: no chest pain or palpitations, no chronic leg edema, no syncope  Gastrointestinal: no abd pain, vomitting or diarrhea, no bleeding symptoms  Genitourinary: No urinary symptoms  Constitutional: No fever or chills or weight loss or night sweats       Objective:   Vital Signs:    Visit Vitals  /67   Pulse 79   Temp 97.8 °F (36.6 °C)   Resp 14   Ht 5' 5\" (1.651 m)   Wt 83 kg (183 lb)   SpO2 96%   BMI 30.45 kg/m²       O2 Device: Room air   Temp (24hrs), Av.6 °F (36.4 °C), Min:97 °F (36.1 °C), Max:98 °F (36.7 °C)       Intake/Output:   Last shift:      No intake/output data recorded. Last 3 shifts: 08/30 1901 - 09/01 0700  In: 480 [P.O.:480]  Out: 200 [Urine:200]    Intake/Output Summary (Last 24 hours) at 9/1/2019 1051  Last data filed at 8/31/2019 1238  Gross per 24 hour   Intake 240 ml   Output --   Net 240 ml         Physical Exam:   Gene: AAO x 3, no respiratory distress; on room air  HEENT: pupils not dilated, no scleral jaundice, moist oral mucosa  Neck: No adenopathy or thyroid swelling  CVS: S1S2 no murmurs; JVD not elevated  RS: Mod air entry bilaterally, decreased BS at bases, no wheezes, no crackle, normal respirations  Abd: soft, non tender, no hepatosplenomegaly, no abd distension, obese  Neuro: awake, alert, O x 3, non focal exam  Extrm: no leg edema or swelling or clubbing  Lymphatic: no cervical or supraclavicular adenopathy    Data review:     Recent Results (from the past 24 hour(s))   GLUCOSE, POC    Collection Time: 08/31/19 11:48 AM   Result Value Ref Range    Glucose (POC) 225 (H) 70 - 110 mg/dL   GLUCOSE, POC    Collection Time: 08/31/19  4:29 PM   Result Value Ref Range    Glucose (POC) 263 (H) 70 - 110 mg/dL   GLUCOSE, POC    Collection Time: 08/31/19  8:53 PM   Result Value Ref Range    Glucose (POC) 218 (H) 70 - 110 mg/dL   GLUCOSE, POC    Collection Time: 09/01/19  6:11 AM   Result Value Ref Range    Glucose (POC) 267 (H) 70 - 110 mg/dL           Recent Labs     08/29/19 2016   FIO2I 0.21   HCO3I 27.0*   PCO2I 38.4   PHI 7.452*   PO2I 75*       All Micro Results     None            Imaging:  [x]I have personally reviewed the patients chest radiographs images and report     Results from Hospital Encounter encounter on 08/06/18   XR CHEST PORT    Narrative Portable chest 8/6/2018. History: Weakness with progressive severity over the past 2 days. History of  prior coronary artery stent placement x5.     Technique: A semierect portable radiograph of the chest was obtained. Comparison:  4/24/2018. Findings: The cardiomediastinal contours are unchanged. Inspiratory lung  volumes are slightly decreased but the lungs remain clear. There is no  pneumothorax or pleural effusion. Impression Impression:    1. No acute pulmonary disease. Results from Hospital Encounter encounter on 08/29/19   CTA CHEST W OR W WO CONT    Narrative EXAM: CTA CHEST W OR W WO CONT    CLINICAL INDICATION/HISTORY: Chest pain, acute, pulmonary embolism (PE)  suspected    COMPARISON: April 24, 2018. TECHNIQUE: Axial CT imaging from the thoracic inlet through the diaphragm with  intravenous contrast. Coronal and sagittal MIP reformats were generated. One or  more dose reduction techniques were used on this CT: automated exposure control,  adjustment of the mAs and/or kVp according to patient size, and iterative  reconstruction techniques. The specific techniques used on this CT exam have  been documented in the patient's electronic medical record. Digital Imaging and  Communications in Medicine (DICOM) format image data are available to  nonaffiliated external healthcare facilities or entities on a secure, media  free, reciprocally searchable basis with patient authorization for at least a  12-month period after this study. _______________    FINDINGS:    EXAM QUALITY: Non-diagnostic/Adequate/Excellent    PULMONARY ARTERIES: No evidence of pulmonary embolism. MEDIASTINUM: Normal heart size. No evidence of right heart strain. Aorta is  unremarkable. No pericardial effusion. Multiple coronary artery calcifications  are noted. LUNGS: No suspicious nodule or mass. Bilateral areas of scarring and  subsegmental atelectasis. PLEURA: No effusion or pneumothorax. AIRWAY: Normal.    LYMPH NODES: No enlarged nodes. UPPER ABDOMEN: Several low-density hepatic cysts are noted, unchanged. Prior  cholecystectomy. Diverticulosis. OTHER: No acute or aggressive osseous abnormalities identified. Mild  spondylosis. _______________      Impression IMPRESSION:    1. No evidence of pulmonary embolism. IMPRESSION:     Patient Active Problem List   Diagnosis Code    Asthma with COPD with exacerbation [facility] J44.1, J45.901    Chronic obstructive pulmonary disease ([facility]) J44.9    Exertional dyspnea R06.09    CAD (coronary artery disease) I25.10    Hypertension     Elevated troponin R74.8    CKD (chronic kidney disease) stage 3, GFR 30-59 ml/min ([facility]) N18.3    NSTEMI (non-ST elevated myocardial infarction) ([facility]) I21.4    Rheumatoid arthritis ([facility]) M06.9         RECOMMENDATIONS:   · Continue Brovana and Budesonide  · On Duoneb PRN and IV steroids-taper today; respiratory symptoms are out of proportion to the lung disease; CT chest shows no significant emphysema; FEV1 of 1.64L.67%. · Will await result of RT/LT heart cath  · Ct chest shows no PEs or pneumonia; monitor off of antibiotics  · Per cardiology no concern for CHF, angina equivalent VIRK  · Continue montelukast 10 mg daily  · Patient may benefit from pulmonary rehab outpatient  · DVT and GI proph - sc heparin and PPI  · On Arava med  · D.w patient and answered all his questions  · Discussed with Dr. Re Moon  · Full code status  Will defer respective systems problem management to primary and other consultant and follow patient with primary and other medical team  Further recommendations will be based on the patient's response to recommended treatment and results of the investigation ordered.   · Lines/Tubes: PIVs  ADVANCE DIRECTIVE: Full Code     High complexity decision making was performed in this consultation and evaluation of this patient        Kemal Crow MD

## 2019-09-01 NOTE — PROGRESS NOTES
Problem: Mobility Impaired (Adult and Pediatric)  Goal: *Acute Goals and Plan of Care (Insert Text)  Description  Physical Therapy Goals  Initiated 8/31/2019 and to be accomplished within 7 day(s)  1. Patient will move from supine to sit and sit to supine  in bed with supervision/set-up. 2.  Patient will transfer from bed to chair and chair to bed with supervision/set-up using the least restrictive device. 3.  Patient will perform sit to stand with supervision/set-up. 4.  Patient will ambulate with supervision/set-up for 250 feet with the least restrictive device. 5.  Patient will ascend/descend 3 stairs with 1 handrail(s) with supervision/set-up. Outcome: Progressing Towards Goal   PHYSICAL THERAPY TREATMENT    Patient: Sunday Jimenez (75 y.o. male)  Date: 9/1/2019  Diagnosis: Dyspepsia [R10.13] <principal problem not specified>       Precautions: Fall   Chart, physical therapy assessment, plan of care and goals were reviewed. ASSESSMENT:  Pt supine in bed upon entry, no 02, willing to participate. Pt amb increased distance today with increased M/L trunk sway, SBA. Pt req 3 rest breaks d/t increased SOB but Sp02 remained above 91% throughout amb. Pt returned to room and completed STS 2x5 with SBA. Pt left sitting EOB, NAD, Sp02 and HR 91% and 94bpm, call bell within reach. Progression toward goals:  ?      Improving appropriately and progressing toward goals  ? Improving slowly and progressing toward goals  ? Not making progress toward goals and plan of care will be adjusted     PLAN:  Patient continues to benefit from skilled intervention to address the above impairments. Continue treatment per established plan of care. Discharge Recommendations:  Home Health  Further Equipment Recommendations for Discharge:  N/A     SUBJECTIVE:   Patient stated I've been having diarrhea.     OBJECTIVE DATA SUMMARY:   Critical Behavior:  Neurologic State: Alert  Orientation Level: Oriented X4  Cognition: Appropriate decision making, Appropriate for age attention/concentration, Appropriate safety awareness, Follows commands     Functional Mobility Training:  Bed Mobility:  Rolling: Stand-by assistance  Supine to Sit: Stand-by assistance  Sit to Supine: Stand-by assistance  Scooting: Stand-by assistance  Transfers:  Sit to Stand: Stand-by assistance  Stand to Sit: Stand-by assistance  Balance:  Sitting: Intact  Standing: Intact; With support  Ambulation/Gait Training:  Distance (ft): 300 Feet (ft)  Assistive Device: Gait belt  Ambulation - Level of Assistance: Stand-by assistance    Gait Abnormalities: Trunk sway increased;Decreased step clearance  Base of Support: Widened   Speed/Arlette: Fluctuations  Step Length: Right shortened;Left shortened  Pain:  Pain Scale 1: Numeric (0 - 10)  Pain Intensity 1: 0  Pain Location 1: Buttocks  Pain Orientation 1: Other (comment)(\"around the anus\")  Pain Description 1: Burning  Pain Intervention(s) 1: Medication (see MAR)  Activity Tolerance:   good  Please refer to the flowsheet for vital signs taken during this treatment. After treatment:   ? Patient left in no apparent distress sitting up in chair  ? Patient left in no apparent distress in bed, sitting EOB  ? Call bell left within reach  ? Nursing notified  ? Caregiver present  ?  Bed alarm activated      Laura Barroso   Time Calculation: 24 mins

## 2019-09-02 PROBLEM — R07.9 CHEST PAIN AT REST: Status: ACTIVE | Noted: 2019-08-29

## 2019-09-02 PROBLEM — R00.0 TACHYCARDIA: Status: ACTIVE | Noted: 2019-08-29

## 2019-09-02 PROBLEM — R06.09 DOE (DYSPNEA ON EXERTION): Status: ACTIVE | Noted: 2019-08-29

## 2019-09-02 LAB
ANION GAP SERPL CALC-SCNC: 8 MMOL/L (ref 3–18)
BASOPHILS # BLD: 0 K/UL (ref 0–0.1)
BASOPHILS NFR BLD: 0 % (ref 0–3)
BUN SERPL-MCNC: 37 MG/DL (ref 7–18)
BUN/CREAT SERPL: 25 (ref 12–20)
CALCIUM SERPL-MCNC: 8.4 MG/DL (ref 8.5–10.1)
CHLORIDE SERPL-SCNC: 104 MMOL/L (ref 100–111)
CO2 SERPL-SCNC: 26 MMOL/L (ref 21–32)
CREAT SERPL-MCNC: 1.51 MG/DL (ref 0.6–1.3)
DIFFERENTIAL METHOD BLD: ABNORMAL
EOSINOPHIL # BLD: 0 K/UL (ref 0–0.4)
EOSINOPHIL NFR BLD: 0 % (ref 0–5)
ERYTHROCYTE [DISTWIDTH] IN BLOOD BY AUTOMATED COUNT: 15 % (ref 11.6–14.5)
GLUCOSE BLD STRIP.AUTO-MCNC: 205 MG/DL (ref 70–110)
GLUCOSE BLD STRIP.AUTO-MCNC: 218 MG/DL (ref 70–110)
GLUCOSE BLD STRIP.AUTO-MCNC: 297 MG/DL (ref 70–110)
GLUCOSE SERPL-MCNC: 145 MG/DL (ref 74–99)
HCT VFR BLD AUTO: 29.1 % (ref 36–48)
HGB BLD-MCNC: 9.6 G/DL (ref 13–16)
LYMPHOCYTES # BLD: 0.4 K/UL (ref 0.8–3.5)
LYMPHOCYTES NFR BLD: 4 % (ref 20–51)
MCH RBC QN AUTO: 30.8 PG (ref 24–34)
MCHC RBC AUTO-ENTMCNC: 33 G/DL (ref 31–37)
MCV RBC AUTO: 93.3 FL (ref 74–97)
MONOCYTES # BLD: 0.7 K/UL (ref 0–1)
MONOCYTES NFR BLD: 7 % (ref 2–9)
NEUTS BAND NFR BLD MANUAL: 5 % (ref 0–5)
NEUTS SEG # BLD: 8.6 K/UL (ref 1.8–8)
NEUTS SEG NFR BLD: 84 % (ref 42–75)
PLATELET # BLD AUTO: 205 K/UL (ref 135–420)
PMV BLD AUTO: 9.5 FL (ref 9.2–11.8)
POTASSIUM SERPL-SCNC: 2.9 MMOL/L (ref 3.5–5.5)
RBC # BLD AUTO: 3.12 M/UL (ref 4.7–5.5)
RBC MORPH BLD: ABNORMAL
SODIUM SERPL-SCNC: 138 MMOL/L (ref 136–145)
WBC # BLD AUTO: 10.2 K/UL (ref 4.6–13.2)

## 2019-09-02 PROCEDURE — 74011000250 HC RX REV CODE- 250: Performed by: INTERNAL MEDICINE

## 2019-09-02 PROCEDURE — 77030029997 HC DEV COM RDL R BND TELE -B: Performed by: INTERNAL MEDICINE

## 2019-09-02 PROCEDURE — 74011250636 HC RX REV CODE- 250/636: Performed by: INTERNAL MEDICINE

## 2019-09-02 PROCEDURE — 94640 AIRWAY INHALATION TREATMENT: CPT

## 2019-09-02 PROCEDURE — C1894 INTRO/SHEATH, NON-LASER: HCPCS | Performed by: INTERNAL MEDICINE

## 2019-09-02 PROCEDURE — 99152 MOD SED SAME PHYS/QHP 5/>YRS: CPT | Performed by: INTERNAL MEDICINE

## 2019-09-02 PROCEDURE — B2111ZZ FLUOROSCOPY OF MULTIPLE CORONARY ARTERIES USING LOW OSMOLAR CONTRAST: ICD-10-PCS | Performed by: INTERNAL MEDICINE

## 2019-09-02 PROCEDURE — 4A023N8 MEASUREMENT OF CARDIAC SAMPLING AND PRESSURE, BILATERAL, PERCUTANEOUS APPROACH: ICD-10-PCS | Performed by: INTERNAL MEDICINE

## 2019-09-02 PROCEDURE — B2151ZZ FLUOROSCOPY OF LEFT HEART USING LOW OSMOLAR CONTRAST: ICD-10-PCS | Performed by: INTERNAL MEDICINE

## 2019-09-02 PROCEDURE — 74011250637 HC RX REV CODE- 250/637: Performed by: INTERNAL MEDICINE

## 2019-09-02 PROCEDURE — 65660000000 HC RM CCU STEPDOWN

## 2019-09-02 PROCEDURE — 85025 COMPLETE CBC W/AUTO DIFF WBC: CPT

## 2019-09-02 PROCEDURE — C1769 GUIDE WIRE: HCPCS | Performed by: INTERNAL MEDICINE

## 2019-09-02 PROCEDURE — 94760 N-INVAS EAR/PLS OXIMETRY 1: CPT

## 2019-09-02 PROCEDURE — 74011250636 HC RX REV CODE- 250/636: Performed by: HOSPITALIST

## 2019-09-02 PROCEDURE — 74011250636 HC RX REV CODE- 250/636

## 2019-09-02 PROCEDURE — 80048 BASIC METABOLIC PNL TOTAL CA: CPT

## 2019-09-02 PROCEDURE — 93460 R&L HRT ART/VENTRICLE ANGIO: CPT | Performed by: INTERNAL MEDICINE

## 2019-09-02 PROCEDURE — 74011636637 HC RX REV CODE- 636/637: Performed by: INTERNAL MEDICINE

## 2019-09-02 PROCEDURE — 77030008543 HC TBNG MON PRSS MRTM -A: Performed by: INTERNAL MEDICINE

## 2019-09-02 PROCEDURE — C1751 CATH, INF, PER/CENT/MIDLINE: HCPCS | Performed by: INTERNAL MEDICINE

## 2019-09-02 PROCEDURE — 77030013797 HC KT TRNSDUC PRSSR EDWD -A: Performed by: INTERNAL MEDICINE

## 2019-09-02 PROCEDURE — 99153 MOD SED SAME PHYS/QHP EA: CPT | Performed by: INTERNAL MEDICINE

## 2019-09-02 PROCEDURE — 74011250637 HC RX REV CODE- 250/637: Performed by: HOSPITALIST

## 2019-09-02 PROCEDURE — 74011636320 HC RX REV CODE- 636/320: Performed by: INTERNAL MEDICINE

## 2019-09-02 PROCEDURE — 82962 GLUCOSE BLOOD TEST: CPT

## 2019-09-02 PROCEDURE — 77030004522 HC CATH ANGI DX EXPO BSC -A: Performed by: INTERNAL MEDICINE

## 2019-09-02 PROCEDURE — 77030004521 HC CATH ANGI DX COOK -B: Performed by: INTERNAL MEDICINE

## 2019-09-02 RX ORDER — MIDAZOLAM HYDROCHLORIDE 1 MG/ML
INJECTION, SOLUTION INTRAMUSCULAR; INTRAVENOUS AS NEEDED
Status: DISCONTINUED | OUTPATIENT
Start: 2019-09-02 | End: 2019-09-02 | Stop reason: HOSPADM

## 2019-09-02 RX ORDER — LIDOCAINE HYDROCHLORIDE 10 MG/ML
INJECTION INFILTRATION; PERINEURAL AS NEEDED
Status: DISCONTINUED | OUTPATIENT
Start: 2019-09-02 | End: 2019-09-02 | Stop reason: HOSPADM

## 2019-09-02 RX ORDER — SODIUM CHLORIDE 0.9 % (FLUSH) 0.9 %
5-40 SYRINGE (ML) INJECTION EVERY 8 HOURS
Status: CANCELLED | OUTPATIENT
Start: 2019-09-02

## 2019-09-02 RX ORDER — FENTANYL CITRATE 50 UG/ML
INJECTION, SOLUTION INTRAMUSCULAR; INTRAVENOUS AS NEEDED
Status: DISCONTINUED | OUTPATIENT
Start: 2019-09-02 | End: 2019-09-02 | Stop reason: HOSPADM

## 2019-09-02 RX ORDER — HEPARIN SODIUM 200 [USP'U]/100ML
INJECTION, SOLUTION INTRAVENOUS
Status: COMPLETED | OUTPATIENT
Start: 2019-09-02 | End: 2019-09-02

## 2019-09-02 RX ORDER — VERAPAMIL HYDROCHLORIDE 2.5 MG/ML
INJECTION, SOLUTION INTRAVENOUS AS NEEDED
Status: DISCONTINUED | OUTPATIENT
Start: 2019-09-02 | End: 2019-09-02 | Stop reason: HOSPADM

## 2019-09-02 RX ORDER — HEPARIN SODIUM 1000 [USP'U]/ML
INJECTION, SOLUTION INTRAVENOUS; SUBCUTANEOUS AS NEEDED
Status: DISCONTINUED | OUTPATIENT
Start: 2019-09-02 | End: 2019-09-02 | Stop reason: HOSPADM

## 2019-09-02 RX ORDER — SODIUM CHLORIDE 0.9 % (FLUSH) 0.9 %
5-40 SYRINGE (ML) INJECTION AS NEEDED
Status: CANCELLED | OUTPATIENT
Start: 2019-09-02

## 2019-09-02 RX ORDER — POTASSIUM CHLORIDE 20 MEQ/1
40 TABLET, EXTENDED RELEASE ORAL
Status: COMPLETED | OUTPATIENT
Start: 2019-09-02 | End: 2019-09-02

## 2019-09-02 RX ORDER — HYDRALAZINE HYDROCHLORIDE 20 MG/ML
INJECTION INTRAMUSCULAR; INTRAVENOUS AS NEEDED
Status: DISCONTINUED | OUTPATIENT
Start: 2019-09-02 | End: 2019-09-02 | Stop reason: HOSPADM

## 2019-09-02 RX ORDER — POTASSIUM CHLORIDE 7.45 MG/ML
10 INJECTION INTRAVENOUS
Status: COMPLETED | OUTPATIENT
Start: 2019-09-02 | End: 2019-09-02

## 2019-09-02 RX ADMIN — CELECOXIB 200 MG: 100 CAPSULE ORAL at 08:54

## 2019-09-02 RX ADMIN — HEPARIN SODIUM 5000 UNITS: 5000 INJECTION INTRAVENOUS; SUBCUTANEOUS at 02:18

## 2019-09-02 RX ADMIN — ACETAMINOPHEN 650 MG: 325 TABLET ORAL at 16:36

## 2019-09-02 RX ADMIN — IBUPROFEN 400 MG: 400 TABLET ORAL at 08:54

## 2019-09-02 RX ADMIN — ASPIRIN 81 MG 81 MG: 81 TABLET ORAL at 08:53

## 2019-09-02 RX ADMIN — PROPRANOLOL HYDROCHLORIDE 40 MG: 20 TABLET ORAL at 22:07

## 2019-09-02 RX ADMIN — METHYLPREDNISOLONE SODIUM SUCCINATE 40 MG: 40 INJECTION, POWDER, FOR SOLUTION INTRAMUSCULAR; INTRAVENOUS at 02:18

## 2019-09-02 RX ADMIN — HEPARIN SODIUM 5000 UNITS: 5000 INJECTION INTRAVENOUS; SUBCUTANEOUS at 17:31

## 2019-09-02 RX ADMIN — CELECOXIB 200 MG: 100 CAPSULE ORAL at 22:06

## 2019-09-02 RX ADMIN — DULOXETINE 20 MG: 20 CAPSULE, DELAYED RELEASE ORAL at 08:53

## 2019-09-02 RX ADMIN — POTASSIUM CHLORIDE 10 MEQ: 10 INJECTION, SOLUTION INTRAVENOUS at 14:24

## 2019-09-02 RX ADMIN — MONTELUKAST 10 MG: 10 TABLET, FILM COATED ORAL at 22:07

## 2019-09-02 RX ADMIN — METHYLPREDNISOLONE SODIUM SUCCINATE 20 MG: 40 INJECTION, POWDER, FOR SOLUTION INTRAMUSCULAR; INTRAVENOUS at 16:36

## 2019-09-02 RX ADMIN — PROPRANOLOL HYDROCHLORIDE 40 MG: 20 TABLET ORAL at 08:54

## 2019-09-02 RX ADMIN — LEFLUNOMIDE 10 MG: 10 TABLET ORAL at 17:31

## 2019-09-02 RX ADMIN — INSULIN LISPRO 6 UNITS: 100 INJECTION, SOLUTION INTRAVENOUS; SUBCUTANEOUS at 16:36

## 2019-09-02 RX ADMIN — INSULIN LISPRO 6 UNITS: 100 INJECTION, SOLUTION INTRAVENOUS; SUBCUTANEOUS at 06:37

## 2019-09-02 RX ADMIN — BUDESONIDE 500 MCG: 0.5 INHALANT RESPIRATORY (INHALATION) at 07:25

## 2019-09-02 RX ADMIN — METHYLPREDNISOLONE SODIUM SUCCINATE 40 MG: 40 INJECTION, POWDER, FOR SOLUTION INTRAMUSCULAR; INTRAVENOUS at 08:54

## 2019-09-02 RX ADMIN — POTASSIUM CHLORIDE 10 MEQ: 10 INJECTION, SOLUTION INTRAVENOUS at 11:51

## 2019-09-02 RX ADMIN — BUDESONIDE 500 MCG: 0.5 INHALANT RESPIRATORY (INHALATION) at 20:19

## 2019-09-02 RX ADMIN — ARFORMOTEROL TARTRATE 15 MCG: 15 SOLUTION RESPIRATORY (INHALATION) at 20:19

## 2019-09-02 RX ADMIN — INSULIN LISPRO 9 UNITS: 100 INJECTION, SOLUTION INTRAVENOUS; SUBCUTANEOUS at 22:07

## 2019-09-02 RX ADMIN — POTASSIUM CHLORIDE 40 MEQ: 20 TABLET, EXTENDED RELEASE ORAL at 11:49

## 2019-09-02 RX ADMIN — SIMVASTATIN 40 MG: 40 TABLET, FILM COATED ORAL at 22:07

## 2019-09-02 RX ADMIN — ARFORMOTEROL TARTRATE 15 MCG: 15 SOLUTION RESPIRATORY (INHALATION) at 07:25

## 2019-09-02 RX ADMIN — PANTOPRAZOLE SODIUM 40 MG: 40 TABLET, DELAYED RELEASE ORAL at 08:53

## 2019-09-02 NOTE — PROGRESS NOTES
Came to see patient but he is off the floor for procedure. Will follow up tomorrow again. If any questions then feel free to call.     Lula Bennett MD

## 2019-09-02 NOTE — PROGRESS NOTES
Problem: Falls - Risk of  Goal: *Absence of Falls  Description  Document Antonino Shea Fall Risk and appropriate interventions in the flowsheet. Outcome: Progressing Towards Goal  Note:   Fall Risk Interventions:            Medication Interventions: Assess postural VS orthostatic hypotension, Evaluate medications/consider consulting pharmacy, Patient to call before getting OOB, Teach patient to arise slowly         History of Falls Interventions: Door open when patient unattended         Problem: Patient Education: Go to Patient Education Activity  Goal: Patient/Family Education  Outcome: Progressing Towards Goal     Problem: Pain  Goal: *Control of Pain  Outcome: Progressing Towards Goal  Goal: *PALLIATIVE CARE:  Alleviation of Pain  Outcome: Progressing Towards Goal     Problem: Patient Education: Go to Patient Education Activity  Goal: Patient/Family Education  Outcome: Progressing Towards Goal     Problem:  Activity Intolerance  Goal: *Oxygen saturation during activity within specified parameters  Outcome: Progressing Towards Goal  Goal: *Able to remain out of bed as prescribed  Outcome: Progressing Towards Goal     Problem: Patient Education: Go to Patient Education Activity  Goal: Patient/Family Education  Outcome: Progressing Towards Goal     Problem: General Medical Care Plan  Goal: *Vital signs within specified parameters  Outcome: Progressing Towards Goal  Goal: *Labs within defined limits  Outcome: Progressing Towards Goal  Goal: *Absence of infection signs and symptoms  Outcome: Progressing Towards Goal  Goal: *Optimal pain control at patient's stated goal  Outcome: Progressing Towards Goal  Goal: *Skin integrity maintained  Outcome: Progressing Towards Goal  Goal: *Fluid volume balance  Outcome: Progressing Towards Goal  Goal: *Optimize nutritional status  Outcome: Progressing Towards Goal  Goal: *Anxiety reduced or absent  Outcome: Progressing Towards Goal  Goal: *Progressive mobility and function (eg: ADL's)  Outcome: Progressing Towards Goal     Problem: Patient Education: Go to Patient Education Activity  Goal: Patient/Family Education  Outcome: Progressing Towards Goal     Problem: Patient Education:  Go to Education Activity  Goal: Patient/Family Education  Outcome: Progressing Towards Goal     Problem: Asthma: Day 1  Goal: Off Pathway (Use only if patient is Off Pathway)  Outcome: Progressing Towards Goal  Goal: Activity/Safety  Outcome: Progressing Towards Goal  Goal: Consults, if ordered  Outcome: Progressing Towards Goal  Goal: Diagnostic Test/Procedures  Outcome: Progressing Towards Goal  Goal: Nutrition/Diet  Outcome: Progressing Towards Goal  Goal: Discharge Planning  Outcome: Progressing Towards Goal  Goal: Medications  Outcome: Progressing Towards Goal  Goal: Respiratory  Outcome: Progressing Towards Goal  Goal: Treatments/Interventions/Procedures  Outcome: Progressing Towards Goal  Goal: Psychosocial  Outcome: Progressing Towards Goal  Goal: *Oxygen saturation returns to baseline  Outcome: Progressing Towards Goal  Goal: *Vital signs within defined limits  Outcome: Progressing Towards Goal  Goal: *Labs within defined limits  Outcome: Progressing Towards Goal     Problem: Asthma: Day 2  Goal: Off Pathway (Use only if patient is Off Pathway)  Outcome: Progressing Towards Goal  Goal: Activity/Safety  Outcome: Progressing Towards Goal  Goal: Consults, if ordered  Outcome: Progressing Towards Goal  Goal: Diagnostic Test/Procedures  Outcome: Progressing Towards Goal  Goal: Nutrition/Diet  Outcome: Progressing Towards Goal  Goal: Discharge Planning  Outcome: Progressing Towards Goal  Goal: Medications  Outcome: Progressing Towards Goal  Goal: Respiratory  Outcome: Progressing Towards Goal  Goal: Treatments/Interventions/Procedures  Outcome: Progressing Towards Goal  Goal: Psychosocial  Outcome: Progressing Towards Goal  Goal: *Oxygen saturation returns to baseline  Outcome: Progressing Towards Goal  Goal: *Vital signs within defined limits  Outcome: Progressing Towards Goal  Goal: *Labs within defined limits  Outcome: Progressing Towards Goal  Goal: *Lungs clear or at baseline  Outcome: Progressing Towards Goal  Goal: *Tolerating diet  Outcome: Progressing Towards Goal  Goal: *Demonstrates progressive activity  Outcome: Progressing Towards Goal     Problem: Asthma: Day 3  Goal: Off Pathway (Use only if patient is Off Pathway)  Outcome: Progressing Towards Goal  Goal: Activity/Safety  Outcome: Progressing Towards Goal  Goal: Diagnostic Test/Procedures  Outcome: Progressing Towards Goal  Goal: Nutrition/Diet  Outcome: Progressing Towards Goal  Goal: Discharge Planning  Outcome: Progressing Towards Goal  Goal: Medications  Outcome: Progressing Towards Goal  Goal: Respiratory  Outcome: Progressing Towards Goal  Goal: Treatments/Interventions/Procedures  Outcome: Progressing Towards Goal  Goal: Psychosocial  Outcome: Progressing Towards Goal     Problem: Asthma: Discharge Outcomes  Goal: *Hemodynamically stable  Outcome: Progressing Towards Goal  Goal: *Lungs clear or at baseline  Outcome: Progressing Towards Goal  Goal: *Adequate oxygenation  Outcome: Progressing Towards Goal  Goal: *Labs within defined limits  Outcome: Progressing Towards Goal  Goal: *Demonstrates progressive activity  Outcome: Progressing Towards Goal  Goal: *Participates in self care  Outcome: Progressing Towards Goal  Goal: *Verbalizes understanding and describes prescribed diet  Outcome: Progressing Towards Goal  Goal: *Tolerating diet  Outcome: Progressing Towards Goal  Goal: *Verbalizes name, dosage, time, side effects, and number of days to continue medications  Outcome: Progressing Towards Goal  Goal: *Anxiety reduced or absent  Outcome: Progressing Towards Goal  Goal: *Understands and describes signs and symptoms to report to providers(Stroke Metric)  Outcome: Progressing Towards Goal  Goal: *Describes follow-up/return visits to physicians  Outcome: Progressing Towards Goal  Goal: *Describes available resources and support systems  Outcome: Progressing Towards Goal  Goal: *Influenza immunization (Oct-Mar only)  Outcome: Progressing Towards Goal  Goal: *Pneumococcal immunization (if eligible)  Outcome: Progressing Towards Goal     Problem: Patient Education: Go to Patient Education Activity  Goal: Patient/Family Education  Outcome: Progressing Towards Goal

## 2019-09-02 NOTE — PROGRESS NOTES
Bedside shift change report given to [address] [place] [address] (oncoming nurse) by Gabrielle Patricia RN (offgoing nurse). Report included the following information SBAR, Kardex, ED Summary, Intake/Output, MAR, Accordion, Recent Results and Alarm Parameters . 1481 Shift assessment complete. 1105 Critical potassium level of 2.9. Patient down in cath lab. Spoke to the cath lab, they stated that Dr. Jitendra Nelson will assist patient. Dr. Pagan Lisa also aware of patient's potassium level. Shift Summary: Shift uneventful. No complaints of chest pain or shortness of breath. Call light is within reach.

## 2019-09-02 NOTE — ROUTINE PROCESS
Cardiac Cath Lab:  Pre Procedure Chart Check     Patients chart was accessed and reviewed for possible and/or scheduled procedure. Creatinine Clearance:  Serum creatinine: 1.6 mg/dL (H) 08/29/19 1914  Estimated creatinine clearance: 45.6 mL/min (A)    Total Contrast  Load:  3 x estimated clearance amount=  136.8ml    75% of Contrast Load:  0.75 x Total Contrast Load=    102.6ml    Recent Labs     08/30/19  1437   CPK 74   CKMB 1.2   CKND1 1.6   TROIQ <0.02       BMI: Body mass index is 30.45 kg/m². ALLERGIES:   Allergies   Allergen Reactions    Ciprofloxacin Other (comments)     PT states that he turns red.     Tape [Adhesive] Other (comments)     Pt states, \"it rips my skin\"       Lines:        Peripheral IV 08/29/19 Left Antecubital (Active)   Site Assessment Clean, dry, & intact 9/2/2019  7:26 AM   Phlebitis Assessment 0 9/2/2019  7:26 AM   Infiltration Assessment 0 9/2/2019  7:26 AM   Dressing Status Clean, dry, & intact 9/2/2019  7:26 AM   Dressing Type Tape;Transparent 9/2/2019  7:26 AM   Hub Color/Line Status Capped 9/2/2019  7:26 AM   Action Taken Open ports on tubing capped 9/2/2019  7:26 AM   Alcohol Cap Used Yes 9/2/2019  7:26 AM          History:    Past Medical History:   Diagnosis Date    Arthritis     Asthma     CAD (coronary artery disease)     stents x 5    Cancer ([address]     melanoma on left side of face    Chronic pain     left knee    COPD     DVT (deep venous thrombosis) ([address] 2001    left leg    Hypertension     Myocardial infarction [facility]) 2005    Primary localized osteoarthritis of left knee 7/12/2019    Pulmonary embolism ([address] 2017    Rheumatoid arthritis ([address]      Past Surgical History:   Procedure Laterality Date    ABDOMEN SURGERY PROC UNLISTED      Laparotomy bowel resection instestinal rupture, colostomy    ABDOMEN SURGERY PROC UNLISTED  1999    Revision of colostomy    FRACTIONAL FLOW RESERVE  4/29/2018    FRACTIONAL FLOW RESERVE ADDL  4/29/2018    HX COLOSTOMY 1999    HX COLOSTOMY TAKE DOWN      HX CORONARY STENT PLACEMENT      HX HEART CATHETERIZATION  2005    stents x 4    HX HEART CATHETERIZATION  2014    stent x 1    HX HERNIA REPAIR      x 4    HX LUMBAR DISKECTOMY  2014    LEFT HEART PERCUTANEOUS  4/29/2018    JH CORONARY ARTERIOGRAPH  4/29/2018     Patient Active Problem List   Diagnosis Code    CAD (coronary artery disease) I25.10    Hypertension I10    Chronic obstructive pulmonary disease ([facility]) J44.9    Elevated troponin R74.8    CKD (chronic kidney disease) stage 3, GFR 30-59 ml/min ([facility]) N18.3    Asthma J45.909    NSTEMI (non-ST elevated myocardial infarction) ([facility] I21.4    Asthma with COPD with exacerbation ([facility]) J44.1, J45.901    Exertional dyspnea R06.09    Rheumatoid arthritis ([facility]) M06.9    VIRK (dyspnea on exertion) R06.09    Chest pain at rest R07.9    Tachycardia R00.0

## 2019-09-02 NOTE — PROGRESS NOTES
Problem: Falls - Risk of  Goal: *Absence of Falls  Description  Document Marzena Payment Fall Risk and appropriate interventions in the flowsheet. Outcome: Progressing Towards Goal  Note:   Fall Risk Interventions:            Medication Interventions: Teach patient to arise slowly         History of Falls Interventions: Door open when patient unattended         Problem: Patient Education: Go to Patient Education Activity  Goal: Patient/Family Education  Outcome: Progressing Towards Goal     Problem: Pain  Goal: *Control of Pain  Outcome: Progressing Towards Goal  Goal: *PALLIATIVE CARE:  Alleviation of Pain  Outcome: Progressing Towards Goal     Problem: Patient Education: Go to Patient Education Activity  Goal: Patient/Family Education  Outcome: Progressing Towards Goal     Problem:  Activity Intolerance  Goal: *Oxygen saturation during activity within specified parameters  Outcome: Progressing Towards Goal  Goal: *Able to remain out of bed as prescribed  Outcome: Progressing Towards Goal     Problem: Patient Education: Go to Patient Education Activity  Goal: Patient/Family Education  Outcome: Progressing Towards Goal     Problem: General Medical Care Plan  Goal: *Vital signs within specified parameters  Outcome: Progressing Towards Goal  Goal: *Labs within defined limits  Outcome: Progressing Towards Goal  Goal: *Absence of infection signs and symptoms  Outcome: Progressing Towards Goal  Goal: *Optimal pain control at patient's stated goal  Outcome: Progressing Towards Goal  Goal: *Skin integrity maintained  Outcome: Progressing Towards Goal  Goal: *Fluid volume balance  Outcome: Progressing Towards Goal  Goal: *Optimize nutritional status  Outcome: Progressing Towards Goal  Goal: *Anxiety reduced or absent  Outcome: Progressing Towards Goal  Goal: *Progressive mobility and function (eg: ADL's)  Outcome: Progressing Towards Goal     Problem: Patient Education: Go to Patient Education Activity  Goal: Patient/Family Education  Outcome: Progressing Towards Goal     Problem: Patient Education:  Go to Education Activity  Goal: Patient/Family Education  Outcome: Progressing Towards Goal     Problem: Asthma: Day 1  Goal: Off Pathway (Use only if patient is Off Pathway)  Outcome: Progressing Towards Goal  Goal: Activity/Safety  Outcome: Progressing Towards Goal  Goal: Consults, if ordered  Outcome: Progressing Towards Goal  Goal: Diagnostic Test/Procedures  Outcome: Progressing Towards Goal  Goal: Nutrition/Diet  Outcome: Progressing Towards Goal  Goal: Discharge Planning  Outcome: Progressing Towards Goal  Goal: Medications  Outcome: Progressing Towards Goal  Goal: Respiratory  Outcome: Progressing Towards Goal  Goal: Treatments/Interventions/Procedures  Outcome: Progressing Towards Goal  Goal: Psychosocial  Outcome: Progressing Towards Goal  Goal: *Oxygen saturation returns to baseline  Outcome: Progressing Towards Goal  Goal: *Vital signs within defined limits  Outcome: Progressing Towards Goal  Goal: *Labs within defined limits  Outcome: Progressing Towards Goal     Problem: Asthma: Day 2  Goal: Off Pathway (Use only if patient is Off Pathway)  Outcome: Progressing Towards Goal  Goal: Activity/Safety  Outcome: Progressing Towards Goal  Goal: Consults, if ordered  Outcome: Progressing Towards Goal  Goal: Diagnostic Test/Procedures  Outcome: Progressing Towards Goal  Goal: Nutrition/Diet  Outcome: Progressing Towards Goal  Goal: Discharge Planning  Outcome: Progressing Towards Goal  Goal: Medications  Outcome: Progressing Towards Goal  Goal: Respiratory  Outcome: Progressing Towards Goal  Goal: Treatments/Interventions/Procedures  Outcome: Progressing Towards Goal  Goal: Psychosocial  Outcome: Progressing Towards Goal  Goal: *Oxygen saturation returns to baseline  Outcome: Progressing Towards Goal  Goal: *Vital signs within defined limits  Outcome: Progressing Towards Goal  Goal: *Labs within defined limits  Outcome: Progressing Towards Goal  Goal: *Lungs clear or at baseline  Outcome: Progressing Towards Goal  Goal: *Tolerating diet  Outcome: Progressing Towards Goal  Goal: *Demonstrates progressive activity  Outcome: Progressing Towards Goal     Problem: Asthma: Day 3  Goal: Off Pathway (Use only if patient is Off Pathway)  Outcome: Progressing Towards Goal  Goal: Activity/Safety  Outcome: Progressing Towards Goal  Goal: Diagnostic Test/Procedures  Outcome: Progressing Towards Goal  Goal: Nutrition/Diet  Outcome: Progressing Towards Goal  Goal: Discharge Planning  Outcome: Progressing Towards Goal  Goal: Medications  Outcome: Progressing Towards Goal  Goal: Respiratory  Outcome: Progressing Towards Goal  Goal: Treatments/Interventions/Procedures  Outcome: Progressing Towards Goal  Goal: Psychosocial  Outcome: Progressing Towards Goal     Problem: Asthma: Discharge Outcomes  Goal: *Hemodynamically stable  Outcome: Progressing Towards Goal  Goal: *Lungs clear or at baseline  Outcome: Progressing Towards Goal  Goal: *Adequate oxygenation  Outcome: Progressing Towards Goal  Goal: *Labs within defined limits  Outcome: Progressing Towards Goal  Goal: *Demonstrates progressive activity  Outcome: Progressing Towards Goal  Goal: *Participates in self care  Outcome: Progressing Towards Goal  Goal: *Verbalizes understanding and describes prescribed diet  Outcome: Progressing Towards Goal  Goal: *Tolerating diet  Outcome: Progressing Towards Goal  Goal: *Verbalizes name, dosage, time, side effects, and number of days to continue medications  Outcome: Progressing Towards Goal  Goal: *Anxiety reduced or absent  Outcome: Progressing Towards Goal  Goal: *Understands and describes signs and symptoms to report to providers(Stroke Metric)  Outcome: Progressing Towards Goal  Goal: *Describes follow-up/return visits to physicians  Outcome: Progressing Towards Goal  Goal: *Describes available resources and support systems  Outcome: Progressing Towards Goal  Goal: *Influenza immunization (Oct-Mar only)  Outcome: Progressing Towards Goal  Goal: *Pneumococcal immunization (if eligible)  Outcome: Progressing Towards Goal

## 2019-09-02 NOTE — PROGRESS NOTES
Cardiology Progress Note        Patient: Mara Yee        Sex: male          DOA: 8/29/2019  YOB: 1955      Age:  70 y.o.        LOS:  LOS: 4 days   Assessment/Plan     Active Problems:    VIRK (dyspnea on exertion) (8/29/2019)      Overview: Added automatically from request for surgery [id number]      Chest pain at rest (8/29/2019)      Overview: Added automatically from request for surgery [id number]      Tachycardia (8/29/2019)      Overview: Added automatically from request for surgery [id number]      CAD (coronary artery disease) ()      Asthma (4/24/2018)      Asthma with COPD with exacerbation ([address] (8/30/2019)      Exertional dyspnea (8/30/2019)      Rheumatoid arthritis ([facility] (8/30/2019)        Plan:  Recurrent VIRK mild exertion and chest tightness. Class III-IV angina  CAD    Plan  RHC  LHC possible PCI    R/B/A discussed with patient and agreed to proceed. Rest of recommendation after cath. Subjective:    cc:  CP  VIRK  SOB  tachycardia      REVIEW OF SYSTEMS:     General: No fevers or chills. Cardiovascular: No chest pain or pressure. No palpitations. No ankle swelling  Pulmonary: No SOB, orthopnea, PND  Gastrointestinal: No nausea, vomiting or diarrhea      Objective:      Visit Vitals  /79 (BP 1 Location: Left arm, BP Patient Position: At rest;Supine)   Pulse 77   Temp 98 °F (36.7 °C)   Resp 16   Ht 5' 5\" (1.651 m)   Wt 83 kg (183 lb)   SpO2 96%   BMI 30.45 kg/m²     Body mass index is 30.45 kg/m². Physical Exam:  General Appearance: Comfortable, not using accessory muscles of respiration. NECK: No JVD, no thyroidomeglay. LUNGS: Clear bilaterally. HEART: S1+S2 audible,    ABD: Non-tender, BS Audible    EXT: No edema, and no cysnosis. VASCULAR EXAM: Pulses are intact. PSYCHIATRIC EXAM: Mood is appropriate.     Medication:  Current Facility-Administered Medications   Medication Dose Route Frequency    methylPREDNISolone (PF) (SOLU-MEDROL) injection 40 mg  40 mg IntraVENous Q6H    albuterol-ipratropium (DUO-NEB) 2.5 MG-0.5 MG/3 ML  3 mL Nebulization Q4H PRN    budesonide (PULMICORT) 500 mcg/2 ml nebulizer suspension  500 mcg Nebulization BID RT    arformoterol (BROVANA) neb solution 15 mcg  15 mcg Nebulization BID RT    acetaminophen (TYLENOL) tablet 650 mg  650 mg Oral Q6H PRN    ibuprofen (MOTRIN) tablet 400 mg  400 mg Oral TID    loperamide (IMODIUM) capsule 2 mg  2 mg Oral PRN    aspirin chewable tablet 81 mg  81 mg Oral DAILY    leflunomide (ARAVA) tablet 10 mg  10 mg Oral DAILY    montelukast (SINGULAIR) tablet 10 mg  10 mg Oral QHS    nitroglycerin (NITROSTAT) tablet 0.4 mg  0.4 mg SubLINGual PRN    meclizine (ANTIVERT) tablet 25 mg  25 mg Oral Q6H PRN    pantoprazole (PROTONIX) tablet 40 mg  40 mg Oral DAILY    simvastatin (ZOCOR) tablet 40 mg  40 mg Oral QHS    insulin lispro (HUMALOG) injection   SubCUTAneous AC&HS    glucose chewable tablet 16 g  4 Tab Oral PRN    glucagon (GLUCAGEN) injection 1 mg  1 mg IntraMUSCular PRN    heparin (porcine) injection 5,000 Units  5,000 Units SubCUTAneous Q8H    propranolol (INDERAL) tablet 40 mg  40 mg Oral BID    celecoxib (CELEBREX) capsule 200 mg  200 mg Oral BID    DULoxetine (CYMBALTA) capsule 20 mg  20 mg Oral DAILY               Lab/Data Reviewed:  Procedures/imaging: see electronic medical records for all procedures/Xrays   and details which were not copied into this note but were reviewed prior to creation of Plan       All lab results for the last 24 hours reviewed. No results for input(s): WBC, HGB, HCT, PLT, HGBEXT, HCTEXT, PLTEXT in the last 72 hours. No results for input(s): NA, K, CL, CO2, GLU, BUN, CREA, CA in the last 72 hours.     Signed By: Edin Koehler MD     September 2, 2019

## 2019-09-02 NOTE — PROGRESS NOTES
Bedside shift change report given to Chayo Carter RN (oncoming nurse) by Nico Santillan RN (offgoing nurse). Report included the following information SBAR, Kardex, ED Summary, Intake/Output, Accordion and Recent Results.

## 2019-09-02 NOTE — PROGRESS NOTES
Problem: Mobility Impaired (Adult and Pediatric)  Goal: *Acute Goals and Plan of Care (Insert Text)  Description  Physical Therapy Goals  Initiated 8/31/2019 and to be accomplished within 7 day(s)  1. Patient will move from supine to sit and sit to supine  in bed with supervision/set-up. 2.  Patient will transfer from bed to chair and chair to bed with supervision/set-up using the least restrictive device. 3.  Patient will perform sit to stand with supervision/set-up. 4.  Patient will ambulate with supervision/set-up for 250 feet with the least restrictive device. 5.  Patient will ascend/descend 3 stairs with 1 handrail(s) with supervision/set-up. Note:   Pt having cardiac cath procedure. Will follow up later as pt schedule allows.

## 2019-09-02 NOTE — ROUTINE PROCESS
Bedside and Verbal shift change report given to [address] [place] [address] (oncoming nurse) by Vianca Cleveland RN (offgoing nurse). Report included the following information SBAR and Kardex.

## 2019-09-02 NOTE — PROGRESS NOTES
Hospitalist Progress Note    Patient: Otto Pardo MRN: [id number]  CSN: [id number]    YOB: 1955  Age: 70 y.o. Sex: male    DOA: 8/29/2019 LOS:  LOS: 4 days          Chief Complaint:    SOB      Assessment/Plan     Acute COPD exacerbation-recent trials of different inhalers or inability to get certain ones due to cost  CAD-mild  CKD stage 3  HTN-stable  RA  Hypokalemia-PO and IV repletion    Cardiac cath completed today-no notable CAD to require intervention    Patient concerned he has no answer on why his breathing is so bad    Denies new issue currently  Wean IV steroids  Monitor this afternoon  See Pulmonology tomorrow      Disposition :home 1-2 days  Patient Active Problem List   Diagnosis Code    CAD (coronary artery disease) I25.10    Hypertension I10    Chronic obstructive pulmonary disease ([facility]) J44.9    Elevated troponin R74.8    CKD (chronic kidney disease) stage 3, GFR 30-59 ml/min ([facility]) N18.3    Asthma J45.909    NSTEMI (non-ST elevated myocardial infarction) ([facility] [address] I21.4    Asthma with COPD with exacerbation ([facility]) J44.1, J45.901    Exertional dyspnea R06.09    Rheumatoid arthritis ([facility]) M06.9    VIRK (dyspnea on exertion) R06.09    Chest pain at rest R07.9    Tachycardia R00.0       Subjective: Why am I still getting SOB when I walk? Denies chest pain, nausea, prod cough    Review of systems:    Constitutional: denies fever  Cardiovascular: denies chest pain, palpitations  Gastrointestinal: denies nausea      Vital signs/Intake and Output:  Visit Vitals  /79 (BP 1 Location: Left arm, BP Patient Position: At rest;Supine)   Pulse 77   Temp 98 °F (36.7 °C)   Resp 16   Ht 5' 5\" (1.651 m)   Wt 83 kg (183 lb)   SpO2 96%   BMI 30.45 kg/m²     Current Shift:  No intake/output data recorded.   Last three shifts:  08/31 1901 - 09/02 0700  In: 600 [P.O.:600]  Out: 0     Exam:    General: Well developed, alert, NAD, OX3  CVS:Regular rate and rhythm, no M/R/G, S1/S2 heard, no thrill  Lungs:Clear to auscultation bilaterally, no wheezes, rhonchi, or rales  Abdomen: Soft, Nontender, No distention, Normal Bowel sounds, No hepatomegaly  Extremities: No C/C/E, pulses palpable 2+  Neuro:grossly normal , follows commands  Psych:appropriate                Labs: Results:       Chemistry No results for input(s): GLU, NA, K, CL, CO2, BUN, CREA, CA, AGAP, BUCR, TBIL, GPT, AP, TP, ALB, GLOB, AGRAT in the last 72 hours. CBC w/Diff No results for input(s): WBC, RBC, HGB, HCT, PLT, GRANS, LYMPH, EOS, HGBEXT, HCTEXT, PLTEXT in the last 72 hours. Cardiac Enzymes Recent Labs     08/30/19  1437   CPK 74   CKND1 1.6      Coagulation No results for input(s): PTP, INR, APTT in the last 72 hours. No lab exists for component: INREXT    Lipid Panel Lab Results   Component Value Date/Time    Cholesterol, total 111 04/24/2018 11:20 PM    HDL Cholesterol 80 (H) 04/24/2018 11:20 PM    LDL, calculated 27.4 04/24/2018 11:20 PM    VLDL, calculated 3.6 04/24/2018 11:20 PM    Triglyceride 18 04/24/2018 11:20 PM    CHOL/HDL Ratio 1.4 04/24/2018 11:20 PM      BNP No results for input(s): BNPP in the last 72 hours. Liver Enzymes No results for input(s): TP, ALB, TBIL, AP, SGOT, GPT in the last 72 hours.     No lab exists for component: DBIL   Thyroid Studies Lab Results   Component Value Date/Time    TSH 1.730 11/07/2014 04:23 AM        Procedures/imaging: see electronic medical records for all procedures/Xrays and details which were not copied into this note but were reviewed prior to creation of Natalia Pinto MD

## 2019-09-03 VITALS
WEIGHT: 183 LBS | DIASTOLIC BLOOD PRESSURE: 81 MMHG | HEIGHT: 65 IN | HEART RATE: 71 BPM | TEMPERATURE: 98.5 F | OXYGEN SATURATION: 98 % | SYSTOLIC BLOOD PRESSURE: 132 MMHG | BODY MASS INDEX: 30.49 KG/M2 | RESPIRATION RATE: 18 BRPM

## 2019-09-03 LAB
ANION GAP SERPL CALC-SCNC: 5 MMOL/L (ref 3–18)
BUN SERPL-MCNC: 42 MG/DL (ref 7–18)
BUN/CREAT SERPL: 23 (ref 12–20)
CALCIUM SERPL-MCNC: 8.1 MG/DL (ref 8.5–10.1)
CHLORIDE SERPL-SCNC: 104 MMOL/L (ref 100–111)
CO2 SERPL-SCNC: 27 MMOL/L (ref 21–32)
CREAT SERPL-MCNC: 1.86 MG/DL (ref 0.6–1.3)
GLUCOSE BLD STRIP.AUTO-MCNC: 174 MG/DL (ref 70–110)
GLUCOSE BLD STRIP.AUTO-MCNC: 238 MG/DL (ref 70–110)
GLUCOSE BLD STRIP.AUTO-MCNC: 319 MG/DL (ref 70–110)
GLUCOSE SERPL-MCNC: 236 MG/DL (ref 74–99)
MAGNESIUM SERPL-MCNC: 2 MG/DL (ref 1.6–2.6)
POTASSIUM SERPL-SCNC: 4.1 MMOL/L (ref 3.5–5.5)
SODIUM SERPL-SCNC: 136 MMOL/L (ref 136–145)

## 2019-09-03 PROCEDURE — 74011250637 HC RX REV CODE- 250/637: Performed by: INTERNAL MEDICINE

## 2019-09-03 PROCEDURE — 36415 COLL VENOUS BLD VENIPUNCTURE: CPT

## 2019-09-03 PROCEDURE — 74011000250 HC RX REV CODE- 250: Performed by: INTERNAL MEDICINE

## 2019-09-03 PROCEDURE — 80048 BASIC METABOLIC PNL TOTAL CA: CPT

## 2019-09-03 PROCEDURE — 83735 ASSAY OF MAGNESIUM: CPT

## 2019-09-03 PROCEDURE — 94640 AIRWAY INHALATION TREATMENT: CPT

## 2019-09-03 PROCEDURE — 94760 N-INVAS EAR/PLS OXIMETRY 1: CPT

## 2019-09-03 PROCEDURE — 82962 GLUCOSE BLOOD TEST: CPT

## 2019-09-03 PROCEDURE — 74011250636 HC RX REV CODE- 250/636: Performed by: INTERNAL MEDICINE

## 2019-09-03 PROCEDURE — 74011636637 HC RX REV CODE- 636/637: Performed by: INTERNAL MEDICINE

## 2019-09-03 PROCEDURE — 74011250636 HC RX REV CODE- 250/636: Performed by: HOSPITALIST

## 2019-09-03 RX ORDER — PREDNISONE 10 MG/1
TABLET ORAL
Qty: 63 TAB | Refills: 0 | Status: SHIPPED | OUTPATIENT
Start: 2019-09-03 | End: 2020-02-24

## 2019-09-03 RX ORDER — IPRATROPIUM BROMIDE AND ALBUTEROL SULFATE 2.5; .5 MG/3ML; MG/3ML
3 SOLUTION RESPIRATORY (INHALATION)
Qty: 30 NEBULE | Refills: 0 | Status: SHIPPED | OUTPATIENT
Start: 2019-09-03 | End: 2022-02-10

## 2019-09-03 RX ORDER — BUDESONIDE 0.5 MG/2ML
500 INHALANT ORAL 2 TIMES DAILY
Qty: 60 EACH | Refills: 0 | Status: ON HOLD | OUTPATIENT
Start: 2019-09-03 | End: 2021-04-12

## 2019-09-03 RX ORDER — ARFORMOTEROL TARTRATE 15 UG/2ML
15 SOLUTION RESPIRATORY (INHALATION) 2 TIMES DAILY
Qty: 60 VIAL | Refills: 0 | Status: ON HOLD | OUTPATIENT
Start: 2019-09-03 | End: 2021-04-12

## 2019-09-03 RX ADMIN — INSULIN LISPRO 12 UNITS: 100 INJECTION, SOLUTION INTRAVENOUS; SUBCUTANEOUS at 12:33

## 2019-09-03 RX ADMIN — INSULIN LISPRO 3 UNITS: 100 INJECTION, SOLUTION INTRAVENOUS; SUBCUTANEOUS at 06:51

## 2019-09-03 RX ADMIN — HEPARIN SODIUM 5000 UNITS: 5000 INJECTION INTRAVENOUS; SUBCUTANEOUS at 02:32

## 2019-09-03 RX ADMIN — CELECOXIB 200 MG: 100 CAPSULE ORAL at 09:33

## 2019-09-03 RX ADMIN — LEFLUNOMIDE 10 MG: 10 TABLET ORAL at 09:33

## 2019-09-03 RX ADMIN — ARFORMOTEROL TARTRATE 15 MCG: 15 SOLUTION RESPIRATORY (INHALATION) at 07:18

## 2019-09-03 RX ADMIN — BUDESONIDE 500 MCG: 0.5 INHALANT RESPIRATORY (INHALATION) at 07:18

## 2019-09-03 RX ADMIN — PROPRANOLOL HYDROCHLORIDE 40 MG: 20 TABLET ORAL at 09:33

## 2019-09-03 RX ADMIN — PANTOPRAZOLE SODIUM 40 MG: 40 TABLET, DELAYED RELEASE ORAL at 09:32

## 2019-09-03 RX ADMIN — METHYLPREDNISOLONE SODIUM SUCCINATE 20 MG: 40 INJECTION, POWDER, FOR SOLUTION INTRAMUSCULAR; INTRAVENOUS at 16:28

## 2019-09-03 RX ADMIN — DULOXETINE 20 MG: 20 CAPSULE, DELAYED RELEASE ORAL at 09:33

## 2019-09-03 RX ADMIN — INSULIN LISPRO 6 UNITS: 100 INJECTION, SOLUTION INTRAVENOUS; SUBCUTANEOUS at 16:27

## 2019-09-03 RX ADMIN — METHYLPREDNISOLONE SODIUM SUCCINATE 20 MG: 40 INJECTION, POWDER, FOR SOLUTION INTRAMUSCULAR; INTRAVENOUS at 02:32

## 2019-09-03 RX ADMIN — METHYLPREDNISOLONE SODIUM SUCCINATE 20 MG: 40 INJECTION, POWDER, FOR SOLUTION INTRAMUSCULAR; INTRAVENOUS at 09:33

## 2019-09-03 RX ADMIN — ASPIRIN 81 MG 81 MG: 81 TABLET ORAL at 09:32

## 2019-09-03 NOTE — PROGRESS NOTES
Problem: Falls - Risk of  Goal: *Absence of Falls  Description  Document Sujey Late Fall Risk and appropriate interventions in the flowsheet. Outcome: Progressing Towards Goal  Note:   Fall Risk Interventions:            Medication Interventions: Patient to call before getting OOB, Teach patient to arise slowly         History of Falls Interventions: Door open when patient unattended, Evaluate medications/consider consulting pharmacy, Investigate reason for fall         Problem: Patient Education: Go to Patient Education Activity  Goal: Patient/Family Education  Outcome: Progressing Towards Goal     Problem: Pain  Goal: *Control of Pain  Outcome: Progressing Towards Goal     Problem: Patient Education: Go to Patient Education Activity  Goal: Patient/Family Education  Outcome: Progressing Towards Goal     Problem:  Activity Intolerance  Goal: *Oxygen saturation during activity within specified parameters  Outcome: Progressing Towards Goal  Goal: *Able to remain out of bed as prescribed  Outcome: Progressing Towards Goal     Problem: Patient Education: Go to Patient Education Activity  Goal: Patient/Family Education  Outcome: Progressing Towards Goal     Problem: General Medical Care Plan  Goal: *Vital signs within specified parameters  Outcome: Progressing Towards Goal  Goal: *Labs within defined limits  Outcome: Progressing Towards Goal  Goal: *Absence of infection signs and symptoms  Outcome: Progressing Towards Goal  Goal: *Optimal pain control at patient's stated goal  Outcome: Progressing Towards Goal  Goal: *Skin integrity maintained  Outcome: Progressing Towards Goal  Goal: *Fluid volume balance  Outcome: Progressing Towards Goal  Goal: *Optimize nutritional status  Outcome: Progressing Towards Goal  Goal: *Anxiety reduced or absent  Outcome: Progressing Towards Goal  Goal: *Progressive mobility and function (eg: ADL's)  Outcome: Progressing Towards Goal     Problem: Patient Education: Go to Patient Education Activity  Goal: Patient/Family Education  Outcome: Progressing Towards Goal     Problem: Patient Education:  Go to Education Activity  Goal: Patient/Family Education  Outcome: Progressing Towards Goal     Problem: Asthma: Day 1  Goal: Off Pathway (Use only if patient is Off Pathway)  Outcome: Progressing Towards Goal  Goal: Activity/Safety  Outcome: Progressing Towards Goal  Goal: Consults, if ordered  Outcome: Progressing Towards Goal  Goal: Diagnostic Test/Procedures  Outcome: Progressing Towards Goal  Goal: Nutrition/Diet  Outcome: Progressing Towards Goal  Goal: Medications  Outcome: Progressing Towards Goal  Goal: Respiratory  Outcome: Progressing Towards Goal  Goal: Treatments/Interventions/Procedures  Outcome: Progressing Towards Goal  Goal: Psychosocial  Outcome: Progressing Towards Goal  Goal: *Oxygen saturation returns to baseline  Outcome: Progressing Towards Goal  Goal: *Vital signs within defined limits  Outcome: Progressing Towards Goal  Goal: *Labs within defined limits  Outcome: Progressing Towards Goal

## 2019-09-03 NOTE — ROUTINE PROCESS
0700 Bedside shift change report given to ZABRINA Rivers RN (oncoming nurse) by Lena Johnson RN (offgoing nurse). Report included the following information SBAR and Kardex.

## 2019-09-03 NOTE — DISCHARGE INSTRUCTIONS
DISCHARGE SUMMARY from Nurse    PATIENT INSTRUCTIONS:        What to do at Home:  Recommended activity: Activity as tolerated,         *  Please give a list of your current medications to your Primary Care Provider. *  Please update this list whenever your medications are discontinued, doses are      changed, or new medications (including over-the-counter products) are added. *  Please carry medication information at all times in case of emergency situations. These are general instructions for a healthy lifestyle:    No smoking/ No tobacco products/ Avoid exposure to second hand smoke  Surgeon General's Warning:  Quitting smoking now greatly reduces serious risk to your health. Obesity, smoking, and sedentary lifestyle greatly increases your risk for illness    A healthy diet, regular physical exercise & weight monitoring are important for maintaining a healthy lifestyle    You may be retaining fluid if you have a history of heart failure or if you experience any of the following symptoms:  Weight gain of 3 pounds or more overnight or 5 pounds in a week, increased swelling in our hands or feet or shortness of breath while lying flat in bed. Please call your doctor as soon as you notice any of these symptoms; do not wait until your next office visit. The discharge information has been reviewed with the patient. The patient verbalized understanding. Discharge medications reviewed with the patient and appropriate educational materials and side effects teaching were provided.   ___________________________________________________________________________________________________________________________________

## 2019-09-03 NOTE — PROGRESS NOTES
Cardiology Progress Note        Patient: Esha Franco        Sex: male          DOA: 8/29/2019  YOB: 1955      Age:  70 y.o.        LOS:  LOS: 5 days   Assessment/Plan     Active Problems:    VIRK (dyspnea on exertion) (8/29/2019)      Overview: Added automatically from request for surgery [id number]      Chest pain at rest (8/29/2019)      Overview: Added automatically from request for surgery [id number]      Tachycardia (8/29/2019)      Overview: Added automatically from request for surgery [id number]      CAD (coronary artery disease) ()      Asthma (4/24/2018)      Asthma with COPD with exacerbation ([facility] [address] (8/30/2019)      Exertional dyspnea (8/30/2019)      Rheumatoid arthritis ([facility] [address] (8/30/2019)        Plan:  Cardiac tele stable  Cardiac cath stable  Continue with current dose of aspirin and Inderal  Check BMP and magnesium before discharge  Discussed with pt, RN and Dr. Norberto Hooper  Follow up with me as already scheduled in my clinic                    Subjective:    cc:  SOB  CP  Tachycardia  CAD      REVIEW OF SYSTEMS:     General: No fevers or chills. Cardiovascular: No chest pain or pressure. No palpitations. No ankle swelling  Pulmonary: No SOB, orthopnea, PND  Gastrointestinal: No nausea, vomiting or diarrhea      Objective:      Visit Vitals  /72 (BP 1 Location: Left arm, BP Patient Position: At rest;Supine)   Pulse 74   Temp 97.6 °F (36.4 °C)   Resp 18   Ht 5' 5\" (1.651 m)   Wt 83 kg (183 lb)   SpO2 99%   BMI 30.45 kg/m²     Body mass index is 30.45 kg/m². Physical Exam:  General Appearance: Comfortable, not using accessory muscles of respiration. NECK: No JVD, no thyroidomeglay. LUNGS: Clear bilaterally. HEART: S1+S2 audible,    ABD: Non-tender, BS Audible    EXT: No edema, and no cysnosis. VASCULAR EXAM: Pulses are intact. PSYCHIATRIC EXAM: Mood is appropriate.   Right radial artery - good pulse- no hematoma    Medication:  Current Facility-Administered Medications   Medication Dose Route Frequency    methylPREDNISolone (PF) (SOLU-MEDROL) injection 20 mg  20 mg IntraVENous Q8H    albuterol-ipratropium (DUO-NEB) 2.5 MG-0.5 MG/3 ML  3 mL Nebulization Q4H PRN    budesonide (PULMICORT) 500 mcg/2 ml nebulizer suspension  500 mcg Nebulization BID RT    arformoterol (BROVANA) neb solution 15 mcg  15 mcg Nebulization BID RT    acetaminophen (TYLENOL) tablet 650 mg  650 mg Oral Q6H PRN    loperamide (IMODIUM) capsule 2 mg  2 mg Oral PRN    aspirin chewable tablet 81 mg  81 mg Oral DAILY    leflunomide (ARAVA) tablet 10 mg  10 mg Oral DAILY    montelukast (SINGULAIR) tablet 10 mg  10 mg Oral QHS    nitroglycerin (NITROSTAT) tablet 0.4 mg  0.4 mg SubLINGual PRN    meclizine (ANTIVERT) tablet 25 mg  25 mg Oral Q6H PRN    pantoprazole (PROTONIX) tablet 40 mg  40 mg Oral DAILY    simvastatin (ZOCOR) tablet 40 mg  40 mg Oral QHS    insulin lispro (HUMALOG) injection   SubCUTAneous AC&HS    glucose chewable tablet 16 g  4 Tab Oral PRN    glucagon (GLUCAGEN) injection 1 mg  1 mg IntraMUSCular PRN    heparin (porcine) injection 5,000 Units  5,000 Units SubCUTAneous Q8H    propranolol (INDERAL) tablet 40 mg  40 mg Oral BID    celecoxib (CELEBREX) capsule 200 mg  200 mg Oral BID    DULoxetine (CYMBALTA) capsule 20 mg  20 mg Oral DAILY               Lab/Data Reviewed:  Procedures/imaging: see electronic medical records for all procedures/Xrays   and details which were not copied into this note but were reviewed prior to creation of Plan       All lab results for the last 24 hours reviewed.      Recent Labs     09/02/19  1015   WBC 10.2   HGB 9.6*   HCT 29.1*        Recent Labs     09/02/19  1015      K 2.9*      CO2 26   *   BUN 37*   CREA 1.51*   CA 8.4*       Signed By: Marie Esposito MD     September 3, 2019

## 2019-09-03 NOTE — PROGRESS NOTES
Problem: Falls - Risk of  Goal: *Absence of Falls  Description  Document Scottie Wang Fall Risk and appropriate interventions in the flowsheet. Outcome: Progressing Towards Goal  Note:   Fall Risk Interventions:            Medication Interventions: Assess postural VS orthostatic hypotension, Bed/chair exit alarm, Evaluate medications/consider consulting pharmacy, Patient to call before getting OOB         History of Falls Interventions: Door open when patient unattended, Evaluate medications/consider consulting pharmacy, Investigate reason for fall         Problem: Patient Education: Go to Patient Education Activity  Goal: Patient/Family Education  Outcome: Progressing Towards Goal     Problem: Pain  Goal: *Control of Pain  Outcome: Progressing Towards Goal  Goal: *PALLIATIVE CARE:  Alleviation of Pain  Outcome: Progressing Towards Goal     Problem: Patient Education: Go to Patient Education Activity  Goal: Patient/Family Education  Outcome: Progressing Towards Goal     Problem:  Activity Intolerance  Goal: *Oxygen saturation during activity within specified parameters  Outcome: Progressing Towards Goal  Goal: *Able to remain out of bed as prescribed  Outcome: Progressing Towards Goal     Problem: Patient Education: Go to Patient Education Activity  Goal: Patient/Family Education  Outcome: Progressing Towards Goal     Problem: General Medical Care Plan  Goal: *Vital signs within specified parameters  Outcome: Progressing Towards Goal  Goal: *Labs within defined limits  Outcome: Progressing Towards Goal  Goal: *Absence of infection signs and symptoms  Outcome: Progressing Towards Goal  Goal: *Optimal pain control at patient's stated goal  Outcome: Progressing Towards Goal  Goal: *Skin integrity maintained  Outcome: Progressing Towards Goal  Goal: *Fluid volume balance  Outcome: Progressing Towards Goal  Goal: *Optimize nutritional status  Outcome: Progressing Towards Goal  Goal: *Anxiety reduced or absent  Outcome: Progressing Towards Goal  Goal: *Progressive mobility and function (eg: ADL's)  Outcome: Progressing Towards Goal     Problem: Patient Education: Go to Patient Education Activity  Goal: Patient/Family Education  Outcome: Progressing Towards Goal     Problem: Patient Education:  Go to Education Activity  Goal: Patient/Family Education  Outcome: Progressing Towards Goal     Problem: Asthma: Day 1  Goal: Off Pathway (Use only if patient is Off Pathway)  Outcome: Progressing Towards Goal  Goal: Activity/Safety  Outcome: Progressing Towards Goal  Goal: Consults, if ordered  Outcome: Progressing Towards Goal  Goal: Diagnostic Test/Procedures  Outcome: Progressing Towards Goal  Goal: Nutrition/Diet  Outcome: Progressing Towards Goal  Goal: Discharge Planning  Outcome: Progressing Towards Goal  Goal: Medications  Outcome: Progressing Towards Goal  Goal: Respiratory  Outcome: Progressing Towards Goal  Goal: Treatments/Interventions/Procedures  Outcome: Progressing Towards Goal  Goal: Psychosocial  Outcome: Progressing Towards Goal  Goal: *Oxygen saturation returns to baseline  Outcome: Progressing Towards Goal  Goal: *Vital signs within defined limits  Outcome: Progressing Towards Goal  Goal: *Labs within defined limits  Outcome: Progressing Towards Goal     Problem: Asthma: Day 2  Goal: Off Pathway (Use only if patient is Off Pathway)  Outcome: Progressing Towards Goal  Goal: Activity/Safety  Outcome: Progressing Towards Goal  Goal: Consults, if ordered  Outcome: Progressing Towards Goal  Goal: Diagnostic Test/Procedures  Outcome: Progressing Towards Goal  Goal: Nutrition/Diet  Outcome: Progressing Towards Goal  Goal: Discharge Planning  Outcome: Progressing Towards Goal  Goal: Medications  Outcome: Progressing Towards Goal  Goal: Respiratory  Outcome: Progressing Towards Goal  Goal: Treatments/Interventions/Procedures  Outcome: Progressing Towards Goal  Goal: Psychosocial  Outcome: Progressing Towards Goal  Goal: *Oxygen saturation returns to baseline  Outcome: Progressing Towards Goal  Goal: *Vital signs within defined limits  Outcome: Progressing Towards Goal  Goal: *Labs within defined limits  Outcome: Progressing Towards Goal  Goal: *Lungs clear or at baseline  Outcome: Progressing Towards Goal  Goal: *Tolerating diet  Outcome: Progressing Towards Goal  Goal: *Demonstrates progressive activity  Outcome: Progressing Towards Goal     Problem: Asthma: Day 3  Goal: Off Pathway (Use only if patient is Off Pathway)  Outcome: Progressing Towards Goal  Goal: Activity/Safety  Outcome: Progressing Towards Goal  Goal: Diagnostic Test/Procedures  Outcome: Progressing Towards Goal  Goal: Nutrition/Diet  Outcome: Progressing Towards Goal  Goal: Discharge Planning  Outcome: Progressing Towards Goal  Goal: Medications  Outcome: Progressing Towards Goal  Goal: Respiratory  Outcome: Progressing Towards Goal  Goal: Treatments/Interventions/Procedures  Outcome: Progressing Towards Goal  Goal: Psychosocial  Outcome: Progressing Towards Goal     Problem: Asthma: Discharge Outcomes  Goal: *Hemodynamically stable  Outcome: Progressing Towards Goal  Goal: *Lungs clear or at baseline  Outcome: Progressing Towards Goal  Goal: *Adequate oxygenation  Outcome: Progressing Towards Goal  Goal: *Labs within defined limits  Outcome: Progressing Towards Goal  Goal: *Demonstrates progressive activity  Outcome: Progressing Towards Goal  Goal: *Participates in self care  Outcome: Progressing Towards Goal  Goal: *Verbalizes understanding and describes prescribed diet  Outcome: Progressing Towards Goal  Goal: *Tolerating diet  Outcome: Progressing Towards Goal  Goal: *Verbalizes name, dosage, time, side effects, and number of days to continue medications  Outcome: Progressing Towards Goal  Goal: *Anxiety reduced or absent  Outcome: Progressing Towards Goal  Goal: *Understands and describes signs and symptoms to report to providers(Stroke Metric)  Outcome: Progressing Towards Goal  Goal: *Describes follow-up/return visits to physicians  Outcome: Progressing Towards Goal  Goal: *Describes available resources and support systems  Outcome: Progressing Towards Goal  Goal: *Influenza immunization (Oct-Mar only)  Outcome: Progressing Towards Goal  Goal: *Pneumococcal immunization (if eligible)  Outcome: Progressing Towards Goal

## 2019-09-03 NOTE — PROGRESS NOTES
[facility]  Pulmonary, Critical Care, and Sleep Medicine    PCCM Note    Name: Star Bah MRN: [id number]   : 1955 Hospital: [facility] [place]   Date: 9/3/2019  Room: [facility]/     Subjective: This patient has been seen and evaluated at the request of Dr. Nel Vidal for worsening dyspnea. Patient is a 70 y.o. male - known to Dr. Lily Curry, with hx of Asthma/COPD. He had been tried on several inhalers - either they do not work, or expensive. Recently, tried nebulized meds - perforomist and yupelri as well - patient could not afford. He had elevated eosinophils and tried anti-IL injection immunotherapy with Nucala injections - patient developed headaches. Patient has come to ER with worsening SOB on minimal exertion. CTA chest was neg for PEs. Patient developed tachycardia in the ER with heart rate in the 130s. 19:  Had left and right stack cath yesterday  No fever chills   Mild cough, no sputum production   No wheezing sob at rest  VIRK with mild exertion, not a/w wheezing or cough  Not waking up at night due to any pulmonary sx  No leg edema or pnd  No cp GERD sx or CHF sx    Hx of CAD and stents in past.   Hx of chronic RA on Arava med.        Past Medical History:   Diagnosis Date    Arthritis     Asthma     CAD (coronary artery disease)     stents x 5    Cancer ([address]     melanoma on left side of face    Chronic pain     left knee    COPD     DVT (deep venous thrombosis) ([address]     left leg    Hypertension     Myocardial infarction [facility])     Primary localized osteoarthritis of left knee 2019    Pulmonary embolism ([address] 2017    Rheumatoid arthritis ([address]       Past Surgical History:   Procedure Laterality Date    ABDOMEN SURGERY PROC UNLISTED      Laparotomy bowel resection instestinal rupture, colostomy    ABDOMEN SURGERY PROC UNLISTED      Revision of colostomy    FRACTIONAL FLOW RESERVE  2018    FRACTIONAL FLOW RESERVE ADDL  4/29/2018    HX COLOSTOMY  1999    HX COLOSTOMY TAKE DOWN      HX CORONARY STENT PLACEMENT      HX HEART CATHETERIZATION  2005    stents x 4    HX HEART CATHETERIZATION  2014    stent x 1    HX HERNIA REPAIR      x 4    HX LUMBAR DISKECTOMY  2014    LEFT HEART PERCUTANEOUS  4/29/2018    JH CORONARY ARTERIOGRAPH  4/29/2018      Prior to Admission medications    Medication Sig Start Date End Date Taking? Authorizing Provider   DULoxetine (CYMBALTA) 20 mg capsule Take 20 mg by mouth daily. Yes Provider, Historical   furosemide (LASIX) 20 mg tablet Take  by mouth daily. Yes Provider, Historical   leflunomide (ARAVA) 10 mg tablet Take 10 mg by mouth daily. Yes Provider, Historical   propranolol (INDERAL) 20 mg tablet Take 20 mg by mouth two (2) times a day. Yes Provider, Historical   celecoxib (CELEBREX) 200 mg capsule Take 200 mg by mouth two (2) times a day. Yes Other, MD Moira   montelukast (SINGULAIR) 10 mg tablet Take 10 mg by mouth nightly. Yes Other, MD Moira   pantoprazole (PROTONIX) 40 mg tablet Take 40 mg by mouth daily. Yes Provider, Historical   meclizine (ANTIVERT) 25 mg tablet Take  by mouth three (3) times daily as needed. Yes Provider, Historical   omega-3 fatty acids-vitamin e (FISH OIL) 1,000 mg cap Take 2 Caps by mouth daily. Yes Provider, Historical   aspirin 81 mg tablet Take 81 mg by mouth daily. Yes Other, MD Moira   SIMVASTATIN PO Take 40 mg by mouth nightly. Yes Other, MD Moira   mepolizumab (NUCALA) 100 mg solr 100 mg by SubCUTAneous route once. Given in infusion clinic    Provider, Historical   nitroglycerin (NITROSTAT) 0.4 mg SL tablet by SubLINGual route every five (5) minutes as needed for Chest Pain. Provider, Historical   Biotin 2,500 mcg cap Take 5,000 mcg by mouth daily. Provider, Historical     Allergies   Allergen Reactions    Ciprofloxacin Other (comments)     PT states that he turns red.     Tape [Adhesive] Other (comments)     Pt states, \"it rips my skin\"      Social History     Tobacco Use    Smoking status: Former Smoker     Last attempt to quit: 1992     Years since quittin.6    Smokeless tobacco: Never Used   Substance Use Topics    Alcohol use: Not Currently     Comment: last       History reviewed. No pertinent family history.      Current Facility-Administered Medications   Medication Dose Route Frequency    methylPREDNISolone (PF) (SOLU-MEDROL) injection 20 mg  20 mg IntraVENous Q8H    budesonide (PULMICORT) 500 mcg/2 ml nebulizer suspension  500 mcg Nebulization BID RT    arformoterol (BROVANA) neb solution 15 mcg  15 mcg Nebulization BID RT    aspirin chewable tablet 81 mg  81 mg Oral DAILY    leflunomide (ARAVA) tablet 10 mg  10 mg Oral DAILY    montelukast (SINGULAIR) tablet 10 mg  10 mg Oral QHS    pantoprazole (PROTONIX) tablet 40 mg  40 mg Oral DAILY    simvastatin (ZOCOR) tablet 40 mg  40 mg Oral QHS    insulin lispro (HUMALOG) injection   SubCUTAneous AC&HS    heparin (porcine) injection 5,000 Units  5,000 Units SubCUTAneous Q8H    propranolol (INDERAL) tablet 40 mg  40 mg Oral BID    celecoxib (CELEBREX) capsule 200 mg  200 mg Oral BID    DULoxetine (CYMBALTA) capsule 20 mg  20 mg Oral DAILY       Review of Systems:  Ears, nose, mouth, throat, and face: No epistaxis, No nasal drainage, no difficulty in swallowing  Respiratory: as above  Cardiovascular: no chest pain or palpitations, no chronic leg edema, no syncope  Gastrointestinal: no abd pain, vomitting or diarrhea, no bleeding symptoms  Genitourinary: No urinary symptoms  Constitutional: No fever or chills or weight loss or night sweats       Objective:   Vital Signs:    Visit Vitals  /72 (BP 1 Location: Left arm, BP Patient Position: At rest;Supine)   Pulse 74   Temp 97.6 °F (36.4 °C)   Resp 18   Ht 5' 5\" (1.651 m)   Wt 83 kg (183 lb)   SpO2 99%   BMI 30.45 kg/m²       O2 Device: Room air   Temp (24hrs), Av °F (36.7 °C), Min:97.6 °F (36.4 °C), Max:98.4 °F (36.9 °C)       Intake/Output:   Last shift:      No intake/output data recorded. Last 3 shifts: 09/01 1901 - 09/03 0700  In: 720 [P.O.:720]  Out: 0     Intake/Output Summary (Last 24 hours) at 9/3/2019 1351  Last data filed at 9/2/2019 2345  Gross per 24 hour   Intake 480 ml   Output --   Net 480 ml         Physical Exam:   Gene: AAO x 3, no respiratory distress; on room air  HEENT: pupils not dilated, no scleral jaundice, moist oral mucosa  Neck: No adenopathy or thyroid swelling  CVS: S1S2 no murmurs; JVD not elevated  RS: good air entry bilaterally, decreased BS at bases, no wheezes, no crackle, normal respirations  Abd: soft, non tender, no hepatosplenomegaly, no abd distension, obese  Neuro: awake, alert, O x 3, non focal exam  Extrm: no leg edema or swelling or clubbing  Lymphatic: no cervical or supraclavicular adenopathy    Data review:     Recent Results (from the past 24 hour(s))   CARDIAC PROCEDURE    Collection Time: 09/02/19  2:02 PM   Result Value Ref Range         EDP 20    GLUCOSE, POC    Collection Time: 09/02/19  4:20 PM   Result Value Ref Range    Glucose (POC) 205 (H) 70 - 110 mg/dL   GLUCOSE, POC    Collection Time: 09/02/19  9:22 PM   Result Value Ref Range    Glucose (POC) 297 (H) 70 - 110 mg/dL   GLUCOSE, POC    Collection Time: 09/03/19  6:06 AM   Result Value Ref Range    Glucose (POC) 174 (H) 70 - 110 mg/dL   GLUCOSE, POC    Collection Time: 09/03/19 11:20 AM   Result Value Ref Range    Glucose (POC) 319 (H) 70 - 110 mg/dL           No results for input(s): FIO2I, IFO2, HCO3I, IHCO3, HCOPOC, PCO2I, PCOPOC, IPHI, PHI, PHPOC, PO2I, PO2POC in the last 72 hours. No lab exists for component: IPOC2    All Micro Results     None            Imaging:  [x]I have personally reviewed the patients chest radiographs images and report     Results from Hospital Encounter encounter on 08/06/18   XR CHEST PORT    Narrative Portable chest 8/6/2018.     History: Weakness with progressive severity over the past 2 days. History of  prior coronary artery stent placement x5. Technique: A semierect portable radiograph of the chest was obtained. Comparison:  4/24/2018. Findings: The cardiomediastinal contours are unchanged. Inspiratory lung  volumes are slightly decreased but the lungs remain clear. There is no  pneumothorax or pleural effusion. Impression Impression:    1. No acute pulmonary disease. Results from Hospital Encounter encounter on 08/29/19   CTA CHEST W OR W WO CONT    Narrative EXAM: CTA CHEST W OR W WO CONT    CLINICAL INDICATION/HISTORY: Chest pain, acute, pulmonary embolism (PE)  suspected    COMPARISON: April 24, 2018. TECHNIQUE: Axial CT imaging from the thoracic inlet through the diaphragm with  intravenous contrast. Coronal and sagittal MIP reformats were generated. One or  more dose reduction techniques were used on this CT: automated exposure control,  adjustment of the mAs and/or kVp according to patient size, and iterative  reconstruction techniques. The specific techniques used on this CT exam have  been documented in the patient's electronic medical record. Digital Imaging and  Communications in Medicine (DICOM) format image data are available to  nonaffiliated external healthcare facilities or entities on a secure, media  free, reciprocally searchable basis with patient authorization for at least a  12-month period after this study. _______________    FINDINGS:    EXAM QUALITY: Non-diagnostic/Adequate/Excellent    PULMONARY ARTERIES: No evidence of pulmonary embolism. MEDIASTINUM: Normal heart size. No evidence of right heart strain. Aorta is  unremarkable. No pericardial effusion. Multiple coronary artery calcifications  are noted. LUNGS: No suspicious nodule or mass. Bilateral areas of scarring and  subsegmental atelectasis.     PLEURA: No effusion or pneumothorax. AIRWAY: Normal.    LYMPH NODES: No enlarged nodes. UPPER ABDOMEN: Several low-density hepatic cysts are noted, unchanged. Prior  cholecystectomy. Diverticulosis. OTHER: No acute or aggressive osseous abnormalities identified. Mild  spondylosis. _______________      Impression IMPRESSION:    1. No evidence of pulmonary embolism. Echo 8/30/19:  · Left Ventricle: Normal cavity size, wall thickness and systolic function (ejection fraction normal). Estimated left ventricular ejection fraction is 61 - 65%. Mild (grade 1) left ventricular diastolic dysfunction E/E' ratio is 11.87. .  · Aortic Valve: Probably trileaflet aortic valve. · Mitral Valve: Trace mitral valve regurgitation. · Unable to obtain pulmonary arterial systolic pressure. LHC and RHC 9/2/19:  1- Mild to moderate non obstructive CAD. No significant interval changes in coronary anatomy when compared with 2018 cath report. 2- LVDEP 21 mmHg. 3- Mild Pulmonary Hypertension. PVR  2.35 woods unit. PVR was calculated by using LVEDP and Alfonso cardiac out. Discussed with patient. RIGHT HEART HEMODYNAMICS:    1- PCWP- mean pressure 14mmHg, A wave 17mmHg, V wave 15mmHg. 2- PA 38/25 mmHg and mean pressure is 31mmHg. 3- RV 31/10mmHg  4- RA mean pressure 12mmHg, A wave 16mmHg, V wave 13mmHg. PA saturation: 50.70%  AO saturation: 89.60%    Alfonso cardiac out put and index: 4.24 L/min, and 2.23L/min/m2  Thermodilution cardiac out put and index: 3.90 L/min, and 2.05 L/min/m2  PVR: 2.35 woods unit. PVR was calculated by using LVEDP and Alfonso cardiac out.       IMPRESSION:     Patient Active Problem List   Diagnosis Code    Asthma with COPD with exacerbation ([address] J44.1, J45.901    Chronic obstructive pulmonary disease (HCC) J44.9    Exertional dyspnea R06.09    CAD (coronary artery disease) I25.10    Hypertension     Elevated troponin R74.8    CKD (chronic kidney disease) stage 3, GFR 30-59 ml/min ([facility]) N18.3    NSTEMI (non-ST elevated myocardial infarction) (HCC) I21.4    Rheumatoid arthritis ([facility]) M06.9      · Anxiety   · Mild pulmonary hypertension      RECOMMENDATIONS:   · VIRK out of proportion to PFT objective findings. Likely due to anxiety, obesity, anemia, deconditioning (not able to walk due to LBP), chronic diastolic left heart dysfunction, along with ACOS and mild pulmonary HTN. Mildly elevated mean PA pressure 31 mm Hg but PVR 2.35 woods unit. Hb 9.6. No eosinophilia. CTA negative for PE. Hx of asbestos exposure in past work but no asbestos pleural plaques or asbestosis/fibrosis on CT. No significant emphysema on CT  No significant CAD on LHC. Normal LVEF but grade 1 diastolic dysfunction. · Aung Smith has caused headache in past and so was stopped. He stated symbicort was working in past but not lately and so he was tried on [employer] in past without any improvement in dyspnea. He was on spiriva handihaler followed by spiriva respimate. Also has been on singulair. · He was not able to afford nebulized medications in past with regular insurance. · Since pulmonary status improved and cardiac causes ruled out, he agreed to be d/c home on following:    · D/w pt to d/w his pharmacist to fill in the nebulized medications with medicare part B with diagnosis code J 44.9 (COPD). · Continue Brovana neb bid   · Continue Budesonide neb bid  · Start atrovent neb qid. · C/w duoneb or albuterol neb PRN  · C/w albuterol HFA PRN  · C/w Singulair 10 mg daily  · Prednisone taper, taper to 10 mg until seen by Dr. Bonnie Pang in 2-3 weeks. · Consider trying other asthma biologics as outpatient.    · Patient may benefit from pulmonary rehab outpatient  · DVT and GI proph - sc heparin and PPI  · On Arava med  · D.w patient and answered all his questions  · Discussed with Dr. Debbie Toribio  · Full code status  Will defer respective systems problem management to primary and other consultant and follow patient with primary and other medical team  Further recommendations will be based on the patient's response to recommended treatment and results of the investigation ordered. · Lines/Tubes: PIVs  ADVANCE DIRECTIVE: Full Code  Ok to d/c from pulmonary standpoint.       High complexity decision making was performed in this consultation and evaluation of this patient        Tati Hogan MD  9/3/2019

## 2019-09-03 NOTE — PROGRESS NOTES
DC Plan: Discharge home with MD follow up, family assistance    Chart reviewed. Pt admitted to tele by hospitalist.  Met with pt at bedside along with MD Camryn Vogel. Pt will likely dc today. Pt states he will have ride home. Pts home address confirmed per face sheet. Pt denies using DME. Pt states his cardiologist is MD Guerrero. Pt states his pulmonologist is MD Ana M Adams. Pts PCP is MD Loreta Sandoval. Pt verbalized concern that is lung problems are not addressed, encouraged him to follow up with pulmonology. CMS will assist with follow up appts. PT recommending HH. FOC offered. Pt stated [facility], [facility]. Referral placed. CM will cont to follow for transition of care needs. Care Management Interventions  PCP Verified by CM: Yes  Mode of Transport at Discharge:  Other (see comment)(family)  Transition of Care Consult (CM Consult): Home Health, Discharge [address]: Yes  Physical Therapy Consult: Yes  Current Support Network: Relative's Home(lives with son)  Plan discussed with Pt/Family/Caregiver: Yes  Freedom of Choice Offered: Yes  Discharge Location  Discharge Placement: Home with home health

## 2019-09-03 NOTE — DISCHARGE SUMMARY
Discharge Summary    Patient: Daniel Moulton MRN: [id number]  CSN: [id number]    YOB: 1955  Age: 70 y.o.   Sex: male    DOA: 8/29/2019 LOS:  LOS: 5 days   Discharge Date:      Primary Care Provider:  Julito Aldridge MD    Admission Diagnoses: Dyspepsia [R10.13]    Discharge Diagnoses:    Problem List as of 9/3/2019 Date Reviewed: 8/30/2019          Codes Class Noted - Resolved    Asthma with COPD with exacerbation ([facility] ICD-10-CM: J44.1, J45.901  ICD-9-CM: 493.22  8/30/2019 - Present        Exertional dyspnea ICD-10-CM: R06.09  ICD-9-CM: 786.09  8/30/2019 - Present        Rheumatoid arthritis ([facility] ICD-10-CM: M06.9  ICD-9-CM: 714.0  8/30/2019 - Present        VIRK (dyspnea on exertion) ICD-10-CM: R06.09  ICD-9-CM: 786.09  8/29/2019 - Present    Overview Signed 9/2/2019  8:11 AM by Glenys Linares MD     Added automatically from request for surgery [id number]             Chest pain at rest ICD-10-CM: R07.9  ICD-9-CM: 786.50  8/29/2019 - Present    Overview Signed 9/2/2019  8:11 AM by Glenys Linares MD     Added automatically from request for surgery [id number]             Tachycardia ICD-10-CM: R00.0  ICD-9-CM: 785.0  8/29/2019 - Present    Overview Signed 9/2/2019  8:11 AM by Glenys Linares MD     Added automatically from request for surgery [id number]             Elevated troponin ICD-10-CM: R74.8  ICD-9-CM: 790.6  4/24/2018 - Present        CKD (chronic kidney disease) stage 3, GFR 30-59 ml/min ([facility] ICD-10-CM: N18.3  ICD-9-CM: 585.3  4/24/2018 - Present        Asthma ICD-10-CM: J45.909  ICD-9-CM: 493.90  4/24/2018 - Present        NSTEMI (non-ST elevated myocardial infarction) [facility]) ICD-10-CM: I21.4  ICD-9-CM: 410.70  4/24/2018 - Present        CAD (coronary artery disease) ICD-10-CM: I25.10  ICD-9-CM: 414.00  Unknown - Present        Hypertension ICD-10-CM: I10  ICD-9-CM: 401.9  Unknown - Present        Chronic obstructive pulmonary disease ([facility] ICD-10-CM: J44.9  ICD-9-CM: 138  Unknown - Present Discharge Medications:     Current Discharge Medication List      START taking these medications    Details   budesonide (PULMICORT) 0.5 mg/2 mL nbsp 2 mL by Nebulization route two (2) times a day. Charge medicare part B, ICD 10 - J44.1  Qty: 60 Each, Refills: 0      arformoterol (BROVANA) 15 mcg/2 mL nebu neb solution 2 mL by Nebulization route two (2) times a day. Charge medicare part B. ICD 10 - J44.1  Qty: 60 Vial, Refills: 0      albuterol-ipratropium (DUO-NEB) 2.5 mg-0.5 mg/3 ml nebu 3 mL by Nebulization route every four (4) hours as needed for Other (SOB). Charge medicare part B. ICD 10 - J44.1  Qty: 30 Nebule, Refills: 0      predniSONE (DELTASONE) 10 mg tablet Prednisone 10mg tabs:  6 tabs daily for 3 days then drop to   5 tabs daily for 3 days then drop to   4 tabs daily for 3 days then drop to   3 tabs daily for 3 days then drop to  2 tabs daily for 3 days then drop to   1 tab daily for 3 days then stop. Dispense 63 tabs  Qty: 63 Tab, Refills: 0         CONTINUE these medications which have NOT CHANGED    Details   DULoxetine (CYMBALTA) 20 mg capsule Take 20 mg by mouth daily. furosemide (LASIX) 20 mg tablet Take  by mouth daily. leflunomide (ARAVA) 10 mg tablet Take 10 mg by mouth daily. propranolol (INDERAL) 20 mg tablet Take 20 mg by mouth two (2) times a day. montelukast (SINGULAIR) 10 mg tablet Take 10 mg by mouth nightly. pantoprazole (PROTONIX) 40 mg tablet Take 40 mg by mouth daily. omega-3 fatty acids-vitamin e (FISH OIL) 1,000 mg cap Take 2 Caps by mouth daily. aspirin 81 mg tablet Take 81 mg by mouth daily. SIMVASTATIN PO Take 40 mg by mouth nightly. mepolizumab (NUCALA) 100 mg solr 100 mg by SubCUTAneous route once. Given in infusion clinic      nitroglycerin (NITROSTAT) 0.4 mg SL tablet by SubLINGual route every five (5) minutes as needed for Chest Pain.          STOP taking these medications       celecoxib (CELEBREX) 200 mg capsule Comments:   Reason for Stopping:         meclizine (ANTIVERT) 25 mg tablet Comments:   Reason for Stopping:         Biotin 2,500 mcg cap Comments:   Reason for Stopping:               Discharge Condition: Good    Procedures : coronoary angiogram (LHC, RHC)    Consults: Cardiology and Pulmonary/Critical Care      PHYSICAL EXAM   Visit Vitals  /81 (BP 1 Location: Left arm, BP Patient Position: At rest;Supine)   Pulse 71   Temp 98.5 °F (36.9 °C)   Resp 18   Ht 5' 5\" (1.651 m)   Wt 83 kg (183 lb)   SpO2 98%   BMI 30.45 kg/m²     General: Awake, cooperative, no acute distress    HEENT: NC, Atraumatic. PERRLA, EOMI. Anicteric sclerae. Lungs:  CTA Bilaterally. No Wheezing/Rhonchi/Rales. Heart:  Regular  rhythm,  No murmur, No Rubs, No Gallops  Abdomen: Soft, Non distended, Non tender. +Bowel sounds,   Extremities: No c/c/e  Psych:   Not anxious or agitated. Neurologic:  No acute neurological deficits. Admission HPI : Chandler Justin is a 70 y.o.  male who has hx of COPD,Asthma, CAD with stents presents to ER with complaints of dyspnea with minimal exertion  In ER attempted to discharge patient but he developed chest pain tachypnea, hypoxemia and tachycardia to 130  Asked to admit for further eval  Patient has had chronic dyspnea for over a year but recently saw his pulmonologist and had spiriva and symbicort changed to [address]  Patient noticed worsened dyspnea and has been off of inhalers  Altogether for 4 days. Patient has chronic cough in morning but noticed today some sputum production no f/c/n/s  Chest pain started when He was ambulating in ER   Currently no chest pain at rest and heart rate 80-90 asked to admit for further eval.    Hospital Course :   Mr. Bryanna Uribe was admitted to telemetry, he was seen and followed by pulmonary and cardiology. He did not had any acute events during hospitalization.      Asthma/COPD exacerbation - he was started IV seroids, neb treatments with brovana, pulmicort and duo nebs as needed. .  CT chest with significant emphysema  CE negative  Echo with EF of 02-54%, grade 1 diastolic dysfunction. Cath with mod pulm HTN. He was not taking neb treatments at home as he had to pay co pay. Pulmonary recommended discharging on brovana, budesonide, duo nebs and prednisone taper dose. Pulmonary rehab.     CAD - no chest pain, continue with aspirin, propranolol and statin. Cath with no significant blockages seen.     RA - continued on leflunomide      GERD - on protonix       Activity: Activity as tolerated    Diet: cardiac Diet    Follow-up: PCP, pulmonary    Disposition: home    Minutes spent on discharge: 45       Labs: Results:       Chemistry Recent Labs     09/03/19  1612 09/02/19  1015   * 145*    138   K 4.1 2.9*    104   CO2 27 26   BUN 42* 37*   CREA 1.86* 1.51*   CA 8.1* 8.4*   AGAP 5 8   BUCR 23* 25*      CBC w/Diff Recent Labs     09/02/19  1015   WBC 10.2   RBC 3.12*   HGB 9.6*   HCT 29.1*      GRANS 84*   LYMPH 4*   EOS 0      Cardiac Enzymes No results for input(s): CPK, CKND1, FERMÍN in the last 72 hours. No lab exists for component: CKRMB, TROIP   Coagulation No results for input(s): PTP, INR, APTT in the last 72 hours. No lab exists for component: INREXT    Lipid Panel Lab Results   Component Value Date/Time    Cholesterol, total 111 04/24/2018 11:20 PM    HDL Cholesterol 80 (H) 04/24/2018 11:20 PM    LDL, calculated 27.4 04/24/2018 11:20 PM    VLDL, calculated 3.6 04/24/2018 11:20 PM    Triglyceride 18 04/24/2018 11:20 PM    CHOL/HDL Ratio 1.4 04/24/2018 11:20 PM      BNP No results for input(s): BNPP in the last 72 hours. Liver Enzymes No results for input(s): TP, ALB, TBIL, AP, SGOT, GPT in the last 72 hours.     No lab exists for component: DBIL   Thyroid Studies Lab Results   Component Value Date/Time    TSH 1.730 11/07/2014 04:23 AM            Significant Diagnostic Studies: Cta Chest W Or W Wo Cont    Result Date: 8/29/2019  EXAM: CTA CHEST W OR W WO CONT CLINICAL INDICATION/HISTORY: Chest pain, acute, pulmonary embolism (PE) suspected COMPARISON: April 24, 2018. TECHNIQUE: Axial CT imaging from the thoracic inlet through the diaphragm with intravenous contrast. Coronal and sagittal MIP reformats were generated. One or more dose reduction techniques were used on this CT: automated exposure control, adjustment of the mAs and/or kVp according to patient size, and iterative reconstruction techniques. The specific techniques used on this CT exam have been documented in the patient's electronic medical record. Digital Imaging and Communications in Medicine (DICOM) format image data are available to nonaffiliated external healthcare facilities or entities on a secure, media free, reciprocally searchable basis with patient authorization for at least a 12-month period after this study. _______________ FINDINGS: EXAM QUALITY: Non-diagnostic/Adequate/Excellent PULMONARY ARTERIES: No evidence of pulmonary embolism. MEDIASTINUM: Normal heart size. No evidence of right heart strain. Aorta is unremarkable. No pericardial effusion. Multiple coronary artery calcifications are noted. LUNGS: No suspicious nodule or mass. Bilateral areas of scarring and subsegmental atelectasis. PLEURA: No effusion or pneumothorax. AIRWAY: Normal. LYMPH NODES: No enlarged nodes. UPPER ABDOMEN: Several low-density hepatic cysts are noted, unchanged. Prior cholecystectomy. Diverticulosis. OTHER: No acute or aggressive osseous abnormalities identified. Mild spondylosis. _______________     IMPRESSION: 1. No evidence of pulmonary embolism. No results found for this or any previous visit.         CC: Navi Urias MD

## 2019-09-03 NOTE — PROGRESS NOTES
Bedside shift change report given to [address] [place] [address] (oncoming nurse) by Eric León RN (offgoing nurse). Report included the following information SBAR, Kardex, ED Summary, Intake/Output, MAR, Accordion and Recent Results. 0730 Shift assessment complete. Dual AVS reviewed with [facility]. All medications reviewed individually with patient. Opportunities for questions and concerns provided. Patient's arm band appropriately discarded. Shift Summary: Shift uneventful. No complaints of chest pain or shortness of breath. Call light is within reach.

## 2019-09-04 ENCOUNTER — HOME HEALTH ADMISSION (OUTPATIENT)
Dept: HOME HEALTH SERVICES | Facility: HOME HEALTH | Age: 64
End: 2019-09-04
Payer: MEDICARE

## 2019-09-04 LAB
CRD SYSTOLIC BP: 103
END DIASTOLIC PRESSURE: 20

## 2019-09-06 ENCOUNTER — HOME CARE VISIT (OUTPATIENT)
Dept: HOME HEALTH SERVICES | Facility: HOME HEALTH | Age: 64
End: 2019-09-06

## 2019-09-08 ENCOUNTER — HOME CARE VISIT (OUTPATIENT)
Dept: SCHEDULING | Facility: HOME HEALTH | Age: 64
End: 2019-09-08
Payer: MEDICARE

## 2019-09-08 PROCEDURE — 3331090001 HH PPS REVENUE CREDIT

## 2019-09-08 PROCEDURE — 400013 HH SOC

## 2019-09-08 PROCEDURE — G0299 HHS/HOSPICE OF RN EA 15 MIN: HCPCS

## 2019-09-08 PROCEDURE — 3331090002 HH PPS REVENUE DEBIT

## 2019-09-09 ENCOUNTER — HOME CARE VISIT (OUTPATIENT)
Dept: HOME HEALTH SERVICES | Facility: HOME HEALTH | Age: 64
End: 2019-09-09
Payer: MEDICARE

## 2019-09-09 ENCOUNTER — HOME CARE VISIT (OUTPATIENT)
Dept: SCHEDULING | Facility: HOME HEALTH | Age: 64
End: 2019-09-09
Payer: MEDICARE

## 2019-09-09 VITALS
TEMPERATURE: 97.2 F | HEART RATE: 52 BPM | DIASTOLIC BLOOD PRESSURE: 84 MMHG | SYSTOLIC BLOOD PRESSURE: 112 MMHG | OXYGEN SATURATION: 99 %

## 2019-09-09 VITALS
TEMPERATURE: 98.3 F | OXYGEN SATURATION: 96 % | SYSTOLIC BLOOD PRESSURE: 116 MMHG | HEART RATE: 82 BPM | RESPIRATION RATE: 18 BRPM | DIASTOLIC BLOOD PRESSURE: 64 MMHG

## 2019-09-09 PROCEDURE — 3331090001 HH PPS REVENUE CREDIT

## 2019-09-09 PROCEDURE — 3331090002 HH PPS REVENUE DEBIT

## 2019-09-09 PROCEDURE — G0151 HHCP-SERV OF PT,EA 15 MIN: HCPCS

## 2019-09-10 PROCEDURE — 3331090002 HH PPS REVENUE DEBIT

## 2019-09-10 PROCEDURE — 3331090001 HH PPS REVENUE CREDIT

## 2019-09-11 ENCOUNTER — HOME CARE VISIT (OUTPATIENT)
Dept: SCHEDULING | Facility: HOME HEALTH | Age: 64
End: 2019-09-11
Payer: MEDICARE

## 2019-09-11 VITALS
TEMPERATURE: 94.5 F | SYSTOLIC BLOOD PRESSURE: 116 MMHG | OXYGEN SATURATION: 96 % | DIASTOLIC BLOOD PRESSURE: 66 MMHG | HEART RATE: 82 BPM

## 2019-09-11 PROCEDURE — G0157 HHC PT ASSISTANT EA 15: HCPCS

## 2019-09-11 PROCEDURE — 3331090001 HH PPS REVENUE CREDIT

## 2019-09-11 PROCEDURE — 3331090002 HH PPS REVENUE DEBIT

## 2019-09-12 ENCOUNTER — HOME CARE VISIT (OUTPATIENT)
Dept: SCHEDULING | Facility: HOME HEALTH | Age: 64
End: 2019-09-12
Payer: MEDICARE

## 2019-09-12 VITALS
SYSTOLIC BLOOD PRESSURE: 122 MMHG | DIASTOLIC BLOOD PRESSURE: 68 MMHG | TEMPERATURE: 98.8 F | RESPIRATION RATE: 16 BRPM | HEART RATE: 67 BPM | OXYGEN SATURATION: 95 %

## 2019-09-12 PROCEDURE — 3331090002 HH PPS REVENUE DEBIT

## 2019-09-12 PROCEDURE — G0299 HHS/HOSPICE OF RN EA 15 MIN: HCPCS

## 2019-09-12 PROCEDURE — 3331090001 HH PPS REVENUE CREDIT

## 2019-09-13 PROCEDURE — 3331090001 HH PPS REVENUE CREDIT

## 2019-09-13 PROCEDURE — 3331090002 HH PPS REVENUE DEBIT

## 2019-09-14 PROCEDURE — 3331090001 HH PPS REVENUE CREDIT

## 2019-09-14 PROCEDURE — 3331090002 HH PPS REVENUE DEBIT

## 2019-09-15 PROCEDURE — 3331090002 HH PPS REVENUE DEBIT

## 2019-09-15 PROCEDURE — 3331090001 HH PPS REVENUE CREDIT

## 2019-09-16 PROCEDURE — 3331090002 HH PPS REVENUE DEBIT

## 2019-09-16 PROCEDURE — 3331090001 HH PPS REVENUE CREDIT

## 2019-09-17 ENCOUNTER — HOME CARE VISIT (OUTPATIENT)
Dept: HOME HEALTH SERVICES | Facility: HOME HEALTH | Age: 64
End: 2019-09-17
Payer: MEDICARE

## 2019-09-17 ENCOUNTER — HOME CARE VISIT (OUTPATIENT)
Dept: SCHEDULING | Facility: HOME HEALTH | Age: 64
End: 2019-09-17
Payer: MEDICARE

## 2019-09-17 VITALS
OXYGEN SATURATION: 94 % | SYSTOLIC BLOOD PRESSURE: 112 MMHG | HEART RATE: 78 BPM | RESPIRATION RATE: 16 BRPM | DIASTOLIC BLOOD PRESSURE: 78 MMHG | TEMPERATURE: 98.8 F

## 2019-09-17 PROCEDURE — 3331090001 HH PPS REVENUE CREDIT

## 2019-09-17 PROCEDURE — 3331090002 HH PPS REVENUE DEBIT

## 2019-09-17 PROCEDURE — G0299 HHS/HOSPICE OF RN EA 15 MIN: HCPCS

## 2019-09-18 PROCEDURE — 3331090002 HH PPS REVENUE DEBIT

## 2019-09-18 PROCEDURE — 3331090001 HH PPS REVENUE CREDIT

## 2019-09-19 ENCOUNTER — HOME CARE VISIT (OUTPATIENT)
Dept: SCHEDULING | Facility: HOME HEALTH | Age: 64
End: 2019-09-19
Payer: MEDICARE

## 2019-09-19 VITALS
OXYGEN SATURATION: 94 % | DIASTOLIC BLOOD PRESSURE: 70 MMHG | HEART RATE: 72 BPM | SYSTOLIC BLOOD PRESSURE: 120 MMHG | TEMPERATURE: 98.8 F | RESPIRATION RATE: 16 BRPM

## 2019-09-19 PROCEDURE — G0299 HHS/HOSPICE OF RN EA 15 MIN: HCPCS

## 2019-09-19 PROCEDURE — 3331090001 HH PPS REVENUE CREDIT

## 2019-09-19 PROCEDURE — 3331090002 HH PPS REVENUE DEBIT

## 2019-09-20 PROCEDURE — 3331090002 HH PPS REVENUE DEBIT

## 2019-09-20 PROCEDURE — 3331090001 HH PPS REVENUE CREDIT

## 2019-09-21 PROCEDURE — 3331090001 HH PPS REVENUE CREDIT

## 2019-09-21 PROCEDURE — 3331090002 HH PPS REVENUE DEBIT

## 2019-09-22 PROCEDURE — 3331090001 HH PPS REVENUE CREDIT

## 2019-09-22 PROCEDURE — 3331090002 HH PPS REVENUE DEBIT

## 2019-09-23 PROCEDURE — 3331090001 HH PPS REVENUE CREDIT

## 2019-09-23 PROCEDURE — 3331090002 HH PPS REVENUE DEBIT

## 2019-09-24 PROCEDURE — 3331090001 HH PPS REVENUE CREDIT

## 2019-09-24 PROCEDURE — 3331090002 HH PPS REVENUE DEBIT

## 2019-09-25 PROCEDURE — 3331090002 HH PPS REVENUE DEBIT

## 2019-09-25 PROCEDURE — 3331090001 HH PPS REVENUE CREDIT

## 2019-09-26 ENCOUNTER — HOME CARE VISIT (OUTPATIENT)
Dept: SCHEDULING | Facility: HOME HEALTH | Age: 64
End: 2019-09-26
Payer: MEDICARE

## 2019-09-26 PROCEDURE — 3331090001 HH PPS REVENUE CREDIT

## 2019-09-26 PROCEDURE — 3331090002 HH PPS REVENUE DEBIT

## 2019-09-27 ENCOUNTER — HOME CARE VISIT (OUTPATIENT)
Dept: HOME HEALTH SERVICES | Facility: HOME HEALTH | Age: 64
End: 2019-09-27
Payer: MEDICARE

## 2019-09-27 PROCEDURE — 3331090001 HH PPS REVENUE CREDIT

## 2019-09-27 PROCEDURE — 3331090002 HH PPS REVENUE DEBIT

## 2019-09-28 PROCEDURE — 3331090002 HH PPS REVENUE DEBIT

## 2019-09-28 PROCEDURE — 3331090001 HH PPS REVENUE CREDIT

## 2019-09-29 PROCEDURE — 3331090001 HH PPS REVENUE CREDIT

## 2019-09-29 PROCEDURE — 3331090002 HH PPS REVENUE DEBIT

## 2019-09-30 PROCEDURE — 3331090001 HH PPS REVENUE CREDIT

## 2019-09-30 PROCEDURE — 3331090002 HH PPS REVENUE DEBIT

## 2019-10-01 PROCEDURE — 3331090001 HH PPS REVENUE CREDIT

## 2019-10-01 PROCEDURE — 3331090002 HH PPS REVENUE DEBIT

## 2019-10-02 PROCEDURE — 3331090002 HH PPS REVENUE DEBIT

## 2019-10-02 PROCEDURE — 3331090001 HH PPS REVENUE CREDIT

## 2019-10-03 PROCEDURE — 3331090001 HH PPS REVENUE CREDIT

## 2019-10-03 PROCEDURE — 3331090002 HH PPS REVENUE DEBIT

## 2019-10-04 PROCEDURE — 3331090001 HH PPS REVENUE CREDIT

## 2019-10-04 PROCEDURE — 3331090002 HH PPS REVENUE DEBIT

## 2019-10-05 PROCEDURE — 3331090002 HH PPS REVENUE DEBIT

## 2019-10-05 PROCEDURE — 3331090001 HH PPS REVENUE CREDIT

## 2019-10-06 PROCEDURE — 3331090002 HH PPS REVENUE DEBIT

## 2019-10-06 PROCEDURE — 3331090001 HH PPS REVENUE CREDIT

## 2019-10-07 PROCEDURE — 3331090002 HH PPS REVENUE DEBIT

## 2019-10-07 PROCEDURE — 3331090001 HH PPS REVENUE CREDIT

## 2019-10-08 PROCEDURE — 3331090002 HH PPS REVENUE DEBIT

## 2019-10-08 PROCEDURE — 3331090001 HH PPS REVENUE CREDIT

## 2019-10-09 PROCEDURE — 3331090002 HH PPS REVENUE DEBIT

## 2019-10-09 PROCEDURE — 3331090001 HH PPS REVENUE CREDIT

## 2019-10-10 PROCEDURE — 3331090002 HH PPS REVENUE DEBIT

## 2019-10-10 PROCEDURE — 3331090001 HH PPS REVENUE CREDIT

## 2019-10-11 ENCOUNTER — HOME CARE VISIT (OUTPATIENT)
Dept: SCHEDULING | Facility: HOME HEALTH | Age: 64
End: 2019-10-11
Payer: MEDICARE

## 2019-10-11 PROCEDURE — 3331090001 HH PPS REVENUE CREDIT

## 2019-10-11 PROCEDURE — 3331090003 HH PPS REVENUE ADJ

## 2019-10-11 PROCEDURE — 3331090002 HH PPS REVENUE DEBIT

## 2020-02-21 PROBLEM — J45.50 SEVERE PERSISTENT ASTHMA: Status: ACTIVE | Noted: 2020-02-21

## 2020-02-24 ENCOUNTER — HOSPITAL ENCOUNTER (OUTPATIENT)
Dept: INFUSION THERAPY | Age: 65
Discharge: HOME | End: 2020-02-24
Payer: MEDICARE

## 2020-02-24 VITALS
SYSTOLIC BLOOD PRESSURE: 146 MMHG | TEMPERATURE: 98.2 F | OXYGEN SATURATION: 93 % | RESPIRATION RATE: 20 BRPM | DIASTOLIC BLOOD PRESSURE: 107 MMHG | HEART RATE: 81 BPM

## 2020-02-24 DIAGNOSIS — J45.50 SEVERE PERSISTENT ASTHMA, UNSPECIFIED WHETHER COMPLICATED: Primary | ICD-10-CM

## 2020-02-24 DIAGNOSIS — J45.50 SEVERE PERSISTENT ASTHMA, UNSPECIFIED WHETHER COMPLICATED: ICD-10-CM

## 2020-02-24 PROCEDURE — 96372 THER/PROPH/DIAG INJ SC/IM: CPT

## 2020-02-24 PROCEDURE — 74011250636 HC RX REV CODE- 250/636: Performed by: INTERNAL MEDICINE

## 2020-02-24 RX ADMIN — BENRALIZUMAB 30 MG: 30 INJECTION, SOLUTION SUBCUTANEOUS at 11:41

## 2020-02-24 NOTE — PROGRESS NOTES
[facility] Progress Note    Date: 2020    Name: Elia Hammans    MRN: [id number]         : 1955      Mr. Usha Mcgowan was assessed and education was provided. Pt is her for Fasenra injection. Pt received 1st one in office and denies any reactions or complications. Reviewed education with patient, who verbalized understanding. Carenotes provided to patient. Mr. Bright's vitals were reviewed and patient was observed for 5 minutes prior to treatment. Visit Vitals  BP (!) 146/107 (BP 1 Location: Left arm, BP Patient Position: Sitting)   Pulse 81   Temp 98.2 °F (36.8 °C)   Resp 20   SpO2 93%     Pt states that his blood pressure is elevated because he forgot to take Propranolol. Pt did not bring medication with him and kindly declines to go home to get medication. Called dr. Srinivas Fofana office and spoke to Topher, his nurse, received verbal authorization to administer medication and for patient to follow-up with blood pressure with PCP. Fasenra 30mg  was administered subcutaneous in  Left upper arm. Applied bandaid to site. Monitored patient 30 minutes post injection. No complications or reactions  Patient Vitals for the past 12 hrs:   Temp Pulse Resp BP SpO2   20 1213 98.2 °F (36.8 °C) 81 20 (!) 146/107 93 %   20 1111 98.2 °F (36.8 °C) 68 20 (!) 162/113 93 %          Mr. Usha Mcgowan tolerated well, and had no complaints. Patient armband removed and shredded. Mr. Usha Mcgowan was discharged from [place] [address] in stable condition at . He is to return on 3/23/2020 at 1100 for his next appointment for [facility].     Enrique Oviedo RN  2020  1:28 PM

## 2020-03-23 ENCOUNTER — HOSPITAL ENCOUNTER (OUTPATIENT)
Dept: INFUSION THERAPY | Age: 65
Discharge: HOME | End: 2020-03-23
Payer: MEDICARE

## 2020-03-23 VITALS
OXYGEN SATURATION: 95 % | HEART RATE: 83 BPM | TEMPERATURE: 97.6 F | RESPIRATION RATE: 20 BRPM | DIASTOLIC BLOOD PRESSURE: 89 MMHG | SYSTOLIC BLOOD PRESSURE: 142 MMHG

## 2020-03-23 DIAGNOSIS — J45.50 SEVERE PERSISTENT ASTHMA, UNSPECIFIED WHETHER COMPLICATED: Primary | ICD-10-CM

## 2020-03-23 DIAGNOSIS — J45.50 SEVERE PERSISTENT ASTHMA, UNSPECIFIED WHETHER COMPLICATED: ICD-10-CM

## 2020-03-23 PROCEDURE — 74011250636 HC RX REV CODE- 250/636: Performed by: INTERNAL MEDICINE

## 2020-03-23 PROCEDURE — 96372 THER/PROPH/DIAG INJ SC/IM: CPT

## 2020-03-23 RX ADMIN — BENRALIZUMAB 30 MG: 30 INJECTION, SOLUTION SUBCUTANEOUS at 10:55

## 2020-04-20 ENCOUNTER — HOSPITAL ENCOUNTER (OUTPATIENT)
Dept: INFUSION THERAPY | Age: 65
End: 2020-04-20

## 2020-05-15 ENCOUNTER — HOSPITAL ENCOUNTER (OUTPATIENT)
Dept: MRI IMAGING | Age: 65
Discharge: HOME | End: 2020-05-15
Attending: ORTHOPAEDIC SURGERY
Payer: MEDICARE

## 2020-05-15 DIAGNOSIS — M25.562 LEFT KNEE PAIN: ICD-10-CM

## 2020-05-15 PROCEDURE — 73721 MRI JNT OF LWR EXTRE W/O DYE: CPT

## 2020-05-18 ENCOUNTER — HOSPITAL ENCOUNTER (OUTPATIENT)
Dept: INFUSION THERAPY | Age: 65
Discharge: HOME | End: 2020-05-18
Payer: MEDICARE

## 2020-05-18 VITALS
HEART RATE: 67 BPM | SYSTOLIC BLOOD PRESSURE: 124 MMHG | OXYGEN SATURATION: 95 % | DIASTOLIC BLOOD PRESSURE: 88 MMHG | RESPIRATION RATE: 18 BRPM | TEMPERATURE: 98.4 F

## 2020-05-18 DIAGNOSIS — J45.50 SEVERE PERSISTENT ASTHMA, UNSPECIFIED WHETHER COMPLICATED: ICD-10-CM

## 2020-05-18 DIAGNOSIS — J45.50 SEVERE PERSISTENT ASTHMA, UNSPECIFIED WHETHER COMPLICATED: Primary | ICD-10-CM

## 2020-05-18 PROCEDURE — 74011250636 HC RX REV CODE- 250/636: Performed by: INTERNAL MEDICINE

## 2020-05-18 PROCEDURE — 96372 THER/PROPH/DIAG INJ SC/IM: CPT

## 2020-05-18 RX ORDER — OXYCODONE AND ACETAMINOPHEN 10; 325 MG/1; MG/1
1 TABLET ORAL
COMMUNITY
End: 2022-02-10

## 2020-05-18 RX ADMIN — BENRALIZUMAB 30 MG: 30 INJECTION, SOLUTION SUBCUTANEOUS at 11:05

## 2020-05-18 NOTE — PROGRESS NOTES
[facility] Progress Note    Date: May 18, 2020    Name: Eileen Chavez    MRN: [id number]         : 1955     Jewels Damon       Mr. Joan Mcgovern was assessed and education was provided. Mr. Bright's vitals were reviewed and patient was observed for 5 minutes prior to treatment. Visit Vitals  /88 (BP 1 Location: Left arm, BP Patient Position: Sitting)   Pulse 67   Temp 98.4 °F (36.9 °C)   Resp 18   SpO2 95%         Fasenra 30 mg was administered subcutaneously to the back of patient's left arm. Band aid applied to site. Patient declined 30 minute observation post injection and found to have no allergic reactions from previous doses. Mr. Joan Mcgovern tolerated well, and had no complaints. Patient armband removed and shredded. Mr. Joan Mcgovern was discharged from [place] [address] in stable condition at 1110. He is to return on 2020 at 1100 for his next appointment.     Saloni Zavala RN  May 18, 2020  3:38 PM

## 2020-06-01 ENCOUNTER — HOSPITAL ENCOUNTER (OUTPATIENT)
Dept: MRI IMAGING | Age: 65
Discharge: HOME | End: 2020-06-01
Attending: ORTHOPAEDIC SURGERY
Payer: MEDICARE

## 2020-06-01 DIAGNOSIS — M54.2 CERVICALGIA: ICD-10-CM

## 2020-06-01 PROCEDURE — 72141 MRI NECK SPINE W/O DYE: CPT

## 2020-06-15 ENCOUNTER — APPOINTMENT (OUTPATIENT)
Dept: INFUSION THERAPY | Age: 65
End: 2020-06-15

## 2020-07-12 DIAGNOSIS — J45.50 SEVERE PERSISTENT ASTHMA, UNSPECIFIED WHETHER COMPLICATED: ICD-10-CM

## 2020-07-13 ENCOUNTER — HOSPITAL ENCOUNTER (OUTPATIENT)
Dept: INFUSION THERAPY | Age: 65
Discharge: HOME | End: 2020-07-13
Payer: MEDICARE

## 2020-07-13 ENCOUNTER — APPOINTMENT (OUTPATIENT)
Dept: INFUSION THERAPY | Age: 65
End: 2020-07-13
Payer: MEDICARE

## 2020-07-13 VITALS
OXYGEN SATURATION: 96 % | TEMPERATURE: 97.6 F | DIASTOLIC BLOOD PRESSURE: 84 MMHG | HEART RATE: 79 BPM | RESPIRATION RATE: 18 BRPM | SYSTOLIC BLOOD PRESSURE: 118 MMHG

## 2020-07-13 DIAGNOSIS — J45.50 SEVERE PERSISTENT ASTHMA, UNSPECIFIED WHETHER COMPLICATED: Primary | ICD-10-CM

## 2020-07-13 PROCEDURE — 96372 THER/PROPH/DIAG INJ SC/IM: CPT

## 2020-07-13 PROCEDURE — 74011250636 HC RX REV CODE- 250/636: Performed by: INTERNAL MEDICINE

## 2020-07-13 RX ADMIN — BENRALIZUMAB 30 MG: 30 INJECTION, SOLUTION SUBCUTANEOUS at 13:49

## 2020-07-13 NOTE — PROGRESS NOTES
[facility] Progress Note    Date: 2020    Name: Lorraine Lauren    MRN: [id number]         : 1955     Ngozi Gonzales was assessed and education was provided. Patient endorses some fatigue and dyspnea upon exertion. Mr. David Gonzales was encouraged to contact his PCP to discuss symptoms. He states that he has an appointment with PCP next week. Mr. David Gonzales was advised to seek immediate medical assistance if increased or worsening symptoms. He vervalized understanding. Mr. Bright's vitals were reviewed and patient was observed for 5 minutes prior to treatment. Visit Vitals  /84 (BP 1 Location: Left arm, BP Patient Position: Sitting)   Pulse 79   Temp 97.6 °F (36.4 °C)   Resp 18   SpO2 96%         Fasenra 30 mg was administered subcutaneously to the back of patient's left arm. Band aid applied to site. Patient observed for 15 minutes post injection. Pt found to have no allergic reactions from previous doses. Mr. David Gonzales tolerated well, and had no complaints. Patient armband removed and shredded. Mr. David Gonzales was discharged from [place] [address] in stable condition at 1400. He is to return on 2020 at 1400 for his next appointment.     Daphnie Toledo RN  2020  3:38 PM

## 2020-09-06 DIAGNOSIS — J45.50 SEVERE PERSISTENT ASTHMA, UNSPECIFIED WHETHER COMPLICATED: ICD-10-CM

## 2020-09-08 ENCOUNTER — HOSPITAL ENCOUNTER (OUTPATIENT)
Dept: INFUSION THERAPY | Age: 65
Discharge: HOME | End: 2020-09-08
Payer: MEDICARE

## 2020-09-08 VITALS
TEMPERATURE: 98.3 F | OXYGEN SATURATION: 96 % | DIASTOLIC BLOOD PRESSURE: 85 MMHG | HEART RATE: 76 BPM | SYSTOLIC BLOOD PRESSURE: 125 MMHG | RESPIRATION RATE: 18 BRPM

## 2020-09-08 DIAGNOSIS — J45.50 SEVERE PERSISTENT ASTHMA, UNSPECIFIED WHETHER COMPLICATED: Primary | ICD-10-CM

## 2020-09-08 PROCEDURE — 96372 THER/PROPH/DIAG INJ SC/IM: CPT

## 2020-09-08 PROCEDURE — 74011250636 HC RX REV CODE- 250/636: Performed by: INTERNAL MEDICINE

## 2020-09-08 RX ADMIN — BENRALIZUMAB 30 MG: 30 INJECTION, SOLUTION SUBCUTANEOUS at 14:14

## 2020-09-08 NOTE — PROGRESS NOTES
[facility] Progress Note    Date: 2020    Name: Sabine Xavier    MRN: [id number]         : 1955     Timbo Melton       Mr. Charito Noel was assessed and education was provided. Patient endorses extreme fatigue and dyspnea upon exertion. Mr. Charito Noel was encouraged to contact his PCP to discuss symptoms. Mr. Charito Noel was advised to seek immediate medical assistance if increased or worsening symptoms. He vervalized understanding. Mr. Bright's vitals were reviewed and patient was observed for 5 minutes prior to treatment. Visit Vitals  /85 (BP 1 Location: Left arm, BP Patient Position: Sitting)   Pulse 76   Temp 98.3 °F (36.8 °C)   Resp 18   SpO2 96%         Fasenra 30 mg was administered subcutaneously to the back of patient's right arm. Band aid applied to site. Mr. Charito Noel tolerated well, and had no complaints. Patient armband removed and shredded. Mr. Charito Noel was discharged from [place] [address] in stable condition at 1420. He is to return on 2020 at 1400 for his next appointment.     Maria C Rivera RN  2020  3:38 PM

## 2020-11-01 DIAGNOSIS — J45.50 SEVERE PERSISTENT ASTHMA, UNSPECIFIED WHETHER COMPLICATED: ICD-10-CM

## 2020-11-02 ENCOUNTER — HOSPITAL ENCOUNTER (OUTPATIENT)
Dept: INFUSION THERAPY | Age: 65
End: 2020-11-02

## 2020-12-27 DIAGNOSIS — J45.50 SEVERE PERSISTENT ASTHMA, UNSPECIFIED WHETHER COMPLICATED: ICD-10-CM

## 2021-02-21 DIAGNOSIS — J45.50 SEVERE PERSISTENT ASTHMA, UNSPECIFIED WHETHER COMPLICATED: ICD-10-CM

## 2021-04-08 RX ORDER — TESTOSTERONE CYPIONATE 200 MG/ML
200 INJECTION INTRAMUSCULAR EVERY 2 WEEKS
Status: ON HOLD | COMMUNITY
End: 2021-04-12

## 2021-04-08 RX ORDER — METHOTREXATE 2.5 MG/1
15 TABLET ORAL
Status: ON HOLD | COMMUNITY
End: 2021-04-12

## 2021-04-08 RX ORDER — EPINEPHRINE 0.3 MG/.3ML
0.3 INJECTION SUBCUTANEOUS
Status: ON HOLD | COMMUNITY
End: 2021-05-02

## 2021-04-08 RX ORDER — FOLIC ACID 1 MG/1
1 TABLET ORAL DAILY
COMMUNITY

## 2021-04-08 RX ORDER — BENRALIZUMAB 30 MG/ML
30 INJECTION, SOLUTION SUBCUTANEOUS
Status: ON HOLD | COMMUNITY
End: 2021-04-12

## 2021-04-08 RX ORDER — ASPIRIN 81 MG/1
81 TABLET ORAL DAILY
COMMUNITY

## 2021-04-08 RX ORDER — UMECLIDINIUM 62.5 UG/1
1 AEROSOL, POWDER ORAL DAILY
COMMUNITY
End: 2021-05-04

## 2021-04-08 RX ORDER — CETIRIZINE HCL 10 MG
10 TABLET ORAL DAILY
Status: ON HOLD | COMMUNITY
End: 2021-04-12

## 2021-04-08 RX ORDER — BUDESONIDE AND FORMOTEROL FUMARATE DIHYDRATE 160; 4.5 UG/1; UG/1
2 AEROSOL RESPIRATORY (INHALATION) 2 TIMES DAILY
COMMUNITY

## 2021-04-08 RX ORDER — GLUCOSAMINE/CHONDR SU A SOD 750-600 MG
10 TABLET ORAL DAILY
Status: ON HOLD | COMMUNITY
End: 2021-05-02

## 2021-04-08 RX ORDER — DICLOFENAC SODIUM 10 MG/G
2 GEL TOPICAL 4 TIMES DAILY
Status: ON HOLD | COMMUNITY
End: 2021-04-12

## 2021-04-08 RX ORDER — LANOLIN ALCOHOL/MO/W.PET/CERES
400 CREAM (GRAM) TOPICAL DAILY
Status: ON HOLD | COMMUNITY
End: 2021-05-02

## 2021-04-08 RX ORDER — POTASSIUM CHLORIDE 1500 MG/1
20 TABLET, FILM COATED, EXTENDED RELEASE ORAL DAILY
Status: ON HOLD | COMMUNITY
End: 2021-05-02

## 2021-04-12 ENCOUNTER — HOSPITAL ENCOUNTER (OUTPATIENT)
Age: 66
Setting detail: OUTPATIENT SURGERY
Discharge: HOME | End: 2021-04-12
Attending: INTERNAL MEDICINE | Admitting: INTERNAL MEDICINE
Payer: MEDICARE

## 2021-04-12 VITALS
WEIGHT: 196 LBS | OXYGEN SATURATION: 100 % | RESPIRATION RATE: 22 BRPM | BODY MASS INDEX: 32.65 KG/M2 | HEART RATE: 66 BPM | DIASTOLIC BLOOD PRESSURE: 76 MMHG | SYSTOLIC BLOOD PRESSURE: 134 MMHG | TEMPERATURE: 97.7 F | HEIGHT: 65 IN

## 2021-04-12 DIAGNOSIS — I25.10 CAD (CORONARY ARTERY DISEASE): ICD-10-CM

## 2021-04-12 LAB
ANION GAP SERPL CALC-SCNC: 6 MMOL/L (ref 3–18)
BASOPHILS # BLD: 0.1 K/UL (ref 0–0.1)
BASOPHILS NFR BLD: 1 % (ref 0–2)
BUN SERPL-MCNC: 20 MG/DL (ref 7–18)
BUN/CREAT SERPL: 15 (ref 12–20)
CALCIUM SERPL-MCNC: 8.6 MG/DL (ref 8.5–10.1)
CHLORIDE SERPL-SCNC: 103 MMOL/L (ref 100–111)
CHOLEST SERPL-MCNC: 122 MG/DL
CO2 SERPL-SCNC: 32 MMOL/L (ref 21–32)
CREAT SERPL-MCNC: 1.31 MG/DL (ref 0.6–1.3)
DIFFERENTIAL METHOD BLD: ABNORMAL
EOSINOPHIL # BLD: 0.2 K/UL (ref 0–0.4)
EOSINOPHIL NFR BLD: 3 % (ref 0–5)
ERYTHROCYTE [DISTWIDTH] IN BLOOD BY AUTOMATED COUNT: 16.4 % (ref 11.6–14.5)
GLUCOSE SERPL-MCNC: 135 MG/DL (ref 74–99)
HCT VFR BLD AUTO: 36.1 % (ref 36–48)
HDLC SERPL-MCNC: 68 MG/DL (ref 40–60)
HDLC SERPL: 1.8 {RATIO} (ref 0–5)
HGB BLD-MCNC: 10.8 G/DL (ref 13–16)
INR PPP: 1 (ref 0.8–1.2)
LDLC SERPL CALC-MCNC: 38.2 MG/DL (ref 0–100)
LIPID PROFILE,FLP: ABNORMAL
LYMPHOCYTES # BLD: 1.5 K/UL (ref 0.9–3.6)
LYMPHOCYTES NFR BLD: 17 % (ref 21–52)
MAGNESIUM SERPL-MCNC: 1.9 MG/DL (ref 1.6–2.6)
MCH RBC QN AUTO: 27.1 PG (ref 24–34)
MCHC RBC AUTO-ENTMCNC: 29.9 G/DL (ref 31–37)
MCV RBC AUTO: 90.5 FL (ref 74–97)
MONOCYTES # BLD: 1.2 K/UL (ref 0.05–1.2)
MONOCYTES NFR BLD: 14 % (ref 3–10)
NEUTS SEG # BLD: 5.9 K/UL (ref 1.8–8)
NEUTS SEG NFR BLD: 66 % (ref 40–73)
PLATELET # BLD AUTO: 318 K/UL (ref 135–420)
PMV BLD AUTO: 9.1 FL (ref 9.2–11.8)
POTASSIUM SERPL-SCNC: 3.5 MMOL/L (ref 3.5–5.5)
PROTHROMBIN TIME: 13.5 SEC (ref 11.5–15.2)
RBC # BLD AUTO: 3.99 M/UL (ref 4.35–5.65)
SODIUM SERPL-SCNC: 141 MMOL/L (ref 136–145)
TRIGL SERPL-MCNC: 79 MG/DL (ref ?–150)
VLDLC SERPL CALC-MCNC: 15.8 MG/DL
WBC # BLD AUTO: 9 K/UL (ref 4.6–13.2)

## 2021-04-12 PROCEDURE — 99152 MOD SED SAME PHYS/QHP 5/>YRS: CPT | Performed by: INTERNAL MEDICINE

## 2021-04-12 PROCEDURE — 93460 R&L HRT ART/VENTRICLE ANGIO: CPT | Performed by: INTERNAL MEDICINE

## 2021-04-12 PROCEDURE — 74011000250 HC RX REV CODE- 250: Performed by: INTERNAL MEDICINE

## 2021-04-12 PROCEDURE — 83735 ASSAY OF MAGNESIUM: CPT

## 2021-04-12 PROCEDURE — C1769 GUIDE WIRE: HCPCS | Performed by: INTERNAL MEDICINE

## 2021-04-12 PROCEDURE — 77030013797 HC KT TRNSDUC PRSSR EDWD -A: Performed by: INTERNAL MEDICINE

## 2021-04-12 PROCEDURE — 80048 BASIC METABOLIC PNL TOTAL CA: CPT

## 2021-04-12 PROCEDURE — 77030004521 HC CATH ANGI DX COOK -B: Performed by: INTERNAL MEDICINE

## 2021-04-12 PROCEDURE — 85025 COMPLETE CBC W/AUTO DIFF WBC: CPT

## 2021-04-12 PROCEDURE — 77030010221 HC SPLNT WR POS TELE -B: Performed by: INTERNAL MEDICINE

## 2021-04-12 PROCEDURE — 36415 COLL VENOUS BLD VENIPUNCTURE: CPT

## 2021-04-12 PROCEDURE — 74011250637 HC RX REV CODE- 250/637: Performed by: INTERNAL MEDICINE

## 2021-04-12 PROCEDURE — 74011250636 HC RX REV CODE- 250/636: Performed by: INTERNAL MEDICINE

## 2021-04-12 PROCEDURE — 77030008543 HC TBNG MON PRSS MRTM -A: Performed by: INTERNAL MEDICINE

## 2021-04-12 PROCEDURE — 93005 ELECTROCARDIOGRAM TRACING: CPT

## 2021-04-12 PROCEDURE — 99153 MOD SED SAME PHYS/QHP EA: CPT | Performed by: INTERNAL MEDICINE

## 2021-04-12 PROCEDURE — 77030004522 HC CATH ANGI DX EXPO BSC -A: Performed by: INTERNAL MEDICINE

## 2021-04-12 PROCEDURE — 74011000636 HC RX REV CODE- 636: Performed by: INTERNAL MEDICINE

## 2021-04-12 PROCEDURE — C1751 CATH, INF, PER/CENT/MIDLINE: HCPCS | Performed by: INTERNAL MEDICINE

## 2021-04-12 PROCEDURE — C1894 INTRO/SHEATH, NON-LASER: HCPCS | Performed by: INTERNAL MEDICINE

## 2021-04-12 PROCEDURE — 85610 PROTHROMBIN TIME: CPT

## 2021-04-12 PROCEDURE — 77030029997 HC DEV COM RDL R BND TELE -B: Performed by: INTERNAL MEDICINE

## 2021-04-12 PROCEDURE — 80061 LIPID PANEL: CPT

## 2021-04-12 RX ORDER — SODIUM CHLORIDE 9 MG/ML
10 INJECTION, SOLUTION INTRAVENOUS CONTINUOUS
Status: DISCONTINUED | OUTPATIENT
Start: 2021-04-12 | End: 2021-04-12 | Stop reason: HOSPADM

## 2021-04-12 RX ORDER — LIDOCAINE HYDROCHLORIDE 10 MG/ML
INJECTION INFILTRATION; PERINEURAL AS NEEDED
Status: DISCONTINUED | OUTPATIENT
Start: 2021-04-12 | End: 2021-04-12 | Stop reason: HOSPADM

## 2021-04-12 RX ORDER — SODIUM CHLORIDE 9 MG/ML
INJECTION, SOLUTION INTRAVENOUS
Status: COMPLETED | OUTPATIENT
Start: 2021-04-12 | End: 2021-04-12

## 2021-04-12 RX ORDER — VERAPAMIL HYDROCHLORIDE 2.5 MG/ML
INJECTION, SOLUTION INTRAVENOUS AS NEEDED
Status: DISCONTINUED | OUTPATIENT
Start: 2021-04-12 | End: 2021-04-12 | Stop reason: HOSPADM

## 2021-04-12 RX ORDER — FENTANYL CITRATE 50 UG/ML
INJECTION, SOLUTION INTRAMUSCULAR; INTRAVENOUS AS NEEDED
Status: DISCONTINUED | OUTPATIENT
Start: 2021-04-12 | End: 2021-04-12 | Stop reason: HOSPADM

## 2021-04-12 RX ORDER — GUAIFENESIN 100 MG/5ML
81 LIQUID (ML) ORAL
Status: COMPLETED | OUTPATIENT
Start: 2021-04-12 | End: 2021-04-12

## 2021-04-12 RX ORDER — MIDAZOLAM HYDROCHLORIDE 1 MG/ML
INJECTION, SOLUTION INTRAMUSCULAR; INTRAVENOUS AS NEEDED
Status: DISCONTINUED | OUTPATIENT
Start: 2021-04-12 | End: 2021-04-12 | Stop reason: HOSPADM

## 2021-04-12 RX ORDER — HEPARIN SODIUM 1000 [USP'U]/ML
INJECTION, SOLUTION INTRAVENOUS; SUBCUTANEOUS AS NEEDED
Status: DISCONTINUED | OUTPATIENT
Start: 2021-04-12 | End: 2021-04-12 | Stop reason: HOSPADM

## 2021-04-12 RX ORDER — LORAZEPAM 1 MG/1
1 TABLET ORAL ONCE
Status: COMPLETED | OUTPATIENT
Start: 2021-04-12 | End: 2021-04-12

## 2021-04-12 RX ORDER — HEPARIN SODIUM 200 [USP'U]/100ML
INJECTION, SOLUTION INTRAVENOUS
Status: COMPLETED | OUTPATIENT
Start: 2021-04-12 | End: 2021-04-12

## 2021-04-12 RX ADMIN — ASPIRIN 81 MG: 81 TABLET, CHEWABLE ORAL at 08:10

## 2021-04-12 RX ADMIN — SODIUM CHLORIDE 10 ML/HR: 900 INJECTION, SOLUTION INTRAVENOUS at 08:11

## 2021-04-12 RX ADMIN — LORAZEPAM 1 MG: 1 TABLET ORAL at 08:10

## 2021-04-12 NOTE — ROUTINE PROCESS
Cardiac Cath Lab:  Pre Procedure Chart Check     Patients chart was accessed and reviewed for possible and/or scheduled procedure. Creatinine Clearance:  Serum creatinine: 1.31 mg/dL (H) 04/12/21 0849  Estimated creatinine clearance: 56.1 mL/min (A)    Total Contrast  Load:  3 x estimated clearance amount=  168.3ml    75% of Contrast Load:  0.75 x Total Contrast Load=    126.2ml    Recent Labs     04/12/21  0849 04/12/21  0745   WBC  --  9.0   RBC  --  3.99*   HCT  --  36.1   HGB  --  10.8*   PLT  --  318   INR  --  1.0   PTP  --  13.5     --    K 3.5  --    BUN 20*  --    CREA 1.31*  --    GFRAA >60  --    GFRNA 55*  --    CA 8.6  --        BMI: Body mass index is 32.62 kg/m². ALLERGIES:   Allergies   Allergen Reactions    Ciprofloxacin Other (comments)     PT states that he turns red.     Tape [Adhesive] Other (comments)     Pt states, \"it rips my skin\"       Lines:        Peripheral IV 04/12/21 Left Hand (Active)   Site Assessment Clean, dry, & intact 04/12/21 0804   Phlebitis Assessment 0 04/12/21 0804   Infiltration Assessment 0 04/12/21 0804   Dressing Status Clean, dry, & intact 04/12/21 0804   Dressing Type Transparent 04/12/21 0804   Hub Color/Line Status Flushed;Blue 04/12/21 0804   Action Taken Open ports on tubing capped 04/12/21 0804   Alcohol Cap Used Yes 04/12/21 0804       Peripheral IV 04/12/21 Right Antecubital (Active)   Site Assessment Clean, dry, & intact 04/12/21 0805   Phlebitis Assessment 0 04/12/21 0805   Infiltration Assessment 0 04/12/21 0805   Dressing Status Clean, dry, & intact 04/12/21 0805   Dressing Type Transparent 04/12/21 0805   Hub Color/Line Status Flushed;Capped 04/12/21 0805   Action Taken Open ports on tubing capped 04/12/21 0805   Alcohol Cap Used Yes 04/12/21 0805          History:    Past Medical History:   Diagnosis Date    Arthritis     Asthma     CAD (coronary artery disease)     stents x 5    Cancer ([facility]     melanoma on left side of face    Chronic pain     left knee    COPD     Diabetes ([facility]     newly dm have not started med yet    DVT (deep venous thrombosis) ([facility] 2001    left leg    Hypertension     Myocardial infarction [facility]) 2005    Primary localized osteoarthritis of left knee 7/12/2019    Pulmonary embolism ([facility] 2017    Rheumatoid arthritis ([facility]      Past Surgical History:   Procedure Laterality Date    FRACTIONAL FLOW RESERVE  4/29/2018    FRACTIONAL FLOW RESERVE ADDL  4/29/2018    HX CHOLECYSTECTOMY      HX COLOSTOMY  1999    HX COLOSTOMY TAKE DOWN      HX CORONARY STENT PLACEMENT      HX HEART CATHETERIZATION  2005    stents x 4    HX HEART CATHETERIZATION  2014    stent x 1    HX HERNIA REPAIR      x 4    HX LUMBAR DISKECTOMY  2014    LEFT HEART PERCUTANEOUS  4/29/2018    LA ABDOMEN SURGERY PROC UNLISTED      Laparotomy bowel resection instestinal rupture, colostomy    LA ABDOMEN SURGERY PROC UNLISTED  1999    Revision of colostomy    JH CORONARY ARTERIOGRAPH  4/29/2018     Patient Active Problem List   Diagnosis Code    CAD (coronary artery disease) I25.10    Hypertension I10    Chronic obstructive pulmonary disease ([facility]) J44.9    Elevated troponin R77.8    CKD (chronic kidney disease) stage 3, GFR 30-59 ml/min ([facility]) N18.30    Asthma J45.909    NSTEMI (non-ST elevated myocardial infarction) ([facility]) I21.4    Asthma with COPD with exacerbation ([facility]) J44.1, J45.901    Exertional dyspnea R06.00    Rheumatoid arthritis ([facility]) M06.9    VIRK (dyspnea on exertion) R06.00    Chest pain at rest R07.9    Tachycardia R00.0    Severe persistent asthma J45.50

## 2021-04-12 NOTE — DISCHARGE INSTRUCTIONS
DISCHARGE SUMMARY from Nurse    PATIENT INSTRUCTIONS:    After general anesthesia or intravenous sedation, for 24 hours or while taking prescription Narcotics:  · Limit your activities  · Do not drive and operate hazardous machinery  · Do not make important personal or business decisions  · Do  not drink alcoholic beverages  · If you have not urinated within 8 hours after discharge, please contact your surgeon on call. Report the following to your surgeon:  · Excessive pain, swelling, redness or odor of or around the surgical area  · Temperature over 100.5  · Nausea and vomiting lasting longer than 4 hours or if unable to take medications  · Any signs of decreased circulation or nerve impairment to extremity: change in color, persistent  numbness, tingling, coldness or increase pain  · Any questions    What to do at Home:  Recommended activity:     If you experience any of the following symptoms , please follow up with MD.    *  Please give a list of your current medications to your Primary Care Provider. *  Please update this list whenever your medications are discontinued, doses are      changed, or new medications (including over-the-counter products) are added. *  Please carry medication information at all times in case of emergency situations. These are general instructions for a healthy lifestyle:    No smoking/ No tobacco products/ Avoid exposure to second hand smoke  Surgeon General's Warning:  Quitting smoking now greatly reduces serious risk to your health.     Obesity, smoking, and sedentary lifestyle greatly increases your risk for illness    A healthy diet, regular physical exercise & weight monitoring are important for maintaining a healthy lifestyle    You may be retaining fluid if you have a history of heart failure or if you experience any of the following symptoms:  Weight gain of 3 pounds or more overnight or 5 pounds in a week, increased swelling in our hands or feet or shortness of breath while lying flat in bed. Please call your doctor as soon as you notice any of these symptoms; do not wait until your next office visit. The discharge information has been reviewed with the patient and caregiver. The patient and caregiver verbalized understanding. Discharge medications reviewed with the patient and caregiver and appropriate educational materials and side effects teaching were provided. ___________________________________________________________________________________________________________________________________                 Cardiac Catheterization/Angiography Discharge Instructions    *Check the puncture site frequently for swelling or bleeding. If you see any bleeding, lie down and apply pressure over the area with a clean towel or washcloth. Notify your doctor for any redness, swelling, drainage or oozing from the puncture site. Notify your doctor for any fever or chills. *If the leg or arm with the puncture becomes cold, numb or painful, call Dr Maeve Vargas    *Activity should be limited for the next 48 hours. Climb stairs as little as possible and avoid any stooping, bending or strenuous activity for 48 hours. No heavy lifting (anything over 10 pounds) for three days. *Do not drive for 24   hours      *You may resume your usual diet. Drink more fluids than usual.    *Have a responsible person drive you home and stay with you for at least 24 hours after your heart catheterization/angiography. Wear armboard 24 hrs then discard    *You may remove the bandage from your Right and Arm in 24 hours. You may shower in 24 hours. No tub baths, hot tubs or swimming for one week. Do not place any lotions, creams, powders, ointments over the puncture site for one week. You may place a clean band-aid over the puncture site each day for 5 days. Change this daily.     Patient armband removed and shredded

## 2021-04-12 NOTE — PROGRESS NOTES
Back from procedure. Vac band intact to right radial, Right brachial venous intact. Denies pain. Diet given. NS @ 75 ml/hr x 4 hrs per Dr. Trey Zhao. 1300 Resting quietly. 1430 Discharge instructions reviewed. [phone] Discharged home via w/c in care of friend in stable condition. Denies pain.  Dressings intact to right radial & right brachial.

## 2021-04-12 NOTE — Clinical Note
Bilateral groin, right radial and right brachial prepped with ChloraPrep and draped. Wet prep elapsed drying time: 5 mins.

## 2021-04-12 NOTE — ROUTINE PROCESS
12:24pm Right Brachial venous sheath was removed at 12:05 for [id number] without complication. No hematoma or bleeding noted. Skin dry warm and intact. Dry sterile dressing applied to site. Patient tolerated procedure well. 12:37 pm: TR pressure released from Right Radial artery at 2cc q5mins. Patient tolerated well. No hematoma, no pain or discomfort noted. Dry sterile dressing applied and had rotated in prone position in wrist splint. Skin, dry and intact. Pulses intact and unchanged from pre and post procedure.

## 2021-04-12 NOTE — Clinical Note
TRANSFER - OUT REPORT:     Verbal report given to: Saúl. Report consisted of patient's Situation, Background, Assessment and   Recommendations(SBAR). Opportunity for questions and clarification was provided. Patient transported with a Cardiac Cath Tech / Patient Care Tech.

## 2021-04-12 NOTE — PROGRESS NOTES
Cardiology Progress Note        Patient: Janelle Johnson        Sex: male          DOA: 4/12/2021  YOB: 1955      Age:  77 y.o.        LOS:  LOS: 0 days   Assessment/Plan     Date of Surgery Update:  Janelle Johnson was seen and examined. History and physical has been reviewed. The patient has been examined.  There have been no significant clinical changes since the completion of the originally dated History and Physical.    Signed By: French Watkins MD     April 12, 2021 9:30 AM           Signed By: French Watkins MD     April 12, 2021

## 2021-04-14 LAB
ATRIAL RATE: 66 BPM
CALCULATED P AXIS, ECG09: 67 DEGREES
CALCULATED R AXIS, ECG10: 65 DEGREES
CALCULATED T AXIS, ECG11: 79 DEGREES
CRD SYSTOLIC BP: 94
DIAGNOSIS, 93000: NORMAL
P-R INTERVAL, ECG05: 180 MS
Q-T INTERVAL, ECG07: 412 MS
QRS DURATION, ECG06: 90 MS
QTC CALCULATION (BEZET), ECG08: 431 MS
VENTRICULAR RATE, ECG03: 66 BPM

## 2021-05-01 ENCOUNTER — APPOINTMENT (OUTPATIENT)
Dept: GENERAL RADIOLOGY | Age: 66
DRG: 281 | End: 2021-05-01
Attending: EMERGENCY MEDICINE
Payer: MEDICARE

## 2021-05-01 ENCOUNTER — HOSPITAL ENCOUNTER (INPATIENT)
Age: 66
LOS: 2 days | Discharge: HOME | DRG: 281 | End: 2021-05-04
Attending: EMERGENCY MEDICINE | Admitting: FAMILY MEDICINE
Payer: MEDICARE

## 2021-05-01 DIAGNOSIS — R77.8 ELEVATED TROPONIN I LEVEL: ICD-10-CM

## 2021-05-01 DIAGNOSIS — I20.0 UNSTABLE ANGINA PECTORIS (HCC): ICD-10-CM

## 2021-05-01 DIAGNOSIS — I21.4 NSTEMI (NON-ST ELEVATED MYOCARDIAL INFARCTION) (HCC): ICD-10-CM

## 2021-05-01 DIAGNOSIS — I25.110 CORONARY ARTERY DISEASE INVOLVING NATIVE HEART WITH UNSTABLE ANGINA PECTORIS, UNSPECIFIED VESSEL OR LESION TYPE (HCC): Primary | ICD-10-CM

## 2021-05-01 LAB
ALBUMIN SERPL-MCNC: 3.1 G/DL (ref 3.4–5)
ALBUMIN/GLOB SERPL: 1 {RATIO} (ref 0.8–1.7)
ALP SERPL-CCNC: 137 U/L (ref 45–117)
ALT SERPL-CCNC: 24 U/L (ref 16–61)
ANION GAP SERPL CALC-SCNC: 6 MMOL/L (ref 3–18)
AST SERPL-CCNC: 23 U/L (ref 10–38)
BASOPHILS # BLD: 0 K/UL (ref 0–0.1)
BASOPHILS NFR BLD: 0 % (ref 0–2)
BILIRUB SERPL-MCNC: 0.4 MG/DL (ref 0.2–1)
BNP SERPL-MCNC: 120 PG/ML (ref 0–900)
BUN SERPL-MCNC: 17 MG/DL (ref 7–18)
BUN/CREAT SERPL: 13 (ref 12–20)
CALCIUM SERPL-MCNC: 8.1 MG/DL (ref 8.5–10.1)
CHLORIDE SERPL-SCNC: 105 MMOL/L (ref 100–111)
CK MB CFR SERPL CALC: NORMAL % (ref 0–4)
CK MB SERPL-MCNC: <1 NG/ML (ref 5–25)
CK SERPL-CCNC: 74 U/L (ref 39–308)
CO2 SERPL-SCNC: 29 MMOL/L (ref 21–32)
CREAT SERPL-MCNC: 1.36 MG/DL (ref 0.6–1.3)
DIFFERENTIAL METHOD BLD: ABNORMAL
EOSINOPHIL # BLD: 0.3 K/UL (ref 0–0.4)
EOSINOPHIL NFR BLD: 3 % (ref 0–5)
ERYTHROCYTE [DISTWIDTH] IN BLOOD BY AUTOMATED COUNT: 16.8 % (ref 11.6–14.5)
GLOBULIN SER CALC-MCNC: 3.2 G/DL (ref 2–4)
GLUCOSE SERPL-MCNC: 183 MG/DL (ref 74–99)
HCT VFR BLD AUTO: 36.5 % (ref 36–48)
HGB BLD-MCNC: 11.1 G/DL (ref 13–16)
LYMPHOCYTES # BLD: 1.6 K/UL (ref 0.9–3.6)
LYMPHOCYTES NFR BLD: 15 % (ref 21–52)
MAGNESIUM SERPL-MCNC: 1.9 MG/DL (ref 1.6–2.6)
MCH RBC QN AUTO: 27.1 PG (ref 24–34)
MCHC RBC AUTO-ENTMCNC: 30.4 G/DL (ref 31–37)
MCV RBC AUTO: 89.2 FL (ref 74–97)
MONOCYTES # BLD: 1.4 K/UL (ref 0.05–1.2)
MONOCYTES NFR BLD: 12 % (ref 3–10)
NEUTS SEG # BLD: 7.6 K/UL (ref 1.8–8)
NEUTS SEG NFR BLD: 69 % (ref 40–73)
PLATELET # BLD AUTO: 290 K/UL (ref 135–420)
PMV BLD AUTO: 8.9 FL (ref 9.2–11.8)
POTASSIUM SERPL-SCNC: 3.6 MMOL/L (ref 3.5–5.5)
PROT SERPL-MCNC: 6.3 G/DL (ref 6.4–8.2)
RBC # BLD AUTO: 4.09 M/UL (ref 4.35–5.65)
SODIUM SERPL-SCNC: 140 MMOL/L (ref 136–145)
TROPONIN I SERPL-MCNC: <0.02 NG/ML (ref 0–0.04)
WBC # BLD AUTO: 11 K/UL (ref 4.6–13.2)

## 2021-05-01 PROCEDURE — 71045 X-RAY EXAM CHEST 1 VIEW: CPT

## 2021-05-01 PROCEDURE — 80053 COMPREHEN METABOLIC PANEL: CPT

## 2021-05-01 PROCEDURE — 93005 ELECTROCARDIOGRAM TRACING: CPT

## 2021-05-01 PROCEDURE — 83735 ASSAY OF MAGNESIUM: CPT

## 2021-05-01 PROCEDURE — 85025 COMPLETE CBC W/AUTO DIFF WBC: CPT

## 2021-05-01 PROCEDURE — 82553 CREATINE MB FRACTION: CPT

## 2021-05-01 PROCEDURE — 99285 EMERGENCY DEPT VISIT HI MDM: CPT

## 2021-05-01 PROCEDURE — 83880 ASSAY OF NATRIURETIC PEPTIDE: CPT

## 2021-05-01 NOTE — Clinical Note
Status[de-identified] INPATIENT [101]   Type of Bed: Telemetry [19]   Inpatient Hospitalization Certified Necessary for the Following Reasons: 3.  Patient receiving treatment that can only be provided in an inpatient setting (further clarification in H&P documentation)   Admitting Diagnosis: ACS (acute coronary syndrome) [facility]) [[id number]]   Admitting Physician: Balaji Freitas [[id number]]   Attending Physician: Balaji Freitas [[id number]]   Estimated Length of Stay: 2 Midnights   Discharge Plan[de-identified] Home with Office Follow-up

## 2021-05-01 NOTE — Clinical Note
TRANSFER - IN REPORT:     Verbal report received from: Yanira. Report consisted of patient's Situation, Background, Assessment and   Recommendations(SBAR). Opportunity for questions and clarification was provided. Assessment completed upon patient's arrival to unit and care assumed. Patient transported with a Cardiac Cath Tech / Patient Care Tech.

## 2021-05-01 NOTE — Clinical Note
TRANSFER - OUT REPORT:     Verbal report given to: Lili Lover. Report consisted of patient's Situation, Background, Assessment and   Recommendations(SBAR). Opportunity for questions and clarification was provided. Patient transported with a Cardiac Cath Tech / Patient Care Tech. Patient transported to: [facility] area.

## 2021-05-02 ENCOUNTER — APPOINTMENT (OUTPATIENT)
Dept: CT IMAGING | Age: 66
DRG: 281 | End: 2021-05-02
Attending: EMERGENCY MEDICINE
Payer: MEDICARE

## 2021-05-02 ENCOUNTER — APPOINTMENT (OUTPATIENT)
Dept: NON INVASIVE DIAGNOSTICS | Age: 66
DRG: 281 | End: 2021-05-02
Attending: FAMILY MEDICINE
Payer: MEDICARE

## 2021-05-02 PROBLEM — I20.0 UNSTABLE ANGINA PECTORIS (HCC): Status: ACTIVE | Noted: 2021-05-02

## 2021-05-02 PROBLEM — E66.9 OBESITY (BMI 30-39.9): Status: ACTIVE | Noted: 2021-05-02

## 2021-05-02 PROBLEM — Z91.14 NONCOMPLIANCE WITH MEDICATION REGIMEN: Status: ACTIVE | Noted: 2021-05-02

## 2021-05-02 PROBLEM — I24.9 ACS (ACUTE CORONARY SYNDROME) (HCC): Status: ACTIVE | Noted: 2021-05-02

## 2021-05-02 PROBLEM — E11.9 TYPE II DIABETES MELLITUS (HCC): Status: ACTIVE | Noted: 2021-05-02

## 2021-05-02 LAB
APTT PPP: 73.9 SEC (ref 23–36.4)
APTT PPP: >180 SEC (ref 23–36.4)
BASOPHILS # BLD: 0 K/UL (ref 0–0.1)
BASOPHILS NFR BLD: 0 % (ref 0–2)
CHOLEST SERPL-MCNC: 114 MG/DL
CK MB CFR SERPL CALC: 1.6 % (ref 0–4)
CK MB CFR SERPL CALC: 1.9 % (ref 0–4)
CK MB CFR SERPL CALC: 1.9 % (ref 0–4)
CK MB CFR SERPL CALC: 2 % (ref 0–4)
CK MB SERPL-MCNC: 1.2 NG/ML (ref 5–25)
CK MB SERPL-MCNC: 1.2 NG/ML (ref 5–25)
CK MB SERPL-MCNC: 1.3 NG/ML (ref 5–25)
CK MB SERPL-MCNC: 1.3 NG/ML (ref 5–25)
CK SERPL-CCNC: 62 U/L (ref 39–308)
CK SERPL-CCNC: 65 U/L (ref 39–308)
CK SERPL-CCNC: 69 U/L (ref 39–308)
CK SERPL-CCNC: 73 U/L (ref 39–308)
DIFFERENTIAL METHOD BLD: ABNORMAL
EOSINOPHIL # BLD: 0.3 K/UL (ref 0–0.4)
EOSINOPHIL NFR BLD: 3 % (ref 0–5)
ERYTHROCYTE [DISTWIDTH] IN BLOOD BY AUTOMATED COUNT: 17 % (ref 11.6–14.5)
GLUCOSE BLD STRIP.AUTO-MCNC: 124 MG/DL (ref 70–110)
GLUCOSE BLD STRIP.AUTO-MCNC: 130 MG/DL (ref 70–110)
GLUCOSE BLD STRIP.AUTO-MCNC: 155 MG/DL (ref 70–110)
GLUCOSE BLD STRIP.AUTO-MCNC: 176 MG/DL (ref 70–110)
HBA1C MFR BLD: 7.1 % (ref 4.2–5.6)
HCT VFR BLD AUTO: 34.6 % (ref 36–48)
HDLC SERPL-MCNC: 64 MG/DL (ref 40–60)
HDLC SERPL: 1.8 {RATIO} (ref 0–5)
HGB BLD-MCNC: 10.3 G/DL (ref 13–16)
LDLC SERPL CALC-MCNC: 38.6 MG/DL (ref 0–100)
LIPID PROFILE,FLP: ABNORMAL
LYMPHOCYTES # BLD: 1.7 K/UL (ref 0.9–3.6)
LYMPHOCYTES NFR BLD: 17 % (ref 21–52)
MCH RBC QN AUTO: 26.5 PG (ref 24–34)
MCHC RBC AUTO-ENTMCNC: 29.8 G/DL (ref 31–37)
MCV RBC AUTO: 89.2 FL (ref 74–97)
MONOCYTES # BLD: 1.5 K/UL (ref 0.05–1.2)
MONOCYTES NFR BLD: 15 % (ref 3–10)
NEUTS SEG # BLD: 6.4 K/UL (ref 1.8–8)
NEUTS SEG NFR BLD: 64 % (ref 40–73)
PLATELET # BLD AUTO: 264 K/UL (ref 135–420)
PMV BLD AUTO: 8.7 FL (ref 9.2–11.8)
RBC # BLD AUTO: 3.88 M/UL (ref 4.35–5.65)
TRIGL SERPL-MCNC: 57 MG/DL (ref ?–150)
TROPONIN I SERPL-MCNC: 0.08 NG/ML (ref 0–0.04)
TROPONIN I SERPL-MCNC: 0.1 NG/ML (ref 0–0.04)
TROPONIN I SERPL-MCNC: 0.17 NG/ML (ref 0–0.04)
TROPONIN I SERPL-MCNC: 0.31 NG/ML (ref 0–0.04)
VLDLC SERPL CALC-MCNC: 11.4 MG/DL
WBC # BLD AUTO: 9.9 K/UL (ref 4.6–13.2)

## 2021-05-02 PROCEDURE — 85730 THROMBOPLASTIN TIME PARTIAL: CPT

## 2021-05-02 PROCEDURE — 94664 DEMO&/EVAL PT USE INHALER: CPT

## 2021-05-02 PROCEDURE — 74011636637 HC RX REV CODE- 636/637: Performed by: FAMILY MEDICINE

## 2021-05-02 PROCEDURE — 94640 AIRWAY INHALATION TREATMENT: CPT

## 2021-05-02 PROCEDURE — 83036 HEMOGLOBIN GLYCOSYLATED A1C: CPT

## 2021-05-02 PROCEDURE — 85025 COMPLETE CBC W/AUTO DIFF WBC: CPT

## 2021-05-02 PROCEDURE — 74011250637 HC RX REV CODE- 250/637: Performed by: FAMILY MEDICINE

## 2021-05-02 PROCEDURE — 74011000636 HC RX REV CODE- 636: Performed by: EMERGENCY MEDICINE

## 2021-05-02 PROCEDURE — 82962 GLUCOSE BLOOD TEST: CPT

## 2021-05-02 PROCEDURE — 71275 CT ANGIOGRAPHY CHEST: CPT

## 2021-05-02 PROCEDURE — 65660000000 HC RM CCU STEPDOWN

## 2021-05-02 PROCEDURE — 74011000250 HC RX REV CODE- 250: Performed by: FAMILY MEDICINE

## 2021-05-02 PROCEDURE — 82553 CREATINE MB FRACTION: CPT

## 2021-05-02 PROCEDURE — 74011250636 HC RX REV CODE- 250/636: Performed by: EMERGENCY MEDICINE

## 2021-05-02 PROCEDURE — 36415 COLL VENOUS BLD VENIPUNCTURE: CPT

## 2021-05-02 PROCEDURE — 80061 LIPID PANEL: CPT

## 2021-05-02 RX ORDER — POLYETHYLENE GLYCOL 3350 17 G/17G
17 POWDER, FOR SOLUTION ORAL DAILY PRN
Status: DISCONTINUED | OUTPATIENT
Start: 2021-05-02 | End: 2021-05-04 | Stop reason: HOSPADM

## 2021-05-02 RX ORDER — ENOXAPARIN SODIUM 100 MG/ML
40 INJECTION SUBCUTANEOUS DAILY
Status: DISCONTINUED | OUTPATIENT
Start: 2021-05-02 | End: 2021-05-02

## 2021-05-02 RX ORDER — SODIUM CHLORIDE 0.9 % (FLUSH) 0.9 %
5-40 SYRINGE (ML) INJECTION AS NEEDED
Status: DISCONTINUED | OUTPATIENT
Start: 2021-05-02 | End: 2021-05-04 | Stop reason: HOSPADM

## 2021-05-02 RX ORDER — DULOXETIN HYDROCHLORIDE 30 MG/1
30 CAPSULE, DELAYED RELEASE ORAL DAILY
Status: DISCONTINUED | OUTPATIENT
Start: 2021-05-02 | End: 2021-05-04 | Stop reason: HOSPADM

## 2021-05-02 RX ORDER — MONTELUKAST SODIUM 10 MG/1
10 TABLET ORAL
Status: DISCONTINUED | OUTPATIENT
Start: 2021-05-02 | End: 2021-05-04 | Stop reason: HOSPADM

## 2021-05-02 RX ORDER — ATORVASTATIN CALCIUM 20 MG/1
40 TABLET, FILM COATED ORAL DAILY
Status: DISCONTINUED | OUTPATIENT
Start: 2021-05-02 | End: 2021-05-04 | Stop reason: HOSPADM

## 2021-05-02 RX ORDER — DEXTROSE 50 % IN WATER (D50W) INTRAVENOUS SYRINGE
25-50 AS NEEDED
Status: DISCONTINUED | OUTPATIENT
Start: 2021-05-02 | End: 2021-05-04 | Stop reason: HOSPADM

## 2021-05-02 RX ORDER — FOLIC ACID 1 MG/1
1 TABLET ORAL DAILY
Status: DISCONTINUED | OUTPATIENT
Start: 2021-05-02 | End: 2021-05-04 | Stop reason: HOSPADM

## 2021-05-02 RX ORDER — FUROSEMIDE 20 MG/1
20 TABLET ORAL DAILY
Status: DISCONTINUED | OUTPATIENT
Start: 2021-05-02 | End: 2021-05-04 | Stop reason: HOSPADM

## 2021-05-02 RX ORDER — SODIUM CHLORIDE 0.9 % (FLUSH) 0.9 %
5-40 SYRINGE (ML) INJECTION EVERY 8 HOURS
Status: DISCONTINUED | OUTPATIENT
Start: 2021-05-02 | End: 2021-05-04 | Stop reason: HOSPADM

## 2021-05-02 RX ORDER — PROMETHAZINE HYDROCHLORIDE 25 MG/1
12.5 TABLET ORAL
Status: DISCONTINUED | OUTPATIENT
Start: 2021-05-02 | End: 2021-05-04 | Stop reason: HOSPADM

## 2021-05-02 RX ORDER — FAMOTIDINE 20 MG/1
20 TABLET, FILM COATED ORAL 2 TIMES DAILY
Status: DISCONTINUED | OUTPATIENT
Start: 2021-05-02 | End: 2021-05-04 | Stop reason: HOSPADM

## 2021-05-02 RX ORDER — PROPRANOLOL HYDROCHLORIDE 20 MG/1
10 TABLET ORAL DAILY
Status: DISCONTINUED | OUTPATIENT
Start: 2021-05-02 | End: 2021-05-04 | Stop reason: HOSPADM

## 2021-05-02 RX ORDER — ACETAMINOPHEN 325 MG/1
650 TABLET ORAL
Status: DISCONTINUED | OUTPATIENT
Start: 2021-05-02 | End: 2021-05-04 | Stop reason: HOSPADM

## 2021-05-02 RX ORDER — ACETAMINOPHEN 650 MG/1
650 SUPPOSITORY RECTAL
Status: DISCONTINUED | OUTPATIENT
Start: 2021-05-02 | End: 2021-05-04 | Stop reason: HOSPADM

## 2021-05-02 RX ORDER — ASPIRIN 81 MG/1
81 TABLET ORAL DAILY
Status: DISCONTINUED | OUTPATIENT
Start: 2021-05-02 | End: 2021-05-04 | Stop reason: HOSPADM

## 2021-05-02 RX ORDER — OXYCODONE AND ACETAMINOPHEN 10; 325 MG/1; MG/1
1 TABLET ORAL
Status: DISCONTINUED | OUTPATIENT
Start: 2021-05-02 | End: 2021-05-04 | Stop reason: HOSPADM

## 2021-05-02 RX ORDER — ONDANSETRON 2 MG/ML
4 INJECTION INTRAMUSCULAR; INTRAVENOUS
Status: DISCONTINUED | OUTPATIENT
Start: 2021-05-02 | End: 2021-05-04 | Stop reason: HOSPADM

## 2021-05-02 RX ORDER — HEPARIN SODIUM 1000 [USP'U]/ML
4000 INJECTION, SOLUTION INTRAVENOUS; SUBCUTANEOUS ONCE
Status: COMPLETED | OUTPATIENT
Start: 2021-05-02 | End: 2021-05-02

## 2021-05-02 RX ORDER — CELECOXIB 100 MG/1
200 CAPSULE ORAL 2 TIMES DAILY
Status: DISCONTINUED | OUTPATIENT
Start: 2021-05-02 | End: 2021-05-04 | Stop reason: HOSPADM

## 2021-05-02 RX ORDER — INSULIN LISPRO 100 [IU]/ML
INJECTION, SOLUTION INTRAVENOUS; SUBCUTANEOUS
Status: DISCONTINUED | OUTPATIENT
Start: 2021-05-02 | End: 2021-05-04 | Stop reason: HOSPADM

## 2021-05-02 RX ORDER — HEPARIN SODIUM 10000 [USP'U]/100ML
11.2-25 INJECTION, SOLUTION INTRAVENOUS
Status: DISCONTINUED | OUTPATIENT
Start: 2021-05-02 | End: 2021-05-03

## 2021-05-02 RX ORDER — MAGNESIUM SULFATE 100 %
16 CRYSTALS MISCELLANEOUS AS NEEDED
Status: DISCONTINUED | OUTPATIENT
Start: 2021-05-02 | End: 2021-05-04 | Stop reason: HOSPADM

## 2021-05-02 RX ADMIN — MONTELUKAST 10 MG: 10 TABLET, FILM COATED ORAL at 22:12

## 2021-05-02 RX ADMIN — FOLIC ACID 1 MG: 1 TABLET ORAL at 10:12

## 2021-05-02 RX ADMIN — INSULIN LISPRO 2 UNITS: 100 INJECTION, SOLUTION INTRAVENOUS; SUBCUTANEOUS at 11:31

## 2021-05-02 RX ADMIN — Medication 81 MG: at 10:12

## 2021-05-02 RX ADMIN — FUROSEMIDE 20 MG: 20 TABLET ORAL at 10:12

## 2021-05-02 RX ADMIN — ATORVASTATIN CALCIUM 40 MG: 20 TABLET, FILM COATED ORAL at 10:11

## 2021-05-02 RX ADMIN — ARFORMOTEROL TARTRATE: 15 SOLUTION RESPIRATORY (INHALATION) at 07:59

## 2021-05-02 RX ADMIN — MONTELUKAST 10 MG: 10 TABLET, FILM COATED ORAL at 05:21

## 2021-05-02 RX ADMIN — INSULIN LISPRO 2 UNITS: 100 INJECTION, SOLUTION INTRAVENOUS; SUBCUTANEOUS at 16:32

## 2021-05-02 RX ADMIN — PROPRANOLOL HYDROCHLORIDE 10 MG: 20 TABLET ORAL at 10:12

## 2021-05-02 RX ADMIN — Medication 10 ML: at 22:13

## 2021-05-02 RX ADMIN — CELECOXIB 200 MG: 100 CAPSULE ORAL at 10:11

## 2021-05-02 RX ADMIN — IOPAMIDOL 100 ML: 755 INJECTION, SOLUTION INTRAVENOUS at 01:07

## 2021-05-02 RX ADMIN — ARFORMOTEROL TARTRATE: 15 SOLUTION RESPIRATORY (INHALATION) at 20:36

## 2021-05-02 RX ADMIN — FAMOTIDINE 20 MG: 20 TABLET, FILM COATED ORAL at 22:13

## 2021-05-02 RX ADMIN — FAMOTIDINE 20 MG: 20 TABLET, FILM COATED ORAL at 10:11

## 2021-05-02 RX ADMIN — Medication 10 ML: at 16:32

## 2021-05-02 RX ADMIN — CELECOXIB 200 MG: 100 CAPSULE ORAL at 22:12

## 2021-05-02 RX ADMIN — DULOXETINE HYDROCHLORIDE 30 MG: 30 CAPSULE, DELAYED RELEASE ORAL at 10:11

## 2021-05-02 RX ADMIN — Medication 10 ML: at 06:00

## 2021-05-02 RX ADMIN — HEPARIN SODIUM 4000 UNITS: 1000 INJECTION INTRAVENOUS; SUBCUTANEOUS at 02:15

## 2021-05-02 RX ADMIN — HEPARIN SODIUM 11.2 UNITS/KG/HR: 10000 INJECTION, SOLUTION INTRAVENOUS at 02:15

## 2021-05-02 NOTE — H&P
History & Physical    Patient: Soila Cuadra MRN: [id number]  CSN: [id number]    YOB: 1955  Age: 77 y.o. Sex: male      DOA: 5/1/2021  Primary Care Provider:  Judy Crooks MD      Assessment/Plan     Patient Active Problem List   Diagnosis Code    CAD (coronary artery disease) I25.10    Hypertension I10    Chronic obstructive pulmonary disease ([facility] J44.9    Elevated troponin I level R77.8    CKD (chronic kidney disease) stage 3, GFR 30-59 ml/min ([facility]) N18.30    Asthma J45.909    NSTEMI (non-ST elevated myocardial infarction) ([facility] I21.4    Asthma with COPD with exacerbation ([facility]) J44.1, J45.901    Exertional dyspnea R06.00    Rheumatoid arthritis ([facility] M06.9    VIRK (dyspnea on exertion) R06.00    Chest pain R07.9    Tachycardia R00.0    Severe persistent asthma J45.50    Obesity (BMI 30-39. 9) E66.9    Noncompliance with medication regimen Z91.14    Type II diabetes mellitus ([facility] E69     68-year-old male with CAD and 5 stents in place, COPD, CKD stage III, hypertension, and obesity is admitted with chest pain and mildly elevated troponin. He has been noncompliant with taking his aspirin lately for the past few months. He just had a normal echo and a cardiac catheterization without obstructive disease earlier this month.     Chest pain with mildly elevated troponin  -Telemetry  -Trend troponins  -Heparin drip as recommended by cardiology  -Cardiology consult to consider further work-up for repeat catheterization or stress testing based on troponin trend  -Consider adding Imdur to his medication regimen    CAD-5 stents in place  -Emphasized the importance of continuing aspirin to avoid in-stent stenosis    Diabetes mellitus-newly diagnosed and not yet on medication  -Sliding scale insulin    COPD  -Continue home medications    CKD stage III-stable  -Continue home medications  -Monitor creatinine    Hypertension-at goal  -Continue home medications    Noncompliance with medication regimen  -Advised him of the importance of aspirin was stents in place    Obesity-BMI 32  -Follow-up lipid panel  -Aggressive lifestyle modification needed    Full code    Prophylaxis: Pepcid p.o., heparin drip    Estimated length of stay : 1-2 nights    Skyla Hess MD  Nocturnist  ---------------------------------------------------------------------------------------------------------------------------------------------------------------------------------------------------------------  CC: Chest pain       HPI:     Ann Davidson is a 77 y.o. male with CAD and 5 stents in place, COPD, CKD stage III, hypertension, and obesity presents to the ED via EMS for chest pain tonight. He was watching television around 8 PM and suddenly noticed chest discomfort with pain radiating down his left arm. He suffered a cardiac arrest in 2005 and had 4 stents placed at that time. He had another stent placed in 2013. He just had a normal echo and cardiac catheterization done this month which did not show obstructive disease. He is followed by cardiology and pulmonology. He has had a chronic cough for the past 3 years. He had the first dose of his Pfizer Covid vaccine today. He denies any nausea, vomiting, fever, or worsening leg swelling. Past Medical History:   Diagnosis Date    Arthritis     Asthma     CAD (coronary artery disease)     stents x 5    [facility] ([facility]     melanoma on left side of face    Chronic pain     left knee    COPD     Diabetes ([facility]     newly dm have not started med yet    DVT (deep venous thrombosis) ([facility] 2001    left leg    Hypertension     Myocardial infarction ([address] 2005    Obesity (BMI 30-39. 9)     Primary localized osteoarthritis of left knee 7/12/2019    Pulmonary embolism ([facility] 2017    Rheumatoid arthritis [facility])        Past Surgical History:   Procedure Laterality Date    FRACTIONAL FLOW RESERVE  4/29/2018    FRACTIONAL FLOW RESERVE ADDL  4/29/2018   Bernard Whalen CHOLECYSTECTOMY      HX COLOSTOMY      HX COLOSTOMY TAKE DOWN      HX CORONARY STENT PLACEMENT      HX HEART CATHETERIZATION  2005    stents x 4    HX HEART CATHETERIZATION  2014    stent x 1    HX HERNIA REPAIR      x 4    HX LUMBAR DISKECTOMY  2014    LEFT HEART PERCUTANEOUS  2018    [facility] ABDOMEN SURGERY PROC UNLISTED      Laparotomy bowel resection instestinal rupture, colostomy    [facility] ABDOMEN SURGERY PROC UNLISTED      Revision of colostomy    [facility] CORONARY ARTERIOGRAPH  2018       Family History   Problem Relation Age of Onset    Heart Disease Mother     Heart Disease Father     Heart Disease Maternal Grandmother        Social History     Socioeconomic History    Marital status:      Spouse name: Not on file    Number of children: Not on file    Years of education: Not on file    Highest education level: Not on file   Tobacco Use    Smoking status: Former Smoker     Quit date: 1992     Years since quittin.3    Smokeless tobacco: Never Used   Substance and Sexual Activity    Alcohol use: Not Currently     Comment: last     Drug use: Never    Sexual activity: Not Currently   Other Topics Concern       Prior to Admission medications    Medication Sig Start Date End Date Taking? Authorizing Provider   OTHER DULLXETINE hcL 30MG DAILY   Yes Provider, Historical   aspirin delayed-release 81 mg tablet Take 81 mg by mouth daily. Yes Provider, Historical   folic acid (FOLVITE) 1 mg tablet Take 1 mg by mouth daily. Yes Provider, Historical   umeclidinium (Incruse Ellipta) 62.5 mcg/actuation inhaler Take 1 Puff by inhalation daily. Yes Provider, Historical   budesonide-formoteroL (Symbicort) 160-4.5 mcg/actuation HFAA Take 2 Puffs by inhalation two (2) times a day. Yes Provider, Historical   oxyCODONE-acetaminophen (PERCOCET 10)  mg per tablet Take 1 Tab by mouth two (2) times daily as needed.    Yes Provider, Historical   atorvastatin (LIPITOR) 40 mg tablet Take 40 mg by mouth daily. Yes Provider, Historical   DULoxetine (CYMBALTA) 30 mg capsule Take 30 mg by mouth daily. Yes Provider, Historical   propranolol (INDERAL) 10 mg tablet Take 10 mg by mouth daily. Yes Provider, Historical   celecoxib (CELEBREX) 200 mg capsule Take 200 mg by mouth two (2) times a day. Yes Provider, Historical   meclizine (ANTIVERT) 12.5 mg tablet Take 12.5 mg by mouth three (3) times daily as needed for Dizziness or Nausea. Yes Provider, Historical   furosemide (LASIX) 20 mg tablet Take 20 mg by mouth daily. Yes Provider, Historical   montelukast (SINGULAIR) 10 mg tablet Take 10 mg by mouth nightly. Yes Other, MD Moira   pantoprazole (PROTONIX) 40 mg tablet Take 40 mg by mouth daily. Yes Provider, Historical   nitroglycerin (NITROSTAT) 0.4 mg SL tablet by SubLINGual route every five (5) minutes as needed for Chest Pain. Yes Provider, Historical   fish oil-omega-3 fatty acids (Fish Oil) 340-1,000 mg capsule Take 1 Cap by mouth daily. Provider, Historical   albuterol-ipratropium (DUO-NEB) 2.5 mg-0.5 mg/3 ml nebu 3 mL by Nebulization route every four (4) hours as needed for Other (SOB). Charge medicare part B. ICD 10 - J44.1 9/3/19   Gato Schwarz MD       Allergies   Allergen Reactions    Ciprofloxacin Other (comments)     PT states that he turns red.  Tape [Adhesive] Other (comments)     Pt states, \"it rips my skin\"       Review of Systems  Gen: No fever, chills, malaise, weight loss/gain. Heent: No headache, rhinorrhea, sore throat. Heart: +chest pain, chronic VIRK, no pnd, or orthopnea. Resp: +chronic cough, no wheezing and shortness of breath. GI: No nausea, vomiting, diarrhea, constipation, melena or hematochezia. : No urinary obstruction, dysuria or hematuria. Derm: No rash, new skin lesion or pruritis. Musc/skeletal: no bone or joint complaints. Vasc: +chronic leg edema, no cyanosis or claudication.    Endo: No heat/cold intolerance, no polyuria,polydipsia or polyphagia. Neuro: No unilateral weakness, numbness, tingling. No seizures. Heme: No easy bruising or bleeding. Physical Exam:     Physical Exam:  Visit Vitals  BP (!) 144/77   Pulse 71   Temp 98.3 °F (36.8 °C)   Resp 20   Ht 5' 5\" (1.651 m)   Wt 88.5 kg (195 lb)   SpO2 97%   BMI 32.45 kg/m²      O2 Device: None (Room air)    Temp (24hrs), Av.3 °F (36.8 °C), Min:98.3 °F (36.8 °C), Max:98.4 °F (36.9 °C)    No intake/output data recorded. No intake/output data recorded. General:  Awake, cooperative, no distress. Head:  Normocephalic, without obvious abnormality, atraumatic. Eyes:  Conjunctivae/corneas clear, sclera anicteric. Neck: Supple, symmetrical, trachea midline. Lungs:   Clear to auscultation bilaterally. Heart:  Regular rate and rhythm, S1, S2 normal, no murmur, click, rub or gallop. Abdomen: Soft, non-tender. Bowel sounds normal. No masses,  No organomegaly. Extremities: Extremities normal, atraumatic, no cyanosis, 2+ nonpitting edema. Capillary refill normal.   Pulses: 2+ and symmetric all extremities. Skin: Skin color pink, turgor normal. No rashes or lesions   Neurologic: No focal motor or sensory deficit. Labs Reviewed: All lab results for the last 24 hours reviewed.   Recent Results (from the past 24 hour(s))   EKG, 12 LEAD, INITIAL    Collection Time: 21  9:05 PM   Result Value Ref Range    Ventricular Rate 73 BPM    Atrial Rate 73 BPM    P-R Interval 150 ms    QRS Duration 80 ms    Q-T Interval 410 ms    QTC Calculation (Bezet) 451 ms    Calculated P Axis 56 degrees    Calculated R Axis 19 degrees    Calculated T Axis 58 degrees    Diagnosis       Normal sinus rhythm  Nonspecific T wave abnormality  Abnormal ECG  When compared with ECG of 2021 08:27,  T wave inversion now evident in Anterior leads     CBC WITH AUTOMATED DIFF    Collection Time: 21  9:10 PM   Result Value Ref Range    WBC 11.0 4.6 - 13.2 K/uL RBC 4.09 (L) 4.35 - 5.65 M/uL    HGB 11.1 (L) 13.0 - 16.0 g/dL    HCT 36.5 36.0 - 48.0 %    MCV 89.2 74.0 - 97.0 FL    MCH 27.1 24.0 - 34.0 PG    MCHC 30.4 (L) 31.0 - 37.0 g/dL    RDW 16.8 (H) 11.6 - 14.5 %    PLATELET 287 859 - 490 K/uL    MPV 8.9 (L) 9.2 - 11.8 FL    NEUTROPHILS 69 40 - 73 %    LYMPHOCYTES 15 (L) 21 - 52 %    MONOCYTES 12 (H) 3 - 10 %    EOSINOPHILS 3 0 - 5 %    BASOPHILS 0 0 - 2 %    ABS. NEUTROPHILS 7.6 1.8 - 8.0 K/UL    ABS. LYMPHOCYTES 1.6 0.9 - 3.6 K/UL    ABS. MONOCYTES 1.4 (H) 0.05 - 1.2 K/UL    ABS. EOSINOPHILS 0.3 0.0 - 0.4 K/UL    ABS. BASOPHILS 0.0 0.0 - 0.1 K/UL    DF AUTOMATED     METABOLIC PANEL, COMPREHENSIVE    Collection Time: 05/01/21  9:10 PM   Result Value Ref Range    Sodium 140 136 - 145 mmol/L    Potassium 3.6 3.5 - 5.5 mmol/L    Chloride 105 100 - 111 mmol/L    CO2 29 21 - 32 mmol/L    Anion gap 6 3.0 - 18 mmol/L    Glucose 183 (H) 74 - 99 mg/dL    BUN 17 7.0 - 18 MG/DL    Creatinine 1.36 (H) 0.6 - 1.3 MG/DL    BUN/Creatinine ratio 13 12 - 20      GFR est AA >60 >60 ml/min/1.73m2    GFR est non-AA 52 (L) >60 ml/min/1.73m2    Calcium 8.1 (L) 8.5 - 10.1 MG/DL    Bilirubin, total 0.4 0.2 - 1.0 MG/DL    ALT (SGPT) 24 16 - 61 U/L    AST (SGOT) 23 10 - 38 U/L    Alk.  phosphatase 137 (H) 45 - 117 U/L    Protein, total 6.3 (L) 6.4 - 8.2 g/dL    Albumin 3.1 (L) 3.4 - 5.0 g/dL    Globulin 3.2 2.0 - 4.0 g/dL    A-G Ratio 1.0 0.8 - 1.7     NT-PRO BNP    Collection Time: 05/01/21  9:10 PM   Result Value Ref Range    NT pro- 0 - 900 PG/ML   CARDIAC PANEL,(CK, CKMB & TROPONIN)    Collection Time: 05/01/21  9:10 PM   Result Value Ref Range    CK - MB <1.0 <3.6 ng/ml    CK-MB Index  0.0 - 4.0 %     CALCULATION NOT PERFORMED WHEN RESULT IS BELOW LINEAR LIMIT    CK 74 39 - 308 U/L    Troponin-I, QT <0.02 0.0 - 0.045 NG/ML   MAGNESIUM    Collection Time: 05/01/21  9:10 PM   Result Value Ref Range    Magnesium 1.9 1.6 - 2.6 mg/dL   CARDIAC PANEL,(CK, CKMB & TROPONIN)    Collection Time: 05/02/21 12:10 AM   Result Value Ref Range    CK - MB 1.2 <3.6 ng/ml    CK-MB Index 1.9 0.0 - 4.0 %    CK 62 39 - 308 U/L    Troponin-I, QT 0.08 (H) 0.0 - 0.045 NG/ML   PTT    Collection Time: 05/02/21  2:30 AM   Result Value Ref Range    aPTT >180.0 (HH) 23.0 - 36.4 SEC     Results  CTA CHEST W OR W WO CONT (Accession [id number]) (Order [id number])  Allergies     Not Specified: Ciprofloxacin; Tape [Adhesive]   Exam Information    Status Exam Begun  Exam Ended    Final [99] 5/02/2021 01:07 5/02/2021  1:32 AM [id number]  1:32 AM   Result Information    Status: Final result (Exam End: 5/2/2021 01:32) Provider Status: Open   Study Result    EXAM: CTA chest     CLINICAL INDICATION/HISTORY: Chest pain.     COMPARISON: 8/29/2019     TECHNIQUE: Axial CT imaging from the thoracic inlet through the diaphragm with  intravenous contrast. Coronal and sagittal MIP reformats were generated. One or  more dose reduction techniques were used on this CT: automated exposure control,  adjustment of the mAs and/or kVp according to patient size, and iterative  reconstruction techniques. The specific techniques used on this CT exam have  been documented in the patient's electronic medical record. Digital Imaging and  Communications in Medicine (DICOM) format image data are available to  nonaffiliated external healthcare facilities or entities on a secured, media  free, reciprocally searchable basis with patient authorization for at least a  12-month period after this study.        _______________     FINDINGS:     EXAM QUALITY: Adequate     PULMONARY ARTERIES: No pulmonary embolism identified.     MEDIASTINUM: Normal heart size. Aortic and coronary artery calcifications. No  aortic dissection. No pericardial effusion.     LUNGS: No suspicious nodule or mass.     AIRWAY: Normal.     PLEURA: Normal.     LYMPH NODES: No enlarged nodes.     UPPER ABDOMEN: Simple hepatic cysts noted.  Compensatory biliary dilation  secondary to prior cholecystectomy. Colonic diverticulosis.     BONES: No acute or aggressive osseous abnormalities identified.     OTHER: None.     _______________     IMPRESSION     1. No pulmonary embolism or other acute pulmonary abnormalities identified.        Results  XR CHEST PORT (Accession [id number]) (Order [id number])  Allergies     Not Specified: Ciprofloxacin; Tape [Adhesive]   Exam Information    Status Exam Begun  Exam Ended    Final [99] 2021 20:59 2021  9:16 PM [id number]  9:16 PM   Result Information    Status: Final result (Exam End: 2021 21:16) Provider Status: Open   Study Result    EXAM:  AP Portable Chest X-ray 1 view      INDICATION: Chest     COMPARISON: 2018     _______________     FINDINGS:  Heart and mediastinal contours are within normal limits for portable  radiograph. Lungs are clear of active disease. There are no pleural effusions. No acute osseous findings.     ________________        IMPRESSION  No acute process     Murtis Alfreda  Order# [id number]  Reading physician: Mino Guerrero MD Ordering physician: Mino Guerrero MD Study date: 21   Patient Information    Patient Name   Heather Vera MRN   [id number] Sex   Male      1955 (77 y.o.)   Link to Procedure Log    Procedure Log   Physicians    Panel Physicians Referring Physician Case Authorizing Physician   Mino Guerrero MD (Primary)  Mino Guerrero MD   Assisting Physician    None   Procedures    LEFT AND RIGHT HEART CATH / CORONARY ANGIOGRAPHY   Pre Procedure Diagnosis    CAD (coronary artery disease) [I25.10]    Post Procedure Diagnosis    Mild-to-moderate nonobstructive CAD. Indications    CAD (coronary artery disease) [I25.10 (ICD-10-CM)]   Dyspnea on exertion [R06.00 (ICD-10-CM)]   Angina of effort (HCC) [I20.8 (ICD-10-CM)]   Conclusion    Mild-to-moderate nonobstructive CAD   overall patent stents with minimal to mild lead InStent re-stenoses.   Distal LAD is 1.2 mm vessel with 50% stenosis not suitable for any intervention.       PCWP 12 mm Hg   Mean PA pressure 19 mmHg  PVR is 1.5 woods unit        patient has dyspnea on exertion is probably non cardiac in a region due to multiple comorbidities. Complications    Complications documented before study signed (4/14/2021  1:66 AM)     No complications were associated with this study. Documented by Igor Orozco MD - 4/12/2021 10:31 PM      Coronary Findings    Diagnostic  Dominance: Right  Left Main   The vessel is moderate in size. The vessel is angiographically normal. Short in length   Left Anterior Descending   The vessel is moderate in size. There is mild disease. The diseased area appears to be diffuse. Disease is noted to be 20%. Previously placed Ost LAD to Prox LAD stent (unknown type) is widely patent. Mid LAD to Dist LAD lesion, 10% stenosed. The lesion was previously treated using a stent (unknown type). Dist LAD lesion, 50% stenosed. First Diagonal Branch   The vessel is moderate in size. There is mild disease in the proximal segment. Disease is noted to be 20%. First Septal Branch   The vessel is small. The vessel is angiographically normal.   Second Diagonal Branch   The vessel is small. The vessel exhibits minimal luminal irregularities. Left Circumflex   The vessel is moderate in size. There is mild disease in the proximal segment. Disease is noted to be 30%. Mid Cx to Dist Cx lesion, 15% stenosed. The lesion was previously treated using a stent (unknown type). First Obtuse Marginal Branch   The vessel is moderate in size. There is mild disease. Ost 1st Mrg to 1st Mrg lesion, 10% stenosed. The lesion was previously treated using a stent (unknown type). Second Obtuse Marginal Branch   The vessel is small. The vessel exhibits minimal luminal irregularities. Third Obtuse Marginal Branch   The vessel is small. The vessel exhibits minimal luminal irregularities.    Right Coronary Artery   The vessel is moderate in size. There is mild disease. Prox RCA to Mid RCA lesion, 20% stenosed. The lesion was previously treated using a stent (unknown type). Mid RCA lesion, 35% stenosed. The lesion was previously treated using a stent (unknown type). Right Posterior Descending Artery   The vessel is moderate in size. The vessel exhibits minimal luminal irregularities. Right Posterior Atrioventricular Artery   The vessel is small. The vessel exhibits minimal luminal irregularities. Intervention    No interventions have been documented.

## 2021-05-02 NOTE — ROUTINE PROCESS
Assume care of patient from [facility]. Patient received in bed awake. Patient A&Ox4, patient has complaints of headache from nitro tabs given. Called and informed Dr. Sarika Huggins, will come and see patient, pt also denies nitro paste, No chest pain, or SOB. No distress noted. Bed locked in low position. Call bell within reach and patient verbalized understanding of use for assistance and needs. 0715- Bedside and Verbal shift change report given to Lamar Hernadez RN (oncoming nurse) by Marquita Olea RN (offgoing nurse). Report included the following information SBAR, Kardex, Intake/Output, MAR and Recent Results. 3 [place] [facility] Night Shift Chart Audit    Chart Audit completed?  YES

## 2021-05-02 NOTE — PROGRESS NOTES
7058- Pt admitted to room 338. Database completed. Pt instructed on bed and call light. Pt only complains of headache no chest pain. Pt instructed to save urine in urinal for output measurement. Pt voiced understanding. 1076- Pt declines Nitropatch at this time until he talks to Dr. Emily Laguna.

## 2021-05-02 NOTE — ROUTINE PROCESS
Assume care of patient from [place], [address] [place] [address]. Patient received in bed awake. Patient A&Ox4, patient has complaints of headache from nitro tabs given. Called and informed Dr. Tasia Mccabe, will come and see patient, pt also denies nitro paste, No chest pain, or SOB. No distress noted. Bed locked in low position. Call bell within reach and patient verbalized understanding of use for assistance and needs. 2072- Was called by lab regarding aPTT > 180 that was drawn at 0230. Heparin drip was started at   0520- Patient refused pain medication at this time, stated that the nitro tabs had worn off. Will continue to monitor. 0715- Bedside and Verbal shift change report given to Indio Gordon RN (oncoming nurse) by Becky Mckee RN (offgoing nurse). Report included the following information SBAR, Kardex, Intake/Output, MAR and Recent Results. 3 [place] [facility] Night Shift Chart Audit    Chart Audit completed?  YES

## 2021-05-02 NOTE — PROGRESS NOTES
0715 Bedside and Verbal shift change report given to GUILLERMO Coffman, RUI (oncoming nurse) by Randi West RN (offgoing nurse). Report included the following information SBAR, Kardex, Intake/Output, and MAR. Pt in bed, call light in reach. 3736 Pt assessed. No signs of acute distress. Pt in bed.    1131 Pt assessed. No signs of acute distress. Pt in bed. Glucose 176, 2 unit SSI given. 1631 Pt assessed. No signs of acute distress. Pt in bed.    1920 Bedside and Verbal shift change report given to ZABRINA Chavez RN (oncoming nurse) by Kimberley Valdez. Keaton Coffman RN (offgoing nurse). Report included the following information SBAR, Kardex, Intake/Output and MAR. Pt in bed, call light in reach. Shift uneventful.  Cath tomorrow, NPO after midnight

## 2021-05-02 NOTE — PROGRESS NOTES
Bedside and Verbal shift change report given to Isidro Easton RN (oncoming nurse) by Alba Marie RN (offgoing nurse). Report included the following information SBAR, Kardex, ED Summary, Procedure Summary, Intake/Output, Recent Results, Med Rec Status and Cardiac Rhythm NSR.     1954 Shift assessment complete. Patient is resting quietly in bed with eyes open and chest rising and falling evenly. 0000 Patient was made NPO for stress test in the morning. 6480 Reassessment complete. Patient is resting quietly with his eyes open and chest rising and falling equally. 0366  Shift Reassessment complete. There is an order of PTT and I have called Lab multiple times and they are not answering or calling back. 3 [facility] shift chart check complete    Bedside and Verbal shift change report given to Alba Marie RN (oncoming nurse) by Isidro Easton RN (offgoing nurse). Report included the following information SBAR, Kardex, ED Summary, Procedure Summary, Intake/Output, MAR, Med Rec Status and Cardiac Rhythm NSR.

## 2021-05-02 NOTE — CONSULTS
[facility] Consult Note      Patient: Yung Pham MRN: [id number]  SSN: [id number]    YOB: 1955  Age: 77 y.o. Sex: male          Date of Consultation: 5/2/2021  Referring Physician: Dr. Akilah Ramirez  Reason for Consultation: chest pain    HPI:  I was asked by Dr. Akilah Ramirez for chest pain and non-STEMI. Melanie Quan.,  Is a 40-year-old gentleman with history of known CAD including small distal vessel disease, multiple stents, arthritis, asthma/COPD had severe chest pain yesterday while sitting and tried multiple nitroglycerin and then called the ambulance. The chest pain was central severe then started radiating to the left arm. Patient was brought to the hospital and demonstrate chest pain started improving. Patient was not taking his aspirin probably other medication as well from last month. Patient denied any orthopnea, PND, palpitation, presyncope and syncope. No complaint of fever cough or pleuritic chest pain. Denied any leg swelling or sharp chest pain. Past Medical History:   Diagnosis Date    Arthritis     Asthma     CAD (coronary artery disease)     stents x 5    Cancer ([address]     melanoma on left side of face    Chronic pain     left knee    COPD     Diabetes ([address]     newly dm have not started med yet    DVT (deep venous thrombosis) ([address] 2001    left leg    Hypertension     Myocardial infarction ([address] 2005    Obesity (BMI 30-39. 9)     Primary localized osteoarthritis of left knee 7/12/2019    Pulmonary embolism ([address] 2017    Rheumatoid arthritis ([address]      Past Surgical History:   Procedure Laterality Date    FRACTIONAL FLOW RESERVE  4/29/2018    FRACTIONAL FLOW RESERVE ADDL  4/29/2018    HX CHOLECYSTECTOMY      HX COLOSTOMY  1999    HX COLOSTOMY TAKE DOWN      HX CORONARY STENT PLACEMENT      HX HEART CATHETERIZATION  2005    stents x 4    HX HEART CATHETERIZATION  2014    stent x 1    HX HERNIA REPAIR      x 4    HX LUMBAR DISKECTOMY  2014    LEFT HEART PERCUTANEOUS  4/29/2018    [place] ABDOMEN SURGERY PROC UNLISTED      Laparotomy bowel resection instestinal rupture, colostomy    [place] ABDOMEN SURGERY PROC UNLISTED  1999    Revision of colostomy    [facility] CORONARY ARTERIOGRAPH  4/29/2018     Current Facility-Administered Medications   Medication Dose Route Frequency    heparin 25,000 units in D5W 250 ml infusion  11.2-25 Units/kg/hr (Order-Specific) IntraVENous TITRATE    furosemide (LASIX) tablet 20 mg  20 mg Oral DAILY    aspirin delayed-release tablet 81 mg  81 mg Oral DAILY    atorvastatin (LIPITOR) tablet 40 mg  40 mg Oral DAILY    celecoxib (CELEBREX) capsule 200 mg  200 mg Oral BID    DULoxetine (CYMBALTA) capsule 30 mg  30 mg Oral DAILY    folic acid (FOLVITE) tablet 1 mg  1 mg Oral DAILY    montelukast (SINGULAIR) tablet 10 mg  10 mg Oral QHS    oxyCODONE-acetaminophen (PERCOCET 10)  mg per tablet 1 Tab  1 Tab Oral BID PRN    propranoloL (INDERAL) tablet 10 mg  10 mg Oral DAILY    sodium chloride (NS) flush 5-40 mL  5-40 mL IntraVENous Q8H    sodium chloride (NS) flush 5-40 mL  5-40 mL IntraVENous PRN    acetaminophen (TYLENOL) tablet 650 mg  650 mg Oral Q6H PRN    Or    acetaminophen (TYLENOL) suppository 650 mg  650 mg Rectal Q6H PRN    polyethylene glycol (MIRALAX) packet 17 g  17 g Oral DAILY PRN    promethazine (PHENERGAN) tablet 12.5 mg  12.5 mg Oral Q6H PRN    Or    ondansetron (ZOFRAN) injection 4 mg  4 mg IntraVENous Q6H PRN    famotidine (PEPCID) tablet 20 mg  20 mg Oral BID    insulin lispro (HUMALOG) injection   SubCUTAneous AC&HS    glucose chewable tablet 16 g  16 g Oral PRN    glucagon (GLUCAGEN) injection 1 mg  1 mg IntraMUSCular PRN    dextrose (D50W) injection syrg 12.5-25 g  25-50 mL IntraVENous PRN    arformoterol 15 mcg/budesonide 0.5 mg neb solution   Nebulization BID RT       Allergies and Intolerances: Allergies   Allergen Reactions    Ciprofloxacin Other (comments)     PT states that he turns red.     Tape [Adhesive] Other (comments)     Pt states, \"it rips my skin\"       Family History:   Family History   Problem Relation Age of Onset    Heart Disease Mother     Heart Disease Father     Heart Disease Maternal Grandmother        Social History:   He  reports that he quit smoking about 29 years ago. He has never used smokeless tobacco.  He  reports previous alcohol use. Review of Systems:     Review of Systems  Gen: No fever, chills, malaise, weight loss/gain. Heent: No headache, rhinorrhea, epistaxis, ear pain, hearing loss, sinus pain, neck pain/stiffness, sore throat. Heart: + chest pain, palpitations, VIRK, pnd, or orthopnea. Resp: No cough, hemoptysis, wheezing and shortness of breath. GI: No nausea, vomiting, diarrhea, constipation, melena or hematochezia. : No urinary obstruction, dysuria or hematuria. Derm: No rash, new skin lesion or pruritis. Musc/skeletal: no bone or joint complains. Vasc: No edema, cyanosis or claudication. Endo: No heat/cold intolerance, no polyuria,polydipsia or polyphagia. Neuro: No unilateral weakness, numbness, tingling. No seizures. Heme: No easy bruising or bleeding. Physical:   Patient Vitals for the past 6 hrs:   Temp Pulse Resp BP SpO2   05/02/21 1114 98.4 °F (36.9 °C) 74 16 137/75 97 %         Exam:   General Appearance: Comfortable, not using accessory muscles of respiration. HEENT: VERNON. HEAD: Atraumatic  NECK: No JVD, no thyroidomeglay. CAROTIDS: clear  LUNGS: Clear bilaterally. HEART: S1+S2     ABD: Non-tender, BS Audible    EXT: No edema, and no cysnosis. VASCULAR EXAM: Pulses are intact. PSYCHIATRIC EXAM: Mood is appropriate. MUSCULOSKELETAL: Grossly no joint deformity. NEUROLOGICAL: Motor and sensory sytem intact and Cranial nerves II-XII intact.     Review of Data:   LABS:   Lab Results   Component Value Date/Time    WBC 9.9 05/02/2021 06:00 AM    HGB 10.3 (L) 05/02/2021 06:00 AM    HCT 34.6 (L) 05/02/2021 06:00 AM PLATELET 907 59/54/6140 06:00 AM     Lab Results   Component Value Date/Time    Sodium 140 05/01/2021 09:10 PM    Potassium 3.6 05/01/2021 09:10 PM    Chloride 105 05/01/2021 09:10 PM    CO2 29 05/01/2021 09:10 PM    Glucose 183 (H) 05/01/2021 09:10 PM    BUN 17 05/01/2021 09:10 PM    Creatinine 1.36 (H) 05/01/2021 09:10 PM     Lab Results   Component Value Date/Time    Cholesterol, total 114 05/02/2021 06:00 AM    HDL Cholesterol 64 (H) 05/02/2021 06:00 AM    LDL, calculated 38.6 05/02/2021 06:00 AM    Triglyceride 57 05/02/2021 06:00 AM     No components found for: GPT  Lab Results   Component Value Date/Time    Hemoglobin A1c 7.1 (H) 05/02/2021 06:00 AM       RADIOLOGY:  CT Results  (Last 48 hours)               05/02/21 0132  CTA CHEST W OR W WO CONT Final result    Impression:      1. No pulmonary embolism or other acute pulmonary abnormalities identified. Narrative:  EXAM: CTA chest       CLINICAL INDICATION/HISTORY: Chest pain. COMPARISON: 8/29/2019       TECHNIQUE: Axial CT imaging from the thoracic inlet through the diaphragm with   intravenous contrast. Coronal and sagittal MIP reformats were generated. One or   more dose reduction techniques were used on this CT: automated exposure control,   adjustment of the mAs and/or kVp according to patient size, and iterative   reconstruction techniques. The specific techniques used on this CT exam have   been documented in the patient's electronic medical record. Digital Imaging and   Communications in Medicine (DICOM) format image data are available to   nonaffiliated external healthcare facilities or entities on a secured, media   free, reciprocally searchable basis with patient authorization for at least a   12-month period after this study. _______________       FINDINGS:       EXAM QUALITY: Adequate       PULMONARY ARTERIES: No pulmonary embolism identified. MEDIASTINUM: Normal heart size.  Aortic and coronary artery calcifications. No   aortic dissection. No pericardial effusion. LUNGS: No suspicious nodule or mass. AIRWAY: Normal.       PLEURA: Normal.       LYMPH NODES: No enlarged nodes. UPPER ABDOMEN: Simple hepatic cysts noted. Compensatory biliary dilation   secondary to prior cholecystectomy. Colonic diverticulosis. BONES: No acute or aggressive osseous abnormalities identified. OTHER: None.       _______________               CXR Results  (Last 48 hours)               05/01/21 2116  XR CHEST PORT Final result    Impression:  No acute process       Narrative:  EXAM:  AP Portable Chest X-ray 1 view        INDICATION: Chest       COMPARISON: August 6, 2018       _______________       FINDINGS:  Heart and mediastinal contours are within normal limits for portable   radiograph. Lungs are clear of active disease. There are no pleural effusions. No acute osseous findings.        ________________                       Cardiology Procedures:   Results for orders placed or performed during the hospital encounter of 05/01/21   EKG, 12 LEAD, INITIAL   Result Value Ref Range    Ventricular Rate 73 BPM    Atrial Rate 73 BPM    P-R Interval 150 ms    QRS Duration 80 ms    Q-T Interval 410 ms    QTC Calculation (Bezet) 451 ms    Calculated P Axis 56 degrees    Calculated R Axis 19 degrees    Calculated T Axis 58 degrees    Diagnosis       Normal sinus rhythm  Nonspecific T wave abnormality  Abnormal ECG  When compared with ECG of 12-APR-2021 08:27,  T wave inversion now evident in Anterior leads        Echo Results  (Last 48 hours)    None       Cardiolite (Tc-99m Sestamibi) stress test        Impression / Plan:    Patient Active Problem List   Diagnosis Code    CAD (coronary artery disease) I25.10    Hypertension I10    Chronic obstructive pulmonary disease ([facility]) J44.9    Elevated troponin I level R77.8    CKD (chronic kidney disease) stage 3, GFR 30-59 ml/min ([facility]) N18.30    Asthma J45.909    NSTEMI (non-ST elevated myocardial infarction) ([facility] I21.4    Asthma with COPD with exacerbation ([facility]) J44.1, J45.901    Exertional dyspnea R06.00    Rheumatoid arthritis ([facility]) M06.9    VIRK (dyspnea on exertion) R06.00    Chest pain R07.9    Tachycardia R00.0    Severe persistent asthma J45.50    Obesity (BMI 30-39. 9) E66.9    Noncompliance with medication regimen Z91.14    Type II diabetes mellitus ([facility] E11.9       Assessment and plan      Non-STEMI  CAD with multiple stents in the past  Moderate to severe small vessel distal disease  Hypertension  Hyperlipidemia  History of anxiety/depression  Asthma/COPD      Plan. Continue with heparin  Now patient is a chest pain-free and feeling better  Discussed with patient about cardiac catheterization versus medical treatment. In view of his complex coronary artery disease, will proceed with cardiac catheterization tomorrow. Discussed with patient about compliance. Patient agree with the above treatment plan. Continue with aspirin, atorvastatin, propranolol.         Signed By: Cee Qureshi MD     May 2, 2021

## 2021-05-02 NOTE — PROGRESS NOTES
Weekend coverage    Chart accessed as CM on call. Pt admitted to tele [facility] for chest pain. CM will be following for transition of care needs and will be available via hospital  on weekends    Reason for Admission:  Per H&P pt is \" is a 77 y.o. male with CAD and 5 stents in place, COPD, CKD stage III, hypertension, and obesity presents to the ED via EMS for chest pain tonight. He was watching television around 8 PM and suddenly noticed chest discomfort with pain radiating down his left arm. He suffered a cardiac arrest in 2005 and had 4 stents placed at that time. He had another stent placed in 2013. He just had a normal echo and cardiac catheterization done this month which did not show obstructive disease. He is followed by cardiology and pulmonology. He has had a chronic cough for the past 3 years. He had the first dose of his Pfizer Covid vaccine today. He denies any nausea, vomiting, fever, or worsening leg swelling. \"                     RUR Score:    12%                 Plan for utilizing home health:          PCP: First and Last name:  Shea Hawley MD     Name of Practice:    Are you a current patient: Yes/No:    Approximate date of last visit:    Can you participate in a virtual visit with your PCP:                     Current Advanced Directive/Advance Care Plan: Full Code      Healthcare Decision Maker:   Click here to complete [address] including selection of the Healthcare Decision Maker Relationship (ie \"Primary\")                             Transition of Care Plan:    TBD    Care Management Interventions  Transition of Care Consult (CM Consult): Discharge Planning

## 2021-05-02 NOTE — ED PROVIDER NOTES
EMERGENCY DEPARTMENT HISTORY AND PHYSICAL EXAM    Date: 5/1/2021  Patient Name: Heather Vera    History of Presenting Illness     Chief complaint: Chest pain        History Provided By: Patient and EMS    Heather Vera is a 77 y.o. male who presents to the emergency department C/O chest pain. Patient presents by EMS for this problem. States that his chest pain started about 45 minutes prior to arrival.  Patient states he does have a longstanding history of severe heart disease with 5 stents. States he is actually undergone cardiac arrest 1 time back in 2016. States the chest pain is located on the left side of his chest radiating down his left arm. States it started when he was just watching TV this evening and was not exerting himself. Patient states he took 2 of his own nitroglycerin at home but thinks that they might be old as they are couple of years old and was not sure if they were still good to take. EMS notes that they gave 324 mg aspirin and 1 nitroglycerin bringing his pain down from a 9 out of 10 to a 4 out of 10. Patient denies any other associated signs or symptoms but states that the chest pain does feel similar to when he had heart attacks in the past.  EMS did not note any significant ST changes on the EKG        PCP: Axel Medina MD    Current Facility-Administered Medications   Medication Dose Route Frequency Provider Last Rate Last Admin    heparin (porcine) 1,000 unit/mL injection 4,000 Units  4,000 Units IntraVENous ONCE Alessandro Joel DO        heparin 25,000 units in D5W 250 ml infusion  12-25 Units/kg/hr (Order-Specific) IntraVENous TITRATE Fiorella Montenegro DO         Current Outpatient Medications   Medication Sig Dispense Refill    aspirin delayed-release 81 mg tablet Take 81 mg by mouth daily.  Biotin 2,500 mcg cap Take 10 mg by mouth daily.  EPINEPHrine (EPIPEN) 0.3 mg/0.3 mL injection 0.3 mg by IntraMUSCular route once as needed for Allergic Response.  fish oil-omega-3 fatty acids (Fish Oil) 340-1,000 mg capsule Take 1 Cap by mouth daily.  folic acid (FOLVITE) 1 mg tablet Take 1 mg by mouth daily.  umeclidinium (Incruse Ellipta) 62.5 mcg/actuation inhaler Take 1 Puff by inhalation daily.  magnesium oxide (MAG-OX) 400 mg tablet Take 400 mg by mouth daily.  potassium chloride SR (K-TAB) 20 mEq tablet Take 20 mEq by mouth daily.  budesonide-formoteroL (Symbicort) 160-4.5 mcg/actuation HFAA Take 2 Puffs by inhalation two (2) times a day.  oxyCODONE-acetaminophen (PERCOCET 10)  mg per tablet Take 1 Tab by mouth two (2) times a day.  atorvastatin (LIPITOR) 40 mg tablet Take 40 mg by mouth daily.  DULoxetine (CYMBALTA) 30 mg capsule Take 30 mg by mouth daily.  propranolol (INDERAL) 10 mg tablet Take 10 mg by mouth daily.  celecoxib (CELEBREX) 200 mg capsule Take 200 mg by mouth two (2) times a day.  meclizine (ANTIVERT) 12.5 mg tablet Take 12.5 mg by mouth three (3) times daily as needed for Dizziness or Nausea.  albuterol-ipratropium (DUO-NEB) 2.5 mg-0.5 mg/3 ml nebu 3 mL by Nebulization route every four (4) hours as needed for Other (SOB). Charge medicare part B. ICD 10 - J44.1 30 Nebule 0    furosemide (LASIX) 20 mg tablet Take 20 mg by mouth daily.  montelukast (SINGULAIR) 10 mg tablet Take 10 mg by mouth nightly.  pantoprazole (PROTONIX) 40 mg tablet Take 40 mg by mouth daily.  nitroglycerin (NITROSTAT) 0.4 mg SL tablet by SubLINGual route every five (5) minutes as needed for Chest Pain.          Past History     Past Medical History:  Past Medical History:   Diagnosis Date    Arthritis     Asthma     CAD (coronary artery disease)     stents x 5    Cancer ([facility]     melanoma on left side of face    Chronic pain     left knee    COPD     Diabetes ([facility]     newly dm have not started med yet    DVT (deep venous thrombosis) ([facility] 2001    left leg    Hypertension     [facility] infarction [facility])     Primary localized osteoarthritis of left knee 2019    Pulmonary embolism ([facility] 2017    Rheumatoid arthritis ([facility]        Past Surgical History:  Past Surgical History:   Procedure Laterality Date    FRACTIONAL FLOW RESERVE  2018    FRACTIONAL FLOW RESERVE ADDL  2018    HX CHOLECYSTECTOMY      HX COLOSTOMY      HX COLOSTOMY TAKE DOWN      HX CORONARY STENT PLACEMENT      HX HEART CATHETERIZATION  2005    stents x 4    HX HEART CATHETERIZATION  2014    stent x 1    HX HERNIA REPAIR      x 4    HX LUMBAR DISKECTOMY  2014    LEFT HEART PERCUTANEOUS  2018    OR ABDOMEN SURGERY PROC UNLISTED      Laparotomy bowel resection instestinal rupture, colostomy    OR ABDOMEN SURGERY PROC UNLISTED      Revision of colostomy    [facility] CORONARY ARTERIOGRAPH  2018       Family History:  Family History   Problem Relation Age of Onset    Heart Disease Mother     Heart Disease Father     Heart Disease Maternal Grandmother        Social History:  Social History     Tobacco Use    Smoking status: Former Smoker     Quit date: 1992     Years since quittin.3    Smokeless tobacco: Never Used   Substance Use Topics    Alcohol use: Not Currently     Comment: last     Drug use: Never       Allergies: Allergies   Allergen Reactions    Ciprofloxacin Other (comments)     PT states that he turns red.  Tape [Adhesive] Other (comments)     Pt states, \"it rips my skin\"         Review of Systems   Review of Systems   Constitutional: Negative for fever. Respiratory: Negative for chest tightness and shortness of breath. Cardiovascular: Positive for chest pain. Gastrointestinal: Negative for abdominal pain, nausea and vomiting. Neurological: Positive for numbness. Negative for dizziness and light-headedness. All other systems reviewed and are negative. All other systems reviewed and are negative.     Physical Exam     Vitals:    21 0015 05/02/21 0030 05/02/21 0045 05/02/21 0100   BP: (!) 141/81 134/70 139/73 137/71   Pulse: 62 61 (!) 59 62   Resp: 19 24 15 16   SpO2: 95% 97% 95% 99%     Physical Exam    Nursing notes and vital signs reviewed    Airway: intact, speaking normally  Breathing: No apparent distress, no cyanosis  Circulation: Peripheral pulses equal    Constitutional: Chronically ill-appearing, non toxic appearing, no acute distress, appearing stated age  Head: Normocephalic, Atraumatic  Eyes: PERRL, EOMI, No conjunctival injection  Ears: external ears normal  Nose: No rhinorrhea, external nose normal  Throat: mucous membranes moist  Neck: symmetric, trachea midline, no obvious swelling, no JVD, no obvious deformity  Cardiovascular: Regular rate and rhythm, no murmurs  Chest: No palpable tenderness, structural deformity or crepitus  Lungs: Clear to ausculation bilaterally, No stridor, Normal work of breathing and chest excursion bilaterally  Abdomen: Soft, non tender, non distended, normoactive bowel sounds, No rigidity, no peritoneal signs  Musculoskeletal:  No evidence of obvious deformity to the back, extremities, no LE edema  Skin: Warm, dry, No obvious rashes  Neuro: Alert and oriented x 3, CN 2-12 intact, normal speech, strength and sensation full and symmetric bilaterally  Psychiatric: Normal mood and affect      Diagnostic Study Results     Labs -     Recent Results (from the past 72 hour(s))   EKG, 12 LEAD, INITIAL    Collection Time: 05/01/21  9:05 PM   Result Value Ref Range    Ventricular Rate 73 BPM    Atrial Rate 73 BPM    P-R Interval 150 ms    QRS Duration 80 ms    Q-T Interval 410 ms    QTC Calculation (Bezet) 451 ms    Calculated P Axis 56 degrees    Calculated R Axis 19 degrees    Calculated T Axis 58 degrees    Diagnosis       Normal sinus rhythm  Nonspecific T wave abnormality  Abnormal ECG  When compared with ECG of 12-APR-2021 08:27,  T wave inversion now evident in Anterior leads     CBC WITH AUTOMATED DIFF Collection Time: 05/01/21  9:10 PM   Result Value Ref Range    WBC 11.0 4.6 - 13.2 K/uL    RBC 4.09 (L) 4.35 - 5.65 M/uL    HGB 11.1 (L) 13.0 - 16.0 g/dL    HCT 36.5 36.0 - 48.0 %    MCV 89.2 74.0 - 97.0 FL    MCH 27.1 24.0 - 34.0 PG    MCHC 30.4 (L) 31.0 - 37.0 g/dL    RDW 16.8 (H) 11.6 - 14.5 %    PLATELET 113 683 - 878 K/uL    MPV 8.9 (L) 9.2 - 11.8 FL    NEUTROPHILS 69 40 - 73 %    LYMPHOCYTES 15 (L) 21 - 52 %    MONOCYTES 12 (H) 3 - 10 %    EOSINOPHILS 3 0 - 5 %    BASOPHILS 0 0 - 2 %    ABS. NEUTROPHILS 7.6 1.8 - 8.0 K/UL    ABS. LYMPHOCYTES 1.6 0.9 - 3.6 K/UL    ABS. MONOCYTES 1.4 (H) 0.05 - 1.2 K/UL    ABS. EOSINOPHILS 0.3 0.0 - 0.4 K/UL    ABS. BASOPHILS 0.0 0.0 - 0.1 K/UL    DF AUTOMATED     METABOLIC PANEL, COMPREHENSIVE    Collection Time: 05/01/21  9:10 PM   Result Value Ref Range    Sodium 140 136 - 145 mmol/L    Potassium 3.6 3.5 - 5.5 mmol/L    Chloride 105 100 - 111 mmol/L    CO2 29 21 - 32 mmol/L    Anion gap 6 3.0 - 18 mmol/L    Glucose 183 (H) 74 - 99 mg/dL    BUN 17 7.0 - 18 MG/DL    Creatinine 1.36 (H) 0.6 - 1.3 MG/DL    BUN/Creatinine ratio 13 12 - 20      GFR est AA >60 >60 ml/min/1.73m2    GFR est non-AA 52 (L) >60 ml/min/1.73m2    Calcium 8.1 (L) 8.5 - 10.1 MG/DL    Bilirubin, total 0.4 0.2 - 1.0 MG/DL    ALT (SGPT) 24 16 - 61 U/L    AST (SGOT) 23 10 - 38 U/L    Alk.  phosphatase 137 (H) 45 - 117 U/L    Protein, total 6.3 (L) 6.4 - 8.2 g/dL    Albumin 3.1 (L) 3.4 - 5.0 g/dL    Globulin 3.2 2.0 - 4.0 g/dL    A-G Ratio 1.0 0.8 - 1.7     NT-PRO BNP    Collection Time: 05/01/21  9:10 PM   Result Value Ref Range    NT pro- 0 - 900 PG/ML   CARDIAC PANEL,(CK, CKMB & TROPONIN)    Collection Time: 05/01/21  9:10 PM   Result Value Ref Range    CK - MB <1.0 <3.6 ng/ml    CK-MB Index  0.0 - 4.0 %     CALCULATION NOT PERFORMED WHEN RESULT IS BELOW LINEAR LIMIT    CK 74 39 - 308 U/L    Troponin-I, QT <0.02 0.0 - 0.045 NG/ML   MAGNESIUM    Collection Time: 05/01/21  9:10 PM   Result Value Ref Range Magnesium 1.9 1.6 - 2.6 mg/dL   CARDIAC PANEL,(CK, CKMB & TROPONIN)    Collection Time: 05/02/21 12:10 AM   Result Value Ref Range    CK - MB 1.2 <3.6 ng/ml    CK-MB Index 1.9 0.0 - 4.0 %    CK 62 39 - 308 U/L    Troponin-I, QT 0.08 (H) 0.0 - 0.045 NG/ML       Radiologic Studies -   CTA CHEST W OR W WO CONT   Final Result      1. No pulmonary embolism or other acute pulmonary abnormalities identified. XR CHEST PORT   Final Result   No acute process        CT Results  (Last 48 hours)               05/02/21 0132  CTA CHEST W OR W WO CONT Final result    Impression:      1. No pulmonary embolism or other acute pulmonary abnormalities identified. Narrative:  EXAM: CTA chest       CLINICAL INDICATION/HISTORY: Chest pain. COMPARISON: 8/29/2019       TECHNIQUE: Axial CT imaging from the thoracic inlet through the diaphragm with   intravenous contrast. Coronal and sagittal MIP reformats were generated. One or   more dose reduction techniques were used on this CT: automated exposure control,   adjustment of the mAs and/or kVp according to patient size, and iterative   reconstruction techniques. The specific techniques used on this CT exam have   been documented in the patient's electronic medical record. Digital Imaging and   Communications in Medicine (DICOM) format image data are available to   nonaffiliated external healthcare facilities or entities on a secured, media   free, reciprocally searchable basis with patient authorization for at least a   12-month period after this study. _______________       FINDINGS:       EXAM QUALITY: Adequate       PULMONARY ARTERIES: No pulmonary embolism identified. MEDIASTINUM: Normal heart size. Aortic and coronary artery calcifications. No   aortic dissection. No pericardial effusion. LUNGS: No suspicious nodule or mass. AIRWAY: Normal.       PLEURA: Normal.       LYMPH NODES: No enlarged nodes.        UPPER ABDOMEN: Simple hepatic cysts noted. Compensatory biliary dilation   secondary to prior cholecystectomy. Colonic diverticulosis. BONES: No acute or aggressive osseous abnormalities identified. OTHER: None.       _______________               CXR Results  (Last 48 hours)               05/01/21 2116  XR CHEST PORT Final result    Impression:  No acute process       Narrative:  EXAM:  AP Portable Chest X-ray 1 view        INDICATION: Chest       COMPARISON: August 6, 2018       _______________       FINDINGS:  Heart and mediastinal contours are within normal limits for portable   radiograph. Lungs are clear of active disease. There are no pleural effusions. No acute osseous findings. ________________                     Medications given in the ED-  Medications   heparin (porcine) 1,000 unit/mL injection 4,000 Units (has no administration in time range)   heparin 25,000 units in D5W 250 ml infusion (has no administration in time range)   iopamidoL (ISOVUE-370) 76 % injection  mL (100 mL IntraVENous Given 5/2/21 0107)         Medical Decision Making     I reviewed the vital signs, available nursing notes, past medical history, past surgical history, family history and social history. Vital Signs interpretation- I have reviewed the patient's vital signs. Pulse Oximetry interpretation - 100% on Room air     Cardiac Monitor interpretation:  Rate: 71 bpm  Rhythm: sinus     EKG interpretation: (Preliminary)  EKG interpretation by Dr. Jacoby Silva 73 normal sinus rhythm, normal axis, no specific ST changes, T wave flattening and inversion seen in the anterior leads    Records Reviewed: Nursing Notes, Old Medical Records, Previous electrocardiograms, Previous Radiology Studies and Previous Laboratory Studies    Procedures:  Procedures    ED Course & MDM:   Considering his significant history will obtain blood work and chest x-ray.   I offered the patient something for pain including morphine or nitroglycerin. He states he is doing okay at this time and would prefer to hold off    Chest x-ray normal.  Laboratory findings showing initial troponin negative. Will wait for repeat at midnight    Repeat troponin does show elevation at 0.08. Is noted that the patient has had a recent cardiac catheterization that showed 50% stenosis in the LAD    CONSULT NOTE:   Dr. Angel Pino spoke with Dr. Mary Zamora   Specialty: cardio  Discussed pt's hx, disposition, and available diagnostic and imaging results over the telephone. Reviewed care plans. Recommend CTA of the chest to rule out PE. If PE absent recommended heparin ACS dosing. I discussed with the patient the results of his laboratory findings and imaging studies. Recommended admission and heparin as well as evaluation with cardiology. He understands and agrees to plan    Diagnosis and Disposition     Core Measures:  For Hospitalized Patients:    1. Hospitalization Decision Time:  The decision to hospitalize the patient was made by Angel Pino DO at 1:55 AM on 5/1/2021    2. Aspirin: Aspirin was given    1:54 AM  Patient is being admitted to the hospital by Dr. Henrry Linares. The results of their tests and reasons for their admission have been discussed with them and/or available family. They convey agreement and understanding for the need to be admitted and for their admission diagnosis. CONDITIONS ON ADMISSION:  Sepsis is not present at the time of admission. Deep Vein Thrombosis is not present at the time of admission. Chronic coronary thrombosis is present at the time of admission. Urinary Tract Infection is not present at the time of admission. Pneumonia is not present at the time of admission. MRSA is not present at the time of admission. Wound infection is not present at the time of admission. Pressure Ulcer is not present at the time of admission. CLINICAL IMPRESSION:    1.  Coronary artery disease involving native heart with unstable angina pectoris, unspecified vessel or lesion type ([facility] [address]    2. Unstable angina pectoris (HCC)    3. Elevated troponin I level          Please note that this dictation was completed with [employer], the computer voice recognition software. Quite often unanticipated grammatical, syntax, homophones, and other interpretive errors are inadvertently transcribed by the computer software. Please disregard these errors. Please excuse any errors that have escaped final proofreading.

## 2021-05-03 ENCOUNTER — APPOINTMENT (OUTPATIENT)
Dept: NON INVASIVE DIAGNOSTICS | Age: 66
DRG: 281 | End: 2021-05-03
Attending: FAMILY MEDICINE
Payer: MEDICARE

## 2021-05-03 LAB
ACT BLD: 147 SECS (ref 79–138)
ALBUMIN SERPL-MCNC: 2.7 G/DL (ref 3.4–5)
ALBUMIN/GLOB SERPL: 0.9 {RATIO} (ref 0.8–1.7)
ALP SERPL-CCNC: 103 U/L (ref 45–117)
ALT SERPL-CCNC: 17 U/L (ref 16–61)
ANION GAP SERPL CALC-SCNC: 8 MMOL/L (ref 3–18)
APTT PPP: 66.8 SEC (ref 23–36.4)
AST SERPL-CCNC: 17 U/L (ref 10–38)
BASOPHILS # BLD: 0 K/UL (ref 0–0.1)
BASOPHILS NFR BLD: 1 % (ref 0–2)
BILIRUB SERPL-MCNC: 0.4 MG/DL (ref 0.2–1)
BUN SERPL-MCNC: 17 MG/DL (ref 7–18)
BUN/CREAT SERPL: 12 (ref 12–20)
CALCIUM SERPL-MCNC: 8.2 MG/DL (ref 8.5–10.1)
CHLORIDE SERPL-SCNC: 104 MMOL/L (ref 100–111)
CO2 SERPL-SCNC: 28 MMOL/L (ref 21–32)
CREAT SERPL-MCNC: 1.42 MG/DL (ref 0.6–1.3)
DIFFERENTIAL METHOD BLD: ABNORMAL
ECHO AO ROOT DIAM: 3.25 CM
ECHO AV AREA PEAK VELOCITY: 2.6 CM2
ECHO AV AREA VTI: 2.72 CM2
ECHO AV AREA/BSA PEAK VELOCITY: 1.3 CM2/M2
ECHO AV AREA/BSA VTI: 1.4 CM2/M2
ECHO AV MEAN GRADIENT: 4.01 MMHG
ECHO AV PEAK GRADIENT: 6.1 MMHG
ECHO AV PEAK VELOCITY: 123.45 CM/S
ECHO AV VTI: 29.27 CM
ECHO IVC PROX: 1.24 CM
ECHO LA MAJOR AXIS: 3.68 CM
ECHO LA MINOR AXIS: 1.88 CM
ECHO LA VOL 2C: 25.94 ML (ref 18–58)
ECHO LA VOL 4C: 22.04 ML (ref 18–58)
ECHO LA VOL BP: 26.31 ML (ref 18–58)
ECHO LA VOL/BSA BIPLANE: 13.42 ML/M2 (ref 16–28)
ECHO LA VOLUME INDEX A2C: 13.23 ML/M2 (ref 16–28)
ECHO LA VOLUME INDEX A4C: 11.24 ML/M2 (ref 16–28)
ECHO LV E' LATERAL VELOCITY: 8 CM/S
ECHO LV E' SEPTAL VELOCITY: 8 CM/S
ECHO LV EDV A2C: 55.77 ML
ECHO LV EDV A4C: 79.61 ML
ECHO LV EDV BP: 67.02 ML (ref 67–155)
ECHO LV EDV INDEX A4C: 40.6 ML/M2
ECHO LV EDV INDEX BP: 34.2 ML/M2
ECHO LV EDV NDEX A2C: 28.4 ML/M2
ECHO LV EJECTION FRACTION A2C: 75 PERCENT
ECHO LV EJECTION FRACTION A4C: 51 PERCENT
ECHO LV EJECTION FRACTION BIPLANE: 64.9 PERCENT (ref 55–100)
ECHO LV ESV A2C: 14.13 ML
ECHO LV ESV A4C: 39.04 ML
ECHO LV ESV BP: 23.52 ML (ref 22–58)
ECHO LV ESV INDEX A2C: 7.2 ML/M2
ECHO LV ESV INDEX A4C: 19.9 ML/M2
ECHO LV ESV INDEX BP: 12 ML/M2
ECHO LV INTERNAL DIMENSION DIASTOLIC: 4.12 CM (ref 4.2–5.9)
ECHO LV INTERNAL DIMENSION SYSTOLIC: 3.05 CM
ECHO LV IVSD: 1.1 CM (ref 0.6–1)
ECHO LV MASS 2D: 136.9 G (ref 88–224)
ECHO LV MASS INDEX 2D: 69.8 G/M2 (ref 49–115)
ECHO LV POSTERIOR WALL DIASTOLIC: 0.94 CM (ref 0.6–1)
ECHO LVOT DIAM: 2.24 CM
ECHO LVOT PEAK GRADIENT: 2.66 MMHG
ECHO LVOT PEAK VELOCITY: 81.52 CM/S
ECHO LVOT SV: 79.6 ML
ECHO LVOT VTI: 20.2 CM
ECHO MV A VELOCITY: 93.28 CM/S
ECHO MV AREA PHT: 2.96 CM2
ECHO MV E DECELERATION TIME (DT): 256.41 MS
ECHO MV E VELOCITY: 64.1 CM/S
ECHO MV E/A RATIO: 0.69
ECHO MV E/E' LATERAL: 8.01
ECHO MV E/E' RATIO (AVERAGED): 8.01
ECHO MV E/E' SEPTAL: 8.01
ECHO MV PRESSURE HALF TIME (PHT): 74.36 MS
ECHO RA AREA 4C: 17.24 CM2
ECHO RV INTERNAL DIMENSION: 2.85 CM
ECHO RV TAPSE: 2.2 CM (ref 1.5–2)
EOSINOPHIL # BLD: 0.3 K/UL (ref 0–0.4)
EOSINOPHIL NFR BLD: 3 % (ref 0–5)
ERYTHROCYTE [DISTWIDTH] IN BLOOD BY AUTOMATED COUNT: 17 % (ref 11.6–14.5)
GLOBULIN SER CALC-MCNC: 2.9 G/DL (ref 2–4)
GLUCOSE BLD STRIP.AUTO-MCNC: 120 MG/DL (ref 70–110)
GLUCOSE BLD STRIP.AUTO-MCNC: 123 MG/DL (ref 70–110)
GLUCOSE BLD STRIP.AUTO-MCNC: 135 MG/DL (ref 70–110)
GLUCOSE SERPL-MCNC: 118 MG/DL (ref 74–99)
HCT VFR BLD AUTO: 32.9 % (ref 36–48)
HGB BLD-MCNC: 9.9 G/DL (ref 13–16)
LVOT MG: 1.68 MMHG
LYMPHOCYTES # BLD: 1.7 K/UL (ref 0.9–3.6)
LYMPHOCYTES NFR BLD: 19 % (ref 21–52)
MCH RBC QN AUTO: 26.8 PG (ref 24–34)
MCHC RBC AUTO-ENTMCNC: 30.1 G/DL (ref 31–37)
MCV RBC AUTO: 88.9 FL (ref 74–97)
MONOCYTES # BLD: 1.4 K/UL (ref 0.05–1.2)
MONOCYTES NFR BLD: 16 % (ref 3–10)
NEUTS SEG # BLD: 5.4 K/UL (ref 1.8–8)
NEUTS SEG NFR BLD: 62 % (ref 40–73)
PLATELET # BLD AUTO: 232 K/UL (ref 135–420)
PMV BLD AUTO: 9.2 FL (ref 9.2–11.8)
POTASSIUM SERPL-SCNC: 3.1 MMOL/L (ref 3.5–5.5)
PROT SERPL-MCNC: 5.6 G/DL (ref 6.4–8.2)
RBC # BLD AUTO: 3.7 M/UL (ref 4.35–5.65)
SODIUM SERPL-SCNC: 140 MMOL/L (ref 136–145)
WBC # BLD AUTO: 8.7 K/UL (ref 4.6–13.2)

## 2021-05-03 PROCEDURE — 85347 COAGULATION TIME ACTIVATED: CPT

## 2021-05-03 PROCEDURE — 74011250636 HC RX REV CODE- 250/636: Performed by: EMERGENCY MEDICINE

## 2021-05-03 PROCEDURE — 77030029997 HC DEV COM RDL R BND TELE -B: Performed by: INTERNAL MEDICINE

## 2021-05-03 PROCEDURE — C1894 INTRO/SHEATH, NON-LASER: HCPCS | Performed by: INTERNAL MEDICINE

## 2021-05-03 PROCEDURE — 85730 THROMBOPLASTIN TIME PARTIAL: CPT

## 2021-05-03 PROCEDURE — 74011000250 HC RX REV CODE- 250: Performed by: INTERNAL MEDICINE

## 2021-05-03 PROCEDURE — C1769 GUIDE WIRE: HCPCS | Performed by: INTERNAL MEDICINE

## 2021-05-03 PROCEDURE — 99152 MOD SED SAME PHYS/QHP 5/>YRS: CPT | Performed by: INTERNAL MEDICINE

## 2021-05-03 PROCEDURE — 77030004522 HC CATH ANGI DX EXPO BSC -A: Performed by: INTERNAL MEDICINE

## 2021-05-03 PROCEDURE — 82962 GLUCOSE BLOOD TEST: CPT

## 2021-05-03 PROCEDURE — 74011000636 HC RX REV CODE- 636: Performed by: INTERNAL MEDICINE

## 2021-05-03 PROCEDURE — 99153 MOD SED SAME PHYS/QHP EA: CPT | Performed by: INTERNAL MEDICINE

## 2021-05-03 PROCEDURE — 77030004521 HC CATH ANGI DX COOK -B: Performed by: INTERNAL MEDICINE

## 2021-05-03 PROCEDURE — 74011250637 HC RX REV CODE- 250/637: Performed by: FAMILY MEDICINE

## 2021-05-03 PROCEDURE — 36415 COLL VENOUS BLD VENIPUNCTURE: CPT

## 2021-05-03 PROCEDURE — 93458 L HRT ARTERY/VENTRICLE ANGIO: CPT | Performed by: INTERNAL MEDICINE

## 2021-05-03 PROCEDURE — 65660000000 HC RM CCU STEPDOWN

## 2021-05-03 PROCEDURE — 4A023N7 MEASUREMENT OF CARDIAC SAMPLING AND PRESSURE, LEFT HEART, PERCUTANEOUS APPROACH: ICD-10-PCS | Performed by: INTERNAL MEDICINE

## 2021-05-03 PROCEDURE — 80053 COMPREHEN METABOLIC PANEL: CPT

## 2021-05-03 PROCEDURE — 77030013797 HC KT TRNSDUC PRSSR EDWD -A: Performed by: INTERNAL MEDICINE

## 2021-05-03 PROCEDURE — 74011250636 HC RX REV CODE- 250/636: Performed by: INTERNAL MEDICINE

## 2021-05-03 PROCEDURE — 77030016699 HC CATH ANGI DX INFN1 CARD -A: Performed by: INTERNAL MEDICINE

## 2021-05-03 PROCEDURE — 85025 COMPLETE CBC W/AUTO DIFF WBC: CPT

## 2021-05-03 PROCEDURE — B215YZZ FLUOROSCOPY OF LEFT HEART USING OTHER CONTRAST: ICD-10-PCS | Performed by: INTERNAL MEDICINE

## 2021-05-03 PROCEDURE — 77030008543 HC TBNG MON PRSS MRTM -A: Performed by: INTERNAL MEDICINE

## 2021-05-03 PROCEDURE — 93306 TTE W/DOPPLER COMPLETE: CPT

## 2021-05-03 PROCEDURE — 74011250637 HC RX REV CODE- 250/637: Performed by: HOSPITALIST

## 2021-05-03 RX ORDER — LIDOCAINE HYDROCHLORIDE 10 MG/ML
INJECTION INFILTRATION; PERINEURAL AS NEEDED
Status: DISCONTINUED | OUTPATIENT
Start: 2021-05-03 | End: 2021-05-03 | Stop reason: HOSPADM

## 2021-05-03 RX ORDER — ENOXAPARIN SODIUM 100 MG/ML
40 INJECTION SUBCUTANEOUS EVERY 24 HOURS
Status: DISCONTINUED | OUTPATIENT
Start: 2021-05-03 | End: 2021-05-04 | Stop reason: HOSPADM

## 2021-05-03 RX ORDER — HEPARIN SODIUM 200 [USP'U]/100ML
INJECTION, SOLUTION INTRAVENOUS
Status: COMPLETED | OUTPATIENT
Start: 2021-05-03 | End: 2021-05-03

## 2021-05-03 RX ORDER — FENTANYL CITRATE 50 UG/ML
INJECTION, SOLUTION INTRAMUSCULAR; INTRAVENOUS AS NEEDED
Status: DISCONTINUED | OUTPATIENT
Start: 2021-05-03 | End: 2021-05-03 | Stop reason: HOSPADM

## 2021-05-03 RX ORDER — HEPARIN SODIUM 1000 [USP'U]/ML
INJECTION, SOLUTION INTRAVENOUS; SUBCUTANEOUS AS NEEDED
Status: DISCONTINUED | OUTPATIENT
Start: 2021-05-03 | End: 2021-05-03 | Stop reason: HOSPADM

## 2021-05-03 RX ORDER — SODIUM CHLORIDE 0.9 % (FLUSH) 0.9 %
5-40 SYRINGE (ML) INJECTION AS NEEDED
Status: CANCELLED | OUTPATIENT
Start: 2021-05-03

## 2021-05-03 RX ORDER — POTASSIUM CHLORIDE 20 MEQ/1
40 TABLET, EXTENDED RELEASE ORAL
Status: COMPLETED | OUTPATIENT
Start: 2021-05-03 | End: 2021-05-03

## 2021-05-03 RX ORDER — MIDAZOLAM HYDROCHLORIDE 1 MG/ML
INJECTION, SOLUTION INTRAMUSCULAR; INTRAVENOUS AS NEEDED
Status: DISCONTINUED | OUTPATIENT
Start: 2021-05-03 | End: 2021-05-03 | Stop reason: HOSPADM

## 2021-05-03 RX ORDER — VERAPAMIL HYDROCHLORIDE 2.5 MG/ML
INJECTION, SOLUTION INTRAVENOUS AS NEEDED
Status: DISCONTINUED | OUTPATIENT
Start: 2021-05-03 | End: 2021-05-03 | Stop reason: HOSPADM

## 2021-05-03 RX ORDER — SODIUM CHLORIDE 0.9 % (FLUSH) 0.9 %
5-40 SYRINGE (ML) INJECTION EVERY 8 HOURS
Status: CANCELLED | OUTPATIENT
Start: 2021-05-03

## 2021-05-03 RX ADMIN — FAMOTIDINE 20 MG: 20 TABLET, FILM COATED ORAL at 21:15

## 2021-05-03 RX ADMIN — ENOXAPARIN SODIUM 40 MG: 40 INJECTION SUBCUTANEOUS at 17:19

## 2021-05-03 RX ADMIN — Medication 10 ML: at 21:16

## 2021-05-03 RX ADMIN — Medication 81 MG: at 08:12

## 2021-05-03 RX ADMIN — ATORVASTATIN CALCIUM 40 MG: 20 TABLET, FILM COATED ORAL at 08:12

## 2021-05-03 RX ADMIN — CELECOXIB 200 MG: 100 CAPSULE ORAL at 08:12

## 2021-05-03 RX ADMIN — DULOXETINE HYDROCHLORIDE 30 MG: 30 CAPSULE, DELAYED RELEASE ORAL at 08:12

## 2021-05-03 RX ADMIN — MONTELUKAST 10 MG: 10 TABLET, FILM COATED ORAL at 21:15

## 2021-05-03 RX ADMIN — PROPRANOLOL HYDROCHLORIDE 10 MG: 20 TABLET ORAL at 08:13

## 2021-05-03 RX ADMIN — CELECOXIB 200 MG: 100 CAPSULE ORAL at 21:15

## 2021-05-03 RX ADMIN — FOLIC ACID 1 MG: 1 TABLET ORAL at 08:13

## 2021-05-03 RX ADMIN — POTASSIUM CHLORIDE 40 MEQ: 1500 TABLET, EXTENDED RELEASE ORAL at 10:14

## 2021-05-03 RX ADMIN — FAMOTIDINE 20 MG: 20 TABLET, FILM COATED ORAL at 08:12

## 2021-05-03 RX ADMIN — Medication 10 ML: at 17:18

## 2021-05-03 RX ADMIN — HEPARIN SODIUM 11.25 UNITS/KG/HR: 10000 INJECTION, SOLUTION INTRAVENOUS at 04:10

## 2021-05-03 NOTE — PROGRESS NOTES
Problem: Patient Education: Go to Patient Education Activity  Goal: Patient/Family Education  Outcome: Progressing Towards Goal     Problem: Falls - Risk of  Goal: *Absence of Falls  Description: Document Sofia Shaw Fall Risk and appropriate interventions in the flowsheet. Outcome: Progressing Towards Goal  Note: Fall Risk Interventions:            Medication Interventions: Patient to call before getting OOB, Teach patient to arise slowly                   Problem: Diabetes Self-Management  Goal: *Disease process and treatment process  Description: Define diabetes and identify own type of diabetes; list 3 options for treating diabetes. Outcome: Progressing Towards Goal     Problem: Diabetes Self-Management  Goal: *Developing strategies to promote health/change behavior  Description: Define the ABC's of diabetes; identify appropriate screenings, schedule and personal plan for screenings. Outcome: Progressing Towards Goal     Problem: Diabetes Self-Management  Goal: *Using medications safely  Description: State effect of diabetes medications on diabetes; name diabetes medication taking, action and side effects. Outcome: Progressing Towards Goal     Problem: Diabetes Self-Management  Goal: *Monitoring blood glucose, interpreting and using results  Description: Identify recommended blood glucose targets  and personal targets.   Outcome: Progressing Towards Goal     Problem: Patient Education: Go to Patient Education Activity  Goal: Patient/Family Education  Outcome: Progressing Towards Goal

## 2021-05-03 NOTE — PROGRESS NOTES
TRANSFER - OUT REPORT:    Verbal report given to [facility] RN(name) on Trae Cords  being transferred to [facility](unit) for routine progression of care       Report consisted of patients Situation, Background, Assessment and   Recommendations(SBAR). Information from the following report(s) Procedure Summary was reviewed with the receiving nurse. Lines:   Peripheral IV 05/01/21 Left Antecubital (Active)   Site Assessment Clean, dry, & intact 05/03/21 1000   Phlebitis Assessment 0 05/03/21 1000   Infiltration Assessment 0 05/03/21 1000   Dressing Status Clean, dry, & intact 05/03/21 1000   Dressing Type Transparent 05/03/21 1000   Hub Color/Line Status Brown;Flushed 05/03/21 1000   Action Taken Open ports on tubing capped 05/03/21 0520   Alcohol Cap Used Yes 05/03/21 1000        Opportunity for questions and clarification was provided.       Patient transported with:   Monitor RN

## 2021-05-03 NOTE — PROGRESS NOTES
Tr band removed without incident,  No active drainage noted from site, area covered with 2x2 and tegaderm.   Neuro/vasc assessment of right arm and hand WNL

## 2021-05-03 NOTE — PROGRESS NOTES
Cardiology Progress Note        Patient: Dottie Saint        Sex: male          DOA: 5/1/2021  YOB: 1955      Age:  77 y.o.        LOS:  LOS: 1 day   Assessment/Plan     Principal Problem:    Chest pain (8/29/2019)      Overview: Added automatically from request for surgery [id number]    Active Problems:    CAD (coronary artery disease) ()      Hypertension ()      Chronic obstructive pulmonary disease (HCC) ()      Elevated troponin I level (4/24/2018)      CKD (chronic kidney disease) stage 3, GFR 30-59 ml/min ([facility]) (4/24/2018)      NSTEMI (non-ST elevated myocardial infarction) ([facility] (4/24/2018)      Obesity (BMI 30-39.9) (5/2/2021)      Noncompliance with medication regimen (5/2/2021)      Type II diabetes mellitus ([facility] (5/2/2021)        Plan:    Chest pain   non-STEMI   known CAD    Plan left heart catheterization possible PCI. Risk, benefits and alternatives were discussed in detail patient agreed to proceed. Rest of the recommendation after cardiac catheterization. Subjective:    cc:  Chest pain   non-STEMI          REVIEW OF SYSTEMS:     General: No fevers or chills. Cardiovascular:  recurrent chest pain or pressure. No palpitations. No ankle swelling  Pulmonary: No SOB, orthopnea, PND  Gastrointestinal: No nausea, vomiting or diarrhea      Objective:      Visit Vitals  BP (!) 169/79 (BP 1 Location: Left leg, BP Patient Position: At rest)   Pulse 67   Temp 98 °F (36.7 °C)   Resp 20   Ht 5' 5\" (1.651 m)   Wt 88.5 kg (195 lb)   SpO2 98%   BMI 32.45 kg/m²     Body mass index is 32.45 kg/m². Physical Exam:  General Appearance: Comfortable, not using accessory muscles of respiration. NECK: No JVD, no thyroidomeglay. LUNGS: Clear bilaterally. HEART: S1+S2 audible,    ABD: Non-tender, BS Audible    EXT: No edema, and no cysnosis. VASCULAR EXAM: Pulses are intact. PSYCHIATRIC EXAM: Mood is appropriate.     Medication:  Current Facility-Administered Medications   Medication Dose Route Frequency    perflutren lipid microspheres (DEFINITY) in NS bolus IV  1 mL IntraVENous RAD ONCE    heparin 25,000 units in D5W 250 ml infusion  11.2-25 Units/kg/hr (Order-Specific) IntraVENous TITRATE    furosemide (LASIX) tablet 20 mg  20 mg Oral DAILY    aspirin delayed-release tablet 81 mg  81 mg Oral DAILY    atorvastatin (LIPITOR) tablet 40 mg  40 mg Oral DAILY    celecoxib (CELEBREX) capsule 200 mg  200 mg Oral BID    DULoxetine (CYMBALTA) capsule 30 mg  30 mg Oral DAILY    folic acid (FOLVITE) tablet 1 mg  1 mg Oral DAILY    montelukast (SINGULAIR) tablet 10 mg  10 mg Oral QHS    oxyCODONE-acetaminophen (PERCOCET 10)  mg per tablet 1 Tab  1 Tab Oral BID PRN    propranoloL (INDERAL) tablet 10 mg  10 mg Oral DAILY    sodium chloride (NS) flush 5-40 mL  5-40 mL IntraVENous Q8H    sodium chloride (NS) flush 5-40 mL  5-40 mL IntraVENous PRN    acetaminophen (TYLENOL) tablet 650 mg  650 mg Oral Q6H PRN    Or    acetaminophen (TYLENOL) suppository 650 mg  650 mg Rectal Q6H PRN    polyethylene glycol (MIRALAX) packet 17 g  17 g Oral DAILY PRN    promethazine (PHENERGAN) tablet 12.5 mg  12.5 mg Oral Q6H PRN    Or    ondansetron (ZOFRAN) injection 4 mg  4 mg IntraVENous Q6H PRN    famotidine (PEPCID) tablet 20 mg  20 mg Oral BID    insulin lispro (HUMALOG) injection   SubCUTAneous AC&HS    glucose chewable tablet 16 g  16 g Oral PRN    glucagon (GLUCAGEN) injection 1 mg  1 mg IntraMUSCular PRN    dextrose (D50W) injection syrg 12.5-25 g  25-50 mL IntraVENous PRN    arformoterol 15 mcg/budesonide 0.5 mg neb solution   Nebulization BID RT               Lab/Data Reviewed:  Procedures/imaging: see electronic medical records for all procedures/Xrays   and details which were not copied into this note but were reviewed prior to creation of Plan       All lab results for the last 24 hours reviewed.      Recent Labs     05/03/21  4296 05/02/21  0600 05/01/21  2110   WBC 8.7 9.9 11.0   HGB 9.9* 10.3* 11.1*   HCT 32.9* 34.6* 36.5    264 290     Recent Labs     05/03/21  0442 05/01/21  2110    140   K 3.1* 3.6    105   CO2 28 29   * 183*   BUN 17 17   CREA 1.42* 1.36*   CA 8.2* 8.1*       RADIOLOGY:  CT Results  (Last 48 hours)               05/02/21 0132  CTA CHEST W OR W WO CONT Final result    Impression:      1. No pulmonary embolism or other acute pulmonary abnormalities identified. Narrative:  EXAM: CTA chest       CLINICAL INDICATION/HISTORY: Chest pain. COMPARISON: 8/29/2019       TECHNIQUE: Axial CT imaging from the thoracic inlet through the diaphragm with   intravenous contrast. Coronal and sagittal MIP reformats were generated. One or   more dose reduction techniques were used on this CT: automated exposure control,   adjustment of the mAs and/or kVp according to patient size, and iterative   reconstruction techniques. The specific techniques used on this CT exam have   been documented in the patient's electronic medical record. Digital Imaging and   Communications in Medicine (DICOM) format image data are available to   nonaffiliated external healthcare facilities or entities on a secured, media   free, reciprocally searchable basis with patient authorization for at least a   12-month period after this study. _______________       FINDINGS:       EXAM QUALITY: Adequate       PULMONARY ARTERIES: No pulmonary embolism identified. MEDIASTINUM: Normal heart size. Aortic and coronary artery calcifications. No   aortic dissection. No pericardial effusion. LUNGS: No suspicious nodule or mass. AIRWAY: Normal.       PLEURA: Normal.       LYMPH NODES: No enlarged nodes. UPPER ABDOMEN: Simple hepatic cysts noted. Compensatory biliary dilation   secondary to prior cholecystectomy. Colonic diverticulosis.        BONES: No acute or aggressive osseous abnormalities identified. OTHER: None.       _______________               CXR Results  (Last 48 hours)               05/01/21 2116  XR CHEST PORT Final result    Impression:  No acute process       Narrative:  EXAM:  AP Portable Chest X-ray 1 view        INDICATION: Chest       COMPARISON: August 6, 2018       _______________       FINDINGS:  Heart and mediastinal contours are within normal limits for portable   radiograph. Lungs are clear of active disease. There are no pleural effusions. No acute osseous findings.        ________________                       Cardiology Procedures:   Results for orders placed or performed during the hospital encounter of 05/01/21   EKG, 12 LEAD, INITIAL   Result Value Ref Range    Ventricular Rate 73 BPM    Atrial Rate 73 BPM    P-R Interval 150 ms    QRS Duration 80 ms    Q-T Interval 410 ms    QTC Calculation (Bezet) 451 ms    Calculated P Axis 56 degrees    Calculated R Axis 19 degrees    Calculated T Axis 58 degrees    Diagnosis       Normal sinus rhythm  Nonspecific T wave abnormality  Abnormal ECG  When compared with ECG of 12-APR-2021 08:27,  T wave inversion now evident in Anterior leads        Echo Results  (Last 48 hours)    None       Cardiolite (Tc-99m Sestamibi) stress test    Signed By: French Watkins MD     May 3, 2021

## 2021-05-03 NOTE — PROGRESS NOTES
0725 Bedside and Verbal shift change report given to GUILLERMO Mansfield RN (oncoming nurse) by Chava Leonard RN (offgoing nurse). Report included the following information SBAR, Kardex, Intake/Output, and MAR. Pt in bed, call light in reach. 0802 Report called to Rere in Care Unit    9080 Spoke to MD Maxx Ford, pt's K 3.1, he gave telephone order for 40 mEq potassium PO. Will tell care unit of new order. 1594 TRANSFER - OUT REPORT:  Verbal report given to Rere FABIAN (name) on Heather Vera  being transferred to Care Unit (unit) for ordered procedure     Report consisted of patients Situation, Background, Assessment and   Recommendations(SBAR). Information from the following report(s) SBAR, Kardex, Intake/Output, MAR and Recent Results was reviewed with the receiving nurse. Lines:   Peripheral IV 05/01/21 Left Antecubital (Active)   Site Assessment Clean, dry, & intact 05/03/21 0520   Phlebitis Assessment 0 05/03/21 0520   Infiltration Assessment 0 05/03/21 0520   Dressing Status Clean, dry, & intact 05/03/21 0520   Dressing Type Tape;Transparent 05/03/21 0520   Hub Color/Line Status End cap changed;Green 05/03/21 0520   Action Taken Open ports on tubing capped 05/03/21 0520   Alcohol Cap Used Yes 05/03/21 0520   Opportunity for questions and clarification was provided. Patient transported with:   Registered Nurse    1500 TRANSFER - IN REPORT:  Verbal report received from 3495 Lisa Flores (name) on Heather [facility]  being received from Care Unit (unit) for routine post - op    Report consisted of patients Situation, Background, Assessment and   Recommendations(SBAR). Information from the following report(s) SBAR, Kardex, Intake/Output, MAR and Recent Results was reviewed with the receiving nurse. Opportunity for questions and clarification was provided. Assessment completed upon patients arrival to unit and care assumed. 1718 Pt assessed. No signs of acute distress. Pt in bed.     1930 Bedside and Verbal shift change report given to MICHAEL Brito RN (oncoming nurse) by Paolo Candelario. Fortino Garcia RN (offgoing nurse). Report included the following information SBAR, Kardex, Intake/Output and MAR. Pt in bed, call light in reach.

## 2021-05-03 NOTE — PROGRESS NOTES
Hospitalist Progress Note    Patient: Cam Dela Cruz MRN: [id number]  CSN: [id number]    YOB: 1955  Age: 77 y.o. Sex: male    DOA: 5/1/2021 LOS:  LOS: 1 day                Assessment/Plan     Patient Active Problem List   Diagnosis Code    CAD (coronary artery disease) I25.10    Hypertension I10    Chronic obstructive pulmonary disease ([facility] J44.9    Elevated troponin I level R77.8    CKD (chronic kidney disease) stage 3, GFR 30-59 ml/min ([facility] N18.30    Asthma J45.909    NSTEMI (non-ST elevated myocardial infarction) ([facility] I21.4    Asthma with COPD with exacerbation ([facility]) J44.1, J45.901    Exertional dyspnea R06.00    Rheumatoid arthritis ([facility] M06.9    VIRK (dyspnea on exertion) R06.00    Chest pain R07.9    Tachycardia R00.0    Severe persistent asthma J45.50    Obesity (BMI 30-39. 9) E66.9    Noncompliance with medication regimen Z91.14    Type II diabetes mellitus ([facility] E11.9            78 yo male with history of Asthma/COPD, CAD admitted for chest pain,. No chest pain now. NSTEMI -  S/p cath with no changes from previous. Continue with medical management. DM -  On SSI    COPD -  Will use neb treatment as needed    CKD stage 3 -  Monitor renal function    HTN -  controlled    GERD - on pepcid    DVT prophylaxis with lovenox      Disposition : 1-2 days    Review of systems  General: No fevers or chills. Cardiovascular: No chest pain or pressure. No palpitations. Pulmonary: No shortness of breath. Gastrointestinal: No nausea, vomiting. Physical Exam:  General: Awake, cooperative, no acute distress    HEENT: NC, Atraumatic. PERRLA, anicteric sclerae. Lungs: Decreased breath sounds lower lungs bilaterally  Heart:  S1 S2,  No murmur, No Rubs, No Gallops  Abdomen: Soft, Non distended, Non tender.  +Bowel sounds,   Extremities: No c/c/e  Psych:   Not anxious or agitated. Neurologic:  No acute neurological deficit.          Vital signs/Intake and Output:  Visit Vitals  /66   Pulse 64   Temp 98 °F (36.7 °C)   Resp 18   Ht 5' 5\" (1.651 m)   Wt 88.9 kg (196 lb)   SpO2 97%   BMI 32.62 kg/m²     Current Shift:  No intake/output data recorded. Last three shifts:  05/01 1901 - 05/03 0700  In: 253.3 [P.O.:240; I.V.:13.3]  Out: 400 [Urine:400]            Labs: Results:       Chemistry Recent Labs     05/03/21 0442 05/01/21  2110   * 183*    140   K 3.1* 3.6    105   CO2 28 29   BUN 17 17   CREA 1.42* 1.36*   CA 8.2* 8.1*   AGAP 8 6   BUCR 12 13    137*   TP 5.6* 6.3*   ALB 2.7* 3.1*   GLOB 2.9 3.2   AGRAT 0.9 1.0      CBC w/Diff Recent Labs     05/03/21 0442 05/02/21  0600 05/01/21  2110   WBC 8.7 9.9 11.0   RBC 3.70* 3.88* 4.09*   HGB 9.9* 10.3* 11.1*   HCT 32.9* 34.6* 36.5    264 290   GRANS 62 64 69   LYMPH 19* 17* 15*   EOS 3 3 3      Cardiac Enzymes Recent Labs     05/02/21  1752 05/02/21  1213   CPK 73 65   CKND1 1.6 2.0      Coagulation Recent Labs     05/03/21  0442 05/02/21  0830   APTT 66.8* 73.9*       Lipid Panel Lab Results   Component Value Date/Time    Cholesterol, total 114 05/02/2021 06:00 AM    HDL Cholesterol 64 (H) 05/02/2021 06:00 AM    LDL, calculated 38.6 05/02/2021 06:00 AM    VLDL, calculated 11.4 05/02/2021 06:00 AM    Triglyceride 57 05/02/2021 06:00 AM    CHOL/HDL Ratio 1.8 05/02/2021 06:00 AM      BNP No results for input(s): BNPP in the last 72 hours.    Liver Enzymes Recent Labs     05/03/21  0442   TP 5.6*   ALB 2.7*         Thyroid Studies Lab Results   Component Value Date/Time    TSH 1.730 11/07/2014 04:23 AM        Procedures/imaging: see electronic medical records for all procedures/Xrays and details which were not copied into this note but were reviewed prior to creation of Plan

## 2021-05-03 NOTE — PROGRESS NOTES
Problem: Unstable angina/NSTEMI: Day of Admission/Day 1  Goal: Activity/Safety  Outcome: Resolved/Met  Goal: Consults, if ordered  Outcome: Resolved/Met  Goal: Diagnostic Test/Procedures  Outcome: Resolved/Met  Goal: Nutrition/Diet  Outcome: Resolved/Met  Goal: Discharge Planning  Outcome: Resolved/Met  Goal: Medications  Outcome: Resolved/Met  Goal: Respiratory  Outcome: Resolved/Met  Goal: Treatments/Interventions/Procedures  Outcome: Resolved/Met  Goal: Psychosocial  Outcome: Resolved/Met  Goal: *Hemodynamically stable  Outcome: Resolved/Met  Goal: *Optimal pain control at patient's stated goal  Outcome: Resolved/Met  Goal: *Lungs clear or at baseline  Outcome: Resolved/Met     Problem: Unstable angina/NSTEMI: Day 2  Goal: Activity/Safety  Outcome: Progressing Towards Goal  Goal: Consults, if ordered  Outcome: Progressing Towards Goal  Goal: Diagnostic Test/Procedures  Outcome: Progressing Towards Goal  Goal: Nutrition/Diet  Outcome: Progressing Towards Goal  Goal: Discharge Planning  Outcome: Progressing Towards Goal  Goal: Medications  Outcome: Progressing Towards Goal  Goal: Respiratory  Outcome: Progressing Towards Goal  Goal: Treatments/Interventions/Procedures  Outcome: Progressing Towards Goal  Goal: Psychosocial  Outcome: Progressing Towards Goal  Goal: *Hemodynamically stable  Outcome: Progressing Towards Goal  Goal: *Optimal pain control at patient's stated goal  Outcome: Progressing Towards Goal  Goal: *Lungs clear or at baseline  Outcome: Progressing Towards Goal

## 2021-05-03 NOTE — PROGRESS NOTES
DC Plan:  Home with MD F/U    CM met with pt at bedside. Pt states that he was independent prior to this admission and uses no DME. Pt lives with his 32 y.o. autistic son for whom he provides care. Patient has designated his brother, Yaron Michael ([phone]) as his primary healthcare decision maker if needed. His brother will trasport him home when discharged. CM continuing to follow  Pt for d/c needs. Care Management Interventions  PCP Verified by CM: Yes  Last Visit to PCP: 03/01/21  Mode of Transport at Discharge:  Other (see comment)(Brother)  Transition of Care Consult (CM Consult): Discharge Planning  Current Support Network: Own Home(Lives with adult autistic son)  Confirm Follow Up Transport: Family(Brother)  The Plan for Transition of Care is Related to the Following Treatment Goals : Non Stemi, Chest Pain, CAD  Discharge Location  Discharge Placement: Home with family assistance

## 2021-05-03 NOTE — ROUTINE PROCESS
Cardiac Cath Lab:  Pre Procedure Chart Check     Patients chart was accessed and reviewed for possible and/or scheduled procedure. Creatinine Clearance:  Serum creatinine: 1.42 mg/dL (H) 05/03/21 0442  Estimated creatinine clearance: 51.6 mL/min (A)    Total Contrast  Load:  3 x estimated clearance amount=  154.8ml    75% of Contrast Load:  0.75 x Total Contrast Load=    116.1ml    Recent Labs     05/03/21 0442 05/02/21  1752   WBC 8.7  --    RBC 3.70*  --    HCT 32.9*  --    HGB 9.9*  --      --    APTT 66.8*  --      --    K 3.1*  --    BUN 17  --    CREA 1.42*  --    GFRAA >60  --    GFRNA 50*  --    CA 8.2*  --    CPK  --  73   CKMB  --  1.2   CKND1  --  1.6   TROIQ  --  0.10*       BMI: Body mass index is 32.45 kg/m². ALLERGIES:   Allergies   Allergen Reactions    Ciprofloxacin Other (comments)     PT states that he turns red.  Tape [Adhesive] Other (comments)     Pt states, \"it rips my skin\"       Lines:        Peripheral IV 05/01/21 Left Antecubital (Active)   Site Assessment Clean, dry, & intact 05/03/21 0520   Phlebitis Assessment 0 05/03/21 0520   Infiltration Assessment 0 05/03/21 0520   Dressing Status Clean, dry, & intact 05/03/21 0520   Dressing Type Tape;Transparent 05/03/21 0520   Hub Color/Line Status End cap changed;Green 05/03/21 0520   Action Taken Open ports on tubing capped 05/03/21 0520   Alcohol Cap Used Yes 05/03/21 0520          History:    Past Medical History:   Diagnosis Date    Arthritis     Asthma     CAD (coronary artery disease)     stents x 5    Cancer ([address]     melanoma on left side of face    Chronic kidney disease     Chronic pain     left knee    COPD     Diabetes ([facility]     newly dm have not started med yet    DVT (deep venous thrombosis) ([facility] [address] 2001    left leg    Hypertension     Myocardial infarction ([facility] 2005    Obesity (BMI 30-39. 9)     Primary localized osteoarthritis of left knee 7/12/2019    Pulmonary embolism ([facility] [facility] 2017    [facility]      Past Surgical History:   Procedure Laterality Date    FRACTIONAL FLOW RESERVE  4/29/2018    FRACTIONAL FLOW RESERVE ADDL  4/29/2018    HX CHOLECYSTECTOMY      HX COLOSTOMY  1999    HX COLOSTOMY TAKE DOWN      HX CORONARY STENT PLACEMENT      HX HEART CATHETERIZATION  2005    stents x 4    HX HEART CATHETERIZATION  2014    stent x 1    HX HERNIA REPAIR      x 4    HX LUMBAR DISKECTOMY  2014    LEFT HEART PERCUTANEOUS  4/29/2018    DC ABDOMEN SURGERY PROC UNLISTED      Laparotomy bowel resection instestinal rupture, colostomy    DC ABDOMEN SURGERY PROC UNLISTED  1999    Revision of colostomy    JH CORONARY ARTERIOGRAPH  4/29/2018     Patient Active Problem List   Diagnosis Code    CAD (coronary artery disease) I25.10    Hypertension I10    Chronic obstructive pulmonary disease ([facility] J44.9    Elevated troponin I level R77.8    CKD (chronic kidney disease) stage 3, GFR 30-59 ml/min ([facility] N18.30    Asthma J45.909    NSTEMI (non-ST elevated myocardial infarction) ([facility]) I21.4    Asthma with COPD with exacerbation ([facility]) J44.1, J45.901    Exertional dyspnea R06.00    Rheumatoid arthritis ([facility]) M06.9    VIRK (dyspnea on exertion) R06.00    Chest pain R07.9    Tachycardia R00.0    Severe persistent asthma J45.50    Obesity (BMI 30-39. 9) E66.9    Noncompliance with medication regimen Z91.14    Type II diabetes mellitus ([facility] E11.9

## 2021-05-04 VITALS
TEMPERATURE: 97.4 F | SYSTOLIC BLOOD PRESSURE: 147 MMHG | BODY MASS INDEX: 32.65 KG/M2 | HEART RATE: 65 BPM | HEIGHT: 65 IN | WEIGHT: 196 LBS | OXYGEN SATURATION: 99 % | DIASTOLIC BLOOD PRESSURE: 76 MMHG | RESPIRATION RATE: 16 BRPM

## 2021-05-04 PROBLEM — R07.9 CHEST PAIN: Status: RESOLVED | Noted: 2019-08-29 | Resolved: 2021-05-04

## 2021-05-04 LAB
ALBUMIN SERPL-MCNC: 2.8 G/DL (ref 3.4–5)
ALBUMIN/GLOB SERPL: 0.9 {RATIO} (ref 0.8–1.7)
ALP SERPL-CCNC: 111 U/L (ref 45–117)
ALT SERPL-CCNC: 17 U/L (ref 16–61)
ANION GAP SERPL CALC-SCNC: 6 MMOL/L (ref 3–18)
AST SERPL-CCNC: 18 U/L (ref 10–38)
ATRIAL RATE: 73 BPM
BILIRUB SERPL-MCNC: 0.4 MG/DL (ref 0.2–1)
BUN SERPL-MCNC: 18 MG/DL (ref 7–18)
BUN/CREAT SERPL: 15 (ref 12–20)
CALCIUM SERPL-MCNC: 8.4 MG/DL (ref 8.5–10.1)
CALCULATED P AXIS, ECG09: 56 DEGREES
CALCULATED R AXIS, ECG10: 19 DEGREES
CALCULATED T AXIS, ECG11: 58 DEGREES
CHLORIDE SERPL-SCNC: 106 MMOL/L (ref 100–111)
CO2 SERPL-SCNC: 27 MMOL/L (ref 21–32)
CRD SYSTOLIC BP: 104
CREAT SERPL-MCNC: 1.17 MG/DL (ref 0.6–1.3)
DIAGNOSIS, 93000: NORMAL
ERYTHROCYTE [DISTWIDTH] IN BLOOD BY AUTOMATED COUNT: 17.2 % (ref 11.6–14.5)
GLOBULIN SER CALC-MCNC: 3 G/DL (ref 2–4)
GLUCOSE BLD STRIP.AUTO-MCNC: 143 MG/DL (ref 70–110)
GLUCOSE BLD STRIP.AUTO-MCNC: 175 MG/DL (ref 70–110)
GLUCOSE SERPL-MCNC: 114 MG/DL (ref 74–99)
HCT VFR BLD AUTO: 33.6 % (ref 36–48)
HGB BLD-MCNC: 10 G/DL (ref 13–16)
MCH RBC QN AUTO: 26.6 PG (ref 24–34)
MCHC RBC AUTO-ENTMCNC: 29.8 G/DL (ref 31–37)
MCV RBC AUTO: 89.4 FL (ref 74–97)
P-R INTERVAL, ECG05: 150 MS
PLATELET # BLD AUTO: 252 K/UL (ref 135–420)
PMV BLD AUTO: 9.2 FL (ref 9.2–11.8)
POTASSIUM SERPL-SCNC: 3.9 MMOL/L (ref 3.5–5.5)
PROT SERPL-MCNC: 5.8 G/DL (ref 6.4–8.2)
Q-T INTERVAL, ECG07: 410 MS
QRS DURATION, ECG06: 80 MS
QTC CALCULATION (BEZET), ECG08: 451 MS
RBC # BLD AUTO: 3.76 M/UL (ref 4.35–5.65)
SODIUM SERPL-SCNC: 139 MMOL/L (ref 136–145)
VENTRICULAR RATE, ECG03: 73 BPM
WBC # BLD AUTO: 8 K/UL (ref 4.6–13.2)

## 2021-05-04 PROCEDURE — 94640 AIRWAY INHALATION TREATMENT: CPT

## 2021-05-04 PROCEDURE — 74011636637 HC RX REV CODE- 636/637: Performed by: FAMILY MEDICINE

## 2021-05-04 PROCEDURE — 94760 N-INVAS EAR/PLS OXIMETRY 1: CPT

## 2021-05-04 PROCEDURE — 36415 COLL VENOUS BLD VENIPUNCTURE: CPT

## 2021-05-04 PROCEDURE — 80053 COMPREHEN METABOLIC PANEL: CPT

## 2021-05-04 PROCEDURE — 85027 COMPLETE CBC AUTOMATED: CPT

## 2021-05-04 PROCEDURE — 74011000250 HC RX REV CODE- 250: Performed by: FAMILY MEDICINE

## 2021-05-04 PROCEDURE — 82962 GLUCOSE BLOOD TEST: CPT

## 2021-05-04 PROCEDURE — 74011250637 HC RX REV CODE- 250/637: Performed by: FAMILY MEDICINE

## 2021-05-04 RX ADMIN — FAMOTIDINE 20 MG: 20 TABLET, FILM COATED ORAL at 08:46

## 2021-05-04 RX ADMIN — Medication 10 ML: at 06:00

## 2021-05-04 RX ADMIN — DULOXETINE HYDROCHLORIDE 30 MG: 30 CAPSULE, DELAYED RELEASE ORAL at 08:46

## 2021-05-04 RX ADMIN — INSULIN LISPRO 2 UNITS: 100 INJECTION, SOLUTION INTRAVENOUS; SUBCUTANEOUS at 08:50

## 2021-05-04 RX ADMIN — Medication 81 MG: at 08:46

## 2021-05-04 RX ADMIN — FOLIC ACID 1 MG: 1 TABLET ORAL at 08:46

## 2021-05-04 RX ADMIN — ARFORMOTEROL TARTRATE: 15 SOLUTION RESPIRATORY (INHALATION) at 07:51

## 2021-05-04 RX ADMIN — CELECOXIB 200 MG: 100 CAPSULE ORAL at 08:46

## 2021-05-04 RX ADMIN — ATORVASTATIN CALCIUM 40 MG: 20 TABLET, FILM COATED ORAL at 08:46

## 2021-05-04 RX ADMIN — FUROSEMIDE 20 MG: 20 TABLET ORAL at 08:46

## 2021-05-04 RX ADMIN — PROPRANOLOL HYDROCHLORIDE 10 MG: 20 TABLET ORAL at 08:46

## 2021-05-04 NOTE — PROGRESS NOTES
Problem: Patient Education: Go to Patient Education Activity  Goal: Patient/Family Education  Outcome: Progressing Towards Goal     Problem: Unstable angina/NSTEMI: Day of Admission/Day 1  Goal: Off Pathway (Use only if patient is Off Pathway)  Outcome: Progressing Towards Goal     Problem: Unstable angina/NSTEMI: Day 2  Goal: Off Pathway (Use only if patient is Off Pathway)  Outcome: Progressing Towards Goal  Goal: Activity/Safety  Outcome: Progressing Towards Goal  Goal: Consults, if ordered  Outcome: Progressing Towards Goal  Goal: Diagnostic Test/Procedures  Outcome: Progressing Towards Goal  Goal: Nutrition/Diet  Outcome: Progressing Towards Goal  Goal: Discharge Planning  Outcome: Progressing Towards Goal  Goal: Medications  Outcome: Progressing Towards Goal  Goal: Respiratory  Outcome: Progressing Towards Goal  Goal: Treatments/Interventions/Procedures  Outcome: Progressing Towards Goal  Goal: Psychosocial  Outcome: Progressing Towards Goal  Goal: *Hemodynamically stable  Outcome: Progressing Towards Goal  Goal: *Optimal pain control at patient's stated goal  Outcome: Progressing Towards Goal  Goal: *Lungs clear or at baseline  Outcome: Progressing Towards Goal     Problem: Unstable Angina/NSTEMI: Discharge Outcomes  Goal: *Hemodynamically stable  Outcome: Progressing Towards Goal  Goal: *Stable cardiac rhythm  Outcome: Progressing Towards Goal  Goal: *Lungs clear or at baseline  Outcome: Progressing Towards Goal  Goal: *Optimal pain control at patient's stated goal  Outcome: Progressing Towards Goal  Goal: *Identifies cardiac risk factors  Outcome: Progressing Towards Goal  Goal: *Verbalizes home exercise program, activity guidelines, cardiac precautions  Outcome: Progressing Towards Goal  Goal: *Verbalizes understanding and describes prescribed diet  Outcome: Progressing Towards Goal  Goal: *Verbalizes name, dosage, time, side effects, and number of days to continue medications  Outcome: Progressing Towards Goal  Goal: *Anxiety reduced or absent  Outcome: Progressing Towards Goal  Goal: *Understands and describes signs and symptoms to report to providers(Stroke Metric)  Outcome: Progressing Towards Goal  Goal: *Describes follow-up/return visits to physicians  Outcome: Progressing Towards Goal  Goal: *Describes available resources and support systems  Outcome: Progressing Towards Goal  Goal: *Influenza immunization  Outcome: Progressing Towards Goal  Goal: *Pneumococcal immunization  Outcome: Progressing Towards Goal  Goal: *Describes smoking cessation resources  Outcome: Progressing Towards Goal     Problem: Diabetes Self-Management  Goal: *Disease process and treatment process  Description: Define diabetes and identify own type of diabetes; list 3 options for treating diabetes. Outcome: Progressing Towards Goal  Goal: *Incorporating nutritional management into lifestyle  Description: Describe effect of type, amount and timing of food on blood glucose; list 3 methods for planning meals. Outcome: Progressing Towards Goal  Goal: *Incorporating physical activity into lifestyle  Description: State effect of exercise on blood glucose levels. Outcome: Progressing Towards Goal  Goal: *Developing strategies to promote health/change behavior  Description: Define the ABC's of diabetes; identify appropriate screenings, schedule and personal plan for screenings. Outcome: Progressing Towards Goal  Goal: *Using medications safely  Description: State effect of diabetes medications on diabetes; name diabetes medication taking, action and side effects. Outcome: Progressing Towards Goal  Goal: *Monitoring blood glucose, interpreting and using results  Description: Identify recommended blood glucose targets  and personal targets.   Outcome: Progressing Towards Goal  Goal: *Prevention, detection, treatment of acute complications  Description: List symptoms of hyper- and hypoglycemia; describe how to treat low blood sugar and actions for lowering  high blood glucose level. Outcome: Progressing Towards Goal  Goal: *Prevention, detection and treatment of chronic complications  Description: Define the natural course of diabetes and describe the relationship of blood glucose levels to long term complications of diabetes. Outcome: Progressing Towards Goal  Goal: *Developing strategies to address psychosocial issues  Description: Describe feelings about living with diabetes; identify support needed and support network  Outcome: Progressing Towards Goal  Goal: *Insulin pump training  Outcome: Progressing Towards Goal  Goal: *Sick day guidelines  Outcome: Progressing Towards Goal  Goal: *Patient Specific Goal (EDIT GOAL, INSERT TEXT)  Outcome: Progressing Towards Goal     Problem: Falls - Risk of  Goal: *Absence of Falls  Description: Document Karyn Fall Risk and appropriate interventions in the flowsheet.   Outcome: Progressing Towards Goal  Note: Fall Risk Interventions:            Medication Interventions: Evaluate medications/consider consulting pharmacy, Patient to call before getting OOB, Teach patient to arise slowly                   Problem: Patient Education: Go to Patient Education Activity  Goal: Patient/Family Education  Outcome: Progressing Towards Goal     Problem: Patient Education: Go to Patient Education Activity  Goal: Patient/Family Education  Outcome: Progressing Towards Goal     Problem: Patient Education: Go to Patient Education Activity  Goal: Patient/Family Education  Outcome: Progressing Towards Goal     Problem: Cath Lab Procedures: Pre-Procedure  Goal: Off Pathway (Use only if patient is Off Pathway)  Outcome: Progressing Towards Goal  Goal: Activity/Safety  Outcome: Progressing Towards Goal  Goal: Diagnostic Test/Procedures  Outcome: Progressing Towards Goal  Goal: Nutrition/Diet  Outcome: Progressing Towards Goal  Goal: Discharge Planning  Outcome: Progressing Towards Goal  Goal: Medications  Outcome: Progressing Towards Goal  Goal: Respiratory  Outcome: Progressing Towards Goal  Goal: Treatments/Interventions/Procedures  Outcome: Progressing Towards Goal  Goal: Psychosocial  Outcome: Progressing Towards Goal  Goal: *Verbalize description of procedure  Outcome: Progressing Towards Goal  Goal: *Consent signed  Outcome: Progressing Towards Goal     Problem: Cath Lab Procedures: Post-Cath Day of Procedure (Initiate SCIP Measures for Post-Op Care)  Goal: Off Pathway (Use only if patient is Off Pathway)  Outcome: Progressing Towards Goal  Goal: Activity/Safety  Outcome: Progressing Towards Goal  Goal: Consults, if ordered  Outcome: Progressing Towards Goal  Goal: Diagnostic Test/Procedures  Outcome: Progressing Towards Goal  Goal: Nutrition/Diet  Outcome: Progressing Towards Goal  Goal: Discharge Planning  Outcome: Progressing Towards Goal  Goal: Medications  Outcome: Progressing Towards Goal  Goal: Respiratory  Outcome: Progressing Towards Goal  Goal: Treatments/Interventions/Procedures  Outcome: Progressing Towards Goal  Goal: Psychosocial  Outcome: Progressing Towards Goal  Goal: *Procedure site is without bleeding and signs of infection six hours post sheath removal  Outcome: Progressing Towards Goal  Goal: *Hemodynamically stable  Outcome: Progressing Towards Goal  Goal: *Optimal pain control at patient's stated goal  Outcome: Progressing Towards Goal     Problem: Cath Lab Procedures: Post-Cath Day 1  Goal: Off Pathway (Use only if patient is Off Pathway)  Outcome: Progressing Towards Goal  Goal: Activity/Safety  Outcome: Progressing Towards Goal  Goal: Diagnostic Test/Procedures  Outcome: Progressing Towards Goal  Goal: Nutrition/Diet  Outcome: Progressing Towards Goal  Goal: Discharge Planning  Outcome: Progressing Towards Goal  Goal: Medications  Outcome: Progressing Towards Goal  Goal: Respiratory  Outcome: Progressing Towards Goal  Goal: Treatments/Interventions/Procedures  Outcome: Progressing Towards Goal  Goal: Psychosocial  Outcome: Progressing Towards Goal     Problem: Cath Lab Procedures: Discharge Outcomes  Goal: *Stable cardiac rhythm  Outcome: Progressing Towards Goal  Goal: *Hemodynamically stable  Outcome: Progressing Towards Goal  Goal: *Optimal pain control at patient's stated goal  Outcome: Progressing Towards Goal  Goal: *Pulses palpable, skin color within defined limits, skin temperature warm  Outcome: Progressing Towards Goal  Goal: *Lungs clear or at baseline  Outcome: Progressing Towards Goal  Goal: *Demonstrates ability to perform prescribed activity without shortness of breath or discomfort  Outcome: Progressing Towards Goal  Goal: *Verbalizes home exercise program, activity guidelines, cardiac precautions  Outcome: Progressing Towards Goal  Goal: *Verbalizes understanding and describes prescribed diet  Outcome: Progressing Towards Goal  Goal: *Verbalizes understanding and describes medication purposes and frequencies  Outcome: Progressing Towards Goal  Goal: *Identifies cardiac risk factors  Outcome: Progressing Towards Goal  Goal: *No signs and symptoms of infection or wound complications  Outcome: Progressing Towards Goal  Goal: *Anxiety reduced or absent  Outcome: Progressing Towards Goal  Goal: *Verbalizes and demonstrates incision care  Outcome: Progressing Towards Goal  Goal: *Understands and describes signs and symptoms to report to providers(Stroke Metric)  Outcome: Progressing Towards Goal  Goal: *Describes follow-up/return visits to physicians  Outcome: Progressing Towards Goal  Goal: *Describes available resources and support systems  Outcome: Progressing Towards Goal  Goal: *Influenza immunization  Outcome: Progressing Towards Goal  Goal: *Pneumococcal immunization  Outcome: Progressing Towards Goal

## 2021-05-04 NOTE — PROGRESS NOTES
Cardiology Progress Note        Patient: Vivi Lomeli        Sex: male          DOA: 5/1/2021  YOB: 1955      Age:  77 y.o.        LOS:  LOS: 2 days   Assessment/Plan     Active Problems:    CAD (coronary artery disease) ()      Hypertension ()      Chronic obstructive pulmonary disease (HCC) ()      Elevated troponin I level (4/24/2018)      CKD (chronic kidney disease) stage 3, GFR 30-59 ml/min ([facility]) (4/24/2018)      NSTEMI (non-ST elevated myocardial infarction) ([facility] [address] (4/24/2018)      Obesity (BMI 30-39.9) (5/2/2021)      Noncompliance with medication regimen (5/2/2021)      Type II diabetes mellitus ([facility] [address] (5/2/2021)        Plan:    Chest pain  Non-STEMI  CAD      Coronary angiogram done yesterday no significant interval change comparing with 1 month ago angiogram. Discussed with patient about compliance to medication especially aspirin. Right radial artery access area is normal  Renal function is normal   cardiac telemetry stable                Patient can be discharged from cardiac standpoint. Follow-up with me as I already scheduled. thx    Subjective:    cc:  Chest pain  Non-STEMI  CAD        REVIEW OF SYSTEMS:     General: No fevers or chills. Cardiovascular: No chest pain or pressure. No palpitations. No ankle swelling  Pulmonary: No SOB, orthopnea, PND  Gastrointestinal: No nausea, vomiting or diarrhea      Objective:      Visit Vitals  BP (!) 147/76 (BP 1 Location: Left leg, BP Patient Position: At rest)   Pulse 65   Temp 97.4 °F (36.3 °C)   Resp 16   Ht 5' 5\" (1.651 m)   Wt 88.9 kg (196 lb)   SpO2 99%   BMI 32.62 kg/m²     Body mass index is 32.62 kg/m². Physical Exam:  General Appearance: Comfortable, not using accessory muscles of respiration. NECK: No JVD, no thyroidomeglay. LUNGS: Clear bilaterally. HEART: S1+S2 audible,    ABD: Non-tender, BS Audible    EXT: No edema, and no cysnosis. VASCULAR EXAM: Pulses are intact.     PSYCHIATRIC EXAM: Mood is appropriate. Medication:  Current Facility-Administered Medications   Medication Dose Route Frequency    enoxaparin (LOVENOX) injection 40 mg  40 mg SubCUTAneous Q24H    furosemide (LASIX) tablet 20 mg  20 mg Oral DAILY    aspirin delayed-release tablet 81 mg  81 mg Oral DAILY    atorvastatin (LIPITOR) tablet 40 mg  40 mg Oral DAILY    celecoxib (CELEBREX) capsule 200 mg  200 mg Oral BID    DULoxetine (CYMBALTA) capsule 30 mg  30 mg Oral DAILY    folic acid (FOLVITE) tablet 1 mg  1 mg Oral DAILY    montelukast (SINGULAIR) tablet 10 mg  10 mg Oral QHS    oxyCODONE-acetaminophen (PERCOCET 10)  mg per tablet 1 Tab  1 Tab Oral BID PRN    propranoloL (INDERAL) tablet 10 mg  10 mg Oral DAILY    sodium chloride (NS) flush 5-40 mL  5-40 mL IntraVENous Q8H    sodium chloride (NS) flush 5-40 mL  5-40 mL IntraVENous PRN    acetaminophen (TYLENOL) tablet 650 mg  650 mg Oral Q6H PRN    Or    acetaminophen (TYLENOL) suppository 650 mg  650 mg Rectal Q6H PRN    polyethylene glycol (MIRALAX) packet 17 g  17 g Oral DAILY PRN    promethazine (PHENERGAN) tablet 12.5 mg  12.5 mg Oral Q6H PRN    Or    ondansetron (ZOFRAN) injection 4 mg  4 mg IntraVENous Q6H PRN    famotidine (PEPCID) tablet 20 mg  20 mg Oral BID    insulin lispro (HUMALOG) injection   SubCUTAneous AC&HS    glucose chewable tablet 16 g  16 g Oral PRN    glucagon (GLUCAGEN) injection 1 mg  1 mg IntraMUSCular PRN    dextrose (D50W) injection syrg 12.5-25 g  25-50 mL IntraVENous PRN    arformoterol 15 mcg/budesonide 0.5 mg neb solution   Nebulization BID RT               Lab/Data Reviewed:  Procedures/imaging: see electronic medical records for all procedures/Xrays   and details which were not copied into this note but were reviewed prior to creation of Plan       All lab results for the last 24 hours reviewed.      Recent Labs     05/04/21  0150 05/03/21  0442 05/02/21  0600   WBC 8.0 8.7 9.9   HGB 10.0* 9.9* 10.3*   HCT 33.6* 32.9* 34.6*  232 264     Recent Labs     05/04/21  0150 05/03/21  0442 05/01/21  2110    140 140   K 3.9 3.1* 3.6    104 105   CO2 27 28 29   * 118* 183*   BUN 18 17 17   CREA 1.17 1.42* 1.36*   CA 8.4* 8.2* 8.1*       RADIOLOGY:  CT Results  (Last 48 hours)    None        CXR Results  (Last 48 hours)    None            Cardiology Procedures:   Results for orders placed or performed during the hospital encounter of 05/01/21   EKG, 12 LEAD, INITIAL   Result Value Ref Range    Ventricular Rate 73 BPM    Atrial Rate 73 BPM    P-R Interval 150 ms    QRS Duration 80 ms    Q-T Interval 410 ms    QTC Calculation (Bezet) 451 ms    Calculated P Axis 56 degrees    Calculated R Axis 19 degrees    Calculated T Axis 58 degrees    Diagnosis       Normal sinus rhythm  Nonspecific T wave abnormality  Abnormal ECG  When compared with ECG of 12-APR-2021 08:27,  T wave inversion now evident in Anterior leads        Echo Results  (Last 48 hours)    None       Cardiolite (Tc-99m Sestamibi) stress test    Signed By: David Toledo MD     May 4, 2021

## 2021-05-04 NOTE — DISCHARGE SUMMARY
Discharge Summary    Patient: Bobo Kim MRN: [id number]  CSN: [id number]    YOB: 1955  Age: 77 y.o. Sex: male    DOA: 5/1/2021 LOS:  LOS: 2 days   Discharge Date:      Primary Care Provider:  Renee Antonio MD    Admission Diagnoses: ACS (acute coronary syndrome) [facility]) [I24.9]    Discharge Diagnoses:    Problem List as of 5/4/2021 Date Reviewed: 8/30/2019          Codes Class Noted - Resolved    Obesity (BMI 30-39. 9) ICD-10-CM: E66.9  ICD-9-CM: 278.00  5/2/2021 - Present        Noncompliance with medication regimen ICD-10-CM: Z91.14  ICD-9-CM: V15.81  5/2/2021 - Present        Type II diabetes mellitus ([facility] ICD-10-CM: E11.9  ICD-9-CM: 250.00  5/2/2021 - Present        Severe persistent asthma ICD-10-CM: J45.50  ICD-9-CM: 493.90  2/21/2020 - Present        Rheumatoid arthritis ([facility] ICD-10-CM: M06.9  ICD-9-CM: 714.0  8/30/2019 - Present        Elevated troponin I level ICD-10-CM: R77.8  ICD-9-CM: 790.6  4/24/2018 - Present        CKD (chronic kidney disease) stage 3, GFR 30-59 ml/min ([facility]) ICD-10-CM: N18.30  ICD-9-CM: 585.3  4/24/2018 - Present        Asthma ICD-10-CM: J45.909  ICD-9-CM: 493.90  4/24/2018 - Present        NSTEMI (non-ST elevated myocardial infarction) ([facility] ICD-10-CM: I21.4  ICD-9-CM: 410.70  4/24/2018 - Present        CAD (coronary artery disease) ICD-10-CM: I25.10  ICD-9-CM: 414.00  Unknown - Present        Hypertension ICD-10-CM: I10  ICD-9-CM: 401.9  Unknown - Present        Chronic obstructive pulmonary disease ([facility] ICD-10-CM: J44.9  ICD-9-CM: 496  Unknown - Present        * (Principal) RESOLVED: Chest pain ICD-10-CM: R07.9  ICD-9-CM: 786.50  8/29/2019 - 5/4/2021    Overview Signed 9/2/2019  8:11 AM by Jose Juan MD     Added automatically from request for surgery [id number]                   Discharge Medications:     Current Discharge Medication List      CONTINUE these medications which have NOT CHANGED    Details   aspirin delayed-release 81 mg tablet Take 81 mg by mouth daily. folic acid (FOLVITE) 1 mg tablet Take 1 mg by mouth daily. budesonide-formoteroL (Symbicort) 160-4.5 mcg/actuation HFAA Take 2 Puffs by inhalation two (2) times a day. oxyCODONE-acetaminophen (PERCOCET 10)  mg per tablet Take 1 Tab by mouth two (2) times daily as needed. atorvastatin (LIPITOR) 40 mg tablet Take 40 mg by mouth daily. DULoxetine (CYMBALTA) 30 mg capsule Take 30 mg by mouth daily. propranolol (INDERAL) 10 mg tablet Take 10 mg by mouth daily. meclizine (ANTIVERT) 12.5 mg tablet Take 12.5 mg by mouth three (3) times daily as needed for Dizziness or Nausea. furosemide (LASIX) 20 mg tablet Take 20 mg by mouth daily. montelukast (SINGULAIR) 10 mg tablet Take 10 mg by mouth nightly. pantoprazole (PROTONIX) 40 mg tablet Take 40 mg by mouth daily. nitroglycerin (NITROSTAT) 0.4 mg SL tablet by SubLINGual route every five (5) minutes as needed for Chest Pain. fish oil-omega-3 fatty acids (Fish Oil) 340-1,000 mg capsule Take 1 Cap by mouth daily. albuterol-ipratropium (DUO-NEB) 2.5 mg-0.5 mg/3 ml nebu 3 mL by Nebulization route every four (4) hours as needed for Other (SOB). Charge medicare part B. ICD 10 - J44.1  Qty: 30 Nebule, Refills: 0         STOP taking these medications       OTHER Comments:   Reason for Stopping:         umeclidinium (Incruse Ellipta) 62.5 mcg/actuation inhaler Comments:   Reason for Stopping:         celecoxib (CELEBREX) 200 mg capsule Comments:   Reason for Stopping:               Discharge Condition: Good    Procedures : coronary angiogram    Consults: Cardiology      PHYSICAL EXAM   Visit Vitals  BP (!) 168/86 (BP 1 Location: Left leg)   Pulse 60   Temp 97.7 °F (36.5 °C)   Resp 16   Ht 5' 5\" (1.651 m)   Wt 88.9 kg (196 lb)   SpO2 98%   BMI 32.62 kg/m²     General: Awake, cooperative, no acute distress    HEENT: NC, Atraumatic. PERRLA, EOMI. Anicteric sclerae. Lungs:  CTA Bilaterally.  No Wheezing/Rhonchi/Rales. Heart:  Regular  rhythm,  No murmur, No Rubs, No Gallops  Abdomen: Soft, Non distended, Non tender. +Bowel sounds,   Extremities: No c/c/e  Psych:   Not anxious or agitated. Neurologic:  No acute neurological deficits. Admission HPI :   Haley Richards is a 77 y.o. male with CAD and 5 stents in place, COPD, CKD stage III, hypertension, and obesity presents to the ED via EMS for chest pain tonight. He was watching television around 8 PM and suddenly noticed chest discomfort with pain radiating down his left arm. He suffered a cardiac arrest in 2005 and had 4 stents placed at that time. He had another stent placed in 2013. He just had a normal echo and cardiac catheterization done this month which did not show obstructive disease. He is followed by cardiology and pulmonology. He has had a chronic cough for the past 3 years. He had the first dose of his Pfizer Covid vaccine today. He denies any nausea, vomiting, fever, or worsening leg swelling.     Of note: He stopped taking aspirin on his own about a month ago. Hospital Course :   Mr. Abdirahman Iyer was admitted to monitored floor, he was seen and followed by cardiology. He did not had any acute events during hospitalization. NSTEMI-  His cardiac enzymes trended, troponin peaked at 0.31. He was initially placed on heparin drip, cardiology recommended coronary angiogram.  Coronary angiogram did not reveal any significant changes from the past coronary angiogram.  Cardiology recommended continued with medical management and compliance with medication. DM-  He was started on sliding scale insulin, monitored blood sugars. Placed on ADA diet. COPD-  No active wheezing, use neb treatment as needed. CKD stage III-  Monitor renal function, creatinine remained stable. Hypertension-  Controlled with home medications.     Activity: Activity as tolerated    Diet: Diabetic Diet    Follow-up: PCP, cardiology    Disposition: home    Minutes spent on discharge: 45       Labs: Results:       Chemistry Recent Labs     05/04/21  0150 05/03/21 0442 05/01/21 2110   * 118* 183*    140 140   K 3.9 3.1* 3.6    104 105   CO2 27 28 29   BUN 18 17 17   CREA 1.17 1.42* 1.36*   CA 8.4* 8.2* 8.1*   AGAP 6 8 6   BUCR 15 12 13    103 137*   TP 5.8* 5.6* 6.3*   ALB 2.8* 2.7* 3.1*   GLOB 3.0 2.9 3.2   AGRAT 0.9 0.9 1.0      CBC w/Diff Recent Labs     05/04/21  0150 05/03/21 0442 05/02/21  0600 05/01/21 2110   WBC 8.0 8.7 9.9 11.0   RBC 3.76* 3.70* 3.88* 4.09*   HGB 10.0* 9.9* 10.3* 11.1*   HCT 33.6* 32.9* 34.6* 36.5    232 264 290   GRANS  --  62 64 69   LYMPH  --  19* 17* 15*   EOS  --  3 3 3      Cardiac Enzymes Recent Labs     05/02/21  1752 05/02/21  1213   CPK 73 65   CKND1 1.6 2.0      Coagulation Recent Labs     05/03/21 0442 05/02/21  0830   APTT 66.8* 73.9*       Lipid Panel Lab Results   Component Value Date/Time    Cholesterol, total 114 05/02/2021 06:00 AM    HDL Cholesterol 64 (H) 05/02/2021 06:00 AM    LDL, calculated 38.6 05/02/2021 06:00 AM    VLDL, calculated 11.4 05/02/2021 06:00 AM    Triglyceride 57 05/02/2021 06:00 AM    CHOL/HDL Ratio 1.8 05/02/2021 06:00 AM      BNP No results for input(s): BNPP in the last 72 hours. Liver Enzymes Recent Labs     05/04/21  0150   TP 5.8*   ALB 2.8*         Thyroid Studies Lab Results   Component Value Date/Time    TSH 1.730 11/07/2014 04:23 AM            Significant Diagnostic Studies: Cta Chest W Or W Wo Cont    Result Date: 5/2/2021  EXAM: CTA chest CLINICAL INDICATION/HISTORY: Chest pain. COMPARISON: 8/29/2019 TECHNIQUE: Axial CT imaging from the thoracic inlet through the diaphragm with intravenous contrast. Coronal and sagittal MIP reformats were generated.  One or more dose reduction techniques were used on this CT: automated exposure control, adjustment of the mAs and/or kVp according to patient size, and iterative reconstruction techniques. The specific techniques used on this CT exam have been documented in the patient's electronic medical record. Digital Imaging and Communications in Medicine (DICOM) format image data are available to nonaffiliated external healthcare facilities or entities on a secured, media free, reciprocally searchable basis with patient authorization for at least a 12-month period after this study. _______________ FINDINGS: EXAM QUALITY: Adequate PULMONARY ARTERIES: No pulmonary embolism identified. MEDIASTINUM: Normal heart size. Aortic and coronary artery calcifications. No aortic dissection. No pericardial effusion. LUNGS: No suspicious nodule or mass. AIRWAY: Normal. PLEURA: Normal. LYMPH NODES: No enlarged nodes. UPPER ABDOMEN: Simple hepatic cysts noted. Compensatory biliary dilation secondary to prior cholecystectomy. Colonic diverticulosis. BONES: No acute or aggressive osseous abnormalities identified. OTHER: None. _______________     1. No pulmonary embolism or other acute pulmonary abnormalities identified. Xr Chest Port    Result Date: 5/1/2021  EXAM:  AP Portable Chest X-ray 1 view INDICATION: Chest COMPARISON: August 6, 2018 _______________ FINDINGS:  Heart and mediastinal contours are within normal limits for portable radiograph. Lungs are clear of active disease. There are no pleural effusions. No acute osseous findings. ________________      No acute process    Echo Adult Complete    Result Date: 5/3/2021  · LV: Estimated LVEF is 55 - 60%. Normal cavity size, wall thickness and systolic function (ejection fraction normal). Mild (grade 1) left ventricular diastolic dysfunction E'E= 8.01.      Cardiac Procedure    Result Date: 5/4/2021  No difference in coronary in at me when compared with recent angiogram that was done on 04/12/2021   LVEDP 11 mmHg  mild to moderate nonobstructive CAD specially in the distal circulation. Patent stents with mild InStent re-stenoses.   Distal LAD is a very small caliber vessel 1.2 mm have 50-60% stenosis not suitable for any kind of revascularization will be treated medically. Discussed with patient about compliance and not to stop antiplatelet treatment aspirin. Cardiac Procedure    Result Date: 4/14/2021  Mild-to-moderate nonobstructive CAD  overall patent stents with minimal to mild lead InStent re-stenoses. Distal LAD is 1.2 mm vessel with 50% stenosis not suitable for any intervention. PCWP 12 mm Hg Mean PA pressure 19 mmHg PVR is 1.5 [facility] unit patient has dyspnea on exertion is probably non cardiac in a region due to multiple comorbidities. No results found for this or any previous visit. Please note that this dictation was completed with [employer], the computer voice recognition software. Quite often unanticipated grammatical, syntax, homophones, and other interpretive errors are inadvertently transcribed by the computer software. Please disregard these errors. Please excuse any errors that have escaped final proofreading.      CC: Liana Ramos MD

## 2021-05-04 NOTE — DISCHARGE INSTRUCTIONS
DISCHARGE SUMMARY from Nurse    PATIENT INSTRUCTIONS:    After general anesthesia or intravenous sedation, for 24 hours or while taking prescription Narcotics:  · Limit your activities  · Do not drive and operate hazardous machinery  · Do not make important personal or business decisions  · Do  not drink alcoholic beverages  · If you have not urinated within 8 hours after discharge, please contact your doctor on call. Report the following to your doctor:  · Excessive pain, swelling, redness or odor of or around the surgical area  · Temperature over 101.5  · Nausea and vomiting lasting longer than 6 hours or if unable to take medications  · Any signs of decreased circulation or nerve impairment to extremity: change in color, persistent  numbness, tingling, coldness or increase pain  · Any questions    What to do at Home:  Recommended activity: Activity as tolerated and no driving for today, no hard contact sports, no heavy lifting until MD follow up    If you experience any of the following symptoms, please follow up with your doctor. *  Please give a list of your current medications to your Primary Care Provider. *  Please update this list whenever your medications are discontinued, doses are      changed, or new medications (including over-the-counter products) are added. *  Please carry medication information at all times in case of emergency situations. These are general instructions for a healthy lifestyle:    No smoking/ No tobacco products/ Avoid exposure to second hand smoke  Surgeon General's Warning:  Quitting smoking now greatly reduces serious risk to your health.     Obesity, smoking, and sedentary lifestyle greatly increases your risk for illness    A healthy diet, regular physical exercise & weight monitoring are important for maintaining a healthy lifestyle    You may be retaining fluid if you have a history of heart failure or if you experience any of the following symptoms:  Weight gain of 3 pounds or more overnight or 5 pounds in a week, increased swelling in our hands or feet or shortness of breath while lying flat in bed. Please call your doctor as soon as you notice any of these symptoms; do not wait until your next office visit. The discharge information has been reviewed with the patient. The patient verbalized understanding. Discharge medications reviewed with the patient and appropriate educational materials and side effects teaching were provided.   ___________________________________________________________________________________________________________________________________

## 2022-02-07 ENCOUNTER — APPOINTMENT (OUTPATIENT)
Dept: GENERAL RADIOLOGY | Age: 67
End: 2022-02-07
Attending: HOSPITALIST
Payer: MEDICARE

## 2022-02-07 ENCOUNTER — HOSPITAL ENCOUNTER (OUTPATIENT)
Age: 67
Setting detail: OBSERVATION
Discharge: HOME HEALTH | End: 2022-02-10
Attending: HOSPITALIST | Admitting: HOSPITALIST
Payer: MEDICARE

## 2022-02-07 PROBLEM — R06.09 DOE (DYSPNEA ON EXERTION): Status: ACTIVE | Noted: 2022-02-07

## 2022-02-07 PROBLEM — R53.1 WEAKNESS: Status: ACTIVE | Noted: 2022-02-07

## 2022-02-07 LAB
ANION GAP SERPL CALC-SCNC: 6 MMOL/L (ref 3–18)
BASOPHILS # BLD: 0 K/UL (ref 0–0.1)
BASOPHILS NFR BLD: 0 % (ref 0–2)
BNP SERPL-MCNC: 151 PG/ML (ref 0–900)
BUN SERPL-MCNC: 22 MG/DL (ref 7–18)
BUN/CREAT SERPL: 14 (ref 12–20)
CALCIUM SERPL-MCNC: 8.6 MG/DL (ref 8.5–10.1)
CHLORIDE SERPL-SCNC: 105 MMOL/L (ref 100–111)
CK SERPL-CCNC: 87 U/L (ref 39–308)
CO2 SERPL-SCNC: 27 MMOL/L (ref 21–32)
COVID-19 RAPID TEST, COVR: NOT DETECTED
CREAT SERPL-MCNC: 1.56 MG/DL (ref 0.6–1.3)
DIFFERENTIAL METHOD BLD: ABNORMAL
EOSINOPHIL # BLD: 0.2 K/UL (ref 0–0.4)
EOSINOPHIL NFR BLD: 2 % (ref 0–5)
ERYTHROCYTE [DISTWIDTH] IN BLOOD BY AUTOMATED COUNT: 18.8 % (ref 11.6–14.5)
FERRITIN SERPL-MCNC: 11 NG/ML (ref 8–388)
FOLATE SERPL-MCNC: 14.2 NG/ML (ref 3.1–17.5)
GLUCOSE BLD STRIP.AUTO-MCNC: 120 MG/DL (ref 70–110)
GLUCOSE BLD STRIP.AUTO-MCNC: 183 MG/DL (ref 70–110)
GLUCOSE SERPL-MCNC: 209 MG/DL (ref 74–99)
HCT VFR BLD AUTO: 32 % (ref 36–48)
HGB BLD-MCNC: 9.4 G/DL (ref 13–16)
IMM GRANULOCYTES # BLD AUTO: 0.1 K/UL (ref 0–0.04)
IMM GRANULOCYTES NFR BLD AUTO: 1 % (ref 0–0.5)
IRON SATN MFR SERPL: 16 % (ref 20–50)
IRON SERPL-MCNC: 55 UG/DL (ref 50–175)
LYMPHOCYTES # BLD: 1.5 K/UL (ref 0.9–3.6)
LYMPHOCYTES NFR BLD: 14 % (ref 21–52)
MCH RBC QN AUTO: 24.7 PG (ref 24–34)
MCHC RBC AUTO-ENTMCNC: 29.4 G/DL (ref 31–37)
MCV RBC AUTO: 84 FL (ref 78–100)
MONOCYTES # BLD: 1.1 K/UL (ref 0.05–1.2)
MONOCYTES NFR BLD: 11 % (ref 3–10)
NEUTS SEG # BLD: 7.4 K/UL (ref 1.8–8)
NEUTS SEG NFR BLD: 72 % (ref 40–73)
NRBC # BLD: 0 K/UL (ref 0–0.01)
NRBC BLD-RTO: 0 PER 100 WBC
PLATELET # BLD AUTO: 267 K/UL (ref 135–420)
PMV BLD AUTO: 9.3 FL (ref 9.2–11.8)
POTASSIUM SERPL-SCNC: 4.2 MMOL/L (ref 3.5–5.5)
RBC # BLD AUTO: 3.81 M/UL (ref 4.35–5.65)
RETICS/RBC NFR AUTO: 2.4 % (ref 0.5–2.5)
SODIUM SERPL-SCNC: 138 MMOL/L (ref 136–145)
SOURCE, COVRS: NORMAL
TIBC SERPL-MCNC: 337 UG/DL (ref 250–450)
TROPONIN-HIGH SENSITIVITY: 10 NG/L (ref 0–78)
TROPONIN-HIGH SENSITIVITY: 11 NG/L (ref 0–78)
TSH SERPL DL<=0.05 MIU/L-ACNC: 2.68 UIU/ML (ref 0.36–3.74)
VIT B12 SERPL-MCNC: >2000 PG/ML (ref 211–911)
WBC # BLD AUTO: 10.2 K/UL (ref 4.6–13.2)

## 2022-02-07 PROCEDURE — 82728 ASSAY OF FERRITIN: CPT

## 2022-02-07 PROCEDURE — 65660000000 HC RM CCU STEPDOWN

## 2022-02-07 PROCEDURE — 84443 ASSAY THYROID STIM HORMONE: CPT

## 2022-02-07 PROCEDURE — 85045 AUTOMATED RETICULOCYTE COUNT: CPT

## 2022-02-07 PROCEDURE — 80048 BASIC METABOLIC PNL TOTAL CA: CPT

## 2022-02-07 PROCEDURE — 87635 SARS-COV-2 COVID-19 AMP PRB: CPT

## 2022-02-07 PROCEDURE — 36415 COLL VENOUS BLD VENIPUNCTURE: CPT

## 2022-02-07 PROCEDURE — 74011250637 HC RX REV CODE- 250/637: Performed by: INTERNAL MEDICINE

## 2022-02-07 PROCEDURE — 84484 ASSAY OF TROPONIN QUANT: CPT

## 2022-02-07 PROCEDURE — 74011250636 HC RX REV CODE- 250/636: Performed by: HOSPITALIST

## 2022-02-07 PROCEDURE — 74011636637 HC RX REV CODE- 636/637: Performed by: HOSPITALIST

## 2022-02-07 PROCEDURE — 97162 PT EVAL MOD COMPLEX 30 MIN: CPT

## 2022-02-07 PROCEDURE — 83540 ASSAY OF IRON: CPT

## 2022-02-07 PROCEDURE — 97163 PT EVAL HIGH COMPLEX 45 MIN: CPT

## 2022-02-07 PROCEDURE — 96372 THER/PROPH/DIAG INJ SC/IM: CPT

## 2022-02-07 PROCEDURE — 82962 GLUCOSE BLOOD TEST: CPT

## 2022-02-07 PROCEDURE — 83880 ASSAY OF NATRIURETIC PEPTIDE: CPT

## 2022-02-07 PROCEDURE — 97530 THERAPEUTIC ACTIVITIES: CPT

## 2022-02-07 PROCEDURE — 71046 X-RAY EXAM CHEST 2 VIEWS: CPT

## 2022-02-07 PROCEDURE — 85025 COMPLETE CBC W/AUTO DIFF WBC: CPT

## 2022-02-07 PROCEDURE — 82550 ASSAY OF CK (CPK): CPT

## 2022-02-07 PROCEDURE — 74011250637 HC RX REV CODE- 250/637: Performed by: HOSPITALIST

## 2022-02-07 PROCEDURE — 82607 VITAMIN B-12: CPT

## 2022-02-07 PROCEDURE — G0378 HOSPITAL OBSERVATION PER HR: HCPCS

## 2022-02-07 PROCEDURE — 96374 THER/PROPH/DIAG INJ IV PUSH: CPT

## 2022-02-07 RX ORDER — PROPRANOLOL HYDROCHLORIDE 20 MG/1
10 TABLET ORAL DAILY
Status: DISCONTINUED | OUTPATIENT
Start: 2022-02-08 | End: 2022-02-10 | Stop reason: HOSPADM

## 2022-02-07 RX ORDER — DEXTROSE MONOHYDRATE 100 MG/ML
125-250 INJECTION, SOLUTION INTRAVENOUS AS NEEDED
Status: DISCONTINUED | OUTPATIENT
Start: 2022-02-07 | End: 2022-02-10 | Stop reason: HOSPADM

## 2022-02-07 RX ORDER — ATORVASTATIN CALCIUM 20 MG/1
40 TABLET, FILM COATED ORAL DAILY
Status: DISCONTINUED | OUTPATIENT
Start: 2022-02-08 | End: 2022-02-10 | Stop reason: HOSPADM

## 2022-02-07 RX ORDER — MONTELUKAST SODIUM 10 MG/1
10 TABLET ORAL
Status: DISCONTINUED | OUTPATIENT
Start: 2022-02-07 | End: 2022-02-10 | Stop reason: HOSPADM

## 2022-02-07 RX ORDER — FUROSEMIDE 10 MG/ML
40 INJECTION INTRAMUSCULAR; INTRAVENOUS 2 TIMES DAILY
Status: DISCONTINUED | OUTPATIENT
Start: 2022-02-07 | End: 2022-02-09

## 2022-02-07 RX ORDER — CELECOXIB 200 MG/1
200 CAPSULE ORAL 2 TIMES DAILY
COMMUNITY
End: 2022-02-10

## 2022-02-07 RX ORDER — MAGNESIUM SULFATE 100 %
16 CRYSTALS MISCELLANEOUS AS NEEDED
Status: DISCONTINUED | OUTPATIENT
Start: 2022-02-07 | End: 2022-02-10 | Stop reason: HOSPADM

## 2022-02-07 RX ORDER — DULOXETIN HYDROCHLORIDE 30 MG/1
30 CAPSULE, DELAYED RELEASE ORAL DAILY
Status: DISCONTINUED | OUTPATIENT
Start: 2022-02-08 | End: 2022-02-10 | Stop reason: HOSPADM

## 2022-02-07 RX ORDER — INSULIN LISPRO 100 [IU]/ML
INJECTION, SOLUTION INTRAVENOUS; SUBCUTANEOUS
Status: DISCONTINUED | OUTPATIENT
Start: 2022-02-07 | End: 2022-02-10 | Stop reason: HOSPADM

## 2022-02-07 RX ORDER — ASPIRIN 81 MG/1
81 TABLET ORAL DAILY
Status: DISCONTINUED | OUTPATIENT
Start: 2022-02-08 | End: 2022-02-10 | Stop reason: HOSPADM

## 2022-02-07 RX ORDER — ENOXAPARIN SODIUM 100 MG/ML
40 INJECTION SUBCUTANEOUS EVERY 24 HOURS
Status: DISCONTINUED | OUTPATIENT
Start: 2022-02-07 | End: 2022-02-10 | Stop reason: HOSPADM

## 2022-02-07 RX ORDER — RANOLAZINE 500 MG/1
500 TABLET, EXTENDED RELEASE ORAL 2 TIMES DAILY
Status: DISCONTINUED | OUTPATIENT
Start: 2022-02-07 | End: 2022-02-10 | Stop reason: HOSPADM

## 2022-02-07 RX ORDER — FOLIC ACID 1 MG/1
1 TABLET ORAL DAILY
Status: DISCONTINUED | OUTPATIENT
Start: 2022-02-08 | End: 2022-02-10 | Stop reason: HOSPADM

## 2022-02-07 RX ORDER — DULOXETIN HYDROCHLORIDE 30 MG/1
30 CAPSULE, DELAYED RELEASE ORAL DAILY
COMMUNITY

## 2022-02-07 RX ORDER — IPRATROPIUM BROMIDE AND ALBUTEROL SULFATE 2.5; .5 MG/3ML; MG/3ML
3 SOLUTION RESPIRATORY (INHALATION)
Status: DISCONTINUED | OUTPATIENT
Start: 2022-02-07 | End: 2022-02-10 | Stop reason: HOSPADM

## 2022-02-07 RX ORDER — PANTOPRAZOLE SODIUM 40 MG/1
40 TABLET, DELAYED RELEASE ORAL DAILY
Status: DISCONTINUED | OUTPATIENT
Start: 2022-02-08 | End: 2022-02-10 | Stop reason: HOSPADM

## 2022-02-07 RX ADMIN — ENOXAPARIN SODIUM 40 MG: 100 INJECTION SUBCUTANEOUS at 14:41

## 2022-02-07 RX ADMIN — FUROSEMIDE 40 MG: 10 INJECTION, SOLUTION INTRAMUSCULAR; INTRAVENOUS at 22:08

## 2022-02-07 RX ADMIN — Medication 2 UNITS: at 16:30

## 2022-02-07 RX ADMIN — MONTELUKAST 10 MG: 10 TABLET, FILM COATED ORAL at 22:08

## 2022-02-07 RX ADMIN — RANOLAZINE 500 MG: 500 TABLET, FILM COATED, EXTENDED RELEASE ORAL at 22:08

## 2022-02-07 NOTE — ROUTINE PROCESS
Bedside shift change report given to Anjel Painter RN (oncoming nurse) by King Srivastava RN   (offgoing nurse). Report included the following information SBAR, Kardex, Intake/Output and Recent Results.

## 2022-02-07 NOTE — CONSULTS
[facility] Consult Note      Patient: Karen Westbrook MRN: [id number]  SSN: [id number]    YOB: 1955  Age: 74 y.o. Sex: male        Date of Consultation: 2/7/2022  Referring Physician: Dr. Araceli Quesada  Reason for Consultation: Greg Dalton    HPI:  I was asked by  to see this pleasant patient for dyspnea on exertion. Karen Westbrook, is a 70-year-old very pleasant gentleman with multiple comorbidities came to the hospital with progressive dyspnea on exertion some weight gain and fatigue and muscle weakness. Patient have known history of CAD, small vessel disease with multiple stents without any. Patient is maintaining normal sinus rhythm. No significant pulmonary hypertension. Patient also have underlying significant anxiety as well. Patient is on aspirin, statin and low-dose propranolol. Patient denied any resting chest pain but sometimes may have intermittent chest tightness also. Patient also have history of asthma/COPD. Past Medical History:   Diagnosis Date    Arthritis     Asthma     CAD (coronary artery disease)     stents x 5    Cancer ([address]     melanoma on left side of face    Chronic kidney disease     Chronic pain     left knee    COPD     Diabetes ([address]     newly dm have not started med yet    DVT (deep venous thrombosis) ([address] 2001    left leg    Hypertension     Myocardial infarction ([address] 2005    Obesity (BMI 30-39. 9)     Primary localized osteoarthritis of left knee 7/12/2019    Pulmonary embolism ([address] 2017    Rheumatoid arthritis ([address]      Past Surgical History:   Procedure Laterality Date    FRACTIONAL FLOW RESERVE  4/29/2018    FRACTIONAL FLOW RESERVE ADDL  4/29/2018    HX CHOLECYSTECTOMY      HX COLOSTOMY  1999    HX COLOSTOMY TAKE DOWN      HX CORONARY STENT PLACEMENT      HX HEART CATHETERIZATION  2005    stents x 4    HX HEART CATHETERIZATION  2014    stent x 1    HX HERNIA REPAIR      x 4    HX LUMBAR DISKECTOMY  2014    LEFT HEART PERCUTANEOUS  4/29/2018    MS ABDOMEN SURGERY PROC UNLISTED      Laparotomy bowel resection instestinal rupture, colostomy    MS ABDOMEN SURGERY PROC UNLISTED  1999    Revision of colostomy    JH CORONARY ARTERIOGRAPH  4/29/2018     Current Facility-Administered Medications   Medication Dose Route Frequency    [START ON 2/8/2022] aspirin delayed-release tablet 81 mg  81 mg Oral DAILY    [START ON 2/8/2022] atorvastatin (LIPITOR) tablet 40 mg  40 mg Oral DAILY    [START ON 2/8/2022] DULoxetine (CYMBALTA) capsule 30 mg  30 mg Oral DAILY    [START ON 4/1/4959] folic acid (FOLVITE) tablet 1 mg  1 mg Oral DAILY    montelukast (SINGULAIR) tablet 10 mg  10 mg Oral QHS    [START ON 2/8/2022] pantoprazole (PROTONIX) tablet 40 mg  40 mg Oral DAILY    [START ON 2/8/2022] propranoloL (INDERAL) tablet 10 mg  10 mg Oral DAILY    albuterol-ipratropium (DUO-NEB) 2.5 MG-0.5 MG/3 ML  3 mL Nebulization Q4H PRN    glucose chewable tablet 16 g  16 g Oral PRN    glucagon (GLUCAGEN) injection 1 mg  1 mg IntraMUSCular PRN    insulin lispro (HUMALOG) injection   SubCUTAneous AC&HS    dextrose 10% infusion 125-250 mL  125-250 mL IntraVENous PRN    furosemide (LASIX) injection 40 mg  40 mg IntraVENous BID    enoxaparin (LOVENOX) injection 40 mg  40 mg SubCUTAneous Q24H    ranolazine ER (RANEXA) tablet 500 mg  500 mg Oral BID       Allergies and Intolerances: Allergies   Allergen Reactions    Ciprofloxacin Other (comments)     PT states that he turns red.  Tape [Adhesive] Other (comments)     Pt states, \"it rips my skin\"       Family History:   Family History   Problem Relation Age of Onset    Heart Disease Mother     Heart Disease Father     Heart Disease Maternal Grandmother        Social History:   He  reports that he quit smoking about 30 years ago. He has never used smokeless tobacco.  He  reports previous alcohol use. Review of Systems  Gen: No fever, chills, malaise, weight loss/gain.    Heent: No headache, rhinorrhea, epistaxis, ear pain, hearing loss, sinus pain, neck pain/stiffness, sore throat. Heart: No chest pain, palpitations, +VIRK, pnd, or orthopnea. Resp: No cough, hemoptysis, wheezing and +shortness of breath. GI: No nausea, vomiting, diarrhea, constipation, melena or hematochezia. : No urinary obstruction, dysuria or hematuria. Derm: No rash, new skin lesion or pruritis. Musc/skeletal: no bone or joint complains. Vasc: No edema, cyanosis or claudication. Endo: No heat/cold intolerance, no polyuria,polydipsia or polyphagia. Neuro: No unilateral weakness, numbness, tingling. No seizures. Heme: No easy bruising or bleeding. Physical:   Patient Vitals for the past 6 hrs:   Temp Pulse Resp BP SpO2   02/07/22 1556 98.5 °F (36.9 °C) 67 20 (!) 158/85 98 %         Exam:   General Appearance: Comfortable, not using accessory muscles of respiration. HEENT: VERNON. HEAD: Atraumatic  NECK: No JVD, no thyroidomeglay. LUNGS: Clear bilaterally. HEART: S1+S2     ABD: Non-tender, BS Audible    EXT: No edema, and no cysnosis. VASCULAR EXAM: Pulses are intact. PSYCHIATRIC EXAM: Mood is appropriate. MUSCULOSKELETAL: Grossly no joint deformity. NEUROLOGICAL: Motor and sensory sytem intact and Cranial nerves II-XII intact.     Review of Data:   LABS:   Lab Results   Component Value Date/Time    WBC 10.2 02/07/2022 01:48 PM    HGB 9.4 (L) 02/07/2022 01:48 PM    HCT 32.0 (L) 02/07/2022 01:48 PM    PLATELET 707 31/00/2072 01:48 PM     Lab Results   Component Value Date/Time    Sodium 138 02/07/2022 01:48 PM    Potassium 4.2 02/07/2022 01:48 PM    Chloride 105 02/07/2022 01:48 PM    CO2 27 02/07/2022 01:48 PM    Glucose 209 (H) 02/07/2022 01:48 PM    BUN 22 (H) 02/07/2022 01:48 PM    Creatinine 1.56 (H) 02/07/2022 01:48 PM     Lab Results   Component Value Date/Time    Cholesterol, total 114 05/02/2021 06:00 AM    HDL Cholesterol 64 (H) 05/02/2021 06:00 AM    LDL, calculated 38.6 05/02/2021 06:00 AM Triglyceride 57 05/02/2021 06:00 AM     No components found for: GPT  Lab Results   Component Value Date/Time    Hemoglobin A1c 7.1 (H) 05/02/2021 06:00 AM       RADIOLOGY:  CT Results  (Last 48 hours)    None        CXR Results  (Last 48 hours)    None            Cardiology Procedures:   Results for orders placed or performed during the hospital encounter of 05/01/21   EKG, 12 LEAD, INITIAL   Result Value Ref Range    Ventricular Rate 73 BPM    Atrial Rate 73 BPM    P-R Interval 150 ms    QRS Duration 80 ms    Q-T Interval 410 ms    QTC Calculation (Bezet) 451 ms    Calculated P Axis 56 degrees    Calculated R Axis 19 degrees    Calculated T Axis 58 degrees    Diagnosis       Normal sinus rhythm  Nonspecific T wave abnormality  Abnormal ECG  When compared with ECG of 12-APR-2021 08:27,  T wave inversion now evident in Anterior leads  Confirmed by Shea Reyes MD. ([id number]) on 5/4/2021 6:30:40 PM        Echo Results  (Last 48 hours)    None       Cardiolite (Tc-99m Sestamibi) stress test        Impression / Plan:    Patient Active Problem List   Diagnosis Code    CAD (coronary artery disease) I25.10    Hypertension I10    Asthma-COPD overlap syndrome ([facility]) J44.9    Elevated troponin I level R77.8    CKD (chronic kidney disease) stage 3, GFR 30-59 ml/min ([facility]) N18.30    Asthma J45.909    NSTEMI (non-ST elevated myocardial infarction) ([facility]) I21.4    Rheumatoid arthritis ([facility]) M06.9    Severe persistent asthma J45.50    Obesity E66.9    Noncompliance with medication regimen Z91.14    Type II diabetes mellitus ([facility]) E11.9    VIRK (dyspnea on exertion) R06.00    Weakness R53.1       Assessment and plan    Dyspnea on exertion/at rest multifactorial including deconditioning, stress, pulmonary and CAD  Chronic CAD with multiple stents, small vessel coronary artery disease  No evidence of significant pulmonary hypertension  Asthma/COPD   Chronic fatigue syndrome  Rheumatoid arthritis        Plan:  Patient symptoms are multifactorial deconditioning, anxiety, asthma/COPD, CAD  TSH is normal  I will add Ranexa 500 mg twice daily will titrate with symptoms/blood pressure   continue with aspirin, statin, Inderal  Echocardiogram is pending  Management plan was discussed with patient          Signed By: Olvin Elias MD     February 7, 2022

## 2022-02-07 NOTE — PROGRESS NOTES
Care Management    Reason for Admission:     Chart reviewed.  Per H&P: \"    Prior to admission patient was living:     Prior to admission patient was using the following DME:                     RUR Score:                    Plan for utilizing home health:          COVID Vaccine Status:     PCP: First and Last name: Dane Armstrong MD    Name of Practice:    Are you a current patient: Yes/No:    Approximate date of last visit:    Can you participate in a virtual visit with your PCP:                     Current Advanced Directive/Advance Care Plan: Full Code    Healthcare Decision Maker:   Primary Decision Maker: Baldemar Bright - [place] - [phone]  Click here to [address] including selection of the Healthcare Decision Maker Relationship (ie \"Primary\")                         Transition of Care Plan: In progress

## 2022-02-07 NOTE — CONSULTS
[facility]                  Pulmonary, Critical Care, and Sleep Medicine     Name: Angella Vega MRN: [id number]   : 1955 Hospital: [facility] [place]    Date: 2022        PCCM Note                                              Consult requesting physician: Dr. Flor Hays  Reason for Consult: dyspnea       IMPRESSION:   ·     Active Hospital Problems    Diagnosis Date Noted    VIRK (dyspnea on exertion) 2022    Obesity 2021    Asthma-COPD overlap syndrome ([facility]    ·    ·   Patient Active Problem List   Diagnosis Code    CAD (coronary artery disease) I25.10    Hypertension I10    Asthma-COPD overlap syndrome (HCC) J44.9    Elevated troponin I level R77.8    CKD (chronic kidney disease) stage 3, GFR 30-59 ml/min ([facility]) N18.30    Asthma J45.909    NSTEMI (non-ST elevated myocardial infarction) ([facility] I21.4    Rheumatoid arthritis ([facility] M06.9    Severe persistent asthma J45.50    Obesity E66.9    Noncompliance with medication regimen Z91.14    Type II diabetes mellitus (HCC) E11.9    VIRK (dyspnea on exertion) R06.00         · Code status: Full Code       RECOMMENDATIONS:     Patient follows Dr. Karen Olivera from pulmonary perspective, last seen on 2022. Worsening VIRK thought to be due to neuromuscular issues or severe deconditioning. Studies negative for PEs, pulmonary fibrosis or pulmonary hypertension. Cardiac cath was negative for significant CAD or pulmonary hypertension. Anemia is contributing to VIRK. Since COVID-19 pandemic, patient is mostly at home and not being active. He has gained about 30 to 50 pounds of weight since last 2 years. I believe these VIRK is likely due to deconditioning and morbid obesity more so than any other problems. Recommend CTA chest, cardiology evaluation. Creatinine 1.17. Anemia work-up and treatment per primary team.  On room air resting; Keep SPO2 >91%.   Bronchodilators: Symbicort 160/4.5 mcg 2 puff twice daily, albuterol as needed. Continue Singulair 10 mg nightly. Steroids: None  Steroids as patient does not appear to be in COPD exacerbation. FiO2 to keep SpO2 >=92%, HOB >=30 degree, aspiration precautions, aggressive pulmonary toileting, incentive spirometry, PT/OT eval and treat, mobilization. DVT Prophylaxis: Lovenox  GI Prophylaxis: Protonix    Other issues management by primary team and respective consultants. Further recommendations will be based on the patient's response to recommended treatment and results of the investigation ordered. Quality Care: PPI, DVT prophylaxis, HOB elevated, infection control all reviewed and addressed. Discussed with patient, radiologic work up showed, answered all questions to their satisfaction. Care plan discussed with nursing, Dr. Nikki Watts,  Dr. Garvin Habermann. Subjective/History of Present Illness:     Stacy Ferro 74 y.o. male with PMHx significant for obesity, rheumatoid arthritis (on leflunomide and Plaquenil), HTN, DM, HLD, CKD, CAD, former 20-pack-year smoker quit 1992, asthma-COPD (Nucala caused headache), multifactorial VIRK. Patient follows Dr. Garvin Habermann from pulmonary perspective, last seen on 1/28/2022. Worsening VIRK thought to be due to neuromuscular issues or severe deconditioning. Studies negative for PEs, pulmonary fibrosis or pulmonary hypertension. Cardiac cath was negative for significant CAD or pulmonary hypertension. Anemia is contributing to VIRK. Since COVID-19 pandemic, patient is mostly at home and not being active. He has gained about 30 to 50 pounds of weight since last 2 years. VIRK with minimal exertion, not associated with wheezing, chest tightness, coughing. Denies any dyspnea at rest or nighttime awakening due to pulmonary symptoms.     _________________________________________________________________________________________________  Following records copied from Dr. Edmonds Prosito office visit note on 1/28/2022:    Status post cardiac catheterization April 2021 by Dr. Christoph Sifuentes patent coronary stents; distal LAD with 50% stenosis-not suitable for intervention; PCWP 12 mm Hg; mean PA pressure 19 mm Hg; PVR 1.5 woods unit-no evidence for pulmonary hypertension. Labs 04/02/2021-WBC 8.4, hemoglobin 10.8, hematocrit 33.8, platelets 813; eosinophils 400 cells. V/Q scan 10/23/2020-[place]  Ventilation images demonstrate evidence of bilateral air trapping consistent with COPD. Perfusion images demonstrate diffuse homogeneous radiotracer uptake throughout the pulmonary parenchyma. Impression-low probability for pulmonary embolism. Evidence of air trapping consistent with obstructive lung disease. Ultrasound legs 04/02/2021  IMPRESSION:   No evidence of DVT bilaterally. Echocardiogram April 2021     CONCLUSIONS:  1. Normal left ventricular size. LV Ejection Fraction is 55-60 %. Grade 1 diastolic   dysfunction. There are no regional wall motion abnormalities. 2. Normal LV wall thickness. 3. There is trivial nonrheumatic mitral regurgitation. 4. The aortic valve is not well visualized. There is trivial aortic regurgitation. 5. There is trivial tricuspid regurgitation. Insufficient TR velocity jet to assess   RVS/PASP. 6. Compared to 11/2020 Echo report, no significant changes noted. Overall function   remains normal at 55-60%. HRCT CHEST 03/11/2021  IMPRESSION:   1. No acute process in the chest, and no CT evidence of significant interstitial lung disease. 2. Very mild subpleural septal thickening, reticulation, and suspected fibrosis bilaterally, most notably in the posteromedial aspect of the right lower lobe. 3. Moderate to severe coronary artery calcifications. PFTs  Nov 2018-FEV1/FVC 63%, FEV1 1.64 L/67%, FVC 2.59 L/84%; TLC 4.59/89%, RV 1.95 L/96%; DLCO 11.0/52%; partial bronchial reactivity seen.   Apr 2021-FEV1/FVC 68%, FEV1 1.53 L/70%, FVC 2.26 L/83%; TLC 4.45 L/91%, RV 2.10 L/104%; DLCO 10.5/49%; mid flows decreased at 35% predicted; no significant bronchial reactivity; flow volume loop shows coving during expiration  Impression- study consistent with asthma/COPD overlap; no significant bronchial reactivity; mild air trapping; moderately decreased diffusion capacity-please correlate clinically. CTA chest 4/24/2018  Lungs and pleural spaces: No infiltrates, effusions or pneumothorax  Impression  No evidence of pulmonary embolus or other cause acute cardiopulmonary disease. Labs 4/4/2019-allergens zone 2-mild allergies to grass; WBC 7.2, hemoglobin 12.1-low, hematocrit 37.3-low, MCV 93, platelets 766; eosinophils 12%/900 cells-elevated; total IgE 44-mildly elevated. 2/7/2022 :     Seen in room 335. Lying in the bed. On room air. Not in acute distress or respiratory distress. Complains of VIRK with minimal exertion at home, associated with chest tightness but no wheezing or coughing. Complains of leg edema at home; not present now. No calf pain, chest pain, lightheadedness, dizziness. Review of Systems:   HEENT: No epistaxis, no nasal drainage, no difficulty in swallowing, no redness in eyes  Respiratory: as above  Cardiovascular: no palpitations, no chronic leg edema, no syncope  Gastrointestinal: no abd pain, no vomiting, no diarrhea, no bleeding symptoms  Genitourinary: No urinary symptoms or hematuria  Integument/breast: No ulcers or rashes  Musculoskeletal:Neg  Neurological: No focal weakness, no seizures, no headaches  Behvioral/Psych: No anxiety, no depression  Constitutional: No fever, no chills, no weight loss, no night sweats         Allergies   Allergen Reactions    Ciprofloxacin Other (comments)     PT states that he turns red.     Tape [Adhesive] Other (comments)     Pt states, \"it rips my skin\"        Past Medical History:   Diagnosis Date    Arthritis     Asthma     CAD (coronary artery disease)     stents x 5    Cancer ([facility]     melanoma on left side of face    Chronic kidney disease     Chronic pain     left knee    COPD     Diabetes ([facility]     newly dm have not started med yet    DVT (deep venous thrombosis) ([facility]     left leg    Hypertension     Myocardial infarction ([facility]     Obesity (BMI 30-39. 9)     Primary localized osteoarthritis of left knee 2019    Pulmonary embolism ([facility] 2017    [facility]         Past Surgical History:   Procedure Laterality Date    FRACTIONAL FLOW RESERVE  2018    FRACTIONAL FLOW RESERVE ADDL  2018    HX CHOLECYSTECTOMY      HX COLOSTOMY      HX COLOSTOMY TAKE DOWN      HX CORONARY STENT PLACEMENT      HX HEART CATHETERIZATION  2005    stents x 4    HX HEART CATHETERIZATION  2014    stent x 1    HX HERNIA REPAIR      x 4    HX LUMBAR DISKECTOMY  2014    LEFT HEART PERCUTANEOUS  2018    NE ABDOMEN SURGERY PROC UNLISTED      Laparotomy bowel resection instestinal rupture, colostomy    NE ABDOMEN SURGERY PROC UNLISTED      Revision of colostomy    JH CORONARY ARTERIOGRAPH  2018        Family History   Problem Relation Age of Onset    Heart Disease Mother     Heart Disease Father     Heart Disease Maternal Grandmother         Social History     Tobacco Use    Smoking status: Former Smoker     Quit date: 1992     Years since quittin.1    Smokeless tobacco: Never Used   Substance Use Topics    Alcohol use: Not Currently     Comment: last         Prior to Admission medications    Medication Sig Start Date End Date Taking? Authorizing Provider   DULoxetine (Cymbalta) 30 mg capsule Take 30 mg by mouth daily. Yes Provider, Historical   celecoxib (CeleBREX) 200 mg capsule Take 200 mg by mouth two (2) times a day. Yes Provider, Historical   aspirin delayed-release 81 mg tablet Take 81 mg by mouth daily. Yes Provider, Historical   folic acid (FOLVITE) 1 mg tablet Take 1 mg by mouth daily.    Yes Provider, Historical   budesonide-formoteroL (Symbicort) 160-4.5 mcg/actuation HFAA Take 2 Puffs by inhalation two (2) times a day. Yes Provider, Historical   oxyCODONE-acetaminophen (PERCOCET 10)  mg per tablet Take 1 Tab by mouth two (2) times daily as needed. Yes Provider, Historical   atorvastatin (LIPITOR) 40 mg tablet Take 40 mg by mouth daily. Yes Provider, Historical   propranolol (INDERAL) 10 mg tablet Take 10 mg by mouth daily. Yes Provider, Historical   furosemide (LASIX) 20 mg tablet Take 20 mg by mouth daily. Yes Provider, Historical   montelukast (SINGULAIR) 10 mg tablet Take 10 mg by mouth nightly. Yes Other, MD Moira   pantoprazole (PROTONIX) 40 mg tablet Take 40 mg by mouth daily. Yes Provider, Historical   fish oil-omega-3 fatty acids (Fish Oil) 340-1,000 mg capsule Take 1 Cap by mouth daily. Patient not taking: Reported on 2/7/2022    Provider, Historical   DULoxetine (CYMBALTA) 30 mg capsule Take 30 mg by mouth daily. Patient not taking: Reported on 2/7/2022    Provider, Historical   meclizine (ANTIVERT) 12.5 mg tablet Take 12.5 mg by mouth three (3) times daily as needed for Dizziness or Nausea. Patient not taking: Reported on 2/7/2022    Provider, Historical   albuterol-ipratropium (DUO-NEB) 2.5 mg-0.5 mg/3 ml nebu 3 mL by Nebulization route every four (4) hours as needed for Other (SOB). Charge medicare part B. ICD 10 - J44.1  Patient not taking: Reported on 2/7/2022 9/3/19   Anat Washington MD   nitroglycerin (NITROSTAT) 0.4 mg SL tablet by SubLINGual route every five (5) minutes as needed for Chest Pain.   Patient not taking: Reported on 2/7/2022    Provider, Historical       Current Facility-Administered Medications   Medication Dose Route Frequency    [START ON 2/8/2022] aspirin delayed-release tablet 81 mg  81 mg Oral DAILY    [START ON 2/8/2022] atorvastatin (LIPITOR) tablet 40 mg  40 mg Oral DAILY    [START ON 2/8/2022] DULoxetine (CYMBALTA) capsule 30 mg  30 mg Oral DAILY    [START ON 0/5/1914] folic acid (FOLVITE) tablet 1 mg  1 mg Oral DAILY    montelukast (SINGULAIR) tablet 10 mg  10 mg Oral QHS    [START ON 2022] pantoprazole (PROTONIX) tablet 40 mg  40 mg Oral DAILY    [START ON 2022] propranoloL (INDERAL) tablet 10 mg  10 mg Oral DAILY    albuterol-ipratropium (DUO-NEB) 2.5 MG-0.5 MG/3 ML  3 mL Nebulization Q4H PRN    glucose chewable tablet 16 g  16 g Oral PRN    glucagon (GLUCAGEN) injection 1 mg  1 mg IntraMUSCular PRN    insulin lispro (HUMALOG) injection   SubCUTAneous AC&HS    dextrose 10% infusion 125-250 mL  125-250 mL IntraVENous PRN    furosemide (LASIX) injection 40 mg  40 mg IntraVENous BID    enoxaparin (LOVENOX) injection 40 mg  40 mg SubCUTAneous Q24H         Objective:   Vital Signs:    Visit Vitals  BP (!) 144/93 (BP 1 Location: Right arm)   Pulse 83   Temp 98.5 °F (36.9 °C)   Resp 24   Wt 88.9 kg (195 lb 15.8 oz)   SpO2 100%   BMI 32.61 kg/m²       O2 Device: None (Room air)       Temp (24hrs), Av.5 °F (36.9 °C), Min:98.5 °F (36.9 °C), Max:98.5 °F (36.9 °C)       Intake/Output:   Last shift:      No intake/output data recorded. Last 3 shifts: No intake/output data recorded. No intake or output data in the 24 hours ending 22 1413    Last 3 Recorded Weights in this Encounter    22 1239   Weight: 88.9 kg (195 lb 15.8 oz)       Physical Exam:     General/Neurology: Alert, Awake, NAD. Morbidly obese. Head:   Normocephalic, without obvious abnormality, atraumatic. Eye:   EOM intact. No scleral icterus, no pallor, no cyanosis. Nose:   No sinus tenderness  Throat:  Lips, mucosa, and tongue normal. No oral thrush. Neck:   Supple, symmetric. No lymphadenopathy. Trachea midline  Lung: Moderate air entry bilateral equal. No rales. No rhonchi. No wheezing. No stridors. No prolongded expiration. No accessory muscle use. Heart:   Regular rate & rhythm. S1 S2 present. No murmur. No JVD. Abdomen:  Soft. NT. ND. +BS. No masses. Extremities:  No pedal edema. No cyanosis.  No clubbing. Pulses: 2+ and symmetric in DP. Capillary refill: normal  Lymphatic:  No cervical or supraclavicular palpable lymphadenopathy. Musculoskeletal: No joint swelling. No tenderness. Skin:   Color, texture, turgor normal. No rashes or lesions. Data:       Recent Results (from the past 24 hour(s))   COVID-19 RAPID TEST    Collection Time: 02/07/22 12:30 PM   Result Value Ref Range    Specimen source Nasopharyngeal      COVID-19 rapid test Not detected NOTD     CBC WITH AUTOMATED DIFF    Collection Time: 02/07/22  1:48 PM   Result Value Ref Range    WBC 10.2 4.6 - 13.2 K/uL    RBC 3.81 (L) 4.35 - 5.65 M/uL    HGB 9.4 (L) 13.0 - 16.0 g/dL    HCT 32.0 (L) 36.0 - 48.0 %    MCV 84.0 78.0 - 100.0 FL    MCH 24.7 24.0 - 34.0 PG    MCHC 29.4 (L) 31.0 - 37.0 g/dL    RDW 18.8 (H) 11.6 - 14.5 %    PLATELET 376 954 - 827 K/uL    MPV 9.3 9.2 - 11.8 FL    NRBC 0.0 0  WBC    ABSOLUTE NRBC 0.00 0.00 - 0.01 K/uL    NEUTROPHILS 72 40 - 73 %    LYMPHOCYTES 14 (L) 21 - 52 %    MONOCYTES 11 (H) 3 - 10 %    EOSINOPHILS 2 0 - 5 %    BASOPHILS 0 0 - 2 %    IMMATURE GRANULOCYTES 1 (H) 0.0 - 0.5 %    ABS. NEUTROPHILS 7.4 1.8 - 8.0 K/UL    ABS. LYMPHOCYTES 1.5 0.9 - 3.6 K/UL    ABS. MONOCYTES 1.1 0.05 - 1.2 K/UL    ABS. EOSINOPHILS 0.2 0.0 - 0.4 K/UL    ABS. BASOPHILS 0.0 0.0 - 0.1 K/UL    ABS. IMM. GRANS. 0.1 (H) 0.00 - 0.04 K/UL    DF AUTOMATED           Chemistry No results for input(s): GLU, NA, K, CL, CO2, BUN, CREA, CA, MG, PHOS, AGAP, BUCR, TBIL, AP, TP, ALB, GLOB, AGRAT in the last 72 hours. No lab exists for component: GPT     Lactic Acid No results found for: LAC  No results for input(s): LAC in the last 72 hours.      Liver Enzymes Protein, total   Date Value Ref Range Status   05/04/2021 5.8 (L) 6.4 - 8.2 g/dL Final     Albumin   Date Value Ref Range Status   05/04/2021 2.8 (L) 3.4 - 5.0 g/dL Final     Globulin   Date Value Ref Range Status   05/04/2021 3.0 2.0 - 4.0 g/dL Final     A-G Ratio   Date Value Ref Range Status   05/04/2021 0.9 0.8 - 1.7   Final     Alk. phosphatase   Date Value Ref Range Status   05/04/2021 111 45 - 117 U/L Final     No results for input(s): TP, ALB, GLOB, AGRAT, AP, TBIL in the last 72 hours. No lab exists for component: SGOT, GPT, DBIL     CBC w/Diff Recent Labs     02/07/22  1348   WBC 10.2   RBC 3.81*   HGB 9.4*   HCT 32.0*      GRANS 72   LYMPH 14*   EOS 2        Cardiac Enzymes No results found for: CPK, CK, CKMMB, CKMB, RCK3, CKMBT, CKNDX, CKND1, FERMÍN, TROPT, TROIQ, CARLOS, TROPT, TNIPOC, BNP, BNPP     BNP Lab Results   Component Value Date/Time    BNP 4.0 09/19/2014 02:31 PM        Coagulation No results for input(s): PTP, INR, APTT, INREXT, INREXT in the last 72 hours. Thyroid  Lab Results   Component Value Date/Time    TSH 1.730 11/07/2014 04:23 AM       No results found for: T4     Urinalysis Lab Results   Component Value Date/Time    Color YELLOW 06/21/2019 01:17 PM    Appearance CLEAR 06/21/2019 01:17 PM    Specific gravity 1.012 06/21/2019 01:17 PM    pH (UA) 5.0 06/21/2019 01:17 PM    Protein NEGATIVE  06/21/2019 01:17 PM    Glucose NEGATIVE  06/21/2019 01:17 PM    Ketone NEGATIVE  06/21/2019 01:17 PM    Bilirubin NEGATIVE  06/21/2019 01:17 PM    Urobilinogen 0.2 06/21/2019 01:17 PM    Nitrites NEGATIVE  06/21/2019 01:17 PM    Leukocyte Esterase NEGATIVE  06/21/2019 01:17 PM        Micro  No results for input(s): SDES, CULT in the last 72 hours. No results for input(s): CULT in the last 72 hours. ABG No results for input(s): PHI, PHI, POC2, PCO2I, PO2, PO2I, HCO3, HCO3I, FIO2, FIO2I in the last 72 hours. CT (Most Recent) (CT chest reviewed by me) Results from Hospital Encounter encounter on 05/01/21    CTA CHEST W OR W WO CONT    Narrative  EXAM: CTA chest    CLINICAL INDICATION/HISTORY: Chest pain.     COMPARISON: 8/29/2019    TECHNIQUE: Axial CT imaging from the thoracic inlet through the diaphragm with  intravenous contrast. Coronal and sagittal MIP reformats were generated. One or  more dose reduction techniques were used on this CT: automated exposure control,  adjustment of the mAs and/or kVp according to patient size, and iterative  reconstruction techniques. The specific techniques used on this CT exam have  been documented in the patient's electronic medical record. Digital Imaging and  Communications in Medicine (DICOM) format image data are available to  nonaffiliated external healthcare facilities or entities on a secured, media  free, reciprocally searchable basis with patient authorization for at least a  12-month period after this study. _______________    FINDINGS:    EXAM QUALITY: Adequate    PULMONARY ARTERIES: No pulmonary embolism identified. MEDIASTINUM: Normal heart size. Aortic and coronary artery calcifications. No  aortic dissection. No pericardial effusion. LUNGS: No suspicious nodule or mass. AIRWAY: Normal.    PLEURA: Normal.    LYMPH NODES: No enlarged nodes. UPPER ABDOMEN: Simple hepatic cysts noted. Compensatory biliary dilation  secondary to prior cholecystectomy. Colonic diverticulosis. BONES: No acute or aggressive osseous abnormalities identified. OTHER: None.    _______________    Impression  1. No pulmonary embolism or other acute pulmonary abnormalities identified. XR (Most Recent). CXR  reviewed by me and compared with previous CXR Results from Hospital Encounter encounter on 05/01/21    XR CHEST PORT    Narrative  EXAM:  AP Portable Chest X-ray 1 view    INDICATION: Chest    COMPARISON: August 6, 2018    _______________    FINDINGS:  Heart and mediastinal contours are within normal limits for portable  radiograph. Lungs are clear of active disease. There are no pleural effusions. No acute osseous findings.     ________________    Impression  No acute process         ·Please note: Voice-recognition software may have been used to generate this report, which may have resulted in some phonetic-based errors in grammar and contents. Even though attempts were made to correct all the mistakes, some may have been missed, and remained in the body of the document.     Alicia Dhillon MD  2/7/2022

## 2022-02-07 NOTE — PROGRESS NOTES
Patient is a direct admit from home. His pulmonologist advised him to come here. O2 sat 100% on room air. He is VIRK, and tires easily. He has a productive cough, clear and yellow thick sputum.

## 2022-02-07 NOTE — H&P
History & Physical    Patient: Natividad Tay MRN: [id number]  CSN: [id number]    YOB: 1955  Age: 74 y.o. Sex: male      DOA: 2/7/2022  Primary Care Provider:  Ofelia You MD      Assessment/Plan     Patient Active Problem List   Diagnosis Code    CAD (coronary artery disease) I25.10    Hypertension I10    Asthma-COPD overlap syndrome ([facility]) J44.9    Elevated troponin I level R77.8    CKD (chronic kidney disease) stage 3, GFR 30-59 ml/min ([facility]) N18.30    Asthma J45.909    NSTEMI (non-ST elevated myocardial infarction) ([facility]) I21.4    Rheumatoid arthritis ([facility] M06.9    Severe persistent asthma J45.50    Obesity E66.9    Noncompliance with medication regimen Z91.14    Type II diabetes mellitus ([facility] E11.9    VIRK (dyspnea on exertion) R06.00    Weakness R53.1       Admit to monitored floor    VIRK -  Likely multifactorial, deconditioning, morbid obesity. Check renal function and will get CTA chest as recommended by pulmonary. Follow echo  Started on Lasix. Check NT proBNP. CAD -  S/p cath 05/2021 with no changes from previous. Continue with home meds  Cardiology consulted     DM -  On SSI, diabetic diet     COPD -  No active wheezing  Will use neb treatment as needed     CKD stage 3 -  Monitor renal function     HTN -  Controlled    Anemia-  Check iron panel, ferritin, reticulocyte count. Likely secondary to CKD stage III     GERD - on pepcid     DVT prophylaxis with lovenox    Discussed with Dr. Dennis Walls, Dr. Angela Salamanca. Estimated length of stay : 2 to 3 days    CC: Dyspnea on exertion       HPI:     Natividad Tay is a 74 y.o. male with coronary artery disease, diabetes, COPD, CKD stage III is being admitted at the request of Dr. Dennis Walls. Patient is being admitted for worsening dyspnea on exertion. Patient reports that he has dyspnea on exertion for the past 4 years. He reports however he has been getting progressively worse.  He reports previously his dyspnea on exertion it was on and off. But over the past 1 year he has consistent dyspnea on exertion. He gets short of breath even with few steps. Since COVID 19 he has been mostly bedridden. He reports that he had heart attack after he took first Covid vaccine in May 2021. He was admitted at this hospital and coronary angiogram done did not reveal any significant blockages. Medical management recommended. He also had echocardiogram that showed normal left ventricular systolic function, EF of 55 to 60%, mild grade 1 diastolic dysfunction. He also complains of chest heaviness that has been going on for the past 1 year. He denies any unilateral weakness or numbness. Past Medical History:   Diagnosis Date    Arthritis     Asthma     CAD (coronary artery disease)     stents x 5    Cancer ([address]     melanoma on left side of face    Chronic kidney disease     Chronic pain     left knee    COPD     Diabetes ([address]     newly dm have not started med yet    DVT (deep venous thrombosis) ([address] 2001    left leg    Hypertension     Myocardial infarction ([address] 2005    Obesity (BMI 30-39. 9)     Primary localized osteoarthritis of left knee 7/12/2019    Pulmonary embolism ([address] 2017    Rheumatoid arthritis ([address]        Past Surgical History:   Procedure Laterality Date    FRACTIONAL FLOW RESERVE  4/29/2018    FRACTIONAL FLOW RESERVE ADDL  4/29/2018    HX CHOLECYSTECTOMY      HX COLOSTOMY  1999    HX COLOSTOMY TAKE DOWN      HX CORONARY STENT PLACEMENT      HX HEART CATHETERIZATION  2005    stents x 4    HX HEART CATHETERIZATION  2014    stent x 1    HX HERNIA REPAIR      x 4    HX LUMBAR DISKECTOMY  2014    LEFT HEART PERCUTANEOUS  4/29/2018    CO ABDOMEN SURGERY PROC UNLISTED      Laparotomy bowel resection instestinal rupture, colostomy    CO ABDOMEN SURGERY PROC UNLISTED  1999    Revision of colostomy    JH CORONARY ARTERIOGRAPH  4/29/2018       Family History   Problem Relation Age of Onset    Heart Disease Mother     Heart Disease Father     Heart Disease Maternal Grandmother        Social History     Socioeconomic History    Marital status:    Tobacco Use    Smoking status: Former Smoker     Quit date: 1992     Years since quittin.1    Smokeless tobacco: Never Used   Vaping Use    Vaping Use: Never used   Substance and Sexual Activity    Alcohol use: Not Currently     Comment: last     Drug use: Never    Sexual activity: Not Currently       Prior to Admission medications    Medication Sig Start Date End Date Taking? Authorizing Provider   DULoxetine (Cymbalta) 30 mg capsule Take 30 mg by mouth daily. Yes Provider, Historical   celecoxib (CeleBREX) 200 mg capsule Take 200 mg by mouth two (2) times a day. Yes Provider, Historical   aspirin delayed-release 81 mg tablet Take 81 mg by mouth daily. Yes Provider, Historical   folic acid (FOLVITE) 1 mg tablet Take 1 mg by mouth daily. Yes Provider, Historical   budesonide-formoteroL (Symbicort) 160-4.5 mcg/actuation HFAA Take 2 Puffs by inhalation two (2) times a day. Yes Provider, Historical   oxyCODONE-acetaminophen (PERCOCET 10)  mg per tablet Take 1 Tab by mouth two (2) times daily as needed. Yes Provider, Historical   atorvastatin (LIPITOR) 40 mg tablet Take 40 mg by mouth daily. Yes Provider, Historical   propranolol (INDERAL) 10 mg tablet Take 10 mg by mouth daily. Yes Provider, Historical   furosemide (LASIX) 20 mg tablet Take 20 mg by mouth daily. Yes Provider, Historical   montelukast (SINGULAIR) 10 mg tablet Take 10 mg by mouth nightly. Yes Other, MD Moira   pantoprazole (PROTONIX) 40 mg tablet Take 40 mg by mouth daily. Yes Provider, Historical   fish oil-omega-3 fatty acids (Fish Oil) 340-1,000 mg capsule Take 1 Cap by mouth daily. Patient not taking: Reported on 2022    Provider, Historical   DULoxetine (CYMBALTA) 30 mg capsule Take 30 mg by mouth daily.   Patient not taking: Reported on 2022 Provider, Historical   meclizine (ANTIVERT) 12.5 mg tablet Take 12.5 mg by mouth three (3) times daily as needed for Dizziness or Nausea. Patient not taking: Reported on 2022    Provider, Historical   albuterol-ipratropium (DUO-NEB) 2.5 mg-0.5 mg/3 ml nebu 3 mL by Nebulization route every four (4) hours as needed for Other (SOB). Charge medicare part B. ICD 10 - J44.1  Patient not taking: Reported on 2022 9/3/19   Renetta Nolasco MD   nitroglycerin (NITROSTAT) 0.4 mg SL tablet by SubLINGual route every five (5) minutes as needed for Chest Pain. Patient not taking: Reported on 2022    Provider, Historical       Allergies   Allergen Reactions    Ciprofloxacin Other (comments)     PT states that he turns red.  Tape [Adhesive] Other (comments)     Pt states, \"it rips my skin\"       Review of Systems  Gen: No fever, chills, malaise, weight loss/gain. Heent: No headache, rhinorrhea, epistaxis, ear pain, hearing loss, sinus pain, neck pain/stiffness, sore throat. Heart: see above  Resp: No cough, hemoptysis, wheezing and shortness of breath. GI: No nausea, vomiting, diarrhea, constipation, melena or hematochezia. : No urinary obstruction, dysuria or hematuria. Derm: No rash, new skin lesion or pruritis. Musc/skeletal: no bone or joint complains. Vasc: No edema, cyanosis or claudication. Endo: No heat/cold intolerance, no polyuria,polydipsia or polyphagia. Neuro: No unilateral weakness, numbness, tingling. No seizures. Heme: No easy bruising or bleeding. Physical Exam:     Physical Exam:  Visit Vitals  BP (!) 158/85   Pulse 67   Temp 98.5 °F (36.9 °C)   Resp 20   Wt 88.9 kg (195 lb 15.8 oz)   SpO2 98%   BMI 32.61 kg/m²      O2 Device: None (Room air)    Temp (24hrs), Av.5 °F (36.9 °C), Min:98.5 °F (36.9 °C), Max:98.5 °F (36.9 °C)    No intake/output data recorded. No intake/output data recorded. General:  Awake, cooperative, no distress.    Head:  Normocephalic, without obvious abnormality, atraumatic. Eyes:  Conjunctivae/corneas clear, sclera anicteric, PERRL, EOMs intact. Nose: Nares normal. No drainage or sinus tenderness. Throat: Lips, mucosa, and tongue normal.    Neck: Supple, symmetrical, trachea midline, no adenopathy. Lungs:   Clear to auscultation bilaterally. Heart:   S1, S2, no murmur, click, rub or gallop. Abdomen: Soft, non-tender. Bowel sounds normal. No masses,  No organomegaly. Extremities: Extremities normal, atraumatic, no cyanosis or edema. Capillary refill normal.   Pulses: 2+ and symmetric all extremities. Skin: Skin color pink, turgor normal. No rashes or lesions   Neurologic: CNII-XII intact. No focal motor or sensory deficit. Labs Reviewed:    CMP:   Lab Results   Component Value Date/Time     02/07/2022 01:48 PM    K 4.2 02/07/2022 01:48 PM     02/07/2022 01:48 PM    CO2 27 02/07/2022 01:48 PM    AGAP 6 02/07/2022 01:48 PM     (H) 02/07/2022 01:48 PM    BUN 22 (H) 02/07/2022 01:48 PM    CREA 1.56 (H) 02/07/2022 01:48 PM    GFRAA 54 (L) 02/07/2022 01:48 PM    GFRNA 44 (L) 02/07/2022 01:48 PM    CA 8.6 02/07/2022 01:48 PM     CBC:   Lab Results   Component Value Date/Time    WBC 10.2 02/07/2022 01:48 PM    HGB 9.4 (L) 02/07/2022 01:48 PM    HCT 32.0 (L) 02/07/2022 01:48 PM     02/07/2022 01:48 PM     All Cardiac Markers in the last 24 hours: No results found for: CPK, CK, CKMMB, CKMB, RCK3, CKMBT, CKNDX, CKND1, FERMÍN, TROPT, TROIQ, CARLOS, TROPT, TNIPOC, BNP, BNPP      Procedures/imaging: see electronic medical records for all procedures/Xrays and details which were not copied into this note but were reviewed prior to creation of Plan    Please note that this dictation was completed with [employer], the computer voice recognition software. Quite often unanticipated grammatical, syntax, homophones, and other interpretive errors are inadvertently transcribed by the computer software.   Please disregard these errors. Please excuse any errors that have escaped final proofreading.         CC: Alli Peguero MD

## 2022-02-08 ENCOUNTER — APPOINTMENT (OUTPATIENT)
Dept: NON INVASIVE DIAGNOSTICS | Age: 67
End: 2022-02-08
Attending: HOSPITALIST
Payer: MEDICARE

## 2022-02-08 LAB
ALBUMIN SERPL-MCNC: 3.1 G/DL (ref 3.4–5)
ALBUMIN/GLOB SERPL: 0.9 {RATIO} (ref 0.8–1.7)
ALP SERPL-CCNC: 102 U/L (ref 45–117)
ALT SERPL-CCNC: 18 U/L (ref 16–61)
ANION GAP SERPL CALC-SCNC: 7 MMOL/L (ref 3–18)
AST SERPL-CCNC: 22 U/L (ref 10–38)
BASOPHILS # BLD: 0 K/UL (ref 0–0.1)
BASOPHILS NFR BLD: 0 % (ref 0–2)
BILIRUB SERPL-MCNC: 0.6 MG/DL (ref 0.2–1)
BUN SERPL-MCNC: 23 MG/DL (ref 7–18)
BUN/CREAT SERPL: 13 (ref 12–20)
CALCIUM SERPL-MCNC: 8.6 MG/DL (ref 8.5–10.1)
CHLORIDE SERPL-SCNC: 102 MMOL/L (ref 100–111)
CO2 SERPL-SCNC: 27 MMOL/L (ref 21–32)
CREAT SERPL-MCNC: 1.8 MG/DL (ref 0.6–1.3)
DIFFERENTIAL METHOD BLD: ABNORMAL
EOSINOPHIL # BLD: 0.2 K/UL (ref 0–0.4)
EOSINOPHIL NFR BLD: 2 % (ref 0–5)
ERYTHROCYTE [DISTWIDTH] IN BLOOD BY AUTOMATED COUNT: 19.2 % (ref 11.6–14.5)
EST. AVERAGE GLUCOSE BLD GHB EST-MCNC: 203 MG/DL
GLOBULIN SER CALC-MCNC: 3.3 G/DL (ref 2–4)
GLUCOSE BLD STRIP.AUTO-MCNC: 147 MG/DL (ref 70–110)
GLUCOSE BLD STRIP.AUTO-MCNC: 156 MG/DL (ref 70–110)
GLUCOSE BLD STRIP.AUTO-MCNC: 200 MG/DL (ref 70–110)
GLUCOSE SERPL-MCNC: 136 MG/DL (ref 74–99)
HBA1C MFR BLD: 8.7 % (ref 4.2–5.6)
HCT VFR BLD AUTO: 33 % (ref 36–48)
HGB BLD-MCNC: 9.5 G/DL (ref 13–16)
IMM GRANULOCYTES # BLD AUTO: 0.1 K/UL (ref 0–0.04)
IMM GRANULOCYTES NFR BLD AUTO: 1 % (ref 0–0.5)
LYMPHOCYTES # BLD: 2.4 K/UL (ref 0.9–3.6)
LYMPHOCYTES NFR BLD: 21 % (ref 21–52)
MCH RBC QN AUTO: 24.4 PG (ref 24–34)
MCHC RBC AUTO-ENTMCNC: 28.8 G/DL (ref 31–37)
MCV RBC AUTO: 84.6 FL (ref 78–100)
MONOCYTES # BLD: 1.4 K/UL (ref 0.05–1.2)
MONOCYTES NFR BLD: 12 % (ref 3–10)
NEUTS SEG # BLD: 7.4 K/UL (ref 1.8–8)
NEUTS SEG NFR BLD: 65 % (ref 40–73)
NRBC # BLD: 0 K/UL (ref 0–0.01)
NRBC BLD-RTO: 0 PER 100 WBC
PLATELET # BLD AUTO: 283 K/UL (ref 135–420)
PMV BLD AUTO: 9.6 FL (ref 9.2–11.8)
POTASSIUM SERPL-SCNC: 4.2 MMOL/L (ref 3.5–5.5)
PROT SERPL-MCNC: 6.4 G/DL (ref 6.4–8.2)
RBC # BLD AUTO: 3.9 M/UL (ref 4.35–5.65)
SODIUM SERPL-SCNC: 136 MMOL/L (ref 136–145)
WBC # BLD AUTO: 11.5 K/UL (ref 4.6–13.2)

## 2022-02-08 PROCEDURE — 74011250637 HC RX REV CODE- 250/637: Performed by: HOSPITALIST

## 2022-02-08 PROCEDURE — 74011636637 HC RX REV CODE- 636/637: Performed by: HOSPITALIST

## 2022-02-08 PROCEDURE — 65660000000 HC RM CCU STEPDOWN

## 2022-02-08 PROCEDURE — 97166 OT EVAL MOD COMPLEX 45 MIN: CPT

## 2022-02-08 PROCEDURE — 82962 GLUCOSE BLOOD TEST: CPT

## 2022-02-08 PROCEDURE — 94640 AIRWAY INHALATION TREATMENT: CPT

## 2022-02-08 PROCEDURE — 96372 THER/PROPH/DIAG INJ SC/IM: CPT

## 2022-02-08 PROCEDURE — 85025 COMPLETE CBC W/AUTO DIFF WBC: CPT

## 2022-02-08 PROCEDURE — 74011250636 HC RX REV CODE- 250/636: Performed by: HOSPITALIST

## 2022-02-08 PROCEDURE — 97530 THERAPEUTIC ACTIVITIES: CPT

## 2022-02-08 PROCEDURE — G0378 HOSPITAL OBSERVATION PER HR: HCPCS

## 2022-02-08 PROCEDURE — 36415 COLL VENOUS BLD VENIPUNCTURE: CPT

## 2022-02-08 PROCEDURE — 80053 COMPREHEN METABOLIC PANEL: CPT

## 2022-02-08 PROCEDURE — 74011000250 HC RX REV CODE- 250: Performed by: INTERNAL MEDICINE

## 2022-02-08 PROCEDURE — 74011250637 HC RX REV CODE- 250/637: Performed by: INTERNAL MEDICINE

## 2022-02-08 PROCEDURE — 96376 TX/PRO/DX INJ SAME DRUG ADON: CPT

## 2022-02-08 PROCEDURE — 83036 HEMOGLOBIN GLYCOSYLATED A1C: CPT

## 2022-02-08 RX ADMIN — ASPIRIN 81 MG: 81 TABLET, COATED ORAL at 09:17

## 2022-02-08 RX ADMIN — RANOLAZINE 500 MG: 500 TABLET, FILM COATED, EXTENDED RELEASE ORAL at 09:17

## 2022-02-08 RX ADMIN — FUROSEMIDE 40 MG: 10 INJECTION, SOLUTION INTRAMUSCULAR; INTRAVENOUS at 09:17

## 2022-02-08 RX ADMIN — PANTOPRAZOLE SODIUM 40 MG: 40 TABLET, DELAYED RELEASE ORAL at 09:17

## 2022-02-08 RX ADMIN — Medication 2 UNITS: at 11:30

## 2022-02-08 RX ADMIN — ATORVASTATIN CALCIUM 40 MG: 20 TABLET, FILM COATED ORAL at 09:17

## 2022-02-08 RX ADMIN — ENOXAPARIN SODIUM 40 MG: 100 INJECTION SUBCUTANEOUS at 12:21

## 2022-02-08 RX ADMIN — FUROSEMIDE 40 MG: 10 INJECTION, SOLUTION INTRAMUSCULAR; INTRAVENOUS at 22:33

## 2022-02-08 RX ADMIN — DULOXETINE HYDROCHLORIDE 30 MG: 30 CAPSULE, DELAYED RELEASE ORAL at 09:17

## 2022-02-08 RX ADMIN — ARFORMOTEROL TARTRATE: 15 SOLUTION RESPIRATORY (INHALATION) at 20:14

## 2022-02-08 RX ADMIN — FOLIC ACID 1 MG: 1 TABLET ORAL at 09:17

## 2022-02-08 RX ADMIN — MONTELUKAST 10 MG: 10 TABLET, FILM COATED ORAL at 22:33

## 2022-02-08 RX ADMIN — RANOLAZINE 500 MG: 500 TABLET, FILM COATED, EXTENDED RELEASE ORAL at 22:33

## 2022-02-08 RX ADMIN — PROPRANOLOL HYDROCHLORIDE 10 MG: 20 TABLET ORAL at 09:17

## 2022-02-08 RX ADMIN — Medication 4 UNITS: at 16:30

## 2022-02-08 NOTE — PROGRESS NOTES
1119: Pt requesting to do PT later today, due to lack of sleep. Will follow up again for PT.    1339: Pt sound asleep, did not arouse, will follow up as schedule permits.

## 2022-02-08 NOTE — PROGRESS NOTES
[facility] care from Javier Garcia RN and Daryn Guerrero RN.    [id number] Transported pt to and from x-ray via wheelchair. Shift uneventful. 0710 Bedside and Verbal shift change report given to Jose Zaragoza RN (oncoming nurse) by Chris Villafuerte RN   (offgoing nurse). Report included the following information SBAR, Kardex, ED Summary, Procedure Summary, Intake/Output, MAR, Recent Results and Med Rec Status.

## 2022-02-08 NOTE — PROGRESS NOTES
Problem: Falls - Risk of  Goal: *Absence of Falls  Description: Document Alfie Frank Fall Risk and appropriate interventions in the flowsheet.   Outcome: Progressing Towards Goal  Note: Fall Risk Interventions:  Mobility Interventions: Bed/chair exit alarm,Assess mobility with egress test,Patient to call before getting OOB,Utilize walker, cane, or other assistive device         Medication Interventions: Bed/chair exit alarm,Evaluate medications/consider consulting pharmacy,Patient to call before getting OOB,Teach patient to arise slowly    Elimination Interventions: Call light in reach,Patient to call for help with toileting needs,Stay With Me (per policy),Toilet paper/wipes in reach,Toileting schedule/hourly rounds,Urinal in reach    History of Falls Interventions: Bed/chair exit alarm,Consult care management for discharge planning,Door open when patient unattended,Evaluate medications/consider consulting pharmacy,Investigate reason for fall,Room close to nurse's station

## 2022-02-08 NOTE — PROGRESS NOTES
Cardiology Progress Note        Patient: Deidre Lopez        Sex: male          DOA: 2/7/2022  YOB: 1955      Age:  74 y.o.        LOS:  LOS: 1 day   Assessment/Plan     Active Problems:    Asthma-COPD overlap syndrome ([facility] [address] ()      Obesity (5/2/2021)      VIRK (dyspnea on exertion) (2/7/2022)      Weakness (2/7/2022)        Plan:    Cardiac telemetry stable  Echo is pending  Started on Ranexa will titrate with response  Patient will be scheduled for VQ scan  Discussed with patient                  Subjective:    cc:  Dyspnea on exertion        REVIEW OF SYSTEMS:     General: No fevers or chills. Cardiovascular: No chest pain or pressure. No palpitations. No ankle swelling  Pulmonary: + SOB, orthopnea, PND  Gastrointestinal: No nausea, vomiting or diarrhea      Objective:      Visit Vitals  /74   Pulse 74   Temp 97.7 °F (36.5 °C)   Resp 20   Wt 88.9 kg (195 lb 15.8 oz)   SpO2 97%   BMI 32.61 kg/m²     Body mass index is 32.61 kg/m². Physical Exam:  General Appearance: Comfortable, not using accessory muscles of respiration. NECK: No JVD, no thyroidomeglay. LUNGS: Clear bilaterally. HEART: S1+S2 audible,    ABD: Non-tender, BS Audible    EXT: No edema, and no cysnosis. VASCULAR EXAM: Pulses are intact. PSYCHIATRIC EXAM: Mood is appropriate.     Medication:  Current Facility-Administered Medications   Medication Dose Route Frequency    arformoterol 15 mcg/budesonide 0.5 mg neb solution   Nebulization BID RT    aspirin delayed-release tablet 81 mg  81 mg Oral DAILY    atorvastatin (LIPITOR) tablet 40 mg  40 mg Oral DAILY    DULoxetine (CYMBALTA) capsule 30 mg  30 mg Oral DAILY    folic acid (FOLVITE) tablet 1 mg  1 mg Oral DAILY    montelukast (SINGULAIR) tablet 10 mg  10 mg Oral QHS    pantoprazole (PROTONIX) tablet 40 mg  40 mg Oral DAILY    propranoloL (INDERAL) tablet 10 mg  10 mg Oral DAILY    albuterol-ipratropium (DUO-NEB) 2.5 MG-0.5 MG/3 ML  3 mL Nebulization Q4H PRN    glucose chewable tablet 16 g  16 g Oral PRN    glucagon (GLUCAGEN) injection 1 mg  1 mg IntraMUSCular PRN    insulin lispro (HUMALOG) injection   SubCUTAneous AC&HS    dextrose 10% infusion 125-250 mL  125-250 mL IntraVENous PRN    furosemide (LASIX) injection 40 mg  40 mg IntraVENous BID    enoxaparin (LOVENOX) injection 40 mg  40 mg SubCUTAneous Q24H    ranolazine ER (RANEXA) tablet 500 mg  500 mg Oral BID               Lab/Data Reviewed:  Procedures/imaging: see electronic medical records for all procedures/Xrays   and details which were not copied into this note but were reviewed prior to creation of Plan       All lab results for the last 24 hours reviewed. Recent Labs     02/08/22  0230 02/07/22  1348   WBC 11.5 10.2   HGB 9.5* 9.4*   HCT 33.0* 32.0*    267     Recent Labs     02/08/22  0230 02/07/22  1348    138   K 4.2 4.2    105   CO2 27 27   * 209*   BUN 23* 22*   CREA 1.80* 1.56*   CA 8.6 8.6       RADIOLOGY:  CT Results  (Last 48 hours)    None        CXR Results  (Last 48 hours)               02/07/22 2138  XR CHEST PA LAT Final result    Impression:  No acute process       Narrative:  EXAM:  PA and Lateral Chest X-ray        INDICATION: Dyspnea on exertion       COMPARISON: CT dated May 2, 2021       ___________       FINDINGS: Heart and mediastinal contours are within normal limits. Lungs are   clear of active disease. There are no pleural effusions. No acute osseous   findings.        _____________                       Cardiology Procedures:   Results for orders placed or performed during the hospital encounter of 05/01/21   EKG, 12 LEAD, INITIAL   Result Value Ref Range    Ventricular Rate 73 BPM    Atrial Rate 73 BPM    P-R Interval 150 ms    QRS Duration 80 ms    Q-T Interval 410 ms    QTC Calculation (Bezet) 451 ms    Calculated P Axis 56 degrees    Calculated R Axis 19 degrees    Calculated T Axis 58 degrees    Diagnosis Normal sinus rhythm  Nonspecific T wave abnormality  Abnormal ECG  When compared with ECG of 12-APR-2021 08:27,  T wave inversion now evident in Anterior leads  Confirmed by Dougie Coulter MD. ([id number]) on 5/4/2021 6:30:40 PM        Echo Results  (Last 48 hours)    None       Cardiolite (Tc-99m Sestamibi) stress test    Signed By: Nathanael Singh MD     February 8, 2022

## 2022-02-08 NOTE — PROGRESS NOTES
Cm visited with pt at bedside to discuss d/c planning, pt states he lives with his son whom assist with his care but feels like its getting  to over bearing,pt states his breathing is now worse when walking,pt is an active pt of , stated he has [place] sleep [place] but not able to wear cpap mask likely due to anxiety so he refuses, PT and OT has been ordered, pt stated he has had [facility] in the past and prefers not to use them again, is open to [facility] if decides he will use home health services.

## 2022-02-08 NOTE — PROGRESS NOTES
Problem: Self Care Deficits Care Plan (Adult)  Goal: *Acute Goals and Plan of Care (Insert Text)  Description: Occupational Therapy Goals  Initiated 2/8/2022 within 7 day(s). 1.  Patient will perform grooming with supervision/set-up standing at sink for 5 minutes or more. 2.  Patient will perform upper body dressing with supervision/set-up. 3.  Patient will perform lower body dressing with supervision/set-up. 4.  Patient will perform toilet transfers with supervision/set-up. 5.  Patient will perform all aspects of toileting with supervision/set-up. 6.  Patient will participate in upper extremity therapeutic exercise/activities with supervision/set-up for 10 minutes. 7.  Patient will utilize energy conservation techniques during functional activities with verbal, visual, and tactile cues. OCCUPATIONAL THERAPY EVALUATION    Patient: Stacy Ferro (62 y.o. male)  Date: 2/8/2022  Primary Diagnosis: Weakness [R53.1]        Precautions:   Fall  PLOF: mod I for ADLs and transfers     ASSESSMENT :  Based on the objective data described below, the patient presents with decreased strength, endurance and balance for carryover of ADLs and transfers following above mentioned medical diagnosis. Pt presented supine in bed and agrees to participate with therapy. Pt performed bed mobility, supine to sit, sit<>stand transfers, UB dressing and ambulate in room with -SBA. Pt reports fatigue and SOB with minimum exertion during this session. Pt was left seated in chair to eat lunch at the end of session in NAD. Patient will benefit from skilled intervention to address the above impairments.   Patient's rehabilitation potential is considered to be Good  Factors which may influence rehabilitation potential include:   []             None noted  []             Mental ability/status  [x]             Medical condition  []             Home/family situation and support systems  []             Safety awareness  [] Pain tolerance/management  []             Other:      PLAN :  Recommendations and Planned Interventions:   [x]               Self Care Training                  [x]      Therapeutic Activities  [x]               Functional Mobility Training   []      Cognitive Retraining  [x]               Therapeutic Exercises           [x]      Endurance Activities  [x]               Balance Training                    []      Neuromuscular Re-Education  []               Visual/Perceptual Training     [x]      Home Safety Training  [x]               Patient Education                   [x]      Family Training/Education  []               Other (comment):    Frequency/Duration: Patient will be followed by occupational therapy 1-2 times per day/4-7 days per week to address goals. Discharge Recommendations: Home Health  Further Equipment Recommendations for Discharge: N/A     SUBJECTIVE:   Patient stated  I am tired.     OBJECTIVE DATA SUMMARY:     Past Medical History:   Diagnosis Date    Arthritis     Asthma     CAD (coronary artery disease)     stents x 5    Cancer ([facility] [address]     melanoma on left side of face    Chronic kidney disease     Chronic pain     left knee    COPD     Diabetes ([facility] [address]     newly dm have not started med yet    DVT (deep venous thrombosis) ([facility] [address] 2001    left leg    Hypertension     Myocardial infarction ([facility] [address] 2005    Obesity (BMI 30-39. 9)     Primary localized osteoarthritis of left knee 7/12/2019    Pulmonary embolism ([facility] [address] 2017    Rheumatoid arthritis [facility])      Past Surgical History:   Procedure Laterality Date    FRACTIONAL FLOW RESERVE  4/29/2018    FRACTIONAL FLOW RESERVE ADDL  4/29/2018    HX CHOLECYSTECTOMY      HX COLOSTOMY  1999    HX COLOSTOMY TAKE DOWN      HX CORONARY STENT PLACEMENT      HX HEART CATHETERIZATION  2005    stents x 4    HX HEART CATHETERIZATION  2014    stent x 1    HX HERNIA REPAIR      x 4    HX LUMBAR DISKECTOMY  2014    LEFT HEART PERCUTANEOUS  4/29/2018    [place] ABDOMEN SURGERY PROC UNLISTED      Laparotomy bowel resection instestinal rupture, colostomy    [place] ABDOMEN SURGERY PROC UNLISTED  1999    Revision of colostomy    [facility] CORONARY ARTERIOGRAPH  4/29/2018     Barriers to Learning/Limitations: None  Compensate with: visual, verbal, tactile, kinesthetic cues/model    Home Situation:   Home Situation  Home Environment: Private residence  # Steps to Enter: 3  Rails to Enter: Yes  Hand Rails : Right  One/Two Story Residence: One story  Living Alone: No  Support Systems: Child(kapil),Other Family Member(s)  Patient Expects to be Discharged to[de-identified] Home  Current DME Used/Available at Home: Cane, straight,Walker, rolling,Shower chair,Grab bars  Tub or Shower Type: Tub/Shower combination (pt does sponge bathing at sink)  []  Right hand dominant   []  Left hand dominant    Cognitive/Behavioral Status:  Neurologic State: Alert  Orientation Level: Oriented X4  Cognition: Appropriate for age attention/concentration; Follows commands  Safety/Judgement: Fall prevention    Skin: intact  Edema: none    Vision/Perceptual:    Tracking: Able to track stimulus in all quadrants w/o difficulty    Coordination: BUE  Coordination: Within functional limits  Fine Motor Skills-Upper: Left Intact; Right Intact    Gross Motor Skills-Upper: Left Intact; Right Intact  Balance:  Sitting: Intact  Standing: Intact; With support  Strength: BUE  Strength: Generally decreased, functional  Tone & Sensation: BUE  Tone: Normal  Sensation: Intact  Range of Motion: BUE  AROM: Generally decreased, functional  Functional Mobility and Transfers for ADLs:  Bed Mobility:  Rolling: Supervision  Supine to Sit: Supervision  Scooting: Stand-by assistance  Transfers:  Sit to Stand: Contact guard assistance  Stand to Sit: Contact guard assistance  Bed to Chair: Contact guard assistance  ADL Assessment:   Feeding: Independent    Oral Facial Hygiene/Grooming: Contact guard assistance    Upper Body Dressing: Stand-by assistance    Lower Body Dressing: Moderate assistance    Toileting: Contact guard assistance;Minimum assistance  ADL Intervention:  Upper Body Dressing Assistance  Dressing Assistance: Stand-by assistance  Shirt simulation with hospital gown: Stand-by assistance    Cognitive Retraining  Safety/Judgement: Fall prevention  Pain:  Pain level pre-treatment: 0/10   Pain level post-treatment: 0/10   Pain Intervention(s): Medication (see MAR); Rest, Ice, Repositioning   Response to intervention: Nurse notified, See doc flow    Activity Tolerance:   Fair     Please refer to the flowsheet for vital signs taken during this treatment. After treatment:   [x] Patient left in no apparent distress sitting up in chair  [] Patient left in no apparent distress in bed  [x] Call bell left within reach  [x] Nursing notified  [] Caregiver present  [] Bed alarm activated    COMMUNICATION/EDUCATION:   [x] Role of Occupational Therapy in the acute care setting  [x] Home safety education was provided and the patient/caregiver indicated understanding. [x] Patient/family have participated as able in goal setting and plan of care. [x] Patient/family agree to work toward stated goals and plan of care. [] Patient understands intent and goals of therapy, but is neutral about his/her participation. [] Patient is unable to participate in goal setting and plan of care. Thank you for this referral.  Levon Esparza OTR/L  Time Calculation: 22 mins    Eval Complexity: History: MEDIUM Complexity : Expanded review of history including physical, cognitive and psychosocial  history ; Examination: MEDIUM Complexity : 3-5 performance deficits relating to physical, cognitive , or psychosocial skils that result in activity limitations and / or participation restrictions; Decision Making:MEDIUM Complexity : Patient may present with comorbidities that affect occupational performnce.  Miniml to moderate modification of tasks or assistance (eg, physical or verbal ) with assesment(s) is necessary to enable patient to complete evaluation

## 2022-02-08 NOTE — PROGRESS NOTES
Problem: Mobility Impaired (Adult and Pediatric)  Goal: *Acute Goals and Plan of Care (Insert Text)  Description: In 3 days:  1. Pt will ambulate 15' CGA to demonstrate functional ability to ambulate within the home. 2. Pt will transfer sit <> stand CGA for improved ability to transfer independently and lower extremity strengthening. 3. Pt will ascend/descend 1 step with bilateral UE support to improve ability to enter/exit the home. Note:     PHYSICAL THERAPY EVALUATION    Patient: Jolynn Braxton (47 y.o. male)  Date: 2/7/2022  Primary Diagnosis: Weakness [R53.1]        Precautions:   Fall  WBAT  PLOF: independent home ambulation only, ambulating while reaching out for surfaces within the home to prevent LOB    ASSESSMENT :  Based on the objective data described below, the patient presents with decreased LE strength, decreased balance in standing, shortness of breath, decreased tolerance to activity, and decreased endurance with prolonged activity. Pt reports overall weakness with increased shortness of breath without N/T and has not walked outside of his home since the end of December 2021. Pt reports his last fall was in the summer of 2021 without injuries. Pt states he does not use AD to ambulate in his home, but reaches out for surfaces to ambulate from bedroom to bathroom. Pt was supine in bed when PT arrived for the session reporting 5/10 pain in his back from laying in bed. Pt transitioned supine to sitting EOB with stand by assist with his pulse ox reading 96. Pt was able to maintain sitting balance with both hands in lap and demonstrated full knee extension bilaterally. MMT of bilateral lower extremities were performed: bilateral hip flexion 4/5, L knee flexion and extension 4/5, R knee flexion and extension 3+/5, and bilateral ankle dorsiflexion and plantarflexion 5/5. Pt transferred sit to stand CGA with shortness of breath, pulse ox reading 96 with 100 HR.  Pt ambulated 4 steps forward and backward CGA. Pt transferred standing to sitting EOB CGA and was able to maintain unsupported sitting for 3 minutes. Pt transitioned sitting EOB to supine with stand by assist with a pulse ox reading of 99 with a HR of 110. Pt was left supine in bed with call bell, phone, and all needs within reach. PT recommends acute rehab services to address the impairments listed above. Upon discharge, PT recommends home with home health PT. Patient will benefit from skilled intervention to address the above impairments. Patient's rehabilitation potential is considered to be Good  Factors which may influence rehabilitation potential include:   []         None noted  []         Mental ability/status  [x]         Medical condition  [x]         Home/family situation and support systems  []         Safety awareness  []         Pain tolerance/management  []         Other:      PLAN :  Recommendations and Planned Interventions:   [x]           Bed Mobility Training             []    Neuromuscular Re-Education  [x]           Transfer Training                   []    Orthotic/Prosthetic Training  [x]           Gait Training                          []    Modalities  [x]           Therapeutic Exercises           []    Edema Management/Control  [x]           Therapeutic Activities            []    Family Training/Education  [x]           Patient Education  []           Other (comment):    Frequency/Duration: Patient will be followed by physical therapy daily to address goals. Discharge Recommendations: Home Health  Further Equipment Recommendations for Discharge: N/A     SUBJECTIVE:   Patient stated I get winded just talking.     OBJECTIVE DATA SUMMARY:     Past Medical History:   Diagnosis Date    Arthritis     Asthma     CAD (coronary artery disease)     stents x 5    Cancer ([facility] [address]     melanoma on left side of face    Chronic kidney disease     Chronic pain     left knee    COPD     Diabetes ([facility] [address]     newly dm have not started med yet    DVT (deep venous thrombosis) ([facility] [address] 2001    left leg    Hypertension     Myocardial infarction ([facility] [address] 2005    Obesity (BMI 30-39. 9)     Primary localized osteoarthritis of left knee 7/12/2019    Pulmonary embolism ([facility] [address] 2017    Rheumatoid arthritis ([facility] [address]      Past Surgical History:   Procedure Laterality Date    FRACTIONAL FLOW RESERVE  4/29/2018    FRACTIONAL FLOW RESERVE ADDL  4/29/2018    HX CHOLECYSTECTOMY      HX COLOSTOMY  1999    HX COLOSTOMY TAKE DOWN      HX CORONARY STENT PLACEMENT      HX HEART CATHETERIZATION  2005    stents x 4    HX HEART CATHETERIZATION  2014    stent x 1    HX HERNIA REPAIR      x 4    HX LUMBAR DISKECTOMY  2014    LEFT HEART PERCUTANEOUS  4/29/2018    IN ABDOMEN SURGERY PROC UNLISTED      Laparotomy bowel resection instestinal rupture, colostomy    IN ABDOMEN SURGERY PROC UNLISTED  1999    Revision of colostomy    [facility] CORONARY ARTERIOGRAPH  4/29/2018     Barriers to Learning/Limitations: yes;  anesthesia  Compensate with: Visual Cues, Verbal Cues, and Tactile Cues  Home Situation:  Home Situation  Home Environment: Private residence  # Steps to Enter: 3  Rails to Enter: Yes  Hand Rails : Right  One/Two Story Residence: One story  Living Alone: No  Support Systems: Child(kapil)  Patient Expects to be Discharged to[de-identified] Home with home health  Current DME Used/Available at Home: Dundee Salines, straight,Shower chair  Critical Behavior:  Neurologic State: Alert  Orientation Level: Oriented to person;Oriented to place;Oriented to situation  Cognition: Follows commands  Safety/Judgement: Awareness of environment  Psychosocial  Patient Behaviors: Calm; Cooperative  Purposeful Interaction: Yes  Pt Identified Daily Priority: Clinical issues (comment)  Caritas Process: Nurture loving kindness;Establish trust  Caring Interventions: Reassure  Reassure: Therapeutic listening; Informing    Strength:    Strength: Generally decreased, functional    Tone & Sensation:   Tone: Normal  Sensation: Intact    Range Of Motion:  AROM: Generally decreased, functional    Functional Mobility:  Bed Mobility:  Rolling: Stand-by assistance  Supine to Sit: Stand-by assistance  Sit to Supine: Stand-by assistance     Transfers:  Sit to Stand: Contact guard assistance  Stand to Sit: Contact guard assistance    Balance:   Sitting: Intact  Standing: Intact    Ambulation/Gait Training:  Distance (ft): 4 Feet (ft)  Assistive Device: Gait belt  Ambulation - Level of Assistance: Contact guard assistance  Gait Abnormalities: Antalgic  Base of Support: Narrowed     Pain:  Pain level pre-treatment: 5/10   Pain level post-treatment: 5/10   Pain Intervention(s) : Medication (see MAR); Rest, Ice, Repositioning  Response to intervention: Nurse notified, See doc flow    Activity Tolerance:   Good  Please refer to the flowsheet for vital signs taken during this treatment. After treatment:   []         Patient left in no apparent distress sitting up in chair  [x]         Patient left in no apparent distress in bed  [x]         Call bell left within reach  []         Nursing notified  []         Caregiver present  []         Bed alarm activated  []         SCDs applied    COMMUNICATION/EDUCATION:   [x]         Role of Physical Therapy in the acute care setting. [x]         Fall prevention education was provided and the patient/caregiver indicated understanding. [x]         Patient/family have participated as able in goal setting and plan of care. [x]         Patient/family agree to work toward stated goals and plan of care. []         Patient understands intent and goals of therapy, but is neutral about his/her participation. []         Patient is unable to participate in goal setting/plan of care: ongoing with therapy staff.  []         Other:     Thank you for this referral.  Mary Boyce, SPT   Time Calculation: 26 mins      Eval Complexity: History: HIGH Complexity :3+ comorbidities / personal factors will impact the outcome/ POC Exam:HIGH Complexity : 4+ Standardized tests and measures addressing body structure, function, activity limitation and / or participation in recreation  Presentation: HIGH Complexity : Unstable and unpredictable characteristics  Clinical Decision Making:High Complexity    Overall Complexity:HIGH

## 2022-02-08 NOTE — PROGRESS NOTES
[facility]                  Pulmonary, Critical Care, and Sleep Medicine     Name: Julia Peterson MRN: [id number]   : 1955 Hospital: [facility] [place]    Date: 2022        PCCM Note                                              Consult requesting physician: Dr. Margo Penn  Reason for Consult: dyspnea       IMPRESSION:   ·     Active Hospital Problems    Diagnosis Date Noted    VIRK (dyspnea on exertion) 2022    Weakness 2022    Obesity 2021    Asthma-COPD overlap syndrome ([facility]    ·    ·   Patient Active Problem List   Diagnosis Code    CAD (coronary artery disease) I25.10    Hypertension I10    Asthma-COPD overlap syndrome (HCC) J44.9    Elevated troponin I level R77.8    CKD (chronic kidney disease) stage 3, GFR 30-59 ml/min ([facility]) N18.30    Asthma J45.909    NSTEMI (non-ST elevated myocardial infarction) ([facility] I21.4    Rheumatoid arthritis ([facility] M06.9    Severe persistent asthma J45.50    Obesity E66.9    Noncompliance with medication regimen Z91.14    Type II diabetes mellitus ([facility]) E11.9    VIRK (dyspnea on exertion) R06.00    Weakness R53.1         · Code status: Full Code       RECOMMENDATIONS:     Patient follows Dr. Moose Foy from pulmonary perspective, last seen on 2022. Worsening VIRK thought to be due to neuromuscular issues or severe deconditioning. Studies negative for PEs, pulmonary fibrosis or pulmonary hypertension. Cardiac cath was negative for significant CAD or pulmonary hypertension. Anemia is contributing to VIRK. Since COVID-19 pandemic, patient is mostly at home and not being active. He has gained about 30 to 50 pounds of weight since last 2 years. I believe these VIRK is likely due to deconditioning and morbid obesity more so than any other problems. Seen by Dr. Marcel Trevino and cardiac medications are adjusted. CXR clear lungs. CTA cannot be performed as creatinine is elevated.   Discussed with Dr. Margo Penn; will get PVL LE and VQ scan. On room air resting; keep SPO2 more than 91%. Bronchodilators:; Not available in formulary; start arformoterol/budesonide nebulized twice daily. Continue albuterol as needed. Continue Singulair 10 mg nightly. Steroids: None  Steroids as patient does not appear to be in COPD exacerbation. FiO2 to keep SpO2 >=92%, HOB >=30 degree, aspiration precautions, aggressive pulmonary toileting, incentive spirometry, PT/OT eval and treat, mobilization. DVT Prophylaxis: Lovenox  GI Prophylaxis: Protonix    Other issues management by primary team and respective consultants. Further recommendations will be based on the patient's response to recommended treatment and results of the investigation ordered. Quality Care: PPI, DVT prophylaxis, HOB elevated, infection control all reviewed and addressed. Discussed with patient, radiologic work up showed, answered all questions to their satisfaction. Care plan discussed with nursing, Dr. Sana Blanc. Subjective/History of Present Illness:     Ronni Sor 74 y.o. male with PMHx significant for obesity, rheumatoid arthritis (on leflunomide and Plaquenil), HTN, DM, HLD, CKD, CAD, former 20-pack-year smoker quit 1992, asthma-COPD (Nucala caused headache), multifactorial VIRK. Patient follows Dr. Davian Worthington from pulmonary perspective, last seen on 1/28/2022. Worsening VIRK thought to be due to neuromuscular issues or severe deconditioning. Studies negative for PEs, pulmonary fibrosis or pulmonary hypertension. Cardiac cath was negative for significant CAD or pulmonary hypertension. Anemia is contributing to VIRK. Since COVID-19 pandemic, patient is mostly at home and not being active. He has gained about 30 to 50 pounds of weight since last 2 years.   ____________________________________________________________________________________  Following records copied from Dr. David Zuñiga office visit note on 1/28/2022:    Status post cardiac catheterization April 2021 by Dr. Ocasio Peeling patent coronary stents; distal LAD with 50% stenosis-not suitable for intervention; PCWP 12 mm Hg; mean PA pressure 19 mm Hg; PVR 1.5 woods unit-no evidence for pulmonary hypertension. Labs 04/02/2021-WBC 8.4, hemoglobin 10.8, hematocrit 33.8, platelets 235; eosinophils 400 cells. V/Q scan 10/23/2020-[place]  Ventilation images demonstrate evidence of bilateral air trapping consistent with COPD. Perfusion images demonstrate diffuse homogeneous radiotracer uptake throughout the pulmonary parenchyma. Impression-low probability for pulmonary embolism. Evidence of air trapping consistent with obstructive lung disease. Ultrasound legs 04/02/2021  IMPRESSION:   No evidence of DVT bilaterally. Echocardiogram April 2021     CONCLUSIONS:  1. Normal left ventricular size. LV Ejection Fraction is 55-60 %. Grade 1 diastolic   dysfunction. There are no regional wall motion abnormalities. 2. Normal LV wall thickness. 3. There is trivial nonrheumatic mitral regurgitation. 4. The aortic valve is not well visualized. There is trivial aortic regurgitation. 5. There is trivial tricuspid regurgitation. Insufficient TR velocity jet to assess   RVS/PASP. 6. Compared to 11/2020 Echo report, no significant changes noted. Overall function   remains normal at 55-60%. HRCT CHEST 03/11/2021  IMPRESSION:   1. No acute process in the chest, and no CT evidence of significant interstitial lung disease. 2. Very mild subpleural septal thickening, reticulation, and suspected fibrosis bilaterally, most notably in the posteromedial aspect of the right lower lobe. 3. Moderate to severe coronary artery calcifications. PFTs  Nov 2018-FEV1/FVC 63%, FEV1 1.64 L/67%, FVC 2.59 L/84%; TLC 4.59/89%, RV 1.95 L/96%; DLCO 11.0/52%; partial bronchial reactivity seen.   Apr 2021-FEV1/FVC 68%, FEV1 1.53 L/70%, FVC 2.26 L/83%; TLC 4.45 L/91%, RV 2.10 L/104%; DLCO 10.5/49%; mid flows decreased at 35% predicted; no significant bronchial reactivity; flow volume loop shows coving during expiration  Impression- study consistent with asthma/COPD overlap; no significant bronchial reactivity; mild air trapping; moderately decreased diffusion capacity-please correlate clinically. CTA chest 4/24/2018  Lungs and pleural spaces: No infiltrates, effusions or pneumothorax  Impression  No evidence of pulmonary embolus or other cause acute cardiopulmonary disease. Labs 4/4/2019-allergens zone 2-mild allergies to grass; WBC 7.2, hemoglobin 12.1-low, hematocrit 37.3-low, MCV 93, platelets 670; eosinophils 12%/900 cells-elevated; total IgE 44-mildly elevated. 2/8/2022 :     Seen in room 347. Sitting in chair and eating lunch. On room air resting. Denies dyspnea at rest, coughing, wheezing, chest tightness, leg edema, calf pain. Complains of VIRK with minimal exertion and occasional chest discomfort. No fever or leukocytosis. Hemodynamically stable. No other overnight pulmonary issues reported. [facility] was not called for any issues overnight. Review of Systems:   HEENT: No epistaxis, no nasal drainage, no difficulty in swallowing, no redness in eyes  Respiratory: as above  Cardiovascular: no palpitations, no chronic leg edema, no syncope  Gastrointestinal: no abd pain, no vomiting, no diarrhea, no bleeding symptoms  Genitourinary: No urinary symptoms or hematuria  Integument/breast: No ulcers or rashes  Musculoskeletal:Neg  Neurological: No focal weakness, no seizures, no headaches  Behvioral/Psych: No anxiety, no depression  Constitutional: No fever, no chills, no weight loss, no night sweats         Allergies   Allergen Reactions    Ciprofloxacin Other (comments)     PT states that he turns red.     Tape [Adhesive] Other (comments)     Pt states, \"it rips my skin\"        Past Medical History:   Diagnosis Date    Arthritis     Asthma     CAD (coronary artery disease)     stents x 5    Cancer ([facility]     melanoma on left side of face    Chronic kidney disease     Chronic pain     left knee    COPD     Diabetes ([facility]     newly dm have not started med yet    DVT (deep venous thrombosis) ([facility] [address] [facility]     left leg    Hypertension     Myocardial infarction ([facility] [address] [facility] 2005    Obesity (BMI 30-39. 9)     Primary localized osteoarthritis of left knee 2019    Pulmonary embolism ([facility] 2017    [facility]umatoid arthritis [facility]         Past Surgical History:   Procedure Laterality Date    FRACTIONAL FLOW RESERVE  2018    FRACTIONAL FLOW RESERVE ADDL  2018    HX CHOLECYSTECTOMY      HX COLOSTOMY      HX COLOSTOMY TAKE DOWN      HX CORONARY STENT PLACEMENT      HX HEART CATHETERIZATION  2005    stents x 4    HX HEART CATHETERIZATION  2014    stent x 1    HX HERNIA REPAIR      x 4    HX LUMBAR DISKECTOMY  2014    LEFT HEART PERCUTANEOUS  2018    RI ABDOMEN SURGERY PROC UNLISTED      Laparotomy bowel resection instestinal rupture, colostomy    RI ABDOMEN SURGERY PROC UNLISTED      Revision of colostomy    JH CORONARY ARTERIOGRAPH  2018        Family History   Problem Relation Age of Onset    Heart Disease Mother     Heart Disease Father     Heart Disease Maternal Grandmother         Social History     Tobacco Use    Smoking status: Former Smoker     Quit date: 1992     Years since quittin.1    Smokeless tobacco: Never Used   Substance Use Topics    Alcohol use: Not Currently     Comment: last         Prior to Admission medications    Medication Sig Start Date End Date Taking? Authorizing Provider   DULoxetine (Cymbalta) 30 mg capsule Take 30 mg by mouth daily. Yes Provider, Historical   celecoxib (CeleBREX) 200 mg capsule Take 200 mg by mouth two (2) times a day. Yes Provider, Historical   aspirin delayed-release 81 mg tablet Take 81 mg by mouth daily. Yes Provider, Historical   folic acid (FOLVITE) 1 mg tablet Take 1 mg by mouth daily.    Yes Provider, Historical   budesonide-formoteroL (Symbicort) 160-4.5 mcg/actuation HFAA Take 2 Puffs by inhalation two (2) times a day. Yes Provider, Historical   oxyCODONE-acetaminophen (PERCOCET 10)  mg per tablet Take 1 Tab by mouth two (2) times daily as needed. Yes Provider, Historical   atorvastatin (LIPITOR) 40 mg tablet Take 40 mg by mouth daily. Yes Provider, Historical   propranolol (INDERAL) 10 mg tablet Take 10 mg by mouth daily. Yes Provider, Historical   furosemide (LASIX) 20 mg tablet Take 20 mg by mouth daily. Yes Provider, Historical   montelukast (SINGULAIR) 10 mg tablet Take 10 mg by mouth nightly. Yes Other, MD Moira   pantoprazole (PROTONIX) 40 mg tablet Take 40 mg by mouth daily. Yes Provider, Historical   fish oil-omega-3 fatty acids (Fish Oil) 340-1,000 mg capsule Take 1 Cap by mouth daily. Patient not taking: Reported on 2/7/2022    Provider, Historical   DULoxetine (CYMBALTA) 30 mg capsule Take 30 mg by mouth daily. Patient not taking: Reported on 2/7/2022    Provider, Historical   meclizine (ANTIVERT) 12.5 mg tablet Take 12.5 mg by mouth three (3) times daily as needed for Dizziness or Nausea. Patient not taking: Reported on 2/7/2022    Provider, Historical   albuterol-ipratropium (DUO-NEB) 2.5 mg-0.5 mg/3 ml nebu 3 mL by Nebulization route every four (4) hours as needed for Other (SOB). Charge medicare part B. ICD 10 - J44.1  Patient not taking: Reported on 2/7/2022 9/3/19   Shahana Leavitt MD   nitroglycerin (NITROSTAT) 0.4 mg SL tablet by SubLINGual route every five (5) minutes as needed for Chest Pain.   Patient not taking: Reported on 2/7/2022    Provider, Historical       Current Facility-Administered Medications   Medication Dose Route Frequency    aspirin delayed-release tablet 81 mg  81 mg Oral DAILY    atorvastatin (LIPITOR) tablet 40 mg  40 mg Oral DAILY    DULoxetine (CYMBALTA) capsule 30 mg  30 mg Oral DAILY    folic acid (FOLVITE) tablet 1 mg  1 mg Oral DAILY    montelukast (SINGULAIR) tablet 10 mg  10 mg Oral QHS    pantoprazole (PROTONIX) tablet 40 mg  40 mg Oral DAILY    propranoloL (INDERAL) tablet 10 mg  10 mg Oral DAILY    albuterol-ipratropium (DUO-NEB) 2.5 MG-0.5 MG/3 ML  3 mL Nebulization Q4H PRN    glucose chewable tablet 16 g  16 g Oral PRN    glucagon (GLUCAGEN) injection 1 mg  1 mg IntraMUSCular PRN    insulin lispro (HUMALOG) injection   SubCUTAneous AC&HS    dextrose 10% infusion 125-250 mL  125-250 mL IntraVENous PRN    furosemide (LASIX) injection 40 mg  40 mg IntraVENous BID    enoxaparin (LOVENOX) injection 40 mg  40 mg SubCUTAneous Q24H    ranolazine ER (RANEXA) tablet 500 mg  500 mg Oral BID         Objective:   Vital Signs:    Visit Vitals  /65   Pulse 72   Temp 97.8 °F (36.6 °C)   Resp 20   Wt 88.9 kg (195 lb 15.8 oz)   SpO2 97%   BMI 32.61 kg/m²       O2 Device: None (Room air)       Temp (24hrs), Av.1 °F (36.7 °C), Min:97.8 °F (36.6 °C), Max:98.5 °F (36.9 °C)       Intake/Output:   Last shift:      701 -  190  In: -   Out: 1000 [Urine:1000]    Last 3 shifts:  190 -  0700  In: 200 [P.O.:200]  Out: 1750 [Urine:1750]      Intake/Output Summary (Last 24 hours) at 2022 1250  Last data filed at 2022 1151  Gross per 24 hour   Intake 200 ml   Output 2750 ml   Net -2550 ml       Last 3 Recorded Weights in this Encounter    22 1239   Weight: 88.9 kg (195 lb 15.8 oz)       Physical Exam:       General/Neurology: Alert, awake and oriented. NAD. Obese. Head:   NCAT. Eye:   EOM intact. No icterus/pallor/cyanosis. Nose:   No nasal drainage/discharge. Neck:   Trachea midline. Lung: Moderate air entry bilateral equal.  No rales, rhonchi. No wheezing or stridor. No prolonged expiration or accessory muscle use. Heart:   S1 S2 present. No murmur or JVD. Abdomen:  Soft. NT. ND. No palpable masses. Extremities:  No edema. No cyanosis or clubbing.   Pulses: 2+ and symmetric in DP    Data:       Recent Results (from the past 24 hour(s))   TROPONIN-HIGH SENSITIVITY    Collection Time: 02/07/22  1:48 PM   Result Value Ref Range    Troponin-High Sensitivity 11 0 - 78 ng/L   TSH 3RD GENERATION    Collection Time: 02/07/22  1:48 PM   Result Value Ref Range    TSH 2.68 0.36 - 3.74 uIU/mL   CBC WITH AUTOMATED DIFF    Collection Time: 02/07/22  1:48 PM   Result Value Ref Range    WBC 10.2 4.6 - 13.2 K/uL    RBC 3.81 (L) 4.35 - 5.65 M/uL    HGB 9.4 (L) 13.0 - 16.0 g/dL    HCT 32.0 (L) 36.0 - 48.0 %    MCV 84.0 78.0 - 100.0 FL    MCH 24.7 24.0 - 34.0 PG    MCHC 29.4 (L) 31.0 - 37.0 g/dL    RDW 18.8 (H) 11.6 - 14.5 %    PLATELET 009 205 - 884 K/uL    MPV 9.3 9.2 - 11.8 FL    NRBC 0.0 0  WBC    ABSOLUTE NRBC 0.00 0.00 - 0.01 K/uL    NEUTROPHILS 72 40 - 73 %    LYMPHOCYTES 14 (L) 21 - 52 %    MONOCYTES 11 (H) 3 - 10 %    EOSINOPHILS 2 0 - 5 %    BASOPHILS 0 0 - 2 %    IMMATURE GRANULOCYTES 1 (H) 0.0 - 0.5 %    ABS. NEUTROPHILS 7.4 1.8 - 8.0 K/UL    ABS. LYMPHOCYTES 1.5 0.9 - 3.6 K/UL    ABS. MONOCYTES 1.1 0.05 - 1.2 K/UL    ABS. EOSINOPHILS 0.2 0.0 - 0.4 K/UL    ABS. BASOPHILS 0.0 0.0 - 0.1 K/UL    ABS. IMM.  GRANS. 0.1 (H) 0.00 - 0.04 K/UL    DF AUTOMATED     METABOLIC PANEL, BASIC    Collection Time: 02/07/22  1:48 PM   Result Value Ref Range    Sodium 138 136 - 145 mmol/L    Potassium 4.2 3.5 - 5.5 mmol/L    Chloride 105 100 - 111 mmol/L    CO2 27 21 - 32 mmol/L    Anion gap 6 3.0 - 18 mmol/L    Glucose 209 (H) 74 - 99 mg/dL    BUN 22 (H) 7.0 - 18 MG/DL    Creatinine 1.56 (H) 0.6 - 1.3 MG/DL    BUN/Creatinine ratio 14 12 - 20      GFR est AA 54 (L) >60 ml/min/1.73m2    GFR est non-AA 44 (L) >60 ml/min/1.73m2    Calcium 8.6 8.5 - 10.1 MG/DL   NT-PRO BNP    Collection Time: 02/07/22  1:48 PM   Result Value Ref Range    NT pro- 0 - 900 PG/ML   FERRITIN    Collection Time: 02/07/22  1:48 PM   Result Value Ref Range    Ferritin 11 8 - 388 NG/ML   RETICULOCYTE COUNT    Collection Time: 02/07/22  1:48 PM   Result Value Ref Range    Reticulocyte count 2.4 0.5 - 2.5 %   VITAMIN B12 & FOLATE    Collection Time: 02/07/22  1:48 PM   Result Value Ref Range    Vitamin B12 >2,000 (H) 211 - 911 pg/mL    Folate 14.2 3.10 - 17.50 ng/mL   CK    Collection Time: 02/07/22  1:48 PM   Result Value Ref Range    CK 87 39 - 308 U/L   IRON PROFILE    Collection Time: 02/07/22  1:48 PM   Result Value Ref Range    Iron 55 50 - 175 ug/dL    TIBC 337 250 - 450 ug/dL    Iron % saturation 16 (L) 20 - 50 %   GLUCOSE, POC    Collection Time: 02/07/22  4:42 PM   Result Value Ref Range    Glucose (POC) 183 (H) 70 - 110 mg/dL   TROPONIN-HIGH SENSITIVITY    Collection Time: 02/07/22  5:39 PM   Result Value Ref Range    Troponin-High Sensitivity 10 0 - 78 ng/L   GLUCOSE, POC    Collection Time: 02/07/22 10:22 PM   Result Value Ref Range    Glucose (POC) 120 (H) 70 - 870 mg/dL   METABOLIC PANEL, COMPREHENSIVE    Collection Time: 02/08/22  2:30 AM   Result Value Ref Range    Sodium 136 136 - 145 mmol/L    Potassium 4.2 3.5 - 5.5 mmol/L    Chloride 102 100 - 111 mmol/L    CO2 27 21 - 32 mmol/L    Anion gap 7 3.0 - 18 mmol/L    Glucose 136 (H) 74 - 99 mg/dL    BUN 23 (H) 7.0 - 18 MG/DL    Creatinine 1.80 (H) 0.6 - 1.3 MG/DL    BUN/Creatinine ratio 13 12 - 20      GFR est AA 46 (L) >60 ml/min/1.73m2    GFR est non-AA 38 (L) >60 ml/min/1.73m2    Calcium 8.6 8.5 - 10.1 MG/DL    Bilirubin, total 0.6 0.2 - 1.0 MG/DL    ALT (SGPT) 18 16 - 61 U/L    AST (SGOT) 22 10 - 38 U/L    Alk.  phosphatase 102 45 - 117 U/L    Protein, total 6.4 6.4 - 8.2 g/dL    Albumin 3.1 (L) 3.4 - 5.0 g/dL    Globulin 3.3 2.0 - 4.0 g/dL    A-G Ratio 0.9 0.8 - 1.7     CBC WITH AUTOMATED DIFF    Collection Time: 02/08/22  2:30 AM   Result Value Ref Range    WBC 11.5 4.6 - 13.2 K/uL    RBC 3.90 (L) 4.35 - 5.65 M/uL    HGB 9.5 (L) 13.0 - 16.0 g/dL    HCT 33.0 (L) 36.0 - 48.0 %    MCV 84.6 78.0 - 100.0 FL    MCH 24.4 24.0 - 34.0 PG    MCHC 28.8 (L) 31.0 - 37.0 g/dL    RDW 19. 2 (H) 11.6 - 14.5 %    PLATELET 563 527 - 927 K/uL    MPV 9.6 9.2 - 11.8 FL    NRBC 0.0 0  WBC    ABSOLUTE NRBC 0.00 0.00 - 0.01 K/uL    NEUTROPHILS 65 40 - 73 %    LYMPHOCYTES 21 21 - 52 %    MONOCYTES 12 (H) 3 - 10 %    EOSINOPHILS 2 0 - 5 %    BASOPHILS 0 0 - 2 %    IMMATURE GRANULOCYTES 1 (H) 0.0 - 0.5 %    ABS. NEUTROPHILS 7.4 1.8 - 8.0 K/UL    ABS. LYMPHOCYTES 2.4 0.9 - 3.6 K/UL    ABS. MONOCYTES 1.4 (H) 0.05 - 1.2 K/UL    ABS. EOSINOPHILS 0.2 0.0 - 0.4 K/UL    ABS. BASOPHILS 0.0 0.0 - 0.1 K/UL    ABS. IMM. GRANS. 0.1 (H) 0.00 - 0.04 K/UL    DF AUTOMATED     GLUCOSE, POC    Collection Time: 02/08/22 11:50 AM   Result Value Ref Range    Glucose (POC) 156 (H) 70 - 110 mg/dL         Chemistry Recent Labs     02/08/22  0230 02/07/22  1348   * 209*    138   K 4.2 4.2    105   CO2 27 27   BUN 23* 22*   CREA 1.80* 1.56*   CA 8.6 8.6   AGAP 7 6   BUCR 13 14     --    TP 6.4  --    ALB 3.1*  --    GLOB 3.3  --    AGRAT 0.9  --         Lactic Acid No results found for: LAC  No results for input(s): LAC in the last 72 hours. Liver Enzymes Protein, total   Date Value Ref Range Status   02/08/2022 6.4 6.4 - 8.2 g/dL Final     Albumin   Date Value Ref Range Status   02/08/2022 3.1 (L) 3.4 - 5.0 g/dL Final     Globulin   Date Value Ref Range Status   02/08/2022 3.3 2.0 - 4.0 g/dL Final     A-G Ratio   Date Value Ref Range Status   02/08/2022 0.9 0.8 - 1.7   Final     Alk.  phosphatase   Date Value Ref Range Status   02/08/2022 102 45 - 117 U/L Final     Recent Labs     02/08/22  0230   TP 6.4   ALB 3.1*   GLOB 3.3   AGRAT 0.9           CBC w/Diff Recent Labs     02/08/22  0230 02/07/22  1348   WBC 11.5 10.2   RBC 3.90* 3.81*   HGB 9.5* 9.4*   HCT 33.0* 32.0*    267   GRANS 65 72   LYMPH 21 14*   EOS 2 2        Cardiac Enzymes Lab Results   Component Value Date/Time    CPK 87 02/07/2022 01:48 PM        BNP Lab Results   Component Value Date/Time    BNP 4.0 09/19/2014 02:31 PM Coagulation No results for input(s): PTP, INR, APTT, INREXT, INREXT in the last 72 hours. Thyroid  Lab Results   Component Value Date/Time    TSH 2.68 02/07/2022 01:48 PM       No results found for: T4     Urinalysis Lab Results   Component Value Date/Time    Color YELLOW 06/21/2019 01:17 PM    Appearance CLEAR 06/21/2019 01:17 PM    Specific gravity 1.012 06/21/2019 01:17 PM    pH (UA) 5.0 06/21/2019 01:17 PM    Protein NEGATIVE  06/21/2019 01:17 PM    Glucose NEGATIVE  06/21/2019 01:17 PM    Ketone NEGATIVE  06/21/2019 01:17 PM    Bilirubin NEGATIVE  06/21/2019 01:17 PM    Urobilinogen 0.2 06/21/2019 01:17 PM    Nitrites NEGATIVE  06/21/2019 01:17 PM    Leukocyte Esterase NEGATIVE  06/21/2019 01:17 PM        Micro  No results for input(s): SDES, CULT in the last 72 hours. No results for input(s): CULT in the last 72 hours. ABG No results for input(s): PHI, PHI, POC2, PCO2I, PO2, PO2I, HCO3, HCO3I, FIO2, FIO2I in the last 72 hours. CT (Most Recent) (CT chest reviewed by me) Results from Hospital Encounter encounter on 05/01/21    CTA CHEST W OR W WO CONT    Narrative  EXAM: CTA chest    CLINICAL INDICATION/HISTORY: Chest pain. COMPARISON: 8/29/2019    TECHNIQUE: Axial CT imaging from the thoracic inlet through the diaphragm with  intravenous contrast. Coronal and sagittal MIP reformats were generated. One or  more dose reduction techniques were used on this CT: automated exposure control,  adjustment of the mAs and/or kVp according to patient size, and iterative  reconstruction techniques. The specific techniques used on this CT exam have  been documented in the patient's electronic medical record.  Digital Imaging and  Communications in Medicine (DICOM) format image data are available to  nonaffiliated external healthcare facilities or entities on a secured, media  free, reciprocally searchable basis with patient authorization for at least a  12-month period after this study.      _______________    FINDINGS:    EXAM QUALITY: Adequate    PULMONARY ARTERIES: No pulmonary embolism identified. MEDIASTINUM: Normal heart size. Aortic and coronary artery calcifications. No  aortic dissection. No pericardial effusion. LUNGS: No suspicious nodule or mass. AIRWAY: Normal.    PLEURA: Normal.    LYMPH NODES: No enlarged nodes. UPPER ABDOMEN: Simple hepatic cysts noted. Compensatory biliary dilation  secondary to prior cholecystectomy. Colonic diverticulosis. BONES: No acute or aggressive osseous abnormalities identified. OTHER: None.    _______________    Impression  1. No pulmonary embolism or other acute pulmonary abnormalities identified. XR (Most Recent). CXR  reviewed by me and compared with previous CXR Results from Hospital Encounter encounter on 02/07/22    XR CHEST PA LAT    Narrative  EXAM:  PA and Lateral Chest X-ray    INDICATION: Dyspnea on exertion    COMPARISON: CT dated May 2, 2021    ___________    FINDINGS: Heart and mediastinal contours are within normal limits. Lungs are  clear of active disease. There are no pleural effusions. No acute osseous  findings. _____________    Impression  No acute process         ·Please note: Voice-recognition software may have been used to generate this report, which may have resulted in some phonetic-based errors in grammar and contents. Even though attempts were made to correct all the mistakes, some may have been missed, and remained in the body of the document.     Lilliam Berg MD  2/8/2022

## 2022-02-09 ENCOUNTER — APPOINTMENT (OUTPATIENT)
Dept: VASCULAR SURGERY | Age: 67
End: 2022-02-09
Attending: HOSPITALIST
Payer: MEDICARE

## 2022-02-09 ENCOUNTER — APPOINTMENT (OUTPATIENT)
Dept: GENERAL RADIOLOGY | Age: 67
End: 2022-02-09
Attending: HOSPITALIST
Payer: MEDICARE

## 2022-02-09 ENCOUNTER — APPOINTMENT (OUTPATIENT)
Dept: NON INVASIVE DIAGNOSTICS | Age: 67
End: 2022-02-09
Attending: HOSPITALIST
Payer: MEDICARE

## 2022-02-09 ENCOUNTER — APPOINTMENT (OUTPATIENT)
Dept: NUCLEAR MEDICINE | Age: 67
End: 2022-02-09
Attending: HOSPITALIST
Payer: MEDICARE

## 2022-02-09 LAB
ALBUMIN SERPL-MCNC: 3.3 G/DL (ref 3.4–5)
ALBUMIN/GLOB SERPL: 0.9 {RATIO} (ref 0.8–1.7)
ALP SERPL-CCNC: 111 U/L (ref 45–117)
ALT SERPL-CCNC: 24 U/L (ref 16–61)
ANION GAP SERPL CALC-SCNC: 9 MMOL/L (ref 3–18)
AST SERPL-CCNC: 18 U/L (ref 10–38)
BASOPHILS # BLD: 0 K/UL (ref 0–0.1)
BASOPHILS NFR BLD: 0 % (ref 0–2)
BILIRUB SERPL-MCNC: 0.7 MG/DL (ref 0.2–1)
BUN SERPL-MCNC: 32 MG/DL (ref 7–18)
BUN/CREAT SERPL: 15 (ref 12–20)
CALCIUM SERPL-MCNC: 8.7 MG/DL (ref 8.5–10.1)
CHLORIDE SERPL-SCNC: 99 MMOL/L (ref 100–111)
CO2 SERPL-SCNC: 28 MMOL/L (ref 21–32)
CREAT SERPL-MCNC: 2.16 MG/DL (ref 0.6–1.3)
DIFFERENTIAL METHOD BLD: ABNORMAL
ECHO AO ROOT DIAM: 3 CM
ECHO AO ROOT INDEX: 1.44 CM/M2
ECHO AV MEAN GRADIENT: 2 MMHG
ECHO AV MEAN VELOCITY: 0.6 M/S
ECHO AV PEAK GRADIENT: 4 MMHG
ECHO AV PEAK VELOCITY: 0.9 M/S
ECHO AV VTI: 13.7 CM
ECHO LV E' LATERAL VELOCITY: 5 CM/S
ECHO LV E' SEPTAL VELOCITY: 6 CM/S
ECHO LV EDV A4C: 26 ML
ECHO LV EDV INDEX A4C: 13 ML/M2
ECHO LV EJECTION FRACTION A4C: 63 %
ECHO LV ESV A4C: 9 ML
ECHO LV ESV INDEX A4C: 4 ML/M2
ECHO LV FRACTIONAL SHORTENING: 31 % (ref 28–44)
ECHO LV INTERNAL DIMENSION DIASTOLE INDEX: 1.68 CM/M2
ECHO LV INTERNAL DIMENSION DIASTOLIC: 3.5 CM (ref 4.2–5.9)
ECHO LV INTERNAL DIMENSION SYSTOLIC INDEX: 1.15 CM/M2
ECHO LV INTERNAL DIMENSION SYSTOLIC: 2.4 CM
ECHO LV IVSD: 1.1 CM (ref 0.6–1)
ECHO LV MASS 2D: 127.3 G (ref 88–224)
ECHO LV MASS INDEX 2D: 61.2 G/M2 (ref 49–115)
ECHO LV POSTERIOR WALL DIASTOLIC: 1.2 CM (ref 0.6–1)
ECHO LV RELATIVE WALL THICKNESS RATIO: 0.69
ECHO LVOT AREA: 3.1 CM2
ECHO LVOT DIAM: 2 CM
ECHO MV A VELOCITY: 0.75 M/S
ECHO MV E DECELERATION TIME (DT): 260.6 MS
ECHO MV E VELOCITY: 0.58 M/S
ECHO MV E/A RATIO: 0.77
ECHO MV E/E' LATERAL: 11.6
ECHO MV E/E' RATIO (AVERAGED): 10.63
ECHO MV E/E' SEPTAL: 9.67
ECHO RV FREE WALL PEAK S': 12 CM/S
ECHO RV TAPSE: 1.8 CM (ref 1.5–2)
EOSINOPHIL # BLD: 0.2 K/UL (ref 0–0.4)
EOSINOPHIL NFR BLD: 1 % (ref 0–5)
ERYTHROCYTE [DISTWIDTH] IN BLOOD BY AUTOMATED COUNT: 19.3 % (ref 11.6–14.5)
GLOBULIN SER CALC-MCNC: 3.7 G/DL (ref 2–4)
GLUCOSE BLD STRIP.AUTO-MCNC: 158 MG/DL (ref 70–110)
GLUCOSE BLD STRIP.AUTO-MCNC: 173 MG/DL (ref 70–110)
GLUCOSE BLD STRIP.AUTO-MCNC: 211 MG/DL (ref 70–110)
GLUCOSE SERPL-MCNC: 148 MG/DL (ref 74–99)
HCT VFR BLD AUTO: 33.4 % (ref 36–48)
HGB BLD-MCNC: 9.9 G/DL (ref 13–16)
IMM GRANULOCYTES # BLD AUTO: 0.1 K/UL (ref 0–0.04)
IMM GRANULOCYTES NFR BLD AUTO: 1 % (ref 0–0.5)
LYMPHOCYTES # BLD: 1.8 K/UL (ref 0.9–3.6)
LYMPHOCYTES NFR BLD: 16 % (ref 21–52)
MCH RBC QN AUTO: 24 PG (ref 24–34)
MCHC RBC AUTO-ENTMCNC: 29.6 G/DL (ref 31–37)
MCV RBC AUTO: 81.1 FL (ref 78–100)
MONOCYTES # BLD: 1.2 K/UL (ref 0.05–1.2)
MONOCYTES NFR BLD: 11 % (ref 3–10)
NEUTS SEG # BLD: 7.9 K/UL (ref 1.8–8)
NEUTS SEG NFR BLD: 71 % (ref 40–73)
NRBC # BLD: 0 K/UL (ref 0–0.01)
NRBC BLD-RTO: 0 PER 100 WBC
PLATELET # BLD AUTO: 290 K/UL (ref 135–420)
PMV BLD AUTO: 9.6 FL (ref 9.2–11.8)
POTASSIUM SERPL-SCNC: 3.9 MMOL/L (ref 3.5–5.5)
PROT SERPL-MCNC: 7 G/DL (ref 6.4–8.2)
RBC # BLD AUTO: 4.12 M/UL (ref 4.35–5.65)
SODIUM SERPL-SCNC: 136 MMOL/L (ref 136–145)
WBC # BLD AUTO: 11.2 K/UL (ref 4.6–13.2)

## 2022-02-09 PROCEDURE — 74011250637 HC RX REV CODE- 250/637: Performed by: HOSPITALIST

## 2022-02-09 PROCEDURE — 71045 X-RAY EXAM CHEST 1 VIEW: CPT

## 2022-02-09 PROCEDURE — 94640 AIRWAY INHALATION TREATMENT: CPT

## 2022-02-09 PROCEDURE — A9540 TC99M MAA: HCPCS

## 2022-02-09 PROCEDURE — 74011250637 HC RX REV CODE- 250/637: Performed by: INTERNAL MEDICINE

## 2022-02-09 PROCEDURE — 93970 EXTREMITY STUDY: CPT

## 2022-02-09 PROCEDURE — 93306 TTE W/DOPPLER COMPLETE: CPT

## 2022-02-09 PROCEDURE — 74011250636 HC RX REV CODE- 250/636: Performed by: HOSPITALIST

## 2022-02-09 PROCEDURE — 36415 COLL VENOUS BLD VENIPUNCTURE: CPT

## 2022-02-09 PROCEDURE — 96372 THER/PROPH/DIAG INJ SC/IM: CPT

## 2022-02-09 PROCEDURE — 85025 COMPLETE CBC W/AUTO DIFF WBC: CPT

## 2022-02-09 PROCEDURE — 82962 GLUCOSE BLOOD TEST: CPT

## 2022-02-09 PROCEDURE — 74011636637 HC RX REV CODE- 636/637: Performed by: HOSPITALIST

## 2022-02-09 PROCEDURE — 80053 COMPREHEN METABOLIC PANEL: CPT

## 2022-02-09 PROCEDURE — G0378 HOSPITAL OBSERVATION PER HR: HCPCS

## 2022-02-09 PROCEDURE — 65660000000 HC RM CCU STEPDOWN

## 2022-02-09 PROCEDURE — 74011000250 HC RX REV CODE- 250: Performed by: INTERNAL MEDICINE

## 2022-02-09 RX ORDER — FUROSEMIDE 10 MG/ML
40 INJECTION INTRAMUSCULAR; INTRAVENOUS DAILY
Status: DISCONTINUED | OUTPATIENT
Start: 2022-02-10 | End: 2022-02-10 | Stop reason: HOSPADM

## 2022-02-09 RX ADMIN — Medication 2 UNITS: at 22:09

## 2022-02-09 RX ADMIN — MONTELUKAST 10 MG: 10 TABLET, FILM COATED ORAL at 22:08

## 2022-02-09 RX ADMIN — ARFORMOTEROL TARTRATE: 15 SOLUTION RESPIRATORY (INHALATION) at 20:12

## 2022-02-09 RX ADMIN — ASPIRIN 81 MG: 81 TABLET, COATED ORAL at 15:50

## 2022-02-09 RX ADMIN — Medication 4 UNITS: at 16:20

## 2022-02-09 RX ADMIN — FOLIC ACID 1 MG: 1 TABLET ORAL at 15:51

## 2022-02-09 RX ADMIN — ATORVASTATIN CALCIUM 40 MG: 20 TABLET, FILM COATED ORAL at 15:50

## 2022-02-09 RX ADMIN — RANOLAZINE 500 MG: 500 TABLET, FILM COATED, EXTENDED RELEASE ORAL at 15:50

## 2022-02-09 RX ADMIN — PROPRANOLOL HYDROCHLORIDE 10 MG: 20 TABLET ORAL at 15:48

## 2022-02-09 RX ADMIN — ARFORMOTEROL TARTRATE: 15 SOLUTION RESPIRATORY (INHALATION) at 07:50

## 2022-02-09 RX ADMIN — Medication 2 UNITS: at 06:16

## 2022-02-09 RX ADMIN — PANTOPRAZOLE SODIUM 40 MG: 40 TABLET, DELAYED RELEASE ORAL at 15:48

## 2022-02-09 RX ADMIN — RANOLAZINE 500 MG: 500 TABLET, FILM COATED, EXTENDED RELEASE ORAL at 22:08

## 2022-02-09 RX ADMIN — FUROSEMIDE 40 MG: 10 INJECTION, SOLUTION INTRAMUSCULAR; INTRAVENOUS at 15:54

## 2022-02-09 RX ADMIN — ENOXAPARIN SODIUM 40 MG: 100 INJECTION SUBCUTANEOUS at 15:51

## 2022-02-09 RX ADMIN — DULOXETINE HYDROCHLORIDE 30 MG: 30 CAPSULE, DELAYED RELEASE ORAL at 15:50

## 2022-02-09 NOTE — PROGRESS NOTES
[facility]                  Pulmonary, Critical Care, and Sleep Medicine     Name: Evaristo Bocanegra MRN: [id number]   : 1955 Hospital: [facility] [place]    Date: 2022        PCCM Note                                              Consult requesting physician: Dr. Anjel Saab  Reason for Consult: dyspnea       IMPRESSION:   ·     Active Hospital Problems    Diagnosis Date Noted    VIRK (dyspnea on exertion) 2022    Weakness 2022    Obesity 2021    Asthma-COPD overlap syndrome ([facility]    ·    ·   Patient Active Problem List   Diagnosis Code    CAD (coronary artery disease) I25.10    Hypertension I10    Asthma-COPD overlap syndrome (HCC) J44.9    Elevated troponin I level R77.8    CKD (chronic kidney disease) stage 3, GFR 30-59 ml/min ([facility]) N18.30    Asthma J45.909    NSTEMI (non-ST elevated myocardial infarction) ([facility] I21.4    Rheumatoid arthritis ([facility] M06.9    Severe persistent asthma J45.50    Obesity E66.9    Noncompliance with medication regimen Z91.14    Type II diabetes mellitus ([facility]) E11.9    VIRK (dyspnea on exertion) R06.00    Weakness R53.1         · Code status: Full Code       RECOMMENDATIONS:     Patient follows Dr. Lennox Nephew from pulmonary perspective, last seen on 2022. Worsening VIRK thought to be due to neuromuscular issues or severe deconditioning. Studies negative for PEs, pulmonary fibrosis or pulmonary hypertension. Cardiac cath was negative for significant CAD or pulmonary hypertension. Anemia is contributing to VIRK. Since COVID-19 pandemic, patient is mostly at home and not being active. He has gained about 30 to 50 pounds of weight since last 2 years. I believe these VIRK is likely due to deconditioning and morbid obesity more so than any other problems. Seen by Dr. Connor Patterson and cardiac medications are adjusted. CXR clear lungs. CTA cannot be performed as creatinine is elevated.   VQ scan negative for VTE.  PVL LE negative for DVT. On room air resting; keep SPO2 more than 91%. Bronchodilators: Symbicort not available in formulary; continue arformoterol/budesonide nebulized twice daily. Continue albuterol as needed. Continue Singulair 10 mg nightly. Steroids: None; as patient does not appear to be in COPD exacerbation. FiO2 to keep SpO2 >=92%, HOB >=30 degree, aspiration precautions, aggressive pulmonary toileting, incentive spirometry, PT/OT eval and treat, mobilization. DVT Prophylaxis: Lovenox  GI Prophylaxis: Protonix    Other issues management by primary team and respective consultants. Further recommendations will be based on the patient's response to recommended treatment and results of the investigation ordered. Quality Care: PPI, DVT prophylaxis, HOB elevated, infection control all reviewed and addressed. Discussed with patient, radiologic work up showed, answered all questions to their satisfaction. Care plan discussed with nursing, Dr. Susanne Craft. Discussed with Dr. Susanne Craft: No further pulmonary recommendations. Follow-up with Dr. Mac Bell 1 week after hospital discharge. Okay to discharge from pulmonary perspective. PCCM will sign off; call us with any questions. Subjective/History of Present Illness:     Mik Hall 74 y.o. male with PMHx significant for obesity, rheumatoid arthritis (on leflunomide and Plaquenil), HTN, DM, HLD, CKD, CAD, former 20-pack-year smoker quit 1992, asthma-COPD (Nucala caused headache), multifactorial VIRK. Patient follows Dr. Mac Bell from pulmonary perspective, last seen on 1/28/2022. Worsening VIRK thought to be due to neuromuscular issues or severe deconditioning. Studies negative for PEs, pulmonary fibrosis or pulmonary hypertension. Cardiac cath was negative for significant CAD or pulmonary hypertension. Anemia is contributing to VIRK. Since COVID-19 pandemic, patient is mostly at home and not being active.   He has gained about 30 to 50 pounds of weight since last 2 years. ____________________________________________________________________________________  Following records copied from Dr. Vasile Garcia office visit note on 1/28/2022:    Status post cardiac catheterization April 2021 by Dr. Monico Leyva patent coronary stents; distal LAD with 50% stenosis-not suitable for intervention; PCWP 12 mm Hg; mean PA pressure 19 mm Hg; PVR 1.5 woods unit-no evidence for pulmonary hypertension. Labs 04/02/2021-WBC 8.4, hemoglobin 10.8, hematocrit 33.8, platelets 682; eosinophils 400 cells. V/Q scan 10/23/2020-[place]  Ventilation images demonstrate evidence of bilateral air trapping consistent with COPD. Perfusion images demonstrate diffuse homogeneous radiotracer uptake throughout the pulmonary parenchyma. Impression-low probability for pulmonary embolism. Evidence of air trapping consistent with obstructive lung disease. Ultrasound legs 04/02/2021  IMPRESSION:   No evidence of DVT bilaterally. Echocardiogram April 2021     CONCLUSIONS:  1. Normal left ventricular size. LV Ejection Fraction is 55-60 %. Grade 1 diastolic   dysfunction. There are no regional wall motion abnormalities. 2. Normal LV wall thickness. 3. There is trivial nonrheumatic mitral regurgitation. 4. The aortic valve is not well visualized. There is trivial aortic regurgitation. 5. There is trivial tricuspid regurgitation. Insufficient TR velocity jet to assess   RVS/PASP. 6. Compared to 11/2020 Echo report, no significant changes noted. Overall function   remains normal at 55-60%. HRCT CHEST 03/11/2021  IMPRESSION:   1. No acute process in the chest, and no CT evidence of significant interstitial lung disease. 2. Very mild subpleural septal thickening, reticulation, and suspected fibrosis bilaterally, most notably in the posteromedial aspect of the right lower lobe. 3. Moderate to severe coronary artery calcifications.     PFTs  Nov 2018-FEV1/FVC 63%, FEV1 1.64 L/67%, FVC 2.59 L/84%; TLC 4.59/89%, RV 1.95 L/96%; DLCO 11.0/52%; partial bronchial reactivity seen. Apr 2021-FEV1/FVC 68%, FEV1 1.53 L/70%, FVC 2.26 L/83%; TLC 4.45 L/91%, RV 2.10 L/104%; DLCO 10.5/49%; mid flows decreased at 35% predicted; no significant bronchial reactivity; flow volume loop shows coving during expiration  Impression- study consistent with asthma/COPD overlap; no significant bronchial reactivity; mild air trapping; moderately decreased diffusion capacity-please correlate clinically. CTA chest 4/24/2018  Lungs and pleural spaces: No infiltrates, effusions or pneumothorax  Impression  No evidence of pulmonary embolus or other cause acute cardiopulmonary disease. Labs 4/4/2019-allergens zone 2-mild allergies to grass; WBC 7.2, hemoglobin 12.1-low, hematocrit 37.3-low, MCV 93, platelets 576; eosinophils 12%/900 cells-elevated; total IgE 44-mildly elevated. 2/9/2022 :     Seen in room 347. Sitting in chair and eating lunch. On room air resting. Denies dyspnea at rest, coughing, wheezing, chest tightness, leg edema, calf pain. Complains of VIRK with minimal exertion and occasional chest discomfort. No fever or leukocytosis. Hemodynamically stable. No other overnight pulmonary issues reported. [facility] was not called for any issues overnight.     Review of Systems:   HEENT: No epistaxis, no nasal drainage, no difficulty in swallowing, no redness in eyes  Respiratory: as above  Cardiovascular: no palpitations, no chronic leg edema, no syncope  Gastrointestinal: no abd pain, no vomiting, no diarrhea, no bleeding symptoms  Genitourinary: No urinary symptoms or hematuria  Integument/breast: No ulcers or rashes  Musculoskeletal:Neg  Neurological: No focal weakness, no seizures, no headaches  Behvioral/Psych: No anxiety, no depression  Constitutional: No fever, no chills, no weight loss, no night sweats         Allergies   Allergen Reactions    Ciprofloxacin Other (comments)     PT states that he turns red.  Tape [Adhesive] Other (comments)     Pt states, \"it rips my skin\"        Past Medical History:   Diagnosis Date    Arthritis     Asthma     CAD (coronary artery disease)     stents x 5    Cancer ([facility]     melanoma on left side of face    Chronic kidney disease     Chronic pain     left knee    COPD     Diabetes ([facility]     newly dm have not started med yet    DVT (deep venous thrombosis) ([facility]     left leg    Hypertension     My[facility] infarction [facility]     Obesity (BMI 30-39. 9)     Primary localized osteoarthritis of left knee 2019    Pulmonary embolism ([facility] 2017    Rheumatoid arthritis ([facility]         Past Surgical History:   Procedure Laterality Date    FRACTIONAL FLOW RESERVE  2018    FRACTIONAL FLOW RESERVE ADDL  2018    HX CHOLECYSTECTOMY      HX COLOSTOMY      HX COLOSTOMY TAKE DOWN      HX CORONARY STENT PLACEMENT      HX HEART CATHETERIZATION  2005    stents x 4    HX HEART CATHETERIZATION  2014    stent x 1    HX HERNIA REPAIR      x 4    HX LUMBAR DISKECTOMY  2014    LEFT HEART PERCUTANEOUS  2018    [place] ABDOMEN SURGERY PROC UNLISTED      Laparotomy bowel resection instestinal rupture, colostomy    [place] ABDOMEN SURGERY PROC UNLISTED      Revision of colostomy    [facility] CORONARY ARTERIOGRAPH  2018        Family History   Problem Relation Age of Onset    Heart Disease Mother     Heart Disease Father     Heart Disease Maternal Grandmother         Social History     Tobacco Use    Smoking status: Former Smoker     Quit date: 1992     Years since quittin.1    Smokeless tobacco: Never Used   Substance Use Topics    Alcohol use: Not Currently     Comment: last         Prior to Admission medications    Medication Sig Start Date End Date Taking? Authorizing Provider   DULoxetine (Cymbalta) 30 mg capsule Take 30 mg by mouth daily.    Yes Provider, Historical   celecoxib (CeleBREX) 200 mg capsule Take 200 mg by mouth two (2) times a day. Yes Provider, Historical   aspirin delayed-release 81 mg tablet Take 81 mg by mouth daily. Yes Provider, Historical   folic acid (FOLVITE) 1 mg tablet Take 1 mg by mouth daily. Yes Provider, Historical   budesonide-formoteroL (Symbicort) 160-4.5 mcg/actuation HFAA Take 2 Puffs by inhalation two (2) times a day. Yes Provider, Historical   oxyCODONE-acetaminophen (PERCOCET 10)  mg per tablet Take 1 Tab by mouth two (2) times daily as needed. Yes Provider, Historical   atorvastatin (LIPITOR) 40 mg tablet Take 40 mg by mouth daily. Yes Provider, Historical   propranolol (INDERAL) 10 mg tablet Take 10 mg by mouth daily. Yes Provider, Historical   furosemide (LASIX) 20 mg tablet Take 20 mg by mouth daily. Yes Provider, Historical   montelukast (SINGULAIR) 10 mg tablet Take 10 mg by mouth nightly. Yes Other, MD Moira   pantoprazole (PROTONIX) 40 mg tablet Take 40 mg by mouth daily. Yes Provider, Historical   fish oil-omega-3 fatty acids (Fish Oil) 340-1,000 mg capsule Take 1 Cap by mouth daily. Patient not taking: Reported on 2/7/2022    Provider, Historical   DULoxetine (CYMBALTA) 30 mg capsule Take 30 mg by mouth daily. Patient not taking: Reported on 2/7/2022    Provider, Historical   meclizine (ANTIVERT) 12.5 mg tablet Take 12.5 mg by mouth three (3) times daily as needed for Dizziness or Nausea. Patient not taking: Reported on 2/7/2022    Provider, Historical   albuterol-ipratropium (DUO-NEB) 2.5 mg-0.5 mg/3 ml nebu 3 mL by Nebulization route every four (4) hours as needed for Other (SOB). Charge medicare part B. ICD 10 - J44.1  Patient not taking: Reported on 2/7/2022 9/3/19   Chandler Cruz MD   nitroglycerin (NITROSTAT) 0.4 mg SL tablet by SubLINGual route every five (5) minutes as needed for Chest Pain.   Patient not taking: Reported on 2/7/2022    Provider, Historical       Current Facility-Administered Medications   Medication Dose Route Frequency    [START ON 2/10/2022] furosemide (LASIX) injection 40 mg  40 mg IntraVENous DAILY    perflutren lipid microspheres (DEFINITY) in NS bolus IV  1 mL IntraVENous RAD ONCE    arformoterol 15 mcg/budesonide 0.5 mg neb solution   Nebulization BID RT    aspirin delayed-release tablet 81 mg  81 mg Oral DAILY    atorvastatin (LIPITOR) tablet 40 mg  40 mg Oral DAILY    DULoxetine (CYMBALTA) capsule 30 mg  30 mg Oral DAILY    folic acid (FOLVITE) tablet 1 mg  1 mg Oral DAILY    montelukast (SINGULAIR) tablet 10 mg  10 mg Oral QHS    pantoprazole (PROTONIX) tablet 40 mg  40 mg Oral DAILY    propranoloL (INDERAL) tablet 10 mg  10 mg Oral DAILY    albuterol-ipratropium (DUO-NEB) 2.5 MG-0.5 MG/3 ML  3 mL Nebulization Q4H PRN    glucose chewable tablet 16 g  16 g Oral PRN    glucagon (GLUCAGEN) injection 1 mg  1 mg IntraMUSCular PRN    insulin lispro (HUMALOG) injection   SubCUTAneous AC&HS    dextrose 10% infusion 125-250 mL  125-250 mL IntraVENous PRN    enoxaparin (LOVENOX) injection 40 mg  40 mg SubCUTAneous Q24H    ranolazine ER (RANEXA) tablet 500 mg  500 mg Oral BID         Objective:   Vital Signs:    Visit Vitals  /79   Pulse 82   Temp 98.3 °F (36.8 °C)   Resp 18   Ht 5' 5\" (1.651 m)   Wt 101.6 kg (224 lb)   SpO2 97%   BMI 37.28 kg/m²       O2 Device: None (Room air)       Temp (24hrs), Av.2 °F (36.8 °C), Min:97.7 °F (36.5 °C), Max:98.4 °F (36.9 °C)       Intake/Output:   Last shift:      No intake/output data recorded. Last 3 shifts:  1901 -  0700  In: 200 [P.O.:200]  Out: 3750 [Urine:3750]      Intake/Output Summary (Last 24 hours) at 2022 1357  Last data filed at 2022 0407  Gross per 24 hour   Intake --   Output 1000 ml   Net -1000 ml       Last 3 Recorded Weights in this Encounter    22 1239 22 2338 22 1315   Weight: 88.9 kg (195 lb 15.8 oz) 101.8 kg (224 lb 6.9 oz) 101.6 kg (224 lb)       Physical Exam:       General/Neurology: Alert, awake and oriented. NAD. Morbidly obese. Sitting in the chair. Room air. Head:   NCAT. Eye:   EOM intact. No icterus/pallor/cyanosis. Nose:   No nasal drainage/discharge. Neck:   Trachea midline. Lung: Moderate air entry bilateral equal.  No rales, rhonchi. No wheezing or stridor. No prolonged expiration or accessory muscle use. Heart:   S1 S2 present. No murmur or JVD. Abdomen:  Soft. NT. ND. No palpable masses. Extremities:  No edema. No cyanosis or clubbing. Pulses: 2+ and symmetric in DP. Data:       Recent Results (from the past 24 hour(s))   GLUCOSE, POC    Collection Time: 02/08/22  4:27 PM   Result Value Ref Range    Glucose (POC) 200 (H) 70 - 110 mg/dL   GLUCOSE, POC    Collection Time: 02/08/22  8:32 PM   Result Value Ref Range    Glucose (POC) 147 (H) 70 - 564 mg/dL   METABOLIC PANEL, COMPREHENSIVE    Collection Time: 02/09/22  1:50 AM   Result Value Ref Range    Sodium 136 136 - 145 mmol/L    Potassium 3.9 3.5 - 5.5 mmol/L    Chloride 99 (L) 100 - 111 mmol/L    CO2 28 21 - 32 mmol/L    Anion gap 9 3.0 - 18 mmol/L    Glucose 148 (H) 74 - 99 mg/dL    BUN 32 (H) 7.0 - 18 MG/DL    Creatinine 2.16 (H) 0.6 - 1.3 MG/DL    BUN/Creatinine ratio 15 12 - 20      GFR est AA 37 (L) >60 ml/min/1.73m2    GFR est non-AA 31 (L) >60 ml/min/1.73m2    Calcium 8.7 8.5 - 10.1 MG/DL    Bilirubin, total 0.7 0.2 - 1.0 MG/DL    ALT (SGPT) 24 16 - 61 U/L    AST (SGOT) 18 10 - 38 U/L    Alk.  phosphatase 111 45 - 117 U/L    Protein, total 7.0 6.4 - 8.2 g/dL    Albumin 3.3 (L) 3.4 - 5.0 g/dL    Globulin 3.7 2.0 - 4.0 g/dL    A-G Ratio 0.9 0.8 - 1.7     CBC WITH AUTOMATED DIFF    Collection Time: 02/09/22  1:50 AM   Result Value Ref Range    WBC 11.2 4.6 - 13.2 K/uL    RBC 4.12 (L) 4.35 - 5.65 M/uL    HGB 9.9 (L) 13.0 - 16.0 g/dL    HCT 33.4 (L) 36.0 - 48.0 %    MCV 81.1 78.0 - 100.0 FL    MCH 24.0 24.0 - 34.0 PG    MCHC 29.6 (L) 31.0 - 37.0 g/dL    RDW 19.3 (H) 11.6 - 14.5 %    PLATELET 274 859 - 270 K/uL    MPV 9.6 9.2 - 11.8 FL    NRBC 0.0 0  WBC    ABSOLUTE NRBC 0.00 0.00 - 0.01 K/uL    NEUTROPHILS 71 40 - 73 %    LYMPHOCYTES 16 (L) 21 - 52 %    MONOCYTES 11 (H) 3 - 10 %    EOSINOPHILS 1 0 - 5 %    BASOPHILS 0 0 - 2 %    IMMATURE GRANULOCYTES 1 (H) 0.0 - 0.5 %    ABS. NEUTROPHILS 7.9 1.8 - 8.0 K/UL    ABS. LYMPHOCYTES 1.8 0.9 - 3.6 K/UL    ABS. MONOCYTES 1.2 0.05 - 1.2 K/UL    ABS. EOSINOPHILS 0.2 0.0 - 0.4 K/UL    ABS. BASOPHILS 0.0 0.0 - 0.1 K/UL    ABS. IMM. GRANS. 0.1 (H) 0.00 - 0.04 K/UL    DF AUTOMATED     GLUCOSE, POC    Collection Time: 02/09/22  5:42 AM   Result Value Ref Range    Glucose (POC) 173 (H) 70 - 110 mg/dL         Chemistry Recent Labs     02/09/22  0150 02/08/22  0230 02/07/22  1348   * 136* 209*    136 138   K 3.9 4.2 4.2   CL 99* 102 105   CO2 28 27 27   BUN 32* 23* 22*   CREA 2.16* 1.80* 1.56*   CA 8.7 8.6 8.6   AGAP 9 7 6   BUCR 15 13 14    102  --    TP 7.0 6.4  --    ALB 3.3* 3.1*  --    GLOB 3.7 3.3  --    AGRAT 0.9 0.9  --         Lactic Acid No results found for: LAC  No results for input(s): LAC in the last 72 hours. Liver Enzymes Protein, total   Date Value Ref Range Status   02/09/2022 7.0 6.4 - 8.2 g/dL Final     Albumin   Date Value Ref Range Status   02/09/2022 3.3 (L) 3.4 - 5.0 g/dL Final     Globulin   Date Value Ref Range Status   02/09/2022 3.7 2.0 - 4.0 g/dL Final     A-G Ratio   Date Value Ref Range Status   02/09/2022 0.9 0.8 - 1.7   Final     Alk.  phosphatase   Date Value Ref Range Status   02/09/2022 111 45 - 117 U/L Final     Recent Labs     02/09/22  0150 02/08/22  0230   TP 7.0 6.4   ALB 3.3* 3.1*   GLOB 3.7 3.3   AGRAT 0.9 0.9    102        CBC w/Diff Recent Labs     02/09/22  0150 02/08/22  0230 02/07/22  1348   WBC 11.2 11.5 10.2   RBC 4.12* 3.90* 3.81*   HGB 9.9* 9.5* 9.4*   HCT 33.4* 33.0* 32.0*    283 267   GRANS 71 65 72   LYMPH 16* 21 14*   EOS 1 2 2        Cardiac Enzymes No results found for: CPK, CK, CKMMB, CKMB, RCK3, CKMBT, CKNDX, CKND1, FERMÍN, TROPT, TROIQ, CARLOS, TROPT, TNIPOC, BNP, BNPP     BNP Lab Results   Component Value Date/Time    BNP 4.0 09/19/2014 02:31 PM        Coagulation No results for input(s): PTP, INR, APTT, INREXT, INREXT in the last 72 hours. Thyroid  Lab Results   Component Value Date/Time    TSH 2.68 02/07/2022 01:48 PM       No results found for: T4     Urinalysis Lab Results   Component Value Date/Time    Color YELLOW 06/21/2019 01:17 PM    Appearance CLEAR 06/21/2019 01:17 PM    Specific gravity 1.012 06/21/2019 01:17 PM    pH (UA) 5.0 06/21/2019 01:17 PM    Protein NEGATIVE  06/21/2019 01:17 PM    Glucose NEGATIVE  06/21/2019 01:17 PM    Ketone NEGATIVE  06/21/2019 01:17 PM    Bilirubin NEGATIVE  06/21/2019 01:17 PM    Urobilinogen 0.2 06/21/2019 01:17 PM    Nitrites NEGATIVE  06/21/2019 01:17 PM    Leukocyte Esterase NEGATIVE  06/21/2019 01:17 PM        Micro  No results for input(s): SDES, CULT in the last 72 hours. No results for input(s): CULT in the last 72 hours. ABG No results for input(s): PHI, PHI, POC2, PCO2I, PO2, PO2I, HCO3, HCO3I, FIO2, FIO2I in the last 72 hours. CT (Most Recent) (CT chest reviewed by me) Results from Hospital Encounter encounter on 05/01/21    CTA CHEST W OR W WO CONT    Narrative  EXAM: CTA chest    CLINICAL INDICATION/HISTORY: Chest pain. COMPARISON: 8/29/2019    TECHNIQUE: Axial CT imaging from the thoracic inlet through the diaphragm with  intravenous contrast. Coronal and sagittal MIP reformats were generated. One or  more dose reduction techniques were used on this CT: automated exposure control,  adjustment of the mAs and/or kVp according to patient size, and iterative  reconstruction techniques. The specific techniques used on this CT exam have  been documented in the patient's electronic medical record.  Digital Imaging and  Communications in Medicine (DICOM) format image data are available to  nonaffiliated external healthcare facilities or entities on a secured, media  free, reciprocally searchable basis with patient authorization for at least a  12-month period after this study. _______________    FINDINGS:    EXAM QUALITY: Adequate    PULMONARY ARTERIES: No pulmonary embolism identified. MEDIASTINUM: Normal heart size. Aortic and coronary artery calcifications. No  aortic dissection. No pericardial effusion. LUNGS: No suspicious nodule or mass. AIRWAY: Normal.    PLEURA: Normal.    LYMPH NODES: No enlarged nodes. UPPER ABDOMEN: Simple hepatic cysts noted. Compensatory biliary dilation  secondary to prior cholecystectomy. Colonic diverticulosis. BONES: No acute or aggressive osseous abnormalities identified. OTHER: None.    _______________    Impression  1. No pulmonary embolism or other acute pulmonary abnormalities identified. XR (Most Recent). CXR  reviewed by me and compared with previous CXR Results from Hospital Encounter encounter on 02/07/22    XR CHEST SNGL V    Narrative  EXAM: XR CHEST SNGL V    CLINICAL INDICATION/HISTORY: ro PE  -Additional: None    COMPARISON: 2/7/2022    TECHNIQUE: Frontal view of the chest    _______________    FINDINGS:    HEART AND MEDIASTINUM: Normal cardiac size and mediastinal contours. LUNGS AND PLEURAL SPACES: No focal pneumonic consolidation, pneumothorax, or  pleural effusion. BONY THORAX AND SOFT TISSUES: No acute osseous abnormality    _______________    Impression  No acute findings in the chest.         VQ scan 2/9/2022: No scintigraphic findings to suggest the presence of acute pulmonary embolism. PVL LE 2/9/2022:  · No evidence of deep vein thrombosis in the right lower extremity veins assessed. · No evidence of deep vein thrombosis in the left lower extremity veins assessed. ·Please note: Voice-recognition software may have been used to generate this report, which may have resulted in some phonetic-based errors in grammar and contents.  Even though attempts were made to correct all the mistakes, some may have been missed, and remained in the body of the document.     Cade Michaud MD  2/9/2022

## 2022-02-09 NOTE — PROGRESS NOTES
Hospitalist Progress Note    Patient: Tresa Mahmood MRN: [id number]  [facility]: [id number]    YOB: 1955  Age: 74 y.o. Sex: male    DOA: 2/7/2022 LOS:  LOS: 1 day                Assessment/Plan     Patient Active Problem List   Diagnosis Code    CAD (coronary artery disease) I25.10    Hypertension I10    Asthma-COPD overlap syndrome ([facility]) J44.9    Elevated troponin I level R77.8    CKD (chronic kidney disease) stage 3, GFR 30-59 ml/min ([facility]) N18.30    Asthma J45.909    NSTEMI (non-ST elevated myocardial infarction) ([facility]) I21.4    Rheumatoid arthritis ([facility] M06.9    Severe persistent asthma J45.50    Obesity E66.9    Noncompliance with medication regimen Z91.14    Type II diabetes mellitus ([facility] E11.9    VIRK (dyspnea on exertion) R06.00    Weakness R53.1        Chief complaint :  VIRK  74 y.o. male with coronary artery disease, diabetes, COPD, CKD stage III is being admitted at the request of Dr. Pa Villegas. Patient is being admitted for worsening dyspnea on exertion. VIRK -  Likely multifactorial, deconditioning, morbid obesity. Check renal function and will get CTA chest as recommended by pulmonary. Follow echo  continue on Lasix. Will get LE duplex, VQ scan.       CAD -  S/p cath 05/2021 with no changes from previous. Continue with home meds  Cardiology consulted     DM -  On SSI, diabetic diet     COPD -  No active wheezing  Will use neb treatment as needed     CKD stage 3 -  Monitor renal function     HTN -  Controlled     Anemia-  Check iron panel, ferritin, reticulocyte count. Likely secondary to CKD stage III     GERD - on pepcid    Disposition : 2-3 days    Review of systems  General: No fevers or chills. Cardiovascular: No chest pain or pressure. No palpitations. Pulmonary: No shortness of breath. Gastrointestinal: No nausea, vomiting. Physical Exam:  General: Awake, cooperative, no acute distress    HEENT: NC, Atraumatic. PERRLA, anicteric sclerae.   Lungs: CTA Bilaterally. No Wheezing/Rhonchi/Rales. Heart:  S1 S2,  No murmur, No Rubs, No Gallops  Abdomen: Soft, Non distended, Non tender.  +Bowel sounds,   Extremities: No c/c/e  Psych:   Not anxious or agitated. Neurologic:  No acute neurological deficit. Vital signs/Intake and Output:  Visit Vitals  /74 (BP 1 Location: Right upper arm, BP Patient Position: At rest)   Pulse 75   Temp 98.4 °F (36.9 °C)   Resp 19   Wt 88.9 kg (195 lb 15.8 oz)   SpO2 98%   BMI 32.61 kg/m²     Current Shift:  02/08 1901 - 02/09 0700  In: -   Out: 200 [Urine:200]  Last three shifts:  02/07 0701 - 02/08 1900  In: 200 [P.O.:200]  Out: 2750 [Urine:2750]            Labs: Results:       Chemistry Recent Labs     02/08/22  0230 02/07/22  1348   * 209*    138   K 4.2 4.2    105   CO2 27 27   BUN 23* 22*   CREA 1.80* 1.56*   CA 8.6 8.6   AGAP 7 6   BUCR 13 14     --    TP 6.4  --    ALB 3.1*  --    GLOB 3.3  --    AGRAT 0.9  --       CBC w/Diff Recent Labs     02/08/22  0230 02/07/22  1348   WBC 11.5 10.2   RBC 3.90* 3.81*   HGB 9.5* 9.4*   HCT 33.0* 32.0*    267   GRANS 65 72   LYMPH 21 14*   EOS 2 2      Cardiac Enzymes Recent Labs     02/07/22  1348   CPK 87      Coagulation No results for input(s): PTP, INR, APTT, INREXT in the last 72 hours. Lipid Panel Lab Results   Component Value Date/Time    Cholesterol, total 114 05/02/2021 06:00 AM    HDL Cholesterol 64 (H) 05/02/2021 06:00 AM    LDL, calculated 38.6 05/02/2021 06:00 AM    VLDL, calculated 11.4 05/02/2021 06:00 AM    Triglyceride 57 05/02/2021 06:00 AM    CHOL/HDL Ratio 1.8 05/02/2021 06:00 AM      BNP No results for input(s): BNPP in the last 72 hours.    Liver Enzymes Recent Labs     02/08/22  0230   TP 6.4   ALB 3.1*         Thyroid Studies Lab Results   Component Value Date/Time    TSH 2.68 02/07/2022 01:48 PM        Procedures/imaging: see electronic medical records for all procedures/Xrays and details which were not copied into this note but were reviewed prior to creation of Plan

## 2022-02-09 NOTE — PROGRESS NOTES
DC Plan: home with family, [place] assist if needed    Care manager rounded on patient shortly after he returned from testing; per patient, \"before I go home I want to know what is wrong with me, I just keep getting worse. \"  MD rounding as well, will assess patient for discharge needs in a.m. after results of testing are available.     Care Management Interventions  Support Systems: Child(kapil),Other Family Member(s)  Discharge Location  Patient Expects to be Discharged to[de-identified] Home

## 2022-02-09 NOTE — PROGRESS NOTES
6807: Pt asleep, snoring, did not arouse to name, will follow up again later for PT.    1228: Pt off the floor, will follow up as schedule permits. 1320: 3rd attempt, transport in room taking pt for another test, will follow up tomorrow.

## 2022-02-09 NOTE — DIABETES MGMT
Diabetes/ Glycemic Control Plan of Care  Recommendations:   Recommend to initiate basal insulin    Assessment: Patient with a history of diabetes, HgbA1c outside of target range for age and conditions. Patient with elevated fasting blood glucose value of 173 mg/dL this morning. POC glucose is ranging 147-200 mg/dl over the last 24 hours on current regimen. Recent Glucose Results:   Lab Results   Component Value Date/Time     (H) 02/09/2022 01:50 AM    GLUCPOC 173 (H) 02/09/2022 05:42 AM    GLUCPOC 147 (H) 02/08/2022 08:32 PM    GLUCPOC 200 (H) 02/08/2022 04:27 PM           Within target range (70-180mg/dL): No  Current insulin orders:  Humalog ACHS  Total Daily Dose previous 24 hours = 8 units Humalog  Current A1c:   Lab Results   Component Value Date/Time    Hemoglobin A1c 8.7 (H) 02/08/2022 02:30 AM      equivalent  to ave Blood Glucose of 203 mg/dl for 2-3 months prior to admission  Adequate glycemic control PTA: No  Nutrition/Diet:   Active Orders   Diet    ADULT DIET Regular; 3 carb choices (45 gm/meal)      Home diabetes medications:   Key Antihyperglycemic Medications     Patient is on no antihyperglycemic meds. Plan/Goals:   Blood glucose will be within target of 70 - 180 mg/dl within 72 hours  Reinforce dietary and medication compliance at home.           Education:  [] Refer to Diabetes Education Record                       [] Education not indicated at this time   Patient off of floor, will re-attempt education       Dwaine Bustillo  Glycemic Control Team  [phone]    Monday-Friday   9 am - 3 pm

## 2022-02-09 NOTE — PROGRESS NOTES
Cardiology Progress Note        Patient: Florian Hernandez        Sex: male          DOA: 2/7/2022  YOB: 1955      Age:  74 y.o.        LOS:  LOS: 2 days   Assessment/Plan     Active Problems:    Asthma-COPD overlap syndrome ([address] ()      Obesity (5/2/2021)      VIRK (dyspnea on exertion) (2/7/2022)      Weakness (2/7/2022)        Plan:  So for negative pulmonary and cardiac work-up  Echo stable EF and grade 1 diastolic dysfunction, stable RV function. Negative troponin normal proBNP  Kidney function worsening  Stop Lasix  Symptom does not correlate with underlying degree of CAD diastolic dysfunction  Discussed in detail with patient and sister  Continue with current medical treatment  In the past patient was on calcium channel blocker and isosorbide was unable to tolerate without side effect  Continue with beta-blocker and Ranexa    Cardiac telemetry stable  Echo is pending  Started on Ranexa will titrate with response  Patient will be scheduled for VQ scan  Discussed with patient                  Subjective:    cc:  Dyspnea on exertion        REVIEW OF SYSTEMS:     General: No fevers or chills. Cardiovascular: No chest pain or pressure. No palpitations. No ankle swelling  Pulmonary: + SOB, orthopnea, PND  Gastrointestinal: No nausea, vomiting or diarrhea      Objective:      Visit Vitals  /72 (BP 1 Location: Right upper arm, BP Patient Position: At rest)   Pulse 92   Temp 98.2 °F (36.8 °C)   Resp 18   Ht 5' 5\" (1.651 m)   Wt 101.6 kg (224 lb)   SpO2 97%   BMI 37.28 kg/m²     Body mass index is 37.28 kg/m². Physical Exam:  General Appearance: Comfortable, not using accessory muscles of respiration. NECK: No JVD, no thyroidomeglay. LUNGS: Clear bilaterally. HEART: S1+S2 audible,    ABD: Non-tender, BS Audible    EXT: No edema, and no cysnosis. VASCULAR EXAM: Pulses are intact. PSYCHIATRIC EXAM: Mood is appropriate.     Medication:  Current Facility-Administered Medications   Medication Dose Route Frequency    [Held by provider] furosemide (LASIX) injection 40 mg  40 mg IntraVENous DAILY    perflutren lipid microspheres (DEFINITY) in NS bolus IV  1 mL IntraVENous RAD ONCE    arformoterol 15 mcg/budesonide 0.5 mg neb solution   Nebulization BID RT    aspirin delayed-release tablet 81 mg  81 mg Oral DAILY    atorvastatin (LIPITOR) tablet 40 mg  40 mg Oral DAILY    DULoxetine (CYMBALTA) capsule 30 mg  30 mg Oral DAILY    folic acid (FOLVITE) tablet 1 mg  1 mg Oral DAILY    montelukast (SINGULAIR) tablet 10 mg  10 mg Oral QHS    pantoprazole (PROTONIX) tablet 40 mg  40 mg Oral DAILY    propranoloL (INDERAL) tablet 10 mg  10 mg Oral DAILY    albuterol-ipratropium (DUO-NEB) 2.5 MG-0.5 MG/3 ML  3 mL Nebulization Q4H PRN    glucose chewable tablet 16 g  16 g Oral PRN    glucagon (GLUCAGEN) injection 1 mg  1 mg IntraMUSCular PRN    insulin lispro (HUMALOG) injection   SubCUTAneous AC&HS    dextrose 10% infusion 125-250 mL  125-250 mL IntraVENous PRN    enoxaparin (LOVENOX) injection 40 mg  40 mg SubCUTAneous Q24H    ranolazine ER (RANEXA) tablet 500 mg  500 mg Oral BID               Lab/Data Reviewed:  Procedures/imaging: see electronic medical records for all procedures/Xrays   and details which were not copied into this note but were reviewed prior to creation of Plan       All lab results for the last 24 hours reviewed.      Recent Labs     02/09/22  0150 02/08/22  0230 02/07/22  1348   WBC 11.2 11.5 10.2   HGB 9.9* 9.5* 9.4*   HCT 33.4* 33.0* 32.0*    283 267     Recent Labs     02/09/22  0150 02/08/22  0230 02/07/22  1348    136 138   K 3.9 4.2 4.2   CL 99* 102 105   CO2 28 27 27   * 136* 209*   BUN 32* 23* 22*   CREA 2.16* 1.80* 1.56*   CA 8.7 8.6 8.6       RADIOLOGY:  CT Results  (Last 48 hours)    None        CXR Results  (Last 48 hours)               02/09/22 1201  XR CHEST SNGL V Final result    Impression:      No acute findings in the chest.        Narrative:  EXAM: XR CHEST SNGL V       CLINICAL INDICATION/HISTORY: ro PE   -Additional: None       COMPARISON: 2/7/2022       TECHNIQUE: Frontal view of the chest       _______________       FINDINGS:       HEART AND MEDIASTINUM: Normal cardiac size and mediastinal contours. LUNGS AND PLEURAL SPACES: No focal pneumonic consolidation, pneumothorax, or   pleural effusion. BONY THORAX AND SOFT TISSUES: No acute osseous abnormality       _______________           02/07/22 2138  XR CHEST PA LAT Final result    Impression:  No acute process       Narrative:  EXAM:  PA and Lateral Chest X-ray        INDICATION: Dyspnea on exertion       COMPARISON: CT dated May 2, 2021       ___________       FINDINGS: Heart and mediastinal contours are within normal limits. Lungs are   clear of active disease. There are no pleural effusions. No acute osseous   findings.        _____________                       Cardiology Procedures:   Results for orders placed or performed during the hospital encounter of 05/01/21   EKG, 12 LEAD, INITIAL   Result Value Ref Range    Ventricular Rate 73 BPM    Atrial Rate 73 BPM    P-R Interval 150 ms    QRS Duration 80 ms    Q-T Interval 410 ms    QTC Calculation (Bezet) 451 ms    Calculated P Axis 56 degrees    Calculated R Axis 19 degrees    Calculated T Axis 58 degrees    Diagnosis       Normal sinus rhythm  Nonspecific T wave abnormality  Abnormal ECG  When compared with ECG of 12-APR-2021 08:27,  T wave inversion now evident in Anterior leads  Confirmed by Enoch Vargas MD. ([id number]) on 5/4/2021 6:30:40 PM        Echo Results  (Last 48 hours)    None       Cardiolite (Tc-99m Sestamibi) stress test    Signed By: Clara Colón MD     February 9, 2022

## 2022-02-10 ENCOUNTER — HOME HEALTH ADMISSION (OUTPATIENT)
Dept: HOME HEALTH SERVICES | Facility: HOME HEALTH | Age: 67
End: 2022-02-10
Payer: MEDICARE

## 2022-02-10 VITALS
TEMPERATURE: 98.3 F | BODY MASS INDEX: 36.99 KG/M2 | OXYGEN SATURATION: 98 % | HEIGHT: 65 IN | WEIGHT: 222 LBS | RESPIRATION RATE: 17 BRPM | SYSTOLIC BLOOD PRESSURE: 151 MMHG | DIASTOLIC BLOOD PRESSURE: 80 MMHG | HEART RATE: 100 BPM

## 2022-02-10 LAB
ALBUMIN SERPL-MCNC: 3.2 G/DL (ref 3.4–5)
ALBUMIN/GLOB SERPL: 0.9 {RATIO} (ref 0.8–1.7)
ALP SERPL-CCNC: 98 U/L (ref 45–117)
ALT SERPL-CCNC: 23 U/L (ref 16–61)
ANION GAP SERPL CALC-SCNC: 9 MMOL/L (ref 3–18)
AST SERPL-CCNC: 19 U/L (ref 10–38)
BASOPHILS # BLD: 0.1 K/UL (ref 0–0.1)
BASOPHILS NFR BLD: 0 % (ref 0–2)
BILIRUB SERPL-MCNC: 0.7 MG/DL (ref 0.2–1)
BUN SERPL-MCNC: 35 MG/DL (ref 7–18)
BUN/CREAT SERPL: 16 (ref 12–20)
CALCIUM SERPL-MCNC: 8.8 MG/DL (ref 8.5–10.1)
CHLORIDE SERPL-SCNC: 95 MMOL/L (ref 100–111)
CO2 SERPL-SCNC: 29 MMOL/L (ref 21–32)
CREAT SERPL-MCNC: 2.17 MG/DL (ref 0.6–1.3)
DIFFERENTIAL METHOD BLD: ABNORMAL
EOSINOPHIL # BLD: 0.2 K/UL (ref 0–0.4)
EOSINOPHIL NFR BLD: 1 % (ref 0–5)
ERYTHROCYTE [DISTWIDTH] IN BLOOD BY AUTOMATED COUNT: 19.2 % (ref 11.6–14.5)
GLOBULIN SER CALC-MCNC: 3.5 G/DL (ref 2–4)
GLUCOSE BLD STRIP.AUTO-MCNC: 156 MG/DL (ref 70–110)
GLUCOSE BLD STRIP.AUTO-MCNC: 174 MG/DL (ref 70–110)
GLUCOSE SERPL-MCNC: 153 MG/DL (ref 74–99)
HCT VFR BLD AUTO: 33.4 % (ref 36–48)
HGB BLD-MCNC: 10 G/DL (ref 13–16)
IMM GRANULOCYTES # BLD AUTO: 0.1 K/UL (ref 0–0.04)
IMM GRANULOCYTES NFR BLD AUTO: 1 % (ref 0–0.5)
LYMPHOCYTES # BLD: 2.2 K/UL (ref 0.9–3.6)
LYMPHOCYTES NFR BLD: 18 % (ref 21–52)
MCH RBC QN AUTO: 25.1 PG (ref 24–34)
MCHC RBC AUTO-ENTMCNC: 29.9 G/DL (ref 31–37)
MCV RBC AUTO: 83.7 FL (ref 78–100)
MONOCYTES # BLD: 1.5 K/UL (ref 0.05–1.2)
MONOCYTES NFR BLD: 12 % (ref 3–10)
NEUTS SEG # BLD: 8.3 K/UL (ref 1.8–8)
NEUTS SEG NFR BLD: 67 % (ref 40–73)
NRBC # BLD: 0 K/UL (ref 0–0.01)
NRBC BLD-RTO: 0 PER 100 WBC
PLATELET # BLD AUTO: 320 K/UL (ref 135–420)
PMV BLD AUTO: 9.8 FL (ref 9.2–11.8)
POTASSIUM SERPL-SCNC: 4.2 MMOL/L (ref 3.5–5.5)
PROT SERPL-MCNC: 6.7 G/DL (ref 6.4–8.2)
RBC # BLD AUTO: 3.99 M/UL (ref 4.35–5.65)
SODIUM SERPL-SCNC: 133 MMOL/L (ref 136–145)
WBC # BLD AUTO: 12.3 K/UL (ref 4.6–13.2)

## 2022-02-10 PROCEDURE — 97530 THERAPEUTIC ACTIVITIES: CPT

## 2022-02-10 PROCEDURE — 74011250637 HC RX REV CODE- 250/637: Performed by: HOSPITALIST

## 2022-02-10 PROCEDURE — 74011250637 HC RX REV CODE- 250/637: Performed by: INTERNAL MEDICINE

## 2022-02-10 PROCEDURE — 85025 COMPLETE CBC W/AUTO DIFF WBC: CPT

## 2022-02-10 PROCEDURE — 94640 AIRWAY INHALATION TREATMENT: CPT

## 2022-02-10 PROCEDURE — 97116 GAIT TRAINING THERAPY: CPT

## 2022-02-10 PROCEDURE — G0378 HOSPITAL OBSERVATION PER HR: HCPCS

## 2022-02-10 PROCEDURE — 94760 N-INVAS EAR/PLS OXIMETRY 1: CPT

## 2022-02-10 PROCEDURE — 82962 GLUCOSE BLOOD TEST: CPT

## 2022-02-10 PROCEDURE — 74011000250 HC RX REV CODE- 250: Performed by: INTERNAL MEDICINE

## 2022-02-10 PROCEDURE — 36415 COLL VENOUS BLD VENIPUNCTURE: CPT

## 2022-02-10 PROCEDURE — 80053 COMPREHEN METABOLIC PANEL: CPT

## 2022-02-10 PROCEDURE — 74011636637 HC RX REV CODE- 636/637: Performed by: HOSPITALIST

## 2022-02-10 RX ORDER — RANOLAZINE 500 MG/1
500 TABLET, EXTENDED RELEASE ORAL 2 TIMES DAILY
Qty: 60 TABLET | Refills: 0 | Status: SHIPPED | OUTPATIENT
Start: 2022-02-10

## 2022-02-10 RX ADMIN — ARFORMOTEROL TARTRATE: 15 SOLUTION RESPIRATORY (INHALATION) at 09:05

## 2022-02-10 RX ADMIN — PANTOPRAZOLE SODIUM 40 MG: 40 TABLET, DELAYED RELEASE ORAL at 08:58

## 2022-02-10 RX ADMIN — ASPIRIN 81 MG: 81 TABLET, COATED ORAL at 08:58

## 2022-02-10 RX ADMIN — ATORVASTATIN CALCIUM 40 MG: 20 TABLET, FILM COATED ORAL at 08:58

## 2022-02-10 RX ADMIN — RANOLAZINE 500 MG: 500 TABLET, FILM COATED, EXTENDED RELEASE ORAL at 08:58

## 2022-02-10 RX ADMIN — Medication 2 UNITS: at 11:53

## 2022-02-10 RX ADMIN — DULOXETINE HYDROCHLORIDE 30 MG: 30 CAPSULE, DELAYED RELEASE ORAL at 08:59

## 2022-02-10 RX ADMIN — PROPRANOLOL HYDROCHLORIDE 10 MG: 20 TABLET ORAL at 08:59

## 2022-02-10 RX ADMIN — Medication 2 UNITS: at 07:43

## 2022-02-10 RX ADMIN — FOLIC ACID 1 MG: 1 TABLET ORAL at 08:58

## 2022-02-10 NOTE — PROGRESS NOTES
Occupational Therapy Treatment Attempt     Chart reviewed. Attempted Occupational Therapy Treatment, however, patient unable to be seen due to:  []  Nausea/vomiting  []  Eating  []  Pain  []  Patient too lethargic  []  Off Unit for testing/procedure  []  Dialysis treatment in progress  []  Telemetry Results  [x]  Other: pt reports increased fatigue and not \"Feeling up to it yet. \" requests to return later. Second attempt [id number]: Pt reports recent participation with PT and is now fatigued. Pt scheduled for d/c today. Will f/u later as patient's schedule allows.   Glenn Gonzalez, OTR/L

## 2022-02-10 NOTE — PROGRESS NOTES
Problem: Mobility Impaired (Adult and Pediatric)  Goal: *Acute Goals and Plan of Care (Insert Text)  Description: In 3 days:  1. Pt will ambulate 15' CGA to demonstrate functional ability to ambulate within the home. 2. Pt will transfer sit <> stand CGA for improved ability to transfer independently and lower extremity strengthening. 3. Pt will ascend/descend 1 step with bilateral UE support to improve ability to enter/exit the home. Outcome: Progressing Towards Goal   PHYSICAL THERAPY TREATMENT    Patient: Michelle Ledesma (4 y.o. male)  Date: 2/10/2022  Diagnosis: Weakness [R53.1] <principal problem not specified>    Precautions: Fall  PLOF: sedentary, autistic son lives with him, ambulatory in home without AD, furniture walks    ASSESSMENT:  Pt is frustrated without having a reason for they way he feels. Pt has been increasingly sedentary over the last 6 years due to SOB and weakness. Pt sat up EOB without assistance. Elevated bed to height of bed at home, CGA for sit to stand. Ambulated 16ft with RW, 1 standing rest break due to weakness, no LOB. Returned to supine in bed without assist.  Progression toward goals:   []      Improving appropriately and progressing toward goals  [x]      Improving slowly and progressing toward goals  []      Not making progress toward goals and plan of care will be adjusted     PLAN:  Patient continues to benefit from skilled intervention to address the above impairments. Continue treatment per established plan of care. Discharge Recommendations:  Skilled Nursing Facility  Further Equipment Recommendations for Discharge:  rolling walker     SUBJECTIVE:   Patient stated I know something is wrong with me but no one can figure it out.     OBJECTIVE DATA SUMMARY:   Critical Behavior:  Neurologic State: Alert  Orientation Level: Oriented X4  Cognition: Follows commands  Safety/Judgement: Fall prevention  Functional Mobility Training:  Bed Mobility:    Supine to Sit: Supervision  Sit to Supine: Supervision  Transfers:  Sit to Stand: Contact guard assistance  Stand to Sit: Stand-by assistance  Balance:  Sitting: Intact  Standing: Intact; With support   Ambulation/Gait Training:  Distance (ft): 16 Feet (ft)  Assistive Device: Gait belt;Walker, rolling  Ambulation - Level of Assistance: Contact guard assistance  Gait Abnormalities: Decreased step clearance        Pain:  Pain level pre-treatment: 0/10  Pain level post-treatment: 0/10   Pain Intervention(s): Medication (see MAR); Rest, Ice, Repositioning   Response to intervention: Nurse notified, See doc flow    Activity Tolerance:   Limited, fair-  Please refer to the flowsheet for vital signs taken during this treatment. After treatment:   [] Patient left in no apparent distress sitting up in chair  [x] Patient left in no apparent distress in bed  [x] Call bell left within reach  [] Nursing notified  [] Caregiver present  [] Bed alarm activated  [] SCDs applied      COMMUNICATION/EDUCATION:   [x]         Role of Physical Therapy in the acute care setting. [x]         Fall prevention education was provided and the patient/caregiver indicated understanding. [x]         Patient/family have participated as able in working toward goals and plan of care. [x]         Patient/family agree to work toward stated goals and plan of care. []         Patient understands intent and goals of therapy, but is neutral about his/her participation.   []         Patient is unable to participate in stated goals/plan of care: ongoing with therapy staff.  []         Other:        [place], SENA   Time Calculation: 23 mins

## 2022-02-10 NOTE — PROGRESS NOTES
Physician Progress Note      Maxi Andrews  [facility] #:                  [id number]  :                       1955  ADMIT DATE:       2022 10:56 AM  DISCH DATE:        2/10/2022 4:56 PM  RESPONDING  PROVIDER #:        Fouzia Mathur MD          QUERY TEXT:    Pt admitted with Dyspnea on exertion. Pt noted to have morbid obesity, anemia, anxiety, Pulmonary and Cardiac workups negative so far. If possible, please document in progress notes and discharge summary the relationship, the most likely etiology of patient's dyspnea. The medical record reflects the following:  Risk Factors:  obesity, RA, HTN, DM, CKD, HLD, former smoker, Asthma-COPD overlap syndrome  Clinical Indicators: Per Pulmonary consult note: Anemia is contributing to VIRK, He has gained about 30 to 50 pounds of weight since last 2 years. I believe these VIRK is likely due to deconditioning and morbid obesity more so than any other problems  Studies negative for PEs, pulmonary fibrosis or pulmonary hypertension  Cardiology consult: Echo stable EF and grade 1 diastolic dysfunction, stable RV function. Negative troponin normal proBNP  Symptom does not correlate with underlying degree of CAD diastolic dysfunction. Patient also have underlying significant anxiety as well. H/H 9.9/33.4  Hospitalist note states Anemia likely secondary to CKD stage III  BMI 37.28  Treatment: CXR, lung perfusion scan, LE duplex, Pulmonary consult, Cardiac consult, singulair, Echo, reynold, CLAUDETTE Mar, RN, BSN, CCDS  [facility]  [phone]  Options provided:  -- Dyspnea possibly due to  OHS  -- Dyspnea possibly due to Anemia of CKD  -- Dyspnea possibly due to Anxiety  -- Dyspnea due to COPD and Asthma  -- Dyspnea due to (please specify), please specify most likely etiology.   -- Other - I will add my own diagnosis  -- Disagree - Not applicable / Not valid  -- Disagree - Clinically unable to determine / Unknown  -- Refer to Clinical Documentation Reviewer    PROVIDER RESPONSE TEXT:    Provider is clinically unable to determine a response to this query.     Query created by: Delia Cooney on 2/10/2022 12:47 PM      Electronically signed by:  Eulogio Staton MD 2/10/2022 5:59 PM

## 2022-02-10 NOTE — DISCHARGE INSTRUCTIONS
DISCHARGE SUMMARY from Nurse    PATIENT INSTRUCTIONS:    After general anesthesia or intravenous sedation, for 24 hours or while taking prescription Narcotics:  · Limit your activities  · Do not drive and operate hazardous machinery  · Do not make important personal or business decisions  · Do  not drink alcoholic beverages  · If you have not urinated within 8 hours after discharge, please contact your surgeon on call. Report the following to your surgeon:  · Excessive pain, swelling, redness or odor of or around the surgical area  · Temperature over 100.5  · Nausea and vomiting lasting longer than 4 hours or if unable to take medications  · Any signs of decreased circulation or nerve impairment to extremity: change in color, persistent  numbness, tingling, coldness or increase pain  · Any questions    What to do at Home:  Recommended activity: Activity as tolerated,     If you experience any of the following symptoms shortness of breath, productive cough, fever above 100.4, please follow up with 911. *  Please give a list of your current medications to your Primary Care Provider. *  Please update this list whenever your medications are discontinued, doses are      changed, or new medications (including over-the-counter products) are added. *  Please carry medication information at all times in case of emergency situations. These are general instructions for a healthy lifestyle:    No smoking/ No tobacco products/ Avoid exposure to second hand smoke  Surgeon General's Warning:  Quitting smoking now greatly reduces serious risk to your health.     Obesity, smoking, and sedentary lifestyle greatly increases your risk for illness    A healthy diet, regular physical exercise & weight monitoring are important for maintaining a healthy lifestyle    You may be retaining fluid if you have a history of heart failure or if you experience any of the following symptoms:  Weight gain of 3 pounds or more overnight or 5 pounds in a week, increased swelling in our hands or feet or shortness of breath while lying flat in bed. Please call your doctor as soon as you notice any of these symptoms; do not wait until your next office visit. The discharge information has been reviewed with the patient. The patient verbalized understanding. Discharge medications reviewed with the patient and appropriate educational materials and side effects teaching were provided.   ___________________________________________________________________________________________________________________________________

## 2022-02-10 NOTE — PROGRESS NOTES
Patients Right arm AC peripheral line noted to be reddened, swollen and itching patient. Patient requested it be removed. Tried accessing a new line veins are very tiny, also patient is edematous. IV removed. MD made aware patient has no access.

## 2022-02-10 NOTE — PROGRESS NOTES
Hospitalist Progress Note    Patient: Yin Carter MRN: [id number]  [facility]: [id number]    YOB: 1955  Age: 74 y.o. Sex: male    DOA: 2/7/2022 LOS:  LOS: 2 days                Assessment/Plan     Patient Active Problem List   Diagnosis Code    CAD (coronary artery disease) I25.10    Hypertension I10    Asthma-COPD overlap syndrome ([facility]) J44.9    Elevated troponin I level R77.8    CKD (chronic kidney disease) stage 3, GFR 30-59 ml/min ([facility]) N18.30    Asthma J45.909    NSTEMI (non-ST elevated myocardial infarction) ([facility]) I21.4    Rheumatoid arthritis ([facility] M06.9    Severe persistent asthma J45.50    Obesity E66.9    Noncompliance with medication regimen Z91.14    Type II diabetes mellitus ([facility] E11.9    VIRK (dyspnea on exertion) R06.00    Weakness R53.1        Chief complaint :  VIRK  74 y.o. male with coronary artery disease, diabetes, COPD, CKD stage III is being admitted at the request of Dr. Gloria Hooker. Patient is being admitted for worsening dyspnea on exertion. VIRK -  Likely multifactorial, deconditioning, morbid obesity. Work up so far negative  VQ scan and LE duplex negative. Echo with normal LVF, 60-65%, grade 1 DD.     CAD -  S/p cath 05/2021 with no changes from previous. Continue with home meds  Cardiology following     DM -  On SSI, diabetic diet     COPD -  No active wheezing  Will use neb treatment as needed     CKD stage 3 -  Monitor renal function     HTN -  Controlled     Anemia-  Likely secondary to CKD stage III     GERD - on pepcid    Disposition :1-2 days    Review of systems  General: No fevers or chills. Cardiovascular: No chest pain or pressure. No palpitations. Pulmonary: No shortness of breath. Gastrointestinal: No nausea, vomiting. Physical Exam:  General: Awake, cooperative, no acute distress    HEENT: NC, Atraumatic. PERRLA, anicteric sclerae. Lungs: CTA Bilaterally. No Wheezing/Rhonchi/Rales.   Heart:  S1 S2,  No murmur, No Rubs, No Gallops  Abdomen: Soft, Non distended, Non tender.  +Bowel sounds,   Extremities: No c/c/e  Psych:   Not anxious or agitated. Neurologic:  No acute neurological deficit. Vital signs/Intake and Output:  Visit Vitals  /72 (BP 1 Location: Right upper arm, BP Patient Position: At rest)   Pulse 92   Temp 98.2 °F (36.8 °C)   Resp 18   Ht 5' 5\" (1.651 m)   Wt 101.6 kg (224 lb)   SpO2 97%   BMI 37.28 kg/m²     Current Shift:  No intake/output data recorded. Last three shifts:  02/08 0701 - 02/09 1900  In: -   Out: 2400 [Urine:2400]            Labs: Results:       Chemistry Recent Labs     02/09/22 0150 02/08/22  0230 02/07/22  1348   * 136* 209*    136 138   K 3.9 4.2 4.2   CL 99* 102 105   CO2 28 27 27   BUN 32* 23* 22*   CREA 2.16* 1.80* 1.56*   CA 8.7 8.6 8.6   AGAP 9 7 6   BUCR 15 13 14    102  --    TP 7.0 6.4  --    ALB 3.3* 3.1*  --    GLOB 3.7 3.3  --    AGRAT 0.9 0.9  --       CBC w/Diff Recent Labs     02/09/22 0150 02/08/22  0230 02/07/22  1348   WBC 11.2 11.5 10.2   RBC 4.12* 3.90* 3.81*   HGB 9.9* 9.5* 9.4*   HCT 33.4* 33.0* 32.0*    283 267   GRANS 71 65 72   LYMPH 16* 21 14*   EOS 1 2 2      Cardiac Enzymes Recent Labs     02/07/22  1348   CPK 87      Coagulation No results for input(s): PTP, INR, APTT, INREXT, INREXT in the last 72 hours. Lipid Panel Lab Results   Component Value Date/Time    Cholesterol, total 114 05/02/2021 06:00 AM    HDL Cholesterol 64 (H) 05/02/2021 06:00 AM    LDL, calculated 38.6 05/02/2021 06:00 AM    VLDL, calculated 11.4 05/02/2021 06:00 AM    Triglyceride 57 05/02/2021 06:00 AM    CHOL/HDL Ratio 1.8 05/02/2021 06:00 AM      BNP No results for input(s): BNPP in the last 72 hours.    Liver Enzymes Recent Labs     02/09/22  0150   TP 7.0   ALB 3.3*         Thyroid Studies Lab Results   Component Value Date/Time    TSH 2.68 02/07/2022 01:48 PM        Procedures/imaging: see electronic medical records for all procedures/Xrays and details which were not copied into this note but were reviewed prior to creation of Plan

## 2022-02-10 NOTE — ROUTINE PROCESS
Discharge instructions reviewed with the patient. Patient verbalized understanding and verified by teach back. All questions answered. IV discontinued, no redness, swelling or pain noted. Patient awaiting family for transportation home with an ETA of 45 minutes. Patient discharged off the unit via wheelchair. Patient armband removed and shredded.

## 2022-02-10 NOTE — DIABETES MGMT
Diabetes Patient/Family Education Record    Factors That May Influence Patients Ability to Learn or Comply with Recommendations   []   Language barrier    []   Cultural needs   []   Motivation    []   Cognitive limitation    [x]   Physical: limited mobility   []   Education    []   Physiological factors   []   Hearing/vision/speaking impairment   []   Anabaptist beliefs    []   Financial factors   []  Other:   []  No factors identified at this time. Person Instructed:   [x]   Patient   []   Family   []  Other     Preference for Learning:   [x]   Verbal   []   Written   []  Demonstration     Level of Comprehension & Competence:    [x]  Good                                      [] Fair                                     []  Poor                             []  Needs Reinforcement   []  Teach back completed    Education Component: Patient states that a few months ago his PCP told him his A1c had elevated and that he developed diabetes. He believes he takes Glipizide once daily. He received (pre?) diabetes education a few years ago but nothing since. Patient understands that his A1c is elevated (8.7%) and states that he does not have a glucometer at home but is willing to monitor his blood sugars. Patient identifies that his nutrition is not great. He eats 2 packs of [place] peanut butter crackers for both breakfast and lunch most days. For dinner he typically eats breaded chicken. His knee mobility limits him from walking far and standing too long in the kitchen to prepare meals. Discussed ways to incorporate more variety into daily meals such as pre-prepared meals that include vegetables. Discussed impact of carbohydrates and what foods contain carbohydrates. Plan to return and provide patient with meter.     []  Medication management, including how to administer insulin (if appropriate) and potential medication interactions    []  Nutritional management - [x] Obtained usual meal pattern   []   Basic carbohydrate counting  []  Plate method  []  Limit concentrated sweets and avoid sweetened beverages  []  Portion control  []    Avoid skipping meals   []  Exercise   []  Signs, symptoms, and treatment of hyperglycemia and hypoglycemia   [x] Prevention, recognition and treatment of hyperglycemia and hypoglycemia   [x]  Importance of blood glucose monitoring  [] Blood Glucose targets   []   Provided patient with blood glucose meter  []  Has glucometer and supplies at home   []  Instruction on use of the blood glucose meter and recommended monitoring schedule   [x]  Discuss the importance of HbA1C monitoring. Patients A1c is 8.7%.  This is equivalent to average glucose of 203 mg/dl for the past 2-3 months.   []  Sick day guidelines   []  Proper use and disposal of lancets, needles, syringes or insulin pens (if appropriate)   []  Potential long-term complications (retinopathy, kidney disease, neuropathy, foot care)   [] Information about whom to contact in case of emergency or for more information    []  Goal:  Patient/family will demonstrate understanding of Diabetes Self- Management Skills by: (date) __2/17_____  Plan for post-discharge education or self-management support:    [] Outpatient class schedule provided            [] Patient Declined    [] Scheduled for outpatient classes (date) _______    [] Written information provided  Verify: [x] Prior to admission Diabetes medications    Does patient understand how diabetes medications work? ____________________________  Does patient have difficulty obtaining diabetes medications or testing supplies? _________________    Tita Hernandez RD  Glycemic Control Team  [phone]    Monday-Friday   9 am - 3 pm

## 2022-02-10 NOTE — PROGRESS NOTES
DC Plan: Home with [facility], follow up with Medicaid for additional services    Care manager noted that patient had d/c order and discussed resources for home assistance - noted that PT/OT recommended HH and FOC offered; patient has selected [facility] and orders placed, CMS to place referral.  Verified patient has ride home with his sister. Per patient, he had home assessment for additional resources by Johanny Chicas CM to contact to verify. Also requested CM contact Dr. Ta Go to coordinate home care services. Care Management Interventions  PCP Verified by CM:  Yes  Mode of Transport at Discharge: Self  Transition of Care Consult (CM Consult): Discharge [address] Guerra: Yes  Physical Therapy Consult: Yes  Occupational Therapy Consult: Yes  Support Systems: Other Family Member(s)  Confirm Follow Up Transport: Family  The Plan for Transition of Care is Related to the Following Treatment Goals : weakness, shortness of breath  The Patient and/or Patient Representative was Provided with a Choice of Provider and Agrees with the Discharge Plan?: Yes  Name of the Patient Representative Who was Provided with a Choice of Provider and Agrees with the Discharge Plan: Renato Barnes, patient  [place] of Choice List was Provided with Basic Dialogue that Supports the Patient's Individualized Plan of Care/Goals, Treatment Preferences and Shares the Quality Data Associated with the Providers?: Yes  Discharge Location  Patient Expects to be Discharged to[de-identified] Home with home health

## 2022-02-10 NOTE — DISCHARGE SUMMARY
Discharge Summary    Patient: Aleksandra Cox MRN: [id number]  CSN: [id number]    YOB: 1955  Age: 74 y.o.   Sex: male    DOA: 2/7/2022 LOS:  LOS: 3 days   Discharge Date: 2/10/2022     Primary Care Provider:  Rebekah Dodson MD    Admission Diagnoses: Weakness [R53.1]    Discharge Diagnoses:    Problem List as of 2/10/2022 Date Reviewed: 8/30/2019          Codes Class Noted - Resolved    VIRK (dyspnea on exertion) ICD-10-CM: R06.00  ICD-9-CM: 786.09  2/7/2022 - Present        Weakness ICD-10-CM: R53.1  ICD-9-CM: 780.79  2/7/2022 - Present        Obesity ICD-10-CM: E66.9  ICD-9-CM: 278.00  5/2/2021 - Present        Noncompliance with medication regimen ICD-10-CM: Z91.14  ICD-9-CM: V15.81  5/2/2021 - Present        Type II diabetes mellitus ([facility] ICD-10-CM: E11.9  ICD-9-CM: 250.00  5/2/2021 - Present        Severe persistent asthma ICD-10-CM: J45.50  ICD-9-CM: 493.90  2/21/2020 - Present        Rheumatoid arthritis ([facility] ICD-10-CM: M06.9  ICD-9-CM: 714.0  8/30/2019 - Present        Elevated troponin I level ICD-10-CM: R77.8  ICD-9-CM: 790.6  4/24/2018 - Present        CKD (chronic kidney disease) stage 3, GFR 30-59 ml/min ([facility]) ICD-10-CM: N18.30  ICD-9-CM: 585.3  4/24/2018 - Present        Asthma ICD-10-CM: J45.909  ICD-9-CM: 493.90  4/24/2018 - Present        NSTEMI (non-ST elevated myocardial infarction) ([facility] ICD-10-CM: I21.4  ICD-9-CM: 410.70  4/24/2018 - Present        CAD (coronary artery disease) ICD-10-CM: I25.10  ICD-9-CM: 414.00  Unknown - Present        Hypertension ICD-10-CM: I10  ICD-9-CM: 401.9  Unknown - Present        Asthma-COPD overlap syndrome ([facility] ICD-10-CM: J44.9  ICD-9-CM: 493.20  Unknown - Present        RESOLVED: Chest pain ICD-10-CM: R07.9  ICD-9-CM: 786.50  8/29/2019 - 5/4/2021    Overview Signed 9/2/2019  8:11 AM by Delaney Darby MD     Added automatically from request for surgery [id number]                   Discharge Medications:     Discharge Medication List as of 2/10/2022  3:21 PM      START taking these medications    Details   ranolazine ER (RANEXA) 500 mg SR tablet Take 1 Tablet by mouth two (2) times a day., Normal, Disp-60 Tablet, R-0         CONTINUE these medications which have NOT CHANGED    Details   DULoxetine (Cymbalta) 30 mg capsule Take 30 mg by mouth daily. , Historical Med      aspirin delayed-release 81 mg tablet Take 81 mg by mouth daily. , Historical Med      folic acid (FOLVITE) 1 mg tablet Take 1 mg by mouth daily. , Historical Med      budesonide-formoteroL (Symbicort) 160-4.5 mcg/actuation HFAA Take 2 Puffs by inhalation two (2) times a day., Historical Med      atorvastatin (LIPITOR) 40 mg tablet Take 40 mg by mouth daily. , Historical Med      propranolol (INDERAL) 10 mg tablet Take 10 mg by mouth daily. , Historical Med      furosemide (LASIX) 20 mg tablet Take 20 mg by mouth daily. , Historical Med      montelukast (SINGULAIR) 10 mg tablet Take 10 mg by mouth nightly., Historical Med      pantoprazole (PROTONIX) 40 mg tablet Take 40 mg by mouth daily. , Historical Med         STOP taking these medications       celecoxib (CeleBREX) 200 mg capsule Comments:   Reason for Stopping:         fish oil-omega-3 fatty acids (Fish Oil) 340-1,000 mg capsule Comments:   Reason for Stopping:         oxyCODONE-acetaminophen (PERCOCET 10)  mg per tablet Comments:   Reason for Stopping:         meclizine (ANTIVERT) 12.5 mg tablet Comments:   Reason for Stopping:         albuterol-ipratropium (DUO-NEB) 2.5 mg-0.5 mg/3 ml nebu Comments:   Reason for Stopping:         nitroglycerin (NITROSTAT) 0.4 mg SL tablet Comments:   Reason for Stopping:               Discharge Condition: Good    Procedures : None    Consults: Cardiology and Pulmonary/Critical Care      PHYSICAL EXAM   Visit Vitals  BP (!) 151/80   Pulse 100   Temp 98.3 °F (36.8 °C)   Resp 17   Ht 5' 5\" (1.651 m)   Wt 100.7 kg (222 lb)   SpO2 98%   BMI 36.94 kg/m²     General: Awake, cooperative, no acute distress    HEENT: NC, Atraumatic. PERRLA, EOMI. Anicteric sclerae. Lungs:  CTA Bilaterally. No Wheezing/Rhonchi/Rales. Heart:  Regular  rhythm,  No murmur, No Rubs, No Gallops  Abdomen: Soft, Non distended, Non tender. +Bowel sounds,   Extremities: No c/c/e  Psych:   Not anxious or agitated. Neurologic:  No acute neurological deficits. Admission HPI :   Stacy Ferro is a 74 y.o. male with coronary artery disease, diabetes, COPD, CKD stage III is being admitted at the request of Dr. Garvin Habermann. Patient is being admitted for worsening dyspnea on exertion. Patient reports that he has dyspnea on exertion for the past 4 years. He reports however he has been getting progressively worse. He reports previously his dyspnea on exertion it was on and off. But over the past 1 year he has consistent dyspnea on exertion. He gets short of breath even with few steps. Since COVID 19 he has been mostly bedridden. He reports that he had heart attack after he took first Covid vaccine in May 2021. He was admitted at this hospital and coronary angiogram done did not reveal any significant blockages. Medical management recommended. He also had echocardiogram that showed normal left ventricular systolic function, EF of 55 to 60%, mild grade 1 diastolic dysfunction. He also complains of chest heaviness that has been going on for the past 1 year. He denies any unilateral weakness or numbness. Hospital Course :   Mr. Дмитрий Venegas was admitted to monitored floor, he was seen and followed by pulmonary and cardiology. Etiology of his dyspnea on exertion is likely multifactorial.  There is no clear source identified for his symptoms. Likely deconditioning. His echocardiogram showed normal left ventricular function with EF of 60 to 79%, grade 1 diastolic dysfunction. Could not do CT of chest due to his renal function. His VQ scan negative and his lower extremity duplex negative for VTE.   He had coronary angiogram in May 2021 which did not show any significant blockages or changes from previous coronary angiogram.  He has CKD stage III. Monitored his renal function. Patient reports that he does not know he has chronic kidney disease. He was informed about his renal function. Audiology added Ranexa to his medications. Activity: Activity as tolerated    Diet: Cardiac Diet    Follow-up: PCP, cardiology, pulmonary    Disposition: home    Minutes spent on discharge: 45       Labs: Results:       Chemistry Recent Labs     02/10/22  0350 02/09/22  0150 02/08/22  0230   * 148* 136*   * 136 136   K 4.2 3.9 4.2   CL 95* 99* 102   CO2 29 28 27   BUN 35* 32* 23*   CREA 2.17* 2.16* 1.80*   CA 8.8 8.7 8.6   AGAP 9 9 7   BUCR 16 15 13   AP 98 111 102   TP 6.7 7.0 6.4   ALB 3.2* 3.3* 3.1*   GLOB 3.5 3.7 3.3   AGRAT 0.9 0.9 0.9      CBC w/Diff Recent Labs     02/10/22  0350 02/09/22  0150 02/08/22  0230   WBC 12.3 11.2 11.5   RBC 3.99* 4.12* 3.90*   HGB 10.0* 9.9* 9.5*   HCT 33.4* 33.4* 33.0*    290 283   GRANS 67 71 65   LYMPH 18* 16* 21   EOS 1 1 2      Cardiac Enzymes No results for input(s): CPK, CKND1, FERMÍN in the last 72 hours. No lab exists for component: CKRMB, TROIP   Coagulation No results for input(s): PTP, INR, APTT, INREXT in the last 72 hours. Lipid Panel Lab Results   Component Value Date/Time    Cholesterol, total 114 05/02/2021 06:00 AM    HDL Cholesterol 64 (H) 05/02/2021 06:00 AM    LDL, calculated 38.6 05/02/2021 06:00 AM    VLDL, calculated 11.4 05/02/2021 06:00 AM    Triglyceride 57 05/02/2021 06:00 AM    CHOL/HDL Ratio 1.8 05/02/2021 06:00 AM      BNP No results for input(s): BNPP in the last 72 hours. Liver Enzymes Recent Labs     02/10/22  0350   TP 6.7   ALB 3.2*   AP 98      Thyroid Studies Lab Results   Component Value Date/Time    TSH 2.68 02/07/2022 01:48 PM            Significant Diagnostic Studies: NM LUNG SCAN PERF    Result Date: 2/9/2022  EXAM: NM LUNG SCAN PERF. CLINICAL INDICATION/HISTORY: rule out PE. -Additional: Clinical concern for pulmonary embolus. COMPARISON: Correlation is made with the radiographic study performed the same day. TECHNIQUE: 7.0 mCi Tc-99m MAA was injected intravenously and the 8 standard views of the chest were obtained. This perfusion only examination is interpreted using the PISAPED criteria. _______________ FINDINGS: Perfusion images show relatively symmetric radiotracer distribution to both lungs, with no segmental perfusion defects to suggest the presence of pulmonary embolism. _______________     No scintigraphic findings to suggest the presence of acute pulmonary embolism. _______________     XR CHEST SNGL V    Result Date: 2/9/2022  EXAM: XR CHEST SNGL V CLINICAL INDICATION/HISTORY: ro PE -Additional: None COMPARISON: 2/7/2022 TECHNIQUE: Frontal view of the chest _______________ FINDINGS: HEART AND MEDIASTINUM: Normal cardiac size and mediastinal contours. LUNGS AND PLEURAL SPACES: No focal pneumonic consolidation, pneumothorax, or pleural effusion. BONY THORAX AND SOFT TISSUES: No acute osseous abnormality _______________     No acute findings in the chest.     XR CHEST PA LAT    Result Date: 2/7/2022  EXAM:  PA and Lateral Chest X-ray INDICATION: Dyspnea on exertion COMPARISON: CT dated May 2, 2021 ___________ FINDINGS: Heart and mediastinal contours are within normal limits. Lungs are clear of active disease. There are no pleural effusions. No acute osseous findings. _____________      No acute process    ECHO ADULT COMPLETE    Result Date: 2/9/2022    Technical qualifiers: Echo study was technically difficult due to patient's body habitus. Suboptimal echocardiographic windows.   Left Ventricle: Left ventricle is smaller than normal. Mildly increased wall thickness. See diagram for wall motion findings. Normal left ventricular systolic function with a visually estimated EF of 60 - 65%. Grade I diastolic dysfunction with normal LAP. E/e' ratio 10.63   Left Atrium: Left atrium is smaller than normal.   Inadequate TR velocity jet for assessment of PA systolic pressure.   Normal RV size and function     DUPLEX LOWER EXT VENOUS BILAT    Result Date: 2/10/2022  · No evidence of deep vein thrombosis in the right lower extremity veins assessed. · No evidence of deep vein thrombosis in the left lower extremity veins assessed. No results found for this or any previous visit. Please note that this dictation was completed with [employer], the computer voice recognition software. Quite often unanticipated grammatical, syntax, homophones, and other interpretive errors are inadvertently transcribed by the computer software. Please disregard these errors. Please excuse any errors that have escaped final proofreading.      CC: Lela Drew MD

## 2022-02-11 ENCOUNTER — HOME CARE VISIT (OUTPATIENT)
Dept: SCHEDULING | Facility: HOME HEALTH | Age: 67
End: 2022-02-11
Payer: MEDICARE

## 2022-02-11 VITALS
DIASTOLIC BLOOD PRESSURE: 88 MMHG | OXYGEN SATURATION: 96 % | TEMPERATURE: 98.8 F | HEART RATE: 63 BPM | SYSTOLIC BLOOD PRESSURE: 138 MMHG | RESPIRATION RATE: 16 BRPM

## 2022-02-11 PROCEDURE — G0151 HHCP-SERV OF PT,EA 15 MIN: HCPCS

## 2022-02-11 PROCEDURE — G0299 HHS/HOSPICE OF RN EA 15 MIN: HCPCS

## 2022-02-11 PROCEDURE — 400013 HH SOC

## 2022-02-11 NOTE — Clinical Note
patient will be seen 1w1 2w3 for PT to improve overall functional mobility by graded therapeutic exercises, therapeutic activities, gait training, balance training and patient/caregiver education for safety at home.   Thank you for your referral

## 2022-02-11 NOTE — HOME HEALTH
Skilled services/Home bound verification:     Skilled Reason for admission/summary of clinical condition:  Pateint has PMH of COPD, HTN,CAD and DM. He stated that since august he has been pretty much in bed due to SOB, no oxygen ordered for him. He stated that he does not understand why no one can figure out what is wrong with him . This patient is homebound for the following reasons Requires considerable and taxing effort to leave the home . Caregiver: relative. Caregiver assists with ADLS, meals and any other needs . Medications reconciled and all medications are available in the home this visit. The following education was provided regarding medications, medication interactions, and look alike medications (specify): furosemide and the need to take it as precribed . Medications  are effective at this time. High risk medication teaching regarding anticoagulants, hyperglycemic agents or opiod narcotics performed (specify) moderate interaction/ ,budesonide-formoteroL and propranoloL    Diminished response to either the beta-agonist, beta-blocker, or both may occur. Beta-blockers may also induce bronchospasm. glipiZIDE and propranoloL    Diminished response to sulfonylureas and insulin may occur. Frequency and severity of hypoglycemic episodes may be increased, while warning symptoms of low blood sugar may be masked. DULoxetine and aspirin delayed-release    Concurrent use of a selective serotonin reuptake inhibitor(1-7,13) or a serotonin-norepinephrine reuptake inhibitor(8-10) and a NSAID may result in bleeding. aspirin delayed-release and furosemide    The pharmacological effects of loop diuretics may be decreased reducing their antihypertensive and diuretic actions. Dr Kenton Samuel  notified of any discrepancies/medication interactions noted above .      Home health supplies by type and quantity ordered/delivered this visit include: none    Patient education provided this visit to include: SN educaed on lasix and taking it as prescribed as he stated that he does not take it every day, when SN will be coming, the need for him to get up and change positions every 2 hours . Patient/caregiver degree of understanding:good    Patient level of understanding of education provided: lexus verbalized understanding and that he would try to take meds     Sharps Education Provided: NA  Patient response to procedure performed:  no preocedure preformed     Home exercise program/Homework provided: breathing techqniues and to preform them every hour     Pt/Caregiver instructed on plan of care and are agreeable to plan of care at this time. Physician Dr Cele Gomez  notified of patient admission to home health and plan of care including anticipated frequency of SN and treatments/interventions/modalities of education . Discharge planning discussed with patient and caregiver. Discharge planning as follows: when goals met and education is compelte . Pt/Caregiver did verbalize understanding of discharge planning. Next MD appointment 2/16/2022 (date) with pulmonalogist  MD/NP/PA. Patient/caregiver encouraged/instructed to keep appointment as lack of follow through with physician appointment could result in discontinuation of home care services for non-compliance.

## 2022-02-11 NOTE — Clinical Note
Therapy Functional Score Assessment  Question   Score   Grooming  2       Upper Dressing 2   Lower Dressing 3   Bathing  5    Toilet Transfer  2   Transfer  2       Ambulation  3   Dyspnea                     2   Pain Interfering with activity 4  Est number therapy visits      7

## 2022-02-12 ENCOUNTER — HOME CARE VISIT (OUTPATIENT)
Dept: HOME HEALTH SERVICES | Facility: HOME HEALTH | Age: 67
End: 2022-02-12
Payer: MEDICARE

## 2022-02-12 VITALS
TEMPERATURE: 97.7 F | OXYGEN SATURATION: 97 % | RESPIRATION RATE: 17 BRPM | DIASTOLIC BLOOD PRESSURE: 80 MMHG | HEART RATE: 77 BPM | SYSTOLIC BLOOD PRESSURE: 130 MMHG

## 2022-02-12 NOTE — HOME HEALTH
Summary of clinical condition: Yin Carter is a 74 y.o. male with coronary artery disease, diabetes, COPD, CKD stage III is being admitted at the request of Dr. Gloria Hooker. Patient is being admitted for worsening dyspnea on exertion. Patient reports that he has dyspnea on exertion for the past 4 years. He reports however he has been getting progressively worse. He reports previously his dyspnea on exertion it was on and off. But over th e past 1 year he has consistent dyspnea on exertion. He gets short of breath even with few steps. Since COVID 19 he has been mostly bedridden. He reports that he had heart attack after he took first Covid vaccine in May 2021. He was admitted at this hospital and coronary angiogram done did not reveal any significant blockages. Medical management recommended. He also had echocardiogram that showed normal left ventricular systol ic function, EF of 55 to 60%, mild grade 1 diastolic dysfunction. He also complains of chest heaviness that has been going on for the past 1 year. He denies any unilateral weakness or numbness. Hospital Course :    Mr. Jodi Jimenez was admitted to monitored floor, he was seen and followed by pulmonary and cardiology. Etiology of his dyspnea on exertion is likely multifactorial.  There is no clear source identified for his symptom s. Likely deconditioning. His echocardiogram showed normal left ventricular function with EF of 60 to 02%, grade 1 diastolic dysfunction. Could not do CT of chest due to his renal function. His VQ scan negative and his lower extremity duplex negative for VTE. He had coronary angiogram in May 2021 which did not show any significant blockages or changes from previous coronary angiogram.  He has CKD stage III. Monitored his  renal function. Patient reports that he does not know he has chronic kidney disease. He was informed about his renal function. Audiology added Ranexa to his medications.     Medications reconciliation completed. Pt/Caregiver instructed on plan of care and are agreeable to plan of care at this time. Plan of care and admission to home health status called to attending physician: Andreia Martinez MD    Discharge planning discussed with patient and caregiver. Discharge planning as follows: dc to family with MD supervision when all goals are met . Pt/Caregiver did verbalize understanding. PMHx: obesity. dyspnea on exertion, weakness   S: pain on back 3/10 that is managed by rest, Patient complained of weakness on BLE, easy fatiguability during short period of gait and movement   O:Patients Goals= Be able to go back to PLOF  Wound/Incision: location, description, drainage: none  PLOF: Lives with son in a 1 level house with a ramp to enter main floor, prior to referral, he was independent with ADL's and IADL's and was using SPC for gait and was able to drive  STRENGTH B hip flexor, extensor, hip abductors, adductors, 3+/5, B knee flexor and extensor 3/5  SIT TO STAND from EOB with Min A 1  BALANCE  Tinetti 11/28 high fall risk due to reduced strength on BLE, gait deviation and decreased efficiency of balance reactions. Patient demonstrated poor use of hip and ankle strategy during standing balance activity. Patient requires support from AD at all times for safety and stability. GAIT Patient was able to ambulate without AD holding on to furnitures with Min A x1 x 20 feet with noted slow paced, forward stooped posture with wide JAKI.  patient was educated with importanceo of using AD at this time to prevent falls and benefits of using RW versus cane   RPE 7/10  TUG 56 seconds   BED MOBILITY Patient needed Min A x1 with bed mobility   TRANSFERS Patient needed Min A x1 with verbal cues using SPC to and from bed, chair and toilet   A: PT evaluation completed with the presence of son POC established, Med rec done, HEP established  P:Home Safety eval/recommendations: Home health physical therapy initial evaluation performed. Patient demonstrates decreased strength, balance, and endurance which increases patient's overall fall risk and burden of care. Patient would benefit from home health physical therapy to improve balance, strength, and endurance which would decrease fall risk and allow patient to return to prior level of function once all functional goals or full rehab potential is met. patient educated with HEP including seated hip flex, knee extension, hip abduction, hip adduction, ankle PF and DF and ball squeeze x 20 reps x 3 sets daily to improve MMT on BLE to facilitate with improved bed mobility, transfers and gait with AD. patient also educated with deep breathing exercises to be done daily x 10 reps x 3 sets to prevent SOB during activity.  Patient educated with fall prevention technique by decluttering space, proper use of AD and footwear

## 2022-02-15 ENCOUNTER — HOME CARE VISIT (OUTPATIENT)
Dept: SCHEDULING | Facility: HOME HEALTH | Age: 67
End: 2022-02-15
Payer: MEDICARE

## 2022-02-15 ENCOUNTER — HOME CARE VISIT (OUTPATIENT)
Dept: HOME HEALTH SERVICES | Facility: HOME HEALTH | Age: 67
End: 2022-02-15
Payer: MEDICARE

## 2022-02-15 PROCEDURE — G0299 HHS/HOSPICE OF RN EA 15 MIN: HCPCS

## 2022-02-15 PROCEDURE — G0157 HHC PT ASSISTANT EA 15: HCPCS

## 2022-02-15 PROCEDURE — G0156 HHCP-SVS OF AIDE,EA 15 MIN: HCPCS

## 2022-02-16 VITALS
SYSTOLIC BLOOD PRESSURE: 138 MMHG | RESPIRATION RATE: 18 BRPM | OXYGEN SATURATION: 98 % | DIASTOLIC BLOOD PRESSURE: 90 MMHG | HEART RATE: 73 BPM

## 2022-02-16 VITALS
OXYGEN SATURATION: 94 % | TEMPERATURE: 97.3 F | DIASTOLIC BLOOD PRESSURE: 88 MMHG | HEART RATE: 98 BPM | SYSTOLIC BLOOD PRESSURE: 128 MMHG

## 2022-02-17 ENCOUNTER — HOME CARE VISIT (OUTPATIENT)
Dept: HOME HEALTH SERVICES | Facility: HOME HEALTH | Age: 67
End: 2022-02-17
Payer: MEDICARE

## 2022-02-17 NOTE — HOME HEALTH
Subjective: Pt reports that he doesn't feel stable walking with the cane. Pt reports that he normally uses the furniture and the walls. When asked if he had a bedside urinal for the night, he states no. Future MD appointments: 2/16/22                                           At 12:15 Pulmonology                                           At  13: 15 Dr. Jean-Claude Hummel, PCP  Caregiver involvement/assistance needed for: Pt lives in a single story house with his son who helps with errands and transportation. Objective: See interventions. Pt response to treatment: Pt's vital decline when amb with SPC 10ft to go to the bathroom. His O2 levels drop to 88% and his heartrate elevates to 124. They return to an acceptable level with a 2 min rest.   Pt level of understanding of education provided: Pt is able to perform supine exercises for improved bed mobility. Assessment of Progress toward goals: Pt is not progressing with goals of improved endurance as his vitals decline when amb 10 to go to the bathroom. Continued need for the following skills: There is a continued need for skilled PT to improve transfer training, progress HEP and amb to get to the bathroom without getting winded. Communications with all co-treating staff regarding POC via email. Equipment Needs: Pt instructed that he could used cleaned out clorax wipe containers without the lid as a substitue for a urinal, just add a little water for ease of cleaning. Plan for Next visit: Assess pt's teddy for to HEP and amb next visit. Assess quality of amb with quad cane next visit vs SPC. Frequency: 1 w 1, 2 w 3. Pt has 1 remaining visit this week. The Following Discharge Planning was Discussed with the Pt/Caregiver: Pt to receive HHPT until goals are met or max benefit has been achieved.

## 2022-02-18 ENCOUNTER — HOME CARE VISIT (OUTPATIENT)
Dept: HOME HEALTH SERVICES | Facility: HOME HEALTH | Age: 67
End: 2022-02-18
Payer: MEDICARE

## 2022-02-18 ENCOUNTER — HOME CARE VISIT (OUTPATIENT)
Dept: SCHEDULING | Facility: HOME HEALTH | Age: 67
End: 2022-02-18
Payer: MEDICARE

## 2022-02-18 PROCEDURE — G0299 HHS/HOSPICE OF RN EA 15 MIN: HCPCS

## 2022-02-18 NOTE — Clinical Note
Call to pt to announce arrival of LPTA for rescheduled visit. Pt once again c/o of fatigue and the inability to participate in PT session. PT session cancelled and he is scheduled for the following week.

## 2022-02-20 VITALS
RESPIRATION RATE: 18 BRPM | TEMPERATURE: 97.1 F | HEART RATE: 81 BPM | DIASTOLIC BLOOD PRESSURE: 80 MMHG | OXYGEN SATURATION: 97 % | SYSTOLIC BLOOD PRESSURE: 146 MMHG

## 2022-02-21 NOTE — HOME HEALTH
DIAGNOSIS COPD  Oxygen  2LPM NC  Lung Sounds: Wheezes in left lower lung, all others CTA  Dyspnea: with minimal exertion  Orthopnea:  no increased difficulty    OXYGEN safety issues noted this visit:  No open flames in home, place no smoking sign at door   Tanks stored appropriately  yes   No Oxygen in Use - sign on door no   Evidence of smoking or open flame  no  TEACHING PROVIDED THIS VISIT (specify):    Instructed when to nofity the MD.  Instructed basic anatomy of the Lungs and Physiology COPD. instructed regarding the cause of COPD. Instructed on the sign and symptoms of COPD. Instructed on the complication of COPD. Instructed on the life style change to prevent progression and manage COPD. Instructed to remove respiratory irritants. Diet adjustment, energy conservation method. breathing excercise , chest percussion and postural drainage. Use of  inhaler and/or nebulizer  Reviewed hospital provided teaching material on COPD disease process. Reviewed stoplight instructions   Diet teaching (specify): increase caloric intake, patient may lose weight d/t difficulty breathing  Patient instructed to weight self daily and record on calendar       Skilled reason for visit: SN assessment, education  Caregiver involvement: Patient clean, dressed and well nourished. Home neat and clean. Medications reconciled and all medications are available in the home this visit. The following education was provided regarding medications, medication interactions, and look a like medications:   atorvastatin:  Instruct patient to report signs/symptoms of myopathy or rhabdomyolysis (muscle pain, tenderness, weakness, fever). Drug may cause diarrhea, UTIs, extremity pain, nasopharyngitis, arthralgia, dyspepsia, or nausea.    Advise patient to immediately report signs/symptoms of myopathy including unexplained muscle pain, tenderness, or weakness especially when accompanied by fever or malaise, or if symptoms persist after discontinuation of drug.  patients to report signs/symptoms of liver injury (jaundice, dark urine, upper abdominal discomfort, anorexia, fatigue).  patient to avoid excessive quantities of alcohol to reduce risk of hepatotoxicity. Instruct patient not to eat large amounts of grapefruit or drink more than 1 L/day of grapefruit juice with this drug   Advise patient that there are multiple significant drug-drug interactions for this drug. Consult healthcare professional prior to new drug use (including over-the-counter and herbal drugs). Medications  are effective at this time. Home health supplies by type and quantity ordered/delivered this visit include: None this visit  Patient education provided this visit: Medication  Progress toward goals: Progressing  Home exercise program: Participating  Continued need for the following skills: Nursing  The following discharge planning was discussed with the pt/caregiver:  Will d/c to home/self care when goals are met

## 2022-02-21 NOTE — HOME HEALTH
Skilled reason for visit: SN assessment, education  Caregiver involvement: Patient clean, dressed and well nourished. Home neat and clean. Medications reconciled and all medications are available in the home this visit. The following education was provided regarding medications, medication interactions, and look a like medications:   atorvastatin:  Instruct patient to report signs/symptoms of myopathy or rhabdomyolysis (muscle pain, tenderness, weakness, fever). Drug may cause diarrhea, UTIs, extremity pain, nasopharyngitis, arthralgia, dyspepsia, or nausea. Advise patient to immediately report signs/symptoms of myopathy including unexplained muscle pain, tenderness, or weakness especially when accompanied by fever or malaise, or if symptoms persist after discontinuation of drug.  patients to report signs/symptoms of liver injury (jaundice, dark urine, upper abdominal discomfort, anorexia, fatigue).  patient to avoid excessive quantities of alcohol to reduce risk of hepatotoxicity. Instruct patient not to eat large amounts of grapefruit or drink more than 1 L/day of grapefruit juice with this drug   Advise patient that there are multiple significant drug-drug interactions for this drug. Consult healthcare professional prior to new drug use (including over-the-counter and herbal drugs). Medications  are effective at this time. Home health supplies by type and quantity ordered/delivered this visit include: None this visit  Patient education provided this visit: Medication  Progress toward goals: Progressing  Home exercise program: Participating  Continued need for the following skills: Nursing  The following discharge planning was discussed with the pt/caregiver:  Will d/c to home/self care when goals are met

## 2022-02-22 ENCOUNTER — HOME CARE VISIT (OUTPATIENT)
Dept: HOME HEALTH SERVICES | Facility: HOME HEALTH | Age: 67
End: 2022-02-22
Payer: MEDICARE

## 2022-02-23 ENCOUNTER — HOME CARE VISIT (OUTPATIENT)
Dept: SCHEDULING | Facility: HOME HEALTH | Age: 67
End: 2022-02-23
Payer: MEDICARE

## 2022-02-23 PROCEDURE — G0157 HHC PT ASSISTANT EA 15: HCPCS

## 2022-02-25 ENCOUNTER — HOME CARE VISIT (OUTPATIENT)
Dept: SCHEDULING | Facility: HOME HEALTH | Age: 67
End: 2022-02-25
Payer: MEDICARE

## 2022-02-25 ENCOUNTER — HOME CARE VISIT (OUTPATIENT)
Dept: HOME HEALTH SERVICES | Facility: HOME HEALTH | Age: 67
End: 2022-02-25
Payer: MEDICARE

## 2022-02-25 VITALS
HEART RATE: 92 BPM | TEMPERATURE: 97.3 F | OXYGEN SATURATION: 92 % | DIASTOLIC BLOOD PRESSURE: 92 MMHG | SYSTOLIC BLOOD PRESSURE: 140 MMHG

## 2022-02-25 PROCEDURE — G0156 HHCP-SVS OF AIDE,EA 15 MIN: HCPCS

## 2022-02-25 PROCEDURE — G0299 HHS/HOSPICE OF RN EA 15 MIN: HCPCS

## 2022-02-25 PROCEDURE — G0157 HHC PT ASSISTANT EA 15: HCPCS

## 2022-02-26 NOTE — HOME HEALTH
Subjective: Pt c/o being tired. He has no energy. Future MD appointments: TBD  Caregiver involvement/assistance needed for: Pt lives with son, who helps with errands, meals and transportation. Objective: See interventions. Pt response to treatment: Pt fatigues easily. He has little endurance to perform ADLs. Pt level of understanding of education provided:Pt verbalizes a good understanding of changing positions to prevent skin breakdown. Assessment of Progress toward goals: Pt is not progressing toward goals for improved strength and endurance. Compliance with HEP is questionable. Continued need for the following skills: Continued need for skilled PT to assess strength, endurance and balance. Equipment Needs:  Plan for Next visit: Issue exercises for strength and endurance and assess pt's compliance. Frequency: 1 w 1, 2 w 3  The Following Discharge Planning was Discussed with the Pt/Caregiver: Pt to receive HHPT until goals are met or max benefit has been achieved.

## 2022-02-28 ENCOUNTER — HOME CARE VISIT (OUTPATIENT)
Dept: SCHEDULING | Facility: HOME HEALTH | Age: 67
End: 2022-02-28
Payer: MEDICARE

## 2022-02-28 VITALS
DIASTOLIC BLOOD PRESSURE: 70 MMHG | SYSTOLIC BLOOD PRESSURE: 130 MMHG | TEMPERATURE: 97.4 F | OXYGEN SATURATION: 96 % | HEART RATE: 76 BPM

## 2022-02-28 PROCEDURE — G0152 HHCP-SERV OF OT,EA 15 MIN: HCPCS

## 2022-02-28 PROCEDURE — G0157 HHC PT ASSISTANT EA 15: HCPCS

## 2022-02-28 NOTE — HOME HEALTH
Subjective: When discussing self care with diabetes, pt reports that he doesn't have a glucometer. When discussing pt's inability to perform exercises, pt reports that he really isn't doing all of the exercises. Future MD appointments: TBD  Caregiver involvement/assistance needed for: Pt lives with son who helps with errands, meals and transportation. Objective: See interventions. Pt response to treatment: Pt is able to maintain good vitals with short distance amb. Pt level of understanding of education provided: Pt understands the importance of diabetic footcare and compliance with HEP. Assessment of Progress toward goals: Pt is progressing with improved endurance due to lack of carryover with HEP. Continued need for the following skills: Assess pt for complaince with HEP and address gait/stair training. Communications with all co-treating staff regarding pt status/progress during staff meeting/phone call/text/email. Equipment Needs:  Plan for Next visit: Continue with HEP. Try pt with rollator to see if he is able to go further with amb. Attempt stair training if pt is able. Frequency: 1 w 1, 2 w 3. Pt has two visits next week. The Following Discharge Planning was Discussed with the Pt/Caregiver: Pt to receive HHPT until goals are met or max benefit has been achieved.

## 2022-03-01 ENCOUNTER — HOME CARE VISIT (OUTPATIENT)
Dept: SCHEDULING | Facility: HOME HEALTH | Age: 67
End: 2022-03-01
Payer: MEDICARE

## 2022-03-01 VITALS
TEMPERATURE: 96.7 F | SYSTOLIC BLOOD PRESSURE: 110 MMHG | DIASTOLIC BLOOD PRESSURE: 80 MMHG | OXYGEN SATURATION: 98 % | HEART RATE: 89 BPM

## 2022-03-01 VITALS
TEMPERATURE: 97.8 F | HEART RATE: 82 BPM | OXYGEN SATURATION: 96 % | SYSTOLIC BLOOD PRESSURE: 140 MMHG | DIASTOLIC BLOOD PRESSURE: 82 MMHG | RESPIRATION RATE: 16 BRPM

## 2022-03-01 PROCEDURE — G0299 HHS/HOSPICE OF RN EA 15 MIN: HCPCS

## 2022-03-01 NOTE — HOME HEALTH
Summary of clinical condition:   Pt had progressive SOB and his pulmonologist admitted him to the hospital on 2/7/2022. He was Dx with exacerbation of COPD. He was seen by both cardiologist and pulmonology without specific reason for his SOB. Pt's medications were adjusted and he improved. Pt was discharged home on 2/10/2022 with orders for [facility] OT. Medical History: CAD, 5 stent placement, COPD, CHF, Bilateral knee OA, Angina  Medications review completed. No adverse reactions noted. Caregiver involvement: Pt's autistic son assists with meals, laundry, IADLs. Patient education provided this visit:  Pt was educated in [address] for bathroom and dressing. Patient's Response to education:  Pt said he would look into getting a tub transfer bench. Home exercise program:  Pt was trained in HEP for UE including 2 hands clasped and touch neck. ASSESSMENT:  Pt was able to go supine to sit on EOB without assistance but he became SOB quickly. His O2 level did not drop below 95% even with the C/O SOB. He was independent with sit to stand from his bed but needs assistance for safe chair, commode, and tub transfer. Pt needs a tub transfer bench. He is able to dress UE after TRAN but is unable to dress LE. Pt needs a hip kit for dressing. Pt has decreased UE ROM and reduce UE strength bilaterally. Pt is unable to fix food for himself and is eating poorly as a result. Pt will be seen for there ex, ADL, IADL, and and transfer training. Patient Verbalized Goals:  Pt wants to make meals again. Continued need for the following skills: Occupational Therapy  Pt/Caregiver instructed on plan of care and are agreeable to plan of care at this time. Discharge planning discussed with patient and caregiver. Discharge planning as follows: Pt will be seen 2 x week x 3 then discharge if goals are met. Pt/Caregiver did verbalize understanding.

## 2022-03-01 NOTE — HOME HEALTH
Subjective: Pt reports that he is doing OK. Pt reports that most of his joints hurt today. Pt reports that he has both RA and OA. He thinks his back pain is from his 3rd stage kidney disease. Pt is allowed to purchase items through an insurance based [place] catalog. His account should zero out in April and he will most likely get a rollator then. Future MD appointments: TBD  Caregiver involvement/assistance needed for: Pt lives with his son, who helps with errands and transportation. Objective: See interventions. Pt response to treatment: Pt tries a rollator for amb. Pt is able to amb farther while maintaining good vitals. Pt level of understanding of education provided: Pt verbalizes a good understanding of how important it is for him to take control of his own healthcare and that it is his responsibility to progress himself. No one can do it for him. Assessment of Progress toward goals: Pt is able to amb farther while maintaining good vitals. Continued need for the following skills: There is a need for skilled PT to perform skilled PT assessment for impending DC. Communications with all co-treating staff regarding pt's impending DC during staff meeting. Equipment Needs:  Plan for Next visit: Pt to receive assessment for possible DC. Frequency: 1 w 1, 2 w 3. Pt has one remaining visit. The Following Discharge Planning was Discussed with the Pt/Caregiver: Pt to receive HHPT until goals are met or max benefit has been achieved.

## 2022-03-02 ENCOUNTER — HOME CARE VISIT (OUTPATIENT)
Dept: SCHEDULING | Facility: HOME HEALTH | Age: 67
End: 2022-03-02
Payer: MEDICARE

## 2022-03-02 VITALS
RESPIRATION RATE: 18 BRPM | SYSTOLIC BLOOD PRESSURE: 140 MMHG | OXYGEN SATURATION: 97 % | HEART RATE: 82 BPM | TEMPERATURE: 98.1 F | DIASTOLIC BLOOD PRESSURE: 80 MMHG

## 2022-03-03 ENCOUNTER — HOME CARE VISIT (OUTPATIENT)
Dept: SCHEDULING | Facility: HOME HEALTH | Age: 67
End: 2022-03-03
Payer: MEDICARE

## 2022-03-03 VITALS
RESPIRATION RATE: 16 BRPM | OXYGEN SATURATION: 98 % | TEMPERATURE: 97.1 F | HEART RATE: 73 BPM | SYSTOLIC BLOOD PRESSURE: 160 MMHG | DIASTOLIC BLOOD PRESSURE: 90 MMHG

## 2022-03-03 PROCEDURE — G0151 HHCP-SERV OF PT,EA 15 MIN: HCPCS

## 2022-03-03 NOTE — HOME HEALTH
SUBJECTIVE: Pt reports he has not been doing his HEP. He reports his participation has been limited by chronic pain. MEDICATIONS REVIEWED AND UPDATED: Pt denies any medication changes. .  WOUNDS: None reported. STRENGTH: Seated B LE MMT: hip flx 3+/5, knee ext 3+/5, knee flexion 3+/5. (Goal not met)  BED MOBILITY: MI (Goal met)  TRANSFERS:  Sit to stand from bed MI. GAIT: Gait training 10 feet x 2. Distance limited per patient request due to pain. Pt ambulating without an AD and reaching for furniture for stability. Pt advised to use a cane for all ambulation. (Goal not met)  STAIRS: N/A  BALANCE/NEUROMUSCULAR REEDUCATION: 17/28 indicating improved balance from evaluation, but continued risk for falls. (Goal met)  . PATIENT/CAREGIVER EDUCATION PROVIDED THIS VISIT: safety, HEP, walking, deep breathing, fall risk/prevention, importance of consistent HEP participation, and goal progress. HEP consisting of:  1. Continue existing HEP. Patient level of understanding of education provided: Pt verbalized understanding of risk for falls and need for walker to improve ambulation safety. Patient response to treatment:  Increased respiratory rate noted following ambulation. SPO2 remained 99%. Rate returned to normal with seated rest break. Eliazar Vaughn PROGRESS: Camden Sanon has completed 6 home health PT sessions with treatments focused on strengthening, gait training, and transfer training. Progress with therapy has been impacted by chronic pain and poor patient compliance. Pt is discharged from home health PT as he has reached maximum functional benefit from skilled home health PT. Pt is in agreement with discharge plan and will continue with OT services at this time. PLAN: Discharge patient from [facility] PT as patient has achieved max therapeutic benefit with skilled PT at this time. .  PCP, Abdi Stark was called and message was left informing of discharge from home health PT. .  BP was elevated.   Pt reported he had taken his BP just prior to arrival of PT.

## 2022-03-03 NOTE — Clinical Note
Mally De Santiago has completed 6 home health PT sessions with treatments focused on strengthening, gait training, and transfer training. Progress with therapy has been impacted by chronic pain and poor patient compliance. Pt is discharged from home health PT as he has reached maximum functional benefit from skilled home health PT. Pt is in agreement with discharge plan and will continue with OT services at this time.

## 2022-03-07 NOTE — HOME HEALTH
Skilled reason for visit: SN assessment, education  Caregiver involvement: Patient clean, dressed and well nourished. Home neat and clean. Medications reconciled and all medications are available in the home this visit. The following education was provided regarding medications, medication interactions, and look a like medications:  Lasix:   Drug causes sun-sensitivity. Advise patient to use sunscreen and avoid tanning beds. Patient should avoid activities requiring coordination until drug effects are realized, as drug may cause dizziness, vertigo, or blurred vision. This drug may cause hyperglycemia, hyperuricemia, constipation, diarrhea, loss of appetite, nausea, vomiting, purpuric disorder, cramps, spasticity, asthenia, headache, paresthesia, or scaling eczema. Instruct patient to report unusual bleeding/bruising or signs/symptoms of hypotension, infection, pancreatitis, or ototoxicity (tinnitus, hearing impairment). Advise patient to report signs/symptoms of a severe skin reactions (flu-like symptoms, spreading red rash, or skin/mucous membrane blistering) or erythema multiforme. Instruct patient to eat high-potassium foods during drug therapy, as directed by healthcare professional.   Patient should not drink alcohol while taking this drug. atorvastatin:  Instruct patient to report signs/symptoms of myopathy or rhabdomyolysis (muscle pain, tenderness, weakness, fever). Drug may cause diarrhea, UTIs, extremity pain, nasopharyngitis, arthralgia, dyspepsia, or nausea. Advise patient to immediately report signs/symptoms of myopathy including unexplained muscle pain, tenderness, or weakness especially when accompanied by fever or malaise, or if symptoms persist after discontinuation of drug.  patients to report signs/symptoms of liver injury (jaundice, dark urine, upper abdominal discomfort, anorexia, fatigue).     patient to avoid excessive quantities of alcohol to reduce risk of hepatotoxicity. Instruct patient not to eat large amounts of grapefruit or drink more than 1 L/day of grapefruit juice with this drug   Advise patient that there are multiple significant drug-drug interactions for this drug. Consult healthcare professional prior to new drug use (including over-the-counter and herbal drugs). Propanolol:  Advise caregiver to withhold dosing if child is vomiting or not feeding regularly . Inform caregiver that changes in sleep patterns may occur in children administered drug . Warn patient to avoid activities requiring mental alertness or coordination until drug effects are realized, as drug may cause excessive somnolence and impaired cognition . Tell patient planning major surgery with anesthesia to alert physician that drug is being used, as drug impairs ability of heart to respond to reflex adrenergic stimuli . Oral suspension side effects may include sleep disorders, nightmares, bronchitis, diarrhea, agitation, and irritability . Oral tablet side effects may include bradyarrhythmias, cold extremities, anorexia, nausea, vomiting, insomnia, paresthesias, dyspnea, and wheezing . Tell patient or caregiver to immediately report signs of hypoglycemia, bradycardia, exacerbation of conduction disorders, or hypotension .  patient or caregiver to immediately report signs of bronchospasm or exacerbation of lower respiratory tract infection (ie, difficulty breathing, wheezing) . Advise diabetic patient to carefully monitor blood glucose as drug may mask symptoms of hypoglycemia . Instruct parent or caregiver on proper use of oral dosing syringe .  patient against sudden discontinuation of drug, as this may precipitate hypertension, angina, or myocardial infarction . Hussain Loomis Medications  are effective at this time.     Home health supplies by type and quantity ordered/delivered this visit include: None this visit  Patient education provided this visit: Medication  Progress toward goals: Progressing  Home exercise program: Participating  Continued need for the following skills: Nursing  The patient is discharged from skilled nursing, to continue PT/OT until max benefit reached.

## 2022-03-09 ENCOUNTER — HOME CARE VISIT (OUTPATIENT)
Dept: SCHEDULING | Facility: HOME HEALTH | Age: 67
End: 2022-03-09
Payer: MEDICARE

## 2022-03-09 VITALS
OXYGEN SATURATION: 96 % | DIASTOLIC BLOOD PRESSURE: 83 MMHG | SYSTOLIC BLOOD PRESSURE: 169 MMHG | HEART RATE: 67 BPM | TEMPERATURE: 97.9 F | RESPIRATION RATE: 17 BRPM

## 2022-03-09 PROCEDURE — G0158 HHC OT ASSISTANT EA 15: HCPCS

## 2022-03-11 ENCOUNTER — HOME CARE VISIT (OUTPATIENT)
Dept: HOME HEALTH SERVICES | Facility: HOME HEALTH | Age: 67
End: 2022-03-11
Payer: MEDICARE

## 2022-03-11 NOTE — HOME HEALTH
SUBJECTIVE: Pt report he has not been getting out of bed much and feels tired all the time    PRESENTATION: Client Present sitting upright in chair. CLIENT/CGR CONFIRM: No ED visits, No falls, No Medical Insurance changes reported, and No medication changes reported     CG involvement includes: Pt son assists with ADLs/IOADLs as needed     ____________________________   OBJECTIVE  see interventions  . Patient education provided this visit: Therapist educated client on purse lip breathing exercises( Smell the roses and blow out the birthday candles)  for  oxygen to reach all parts of the body  to prevent exertion, SOB, and  fatigue when performing adls and functional transfers,  Patient level of understanding of education provided: Pt able to demo pursed lipped breathing  Home exercise program: B UE strengthening against gravity for shoulder flexion/extension, overhead press, chest press, shoulder ab/adduction and elbow flexion/extension. Pt to sit in living room daily for 1 hour to increase  activity tolerance needed for ADLs  ________________________________    ASSESSMENT / Justification for continued TX or discharge:     ASSESSMENT AND PROGRESS TOWARD GOALS: G ability to follow HEP ind. Pt demonstartes increased strength as needed for completion of ADLs and functional transfers. Continued OT needed for ADL training, and transfer training.  Pt able to follow HEP with min A for vc and 30 second rest breaks  Patient response to treatment:  Pt demonstartes poor endurance  ________________________________      PLAN FOR NEXT VISIT: balance and ADL training    DISCHARGE PLANNING:  The following discharge planning was discussed with the pt/caregiver: Discharge to self and family under MD supervision once all goals have been met or patient has reached maximum potential.  Frequency remaining: 1X2, 2X1

## 2022-03-14 ENCOUNTER — HOME CARE VISIT (OUTPATIENT)
Dept: SCHEDULING | Facility: HOME HEALTH | Age: 67
End: 2022-03-14
Payer: MEDICARE

## 2022-03-17 ENCOUNTER — HOME CARE VISIT (OUTPATIENT)
Dept: SCHEDULING | Facility: HOME HEALTH | Age: 67
End: 2022-03-17
Payer: MEDICARE

## 2022-03-17 PROCEDURE — 400013 HH SOC

## 2022-03-17 PROCEDURE — G0152 HHCP-SERV OF OT,EA 15 MIN: HCPCS

## 2022-03-18 VITALS
TEMPERATURE: 98.2 F | DIASTOLIC BLOOD PRESSURE: 82 MMHG | SYSTOLIC BLOOD PRESSURE: 122 MMHG | HEART RATE: 96 BPM | RESPIRATION RATE: 16 BRPM | OXYGEN SATURATION: 99 %

## 2022-03-18 PROBLEM — R06.09 DOE (DYSPNEA ON EXERTION): Status: ACTIVE | Noted: 2022-02-07

## 2022-03-18 PROBLEM — R77.8 ELEVATED TROPONIN I LEVEL: Status: ACTIVE | Noted: 2018-04-24

## 2022-03-18 PROBLEM — R79.89 ELEVATED TROPONIN I LEVEL: Status: ACTIVE | Noted: 2018-04-24

## 2022-03-18 PROBLEM — M06.9 RHEUMATOID ARTHRITIS (HCC): Status: ACTIVE | Noted: 2019-08-30

## 2022-03-18 PROBLEM — E11.9 TYPE II DIABETES MELLITUS (HCC): Status: ACTIVE | Noted: 2021-05-02

## 2022-03-18 NOTE — HOME HEALTH
SUBJECTIVE: Pt said he is doing much better and has been able to make one meal a day. He said this is his normal for years. CAREGIVER INVOLVEMENT/ASSISTANCE NEEDED FOR: Pt's son assist with housework and shopping. OBJECTIVE:  See interventions. PATIENT RESPONSE TO TREATMENT:  Pt was SOB after demonstrating activities but returned to baseline with pursed lip breathing. His O2 saturation remained above 90% at all times. Patient Education:  Pt was instructed to buy a hip kit to assist with LE dressing and help avoid bending. PATIENT LEVEL OF UNDERSTANDING OF EDUCATION PROVIDED: Pt verbalized understanding and repeated back how to buy one. ASSESSMENT OF PROGRESS TOWARD GOALS: Patient missed 3 out of 6 scheduled OT visit. He has improved his UE ROM and strength. His left shoulder flexion increased from 90 to 120 degrees and 4- to 4 strength. He has improved his dressing but continues to needs assistance for socks and shoes because he has not obtained a hip kit yet. He has improved his use of pursed lip breathing to conserve energy and limit SOB. Pt is now able to transfer into tub Mod independent and shower by himself while sitting on a shower chair. Pt is now able to fix simple meals by himself. Pt has reached his maximum ability at this time and is discharged from [facility]. Patient agrees with discharge and his MD notified.

## 2022-03-19 PROBLEM — R53.1 WEAKNESS: Status: ACTIVE | Noted: 2022-02-07

## 2022-03-19 PROBLEM — Z91.148 NONCOMPLIANCE WITH MEDICATION REGIMEN: Status: ACTIVE | Noted: 2021-05-02

## 2022-03-19 PROBLEM — E66.9 OBESITY: Status: ACTIVE | Noted: 2021-05-02

## 2022-03-19 PROBLEM — N18.30 CKD (CHRONIC KIDNEY DISEASE) STAGE 3, GFR 30-59 ML/MIN (HCC): Status: ACTIVE | Noted: 2018-04-24

## 2022-03-19 PROBLEM — J45.50 SEVERE PERSISTENT ASTHMA: Status: ACTIVE | Noted: 2020-02-21

## 2022-03-19 PROBLEM — I21.4 NSTEMI (NON-ST ELEVATED MYOCARDIAL INFARCTION) (HCC): Status: ACTIVE | Noted: 2018-04-24

## 2022-03-19 PROBLEM — J45.909 ASTHMA: Status: ACTIVE | Noted: 2018-04-24

## 2022-07-08 NOTE — ED TRIAGE NOTES
Pt presents to the tx room via EMS w/ c/o more weakness that normal, feeling \"completely wiped out\" x 24 hours.
2.01

## 2022-10-18 ENCOUNTER — APPOINTMENT (OUTPATIENT)
Dept: CT IMAGING | Age: 67
End: 2022-10-18
Attending: PHYSICIAN ASSISTANT
Payer: MEDICARE

## 2022-10-18 ENCOUNTER — HOSPITAL ENCOUNTER (EMERGENCY)
Age: 67
Discharge: HOME | End: 2022-10-18
Attending: EMERGENCY MEDICINE
Payer: MEDICARE

## 2022-10-18 ENCOUNTER — APPOINTMENT (OUTPATIENT)
Dept: GENERAL RADIOLOGY | Age: 67
End: 2022-10-18
Attending: EMERGENCY MEDICINE
Payer: MEDICARE

## 2022-10-18 ENCOUNTER — APPOINTMENT (OUTPATIENT)
Dept: VASCULAR SURGERY | Age: 67
End: 2022-10-18
Attending: PHYSICIAN ASSISTANT
Payer: MEDICARE

## 2022-10-18 VITALS
HEIGHT: 65 IN | BODY MASS INDEX: 36.99 KG/M2 | WEIGHT: 222 LBS | SYSTOLIC BLOOD PRESSURE: 168 MMHG | OXYGEN SATURATION: 100 % | HEART RATE: 78 BPM | TEMPERATURE: 98.6 F | RESPIRATION RATE: 18 BRPM | DIASTOLIC BLOOD PRESSURE: 84 MMHG
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
## 2022-10-18 DIAGNOSIS — R53.1 WEAKNESS: ICD-10-CM

## 2022-10-18 LAB
ALBUMIN SERPL-MCNC: 2.9 G/DL (ref 3.4–5)
ALBUMIN/GLOB SERPL: 0.9 {RATIO} (ref 0.8–1.7)
ALP SERPL-CCNC: 88 U/L (ref 45–117)
ALT SERPL-CCNC: 26 U/L (ref 16–61)
ANION GAP SERPL CALC-SCNC: 7 MMOL/L (ref 3–18)
AST SERPL-CCNC: 18 U/L (ref 10–38)
BASOPHILS # BLD: 0 K/UL (ref 0–0.1)
BASOPHILS NFR BLD: 0 % (ref 0–2)
BILIRUB SERPL-MCNC: 0.6 MG/DL (ref 0.2–1)
BUN SERPL-MCNC: 21 MG/DL (ref 7–18)
BUN/CREAT SERPL: 12 (ref 12–20)
CALCIUM SERPL-MCNC: 8.7 MG/DL (ref 8.5–10.1)
CHLORIDE SERPL-SCNC: 100 MMOL/L (ref 100–111)
CO2 SERPL-SCNC: 30 MMOL/L (ref 21–32)
CREAT SERPL-MCNC: 1.75 MG/DL (ref 0.6–1.3)
DIFFERENTIAL METHOD BLD: ABNORMAL
EOSINOPHIL # BLD: 0.2 K/UL (ref 0–0.4)
EOSINOPHIL NFR BLD: 2 % (ref 0–5)
ERYTHROCYTE [DISTWIDTH] IN BLOOD BY AUTOMATED COUNT: 19.9 % (ref 11.6–14.5)
GLOBULIN SER CALC-MCNC: 3.4 G/DL (ref 2–4)
GLUCOSE BLD STRIP.AUTO-MCNC: 202 MG/DL (ref 70–110)
GLUCOSE SERPL-MCNC: 203 MG/DL (ref 74–99)
HCT VFR BLD AUTO: 30.8 % (ref 36–48)
HGB BLD-MCNC: 9 G/DL (ref 13–16)
IMM GRANULOCYTES # BLD AUTO: 0.1 K/UL (ref 0–0.04)
IMM GRANULOCYTES NFR BLD AUTO: 1 % (ref 0–0.5)
LACTATE BLD-SCNC: 2.97 MMOL/L (ref 0.4–2)
LYMPHOCYTES # BLD: 1.3 K/UL (ref 0.9–3.6)
LYMPHOCYTES NFR BLD: 12 % (ref 21–52)
MCH RBC QN AUTO: 23.1 PG (ref 24–34)
MCHC RBC AUTO-ENTMCNC: 29.2 G/DL (ref 31–37)
MCV RBC AUTO: 79.2 FL (ref 78–100)
MONOCYTES # BLD: 1.1 K/UL (ref 0.05–1.2)
MONOCYTES NFR BLD: 10 % (ref 3–10)
NEUTS SEG # BLD: 8.3 K/UL (ref 1.8–8)
NEUTS SEG NFR BLD: 76 % (ref 40–73)
NRBC # BLD: 0 K/UL (ref 0–0.01)
NRBC BLD-RTO: 0 PER 100 WBC
PLATELET # BLD AUTO: 406 K/UL (ref 135–420)
PMV BLD AUTO: 9 FL (ref 9.2–11.8)
POTASSIUM SERPL-SCNC: 3.4 MMOL/L (ref 3.5–5.5)
PROT SERPL-MCNC: 6.3 G/DL (ref 6.4–8.2)
RBC # BLD AUTO: 3.89 M/UL (ref 4.35–5.65)
SODIUM SERPL-SCNC: 137 MMOL/L (ref 136–145)
TROPONIN-HIGH SENSITIVITY: 12 NG/L (ref 0–78)
WBC # BLD AUTO: 11 K/UL (ref 4.6–13.2)

## 2022-10-18 PROCEDURE — 85025 COMPLETE CBC W/AUTO DIFF WBC: CPT

## 2022-10-18 PROCEDURE — 83605 ASSAY OF LACTIC ACID: CPT

## 2022-10-18 PROCEDURE — 84484 ASSAY OF TROPONIN QUANT: CPT

## 2022-10-18 PROCEDURE — 71045 X-RAY EXAM CHEST 1 VIEW: CPT

## 2022-10-18 PROCEDURE — 99285 EMERGENCY DEPT VISIT HI MDM: CPT

## 2022-10-18 PROCEDURE — 72125 CT NECK SPINE W/O DYE: CPT

## 2022-10-18 PROCEDURE — 70450 CT HEAD/BRAIN W/O DYE: CPT

## 2022-10-18 PROCEDURE — 93971 EXTREMITY STUDY: CPT

## 2022-10-18 PROCEDURE — 93005 ELECTROCARDIOGRAM TRACING: CPT

## 2022-10-18 PROCEDURE — 82962 GLUCOSE BLOOD TEST: CPT

## 2022-10-18 PROCEDURE — 80053 COMPREHEN METABOLIC PANEL: CPT

## 2022-10-18 NOTE — DISCHARGE INSTRUCTIONS
Stay well-hydrated  Healthy diet  Continue all your home medications  It is important to follow-up with your family doctor for further evaluation and treatment. Physical therapy and Occupational Therapy need to be indicated.    will contact you tomorrow regarding an aide in the home  Return to ER if new or worsening symptoms or new concerns

## 2022-10-18 NOTE — ED TRIAGE NOTES
Patient was sitting on toilet at home and was too weak when he tried to get up he fell and fell on arm denies hitting head and on aspirin

## 2022-10-18 NOTE — ED PROVIDER NOTES
EMERGENCY DEPARTMENT HISTORY & PHYSICAL EXAM    [facility] EMERGENCY DEPT  10/18/2022, 2:05 PM    Clinical Impression:  1. Fall, initial encounter    2. Weakness        Assessment/Differential Diagnosis:     Ddx fall, generalized weakness, DVT, laceration, neck injury, electrolyte abnormality, dehydration, chronic deconditioning all considered    ED Course:   Initial assessment performed. The patients presenting problems have been discussed, and they are in agreement with the care plan formulated and outlined with them. I have encouraged them to ask questions as they arise throughout their visit. Pt fell earlier today, could not get himself up, brought here for eval by EMS. C/o neck pain, chronic SOB, generalized weakness. Workup with no acute findings with exception of POC lactic 2.9. No signs of sepsis/infection. Discussed with Dr. Francoise Hollingsworth, given h/o CHF, will hold on fluids as pt appears stable. Discussed workup with pt. Agreeable for d/c home. Requesting info for help at home.  aware and will contact pt tomorrow. Return precautions given         Medical Chart Review:  I have reviewed triage nursing documentation. Review of old medical records with the following pertinent information:       Disposition:  Home  in good condition. Chief Complaint   Patient presents with    Lethargy     HPI:    The history is provided by patient. No  used. Tresa Mahmood is a 74 y.o. male presenting to the Emergency Department with complaints of neck pain after fall. He lives at home with his autistic son. He states he spends most of his time in bed and only gets up to use the restroom. When he does this he is short of breath. This has been ongoing for several years. He states he sat on the toilet for over an hour and when he went to stand his legs had fallen asleep so he was unsteady causing him to fall onto the bathroom floor.   He was not strong enough to get himself up so EMS was called and he was brought here for evaluation. He is not complaining of neck pain and abrasion to his left arm. No worsening of his chronic symptoms. Patient does have history of hypertension, diabetes, coronary artery disease with cardiac stents, COPD. He is on no anticoagulation. He takes baby aspirin daily. I have reviewed all PMHX, FMHX and Social Hx as entered into the medical record in the chart below using the Epic Template. Review of Systems:  Constitutional: neg for fever, chills  ENT:  neg for URI symptoms. Neg for vision changes. Respiratory:  neg for cough,+ chronic shortness of breath  Neck: + for neck pain/stiffness  Cardiovascular:  neg for chest pain  GI:  neg for abdominal pain. Neg for nausea, no vomiting  :  No urinary symptoms. No Flank pain. MSK: neg for back pain. Neg for extremity pain. No injury. Integumentary: no rash  Neurological: neg for headaches, positive head injury. All other systems reviewed negative with exception of positives in ROS and HPI. Past Medical History:  Past Medical History:   Diagnosis Date    Arthritis     Asthma     CAD (coronary artery disease)     stents x 5    Cancer ([address]     melanoma on left side of face    Chronic kidney disease     Chronic pain     left knee    COPD     Diabetes ([address]     newly dm have not started med yet    DVT (deep venous thrombosis) ([address] 2001    left leg    Hypertension     Myocardial infarction ([facility] 2005    Obesity (BMI 30-39. 9)     Primary localized osteoarthritis of left knee 7/12/2019    Pulmonary embolism ([facility] 2017    Rheumatoid arthritis [facility])        Past Surgical History:  Past Surgical History:   Procedure Laterality Date    FRACTIONAL FLOW RESERVE  4/29/2018    FRACTIONAL FLOW RESERVE ADDL  4/29/2018    HX CHOLECYSTECTOMY      HX COLOSTOMY  1999    HX COLOSTOMY TAKE DOWN      HX CORONARY STENT PLACEMENT      HX HEART CATHETERIZATION  2005    stents x 4    HX HEART CATHETERIZATION  2014    stent x 1    HX HERNIA REPAIR      x 4    HX LUMBAR DISKECTOMY  2014    LEFT HEART PERCUTANEOUS  2018    MS ABDOMEN SURGERY PROC UNLISTED      Laparotomy bowel resection instestinal rupture, colostomy    MS ABDOMEN SURGERY PROC UNLISTED  1999    Revision of colostomy    JH CORONARY ARTERIOGRAPH  2018       Family History:  Family History   Problem Relation Age of Onset    Heart Disease Mother     Heart Disease Father     Heart Disease Maternal Grandmother        Social History:  Social History     Tobacco Use    Smoking status: Former     Types: Cigarettes     Quit date: 1992     Years since quittin.8    Smokeless tobacco: Never   Vaping Use    Vaping Use: Never used   Substance Use Topics    Alcohol use: Not Currently     Comment: last     Drug use: Never       Allergies: Allergies   Allergen Reactions    Ciprofloxacin Other (comments)     PT states that he turns red. Tape [Adhesive] Other (comments)     Pt states, \"it rips my skin\"       Vital Signs:  Vitals:    10/18/22 1359 10/18/22 1504 10/18/22 1548 10/18/22 1654   BP: 137/80 130/64 (!) 160/94 (!) 168/84   Pulse: 66 62 70 78   Resp: 19 21 20 18   Temp:       SpO2: 99% 100% 100% 100%   Weight:       Height:         Physical Exam:  Vital Signs Reviewed. Nursing Notes Reviewed. Constitutional: Older gentleman sitting on stretcher. He is alert and oriented answer my questions appropriately. He appears chronically ill. He is in no respiratory distress. Ambulation was not attempted. Head: Normocephalic, Atraumatic   Eyes: Visual acuity grossly normal by my exam. Conjunctiva clear, Sclera anicteric. PERRLA. EOMs intact. Eyelids normal   ENT:hearing grossly intact, TMs normal. Throat normal. No mouth trauma   Neck:  supple,+ pain with palpation paracervical m. No bony defect appreciated. Lungs: No respiratory distress. Lungs CTAB   CV:  RR&R without murmur   Thorax: no chest wall tenderness to palpation.  No signs of trauma   Extremities: normal movement of all 4 extremities. No signs of trauma, No pain with palpation. Left lower leg with sig swelling compared to right. Slight calf tenderness. Neurovasc intact. Skin intact. Neuro:  A&O x 3. CN II-XII grossly intact. No gross neuro deficits. No facial asymmetry with testing. Cerebellar tests intact. Strength and sensation intact, bilat equal with testing of UE and LE   Skin:  Warm, dry, no rash. Abrasion to left elbow  Spine:   No tenderness to palpation over thoracic or lumbar spine. No signs of trauma. No bony defect on my exam. No swelling. Diagnostics:    Labs -     Recent Results (from the past 12 hour(s))   CBC WITH AUTOMATED DIFF    Collection Time: 10/18/22  1:40 PM   Result Value Ref Range    WBC 11.0 4.6 - 13.2 K/uL    RBC 3.89 (L) 4.35 - 5.65 M/uL    HGB 9.0 (L) 13.0 - 16.0 g/dL    HCT 30.8 (L) 36.0 - 48.0 %    MCV 79.2 78.0 - 100.0 FL    MCH 23.1 (L) 24.0 - 34.0 PG    MCHC 29.2 (L) 31.0 - 37.0 g/dL    RDW 19.9 (H) 11.6 - 14.5 %    PLATELET 542 018 - 555 K/uL    MPV 9.0 (L) 9.2 - 11.8 FL    NRBC 0.0 0  WBC    ABSOLUTE NRBC 0.00 0.00 - 0.01 K/uL    NEUTROPHILS 76 (H) 40 - 73 %    LYMPHOCYTES 12 (L) 21 - 52 %    MONOCYTES 10 3 - 10 %    EOSINOPHILS 2 0 - 5 %    BASOPHILS 0 0 - 2 %    IMMATURE GRANULOCYTES 1 (H) 0.0 - 0.5 %    ABS. NEUTROPHILS 8.3 (H) 1.8 - 8.0 K/UL    ABS. LYMPHOCYTES 1.3 0.9 - 3.6 K/UL    ABS. MONOCYTES 1.1 0.05 - 1.2 K/UL    ABS. EOSINOPHILS 0.2 0.0 - 0.4 K/UL    ABS. BASOPHILS 0.0 0.0 - 0.1 K/UL    ABS. IMM.  GRANS. 0.1 (H) 0.00 - 0.04 K/UL    DF AUTOMATED     METABOLIC PANEL, COMPREHENSIVE    Collection Time: 10/18/22  1:40 PM   Result Value Ref Range    Sodium 137 136 - 145 mmol/L    Potassium 3.4 (L) 3.5 - 5.5 mmol/L    Chloride 100 100 - 111 mmol/L    CO2 30 21 - 32 mmol/L    Anion gap 7 3.0 - 18 mmol/L    Glucose 203 (H) 74 - 99 mg/dL    BUN 21 (H) 7.0 - 18 MG/DL    Creatinine 1.75 (H) 0.6 - 1.3 MG/DL    BUN/Creatinine ratio 12 12 - 20      eGFR 42 (L) >60 ml/min/1.73m2    Calcium 8.7 8.5 - 10.1 MG/DL    Bilirubin, total 0.6 0.2 - 1.0 MG/DL    ALT (SGPT) 26 16 - 61 U/L    AST (SGOT) 18 10 - 38 U/L    Alk. phosphatase 88 45 - 117 U/L    Protein, total 6.3 (L) 6.4 - 8.2 g/dL    Albumin 2.9 (L) 3.4 - 5.0 g/dL    Globulin 3.4 2.0 - 4.0 g/dL    A-G Ratio 0.9 0.8 - 1.7     TROPONIN-HIGH SENSITIVITY    Collection Time: 10/18/22  1:40 PM   Result Value Ref Range    Troponin-High Sensitivity 12 0 - 78 ng/L   EKG, 12 LEAD, INITIAL    Collection Time: 10/18/22  1:47 PM   Result Value Ref Range    Ventricular Rate 65 BPM    Atrial Rate 65 BPM    P-R Interval 188 ms    QRS Duration 90 ms    Q-T Interval 446 ms    QTC Calculation (Bezet) 463 ms    Calculated P Axis 53 degrees    Calculated R Axis 43 degrees    Calculated T Axis 87 degrees    Diagnosis       Sinus rhythm with premature supraventricular complexes  Nonspecific T wave abnormality  Abnormal ECG  When compared with ECG of 01-MAY-2021 21:05,  premature supraventricular complexes are now present     GLUCOSE, POC    Collection Time: 10/18/22  1:48 PM   Result Value Ref Range    Glucose (POC) 202 (H) 70 - 110 mg/dL   POC LACTIC ACID    Collection Time: 10/18/22  2:26 PM   Result Value Ref Range    Lactic Acid (POC) 2.97 (HH) 0.40 - 2.00 mmol/L       Radiologic Studies -   DUPLEX LOWER EXT VENOUS LEFT   Final Result      CT SPINE CERV WO CONT   Final Result         1. Reversal of usual cervical lordosis and sequential degenerative listhesis as   above without evidence of displaced fracture or acute traumatic malalignment. 2. Diffuse degenerative findings, greatest spanning C4-C7 segments. CT HEAD WO CONT   Final Result      No acute intracranial abnormality. XR CHEST PORT   Final Result      No acute findings in the chest.         CT Results  (Last 48 hours)                 10/18/22 1449  CT SPINE CERV WO CONT Final result    Impression:          1.  Reversal of usual cervical lordosis and sequential degenerative listhesis as   above without evidence of displaced fracture or acute traumatic malalignment. 2. Diffuse degenerative findings, greatest spanning C4-C7 segments. Narrative:  EXAM: CT Cervical spine       INDICATION: Cervical Pain following fall       COMPARISON: No prior study. TECHNIQUE: Axial CT imaging of the cervical spine was performed from the skull   base to the upper thoracic spine without intravenous contrast. Multiplanar   reformats were generated. One or more dose reduction techniques were used on this CT: automated exposure   control, adjustment of the mAs and/or kVp according to patient size, and   iterative reconstruction techniques. The specific techniques used on this CT   exam have been documented in the patient's electronic medical record. Digital   Imaging and Communications in Medicine (DICOM) format image data are available   to nonaffiliated external healthcare facilities or entities on a secure, media   free, reciprocally searchable basis with patient authorization for at least a   12-month period after this study. _______________       FINDINGS:       VERTEBRAE AND DISCS: There is reversal of usual cervical lordosis with mild   anterolisthesis spanning the C3 on C4 and C4 and C5 segments. Vertebral body   heights are preserved. Diffuse intervertebral disc height loss and desiccation   is present, greatest spanning the C4-C7 segments. No displaced fracture   demonstrated. Multilevel facet arthrosis. No evidence of acute facet   malalignment. SPINAL CANAL AND FORAMINA: Likely at least mild osseous canal stenosis at C5-C6   and C6-C7 related to disc osteophyte complexes. Multilevel uncovertebral and   facet arthropathy with accompanying neuroforaminal narrowing. PREVERTEBRAL SOFT TISSUES: Normal       VISIBLE INTRACRANIAL CONTENTS: Unremarkable. LUNG APICES: Clear. OTHER: None. _______________           10/18/22 1449  CT HEAD WO CONT Final result    Impression:      No acute intracranial abnormality. Narrative:  EXAM: CT head       INDICATION: Headache. COMPARISON: None. TECHNIQUE: Axial CT imaging of the head was performed without intravenous   contrast. Standard multiplanar coronal and sagittal reformatted images were   obtained and are included in interpretation. One or more dose reduction techniques were used on this CT: automated exposure   control, adjustment of the mAs and/or kVp according to patient size, and   iterative reconstruction techniques. The specific techniques used on this CT   exam have been documented in the patient's electronic medical record. Digital   Imaging and Communications in Medicine (DICOM) format image data are available   to nonaffiliated external healthcare facilities or entities on a secure, media   free, reciprocally searchable basis with patient authorization for at least a   12-month period after this study. _______________       FINDINGS:       BRAIN AND POSTERIOR FOSSA: Atrophy. Patchy periventricular, deep and subcortical   white matter hypoattenuation which is nonspecific but likely represents chronic   ischemic changes. No evidence of acute large vessel transcortical infarct or   acute parenchymal hemorrhage. No midline shift or hydrocephalus. EXTRA-AXIAL SPACES AND MENINGES: There are no abnormal extra-axial fluid   collections. CALVARIUM: Intact. SINUSES: Clear.        OTHER: None.       _______________                 CXR Results  (Last 48 hours)                 10/18/22 1410  XR CHEST PORT Final result    Impression:      No acute findings in the chest.        Narrative:  EXAM: XR CHEST PORT       CLINICAL INDICATION/HISTORY: weakness fall   -Additional: None       COMPARISON: 2/9/2022       TECHNIQUE: Frontal view of the chest       _______________       FINDINGS:       HEART AND MEDIASTINUM: Normal cardiac size and mediastinal contours. LUNGS AND PLEURAL SPACES: No focal pneumonic consolidation, pneumothorax, or   pleural effusion. BONY THORAX AND SOFT TISSUES: No acute osseous abnormality       _______________                   Medications given in the ED-  Medications - No data to display    Please note that this dictation was completed with [employer], the computer voice recognition software. Quite often unanticipated grammatical, syntax, homophones, and other interpretive errors are inadvertently transcribed by the computer software. Please disregard these errors. Please excuse any errors that have escaped final proofreading.

## 2022-10-20 LAB
ATRIAL RATE: 65 BPM
CALCULATED P AXIS, ECG09: 53 DEGREES
CALCULATED R AXIS, ECG10: 31 DEGREES
CALCULATED R AXIS, ECG10: 43 DEGREES
CALCULATED T AXIS, ECG11: 87 DEGREES
CALCULATED T AXIS, ECG11: 87 DEGREES
DIAGNOSIS, 93000: NORMAL
DIAGNOSIS, 93000: NORMAL
P-R INTERVAL, ECG05: 188 MS
Q-T INTERVAL, ECG07: 446 MS
Q-T INTERVAL, ECG07: 468 MS
QRS DURATION, ECG06: 90 MS
QRS DURATION, ECG06: 90 MS
QTC CALCULATION (BEZET), ECG08: 463 MS
QTC CALCULATION (BEZET), ECG08: 475 MS
VENTRICULAR RATE, ECG03: 62 BPM
VENTRICULAR RATE, ECG03: 65 BPM

## 2023-06-23 NOTE — HOME HEALTH
Call to pt to announce arrival of LPTA for rescheduled visit. Pt once again c/o of fatigue and the inability to participate in PT session. PT session cancelled and he is scheduled for the following week. Called to discuss imaging results left VM requesting call back.     Discussed results with Dr. Daniel patient needs to see endocrinologist

## 2023-08-17 ENCOUNTER — APPOINTMENT (OUTPATIENT)
Facility: HOSPITAL | Age: 68
DRG: 191 | End: 2023-08-17
Payer: MEDICARE

## 2023-08-17 ENCOUNTER — HOSPITAL ENCOUNTER (INPATIENT)
Facility: HOSPITAL | Age: 68
LOS: 6 days | Discharge: NURSING, REHAB OR LONG-TERM CARE | DRG: 191 | End: 2023-08-23
Attending: EMERGENCY MEDICINE | Admitting: INTERNAL MEDICINE
Payer: MEDICARE

## 2023-08-17 DIAGNOSIS — J45.52 SEVERE PERSISTENT ASTHMA WITH STATUS ASTHMATICUS: ICD-10-CM

## 2023-08-17 DIAGNOSIS — Z86.79 HISTORY OF CAD (CORONARY ARTERY DISEASE): ICD-10-CM

## 2023-08-17 DIAGNOSIS — R07.9 CHEST PAIN, UNSPECIFIED TYPE: ICD-10-CM

## 2023-08-17 DIAGNOSIS — I21.4 NSTEMI (NON-ST ELEVATED MYOCARDIAL INFARCTION) (HCC): ICD-10-CM

## 2023-08-17 DIAGNOSIS — R06.02 SHORTNESS OF BREATH: ICD-10-CM

## 2023-08-17 DIAGNOSIS — Z86.718 HX OF DEEP VENOUS THROMBOSIS: Primary | ICD-10-CM

## 2023-08-17 DIAGNOSIS — J44.1 COPD WITH ACUTE EXACERBATION (HCC): ICD-10-CM

## 2023-08-17 PROBLEM — R79.89 ELEVATED TROPONIN I LEVEL: Status: ACTIVE | Noted: 2018-04-24

## 2023-08-17 PROBLEM — R06.00 DYSPNEA: Status: ACTIVE | Noted: 2023-08-17

## 2023-08-17 PROBLEM — M06.9 RHEUMATOID ARTHRITIS (HCC): Status: ACTIVE | Noted: 2019-08-30

## 2023-08-17 PROBLEM — E11.9 TYPE II DIABETES MELLITUS (HCC): Status: ACTIVE | Noted: 2021-05-02

## 2023-08-17 PROBLEM — R77.8 ELEVATED TROPONIN I LEVEL: Status: ACTIVE | Noted: 2018-04-24

## 2023-08-17 PROBLEM — N18.30 CKD (CHRONIC KIDNEY DISEASE) STAGE 3, GFR 30-59 ML/MIN (HCC): Status: ACTIVE | Noted: 2018-04-24

## 2023-08-17 PROBLEM — R06.09 DOE (DYSPNEA ON EXERTION): Status: ACTIVE | Noted: 2022-02-07

## 2023-08-17 LAB
ALBUMIN SERPL-MCNC: 3 G/DL (ref 3.4–5)
ALBUMIN/GLOB SERPL: 0.9 (ref 0.8–1.7)
ALP SERPL-CCNC: 69 U/L (ref 45–117)
ALT SERPL-CCNC: 22 U/L (ref 16–61)
ANION GAP BLD CALC-SCNC: ABNORMAL (ref 10–20)
ANION GAP SERPL CALC-SCNC: 3 MMOL/L (ref 3–18)
ARTERIAL PATENCY WRIST A: POSITIVE
AST SERPL-CCNC: 30 U/L (ref 10–38)
BASE DEFICIT BLD-SCNC: 3.1 MMOL/L
BASOPHILS # BLD: 0.1 K/UL (ref 0–0.1)
BASOPHILS NFR BLD: 1 % (ref 0–2)
BDY SITE: ABNORMAL
BILIRUB SERPL-MCNC: 0.7 MG/DL (ref 0.2–1)
BUN SERPL-MCNC: 16 MG/DL (ref 7–18)
BUN/CREAT SERPL: 11 (ref 12–20)
CA-I BLD-MCNC: 1.14 MMOL/L (ref 1.12–1.32)
CALCIUM SERPL-MCNC: 8.6 MG/DL (ref 8.5–10.1)
CHLORIDE BLD-SCNC: 103 MMOL/L (ref 98–107)
CHLORIDE SERPL-SCNC: 106 MMOL/L (ref 100–111)
CO2 BLD-SCNC: 21 MMOL/L (ref 19–24)
CO2 SERPL-SCNC: 28 MMOL/L (ref 21–32)
CREAT BLD-MCNC: 1.26 MG/DL (ref 0.6–1.3)
CREAT SERPL-MCNC: 1.43 MG/DL (ref 0.6–1.3)
D DIMER PPP FEU-MCNC: 1.2 UG/ML(FEU)
DIFFERENTIAL METHOD BLD: ABNORMAL
EKG ATRIAL RATE: 63 BPM
EKG DIAGNOSIS: NORMAL
EKG P AXIS: 62 DEGREES
EKG P-R INTERVAL: 200 MS
EKG Q-T INTERVAL: 440 MS
EKG QRS DURATION: 90 MS
EKG QTC CALCULATION (BAZETT): 450 MS
EKG R AXIS: 65 DEGREES
EKG T AXIS: 86 DEGREES
EKG VENTRICULAR RATE: 63 BPM
EOSINOPHIL # BLD: 0.7 K/UL (ref 0–0.4)
EOSINOPHIL NFR BLD: 8 % (ref 0–5)
ERYTHROCYTE [DISTWIDTH] IN BLOOD BY AUTOMATED COUNT: 22.9 % (ref 11.6–14.5)
FIO2 ON VENT: 21 %
FLUAV RNA SPEC QL NAA+PROBE: NOT DETECTED
FLUBV RNA SPEC QL NAA+PROBE: NOT DETECTED
GAS FLOW.O2 O2 DELIVERY SYS: ABNORMAL
GLOBULIN SER CALC-MCNC: 3.3 G/DL (ref 2–4)
GLUCOSE BLD STRIP.AUTO-MCNC: 193 MG/DL (ref 70–110)
GLUCOSE BLD-MCNC: 167 MG/DL (ref 65–100)
GLUCOSE SERPL-MCNC: 117 MG/DL (ref 74–99)
HCO3 BLD-SCNC: 21 MMOL/L (ref 22–26)
HCT VFR BLD AUTO: 31 % (ref 36–48)
HGB BLD-MCNC: 9.2 G/DL (ref 13–16)
IMM GRANULOCYTES # BLD AUTO: 0.1 K/UL (ref 0–0.04)
IMM GRANULOCYTES NFR BLD AUTO: 1 % (ref 0–0.5)
LACTATE BLD-SCNC: 1.6 MMOL/L (ref 0.4–2)
LYMPHOCYTES # BLD: 1.6 K/UL (ref 0.9–3.6)
LYMPHOCYTES NFR BLD: 19 % (ref 21–52)
MAGNESIUM SERPL-MCNC: 1.8 MG/DL (ref 1.6–2.6)
MCH RBC QN AUTO: 24 PG (ref 24–34)
MCHC RBC AUTO-ENTMCNC: 29.7 G/DL (ref 31–37)
MCV RBC AUTO: 80.9 FL (ref 78–100)
MONOCYTES # BLD: 1.1 K/UL (ref 0.05–1.2)
MONOCYTES NFR BLD: 13 % (ref 3–10)
NEUTS SEG # BLD: 4.9 K/UL (ref 1.8–8)
NEUTS SEG NFR BLD: 58 % (ref 40–73)
NRBC # BLD: 0 K/UL (ref 0–0.01)
NRBC BLD-RTO: 0 PER 100 WBC
NT PRO BNP: 159 PG/ML (ref 0–900)
PCO2 BLD: 33.3 MMHG (ref 35–45)
PH BLD: 7.41 (ref 7.35–7.45)
PLATELET # BLD AUTO: 369 K/UL (ref 135–420)
PLATELET COMMENT: ABNORMAL
PMV BLD AUTO: 9.2 FL (ref 9.2–11.8)
PO2 BLD: 65 MMHG (ref 80–100)
POTASSIUM BLD-SCNC: 4.8 MMOL/L (ref 3.5–5.1)
POTASSIUM SERPL-SCNC: 4.2 MMOL/L (ref 3.5–5.5)
PROT SERPL-MCNC: 6.3 G/DL (ref 6.4–8.2)
RBC # BLD AUTO: 3.83 M/UL (ref 4.35–5.65)
RBC MORPH BLD: ABNORMAL
SAO2 % BLD: 93 %
SARS-COV-2 RNA RESP QL NAA+PROBE: NOT DETECTED
SERVICE CMNT-IMP: ABNORMAL
SODIUM BLD-SCNC: 135 MMOL/L (ref 136–145)
SODIUM SERPL-SCNC: 137 MMOL/L (ref 136–145)
SPECIMEN SITE: ABNORMAL
TROPONIN I SERPL HS-MCNC: 7 NG/L (ref 0–78)
TROPONIN I SERPL HS-MCNC: 7 NG/L (ref 0–78)
TROPONIN I SERPL HS-MCNC: 8 NG/L (ref 0–78)
WBC # BLD AUTO: 8.5 K/UL (ref 4.6–13.2)

## 2023-08-17 PROCEDURE — 96375 TX/PRO/DX INJ NEW DRUG ADDON: CPT

## 2023-08-17 PROCEDURE — 85025 COMPLETE CBC W/AUTO DIFF WBC: CPT

## 2023-08-17 PROCEDURE — 6360000002 HC RX W HCPCS: Performed by: INTERNAL MEDICINE

## 2023-08-17 PROCEDURE — 82746 ASSAY OF FOLIC ACID SERUM: CPT

## 2023-08-17 PROCEDURE — 82607 VITAMIN B-12: CPT

## 2023-08-17 PROCEDURE — 82803 BLOOD GASES ANY COMBINATION: CPT

## 2023-08-17 PROCEDURE — 36600 WITHDRAWAL OF ARTERIAL BLOOD: CPT

## 2023-08-17 PROCEDURE — 83735 ASSAY OF MAGNESIUM: CPT

## 2023-08-17 PROCEDURE — 93005 ELECTROCARDIOGRAM TRACING: CPT | Performed by: PHYSICIAN ASSISTANT

## 2023-08-17 PROCEDURE — 1100000003 HC PRIVATE W/ TELEMETRY

## 2023-08-17 PROCEDURE — 82728 ASSAY OF FERRITIN: CPT

## 2023-08-17 PROCEDURE — 93005 ELECTROCARDIOGRAM TRACING: CPT | Performed by: EMERGENCY MEDICINE

## 2023-08-17 PROCEDURE — 82962 GLUCOSE BLOOD TEST: CPT

## 2023-08-17 PROCEDURE — 85014 HEMATOCRIT: CPT

## 2023-08-17 PROCEDURE — 71045 X-RAY EXAM CHEST 1 VIEW: CPT

## 2023-08-17 PROCEDURE — 83605 ASSAY OF LACTIC ACID: CPT

## 2023-08-17 PROCEDURE — 83550 IRON BINDING TEST: CPT

## 2023-08-17 PROCEDURE — 84484 ASSAY OF TROPONIN QUANT: CPT

## 2023-08-17 PROCEDURE — 6360000002 HC RX W HCPCS: Performed by: PHYSICIAN ASSISTANT

## 2023-08-17 PROCEDURE — 2500000003 HC RX 250 WO HCPCS: Performed by: INTERNAL MEDICINE

## 2023-08-17 PROCEDURE — 6360000004 HC RX CONTRAST MEDICATION: Performed by: PHYSICIAN ASSISTANT

## 2023-08-17 PROCEDURE — 82330 ASSAY OF CALCIUM: CPT

## 2023-08-17 PROCEDURE — 83540 ASSAY OF IRON: CPT

## 2023-08-17 PROCEDURE — 94640 AIRWAY INHALATION TREATMENT: CPT

## 2023-08-17 PROCEDURE — 74176 CT ABD & PELVIS W/O CONTRAST: CPT

## 2023-08-17 PROCEDURE — 36415 COLL VENOUS BLD VENIPUNCTURE: CPT

## 2023-08-17 PROCEDURE — C9113 INJ PANTOPRAZOLE SODIUM, VIA: HCPCS | Performed by: INTERNAL MEDICINE

## 2023-08-17 PROCEDURE — 71275 CT ANGIOGRAPHY CHEST: CPT

## 2023-08-17 PROCEDURE — 2580000003 HC RX 258: Performed by: INTERNAL MEDICINE

## 2023-08-17 PROCEDURE — 6370000000 HC RX 637 (ALT 250 FOR IP): Performed by: PHYSICIAN ASSISTANT

## 2023-08-17 PROCEDURE — 87636 SARSCOV2 & INF A&B AMP PRB: CPT

## 2023-08-17 PROCEDURE — 94762 N-INVAS EAR/PLS OXIMTRY CONT: CPT

## 2023-08-17 PROCEDURE — 84295 ASSAY OF SERUM SODIUM: CPT

## 2023-08-17 PROCEDURE — 84132 ASSAY OF SERUM POTASSIUM: CPT

## 2023-08-17 PROCEDURE — 83036 HEMOGLOBIN GLYCOSYLATED A1C: CPT

## 2023-08-17 PROCEDURE — 82947 ASSAY GLUCOSE BLOOD QUANT: CPT

## 2023-08-17 PROCEDURE — 2700000000 HC OXYGEN THERAPY PER DAY

## 2023-08-17 PROCEDURE — 99285 EMERGENCY DEPT VISIT HI MDM: CPT

## 2023-08-17 PROCEDURE — 84145 PROCALCITONIN (PCT): CPT

## 2023-08-17 PROCEDURE — 6370000000 HC RX 637 (ALT 250 FOR IP): Performed by: INTERNAL MEDICINE

## 2023-08-17 PROCEDURE — A4216 STERILE WATER/SALINE, 10 ML: HCPCS | Performed by: INTERNAL MEDICINE

## 2023-08-17 PROCEDURE — 80053 COMPREHEN METABOLIC PANEL: CPT

## 2023-08-17 PROCEDURE — 96365 THER/PROPH/DIAG IV INF INIT: CPT

## 2023-08-17 PROCEDURE — 83880 ASSAY OF NATRIURETIC PEPTIDE: CPT

## 2023-08-17 PROCEDURE — 2580000003 HC RX 258: Performed by: PHYSICIAN ASSISTANT

## 2023-08-17 PROCEDURE — 96366 THER/PROPH/DIAG IV INF ADDON: CPT

## 2023-08-17 PROCEDURE — 85379 FIBRIN DEGRADATION QUANT: CPT

## 2023-08-17 PROCEDURE — 93010 ELECTROCARDIOGRAM REPORT: CPT | Performed by: INTERNAL MEDICINE

## 2023-08-17 RX ORDER — FOLIC ACID 1 MG/1
1 TABLET ORAL DAILY
Status: DISCONTINUED | OUTPATIENT
Start: 2023-08-18 | End: 2023-08-23 | Stop reason: HOSPADM

## 2023-08-17 RX ORDER — INSULIN GLARGINE 100 [IU]/ML
0.15 INJECTION, SOLUTION SUBCUTANEOUS NIGHTLY
Status: DISCONTINUED | OUTPATIENT
Start: 2023-08-17 | End: 2023-08-23 | Stop reason: HOSPADM

## 2023-08-17 RX ORDER — INSULIN LISPRO 100 [IU]/ML
0-4 INJECTION, SOLUTION INTRAVENOUS; SUBCUTANEOUS NIGHTLY
Status: DISCONTINUED | OUTPATIENT
Start: 2023-08-17 | End: 2023-08-23 | Stop reason: HOSPADM

## 2023-08-17 RX ORDER — ENOXAPARIN SODIUM 100 MG/ML
40 INJECTION SUBCUTANEOUS DAILY
Status: DISCONTINUED | OUTPATIENT
Start: 2023-08-17 | End: 2023-08-23 | Stop reason: HOSPADM

## 2023-08-17 RX ORDER — RANOLAZINE 500 MG/1
500 TABLET, EXTENDED RELEASE ORAL 2 TIMES DAILY
Status: DISCONTINUED | OUTPATIENT
Start: 2023-08-17 | End: 2023-08-23 | Stop reason: HOSPADM

## 2023-08-17 RX ORDER — SODIUM CHLORIDE 0.9 % (FLUSH) 0.9 %
5-40 SYRINGE (ML) INJECTION EVERY 12 HOURS SCHEDULED
Status: DISCONTINUED | OUTPATIENT
Start: 2023-08-17 | End: 2023-08-23 | Stop reason: HOSPADM

## 2023-08-17 RX ORDER — INSULIN LISPRO 100 [IU]/ML
0-8 INJECTION, SOLUTION INTRAVENOUS; SUBCUTANEOUS
Status: DISCONTINUED | OUTPATIENT
Start: 2023-08-18 | End: 2023-08-23 | Stop reason: HOSPADM

## 2023-08-17 RX ORDER — PROPRANOLOL HYDROCHLORIDE 20 MG/1
10 TABLET ORAL DAILY
Status: DISCONTINUED | OUTPATIENT
Start: 2023-08-18 | End: 2023-08-19

## 2023-08-17 RX ORDER — FUROSEMIDE 10 MG/ML
40 INJECTION INTRAMUSCULAR; INTRAVENOUS 2 TIMES DAILY
Status: COMPLETED | OUTPATIENT
Start: 2023-08-17 | End: 2023-08-18

## 2023-08-17 RX ORDER — ATORVASTATIN CALCIUM 20 MG/1
40 TABLET, FILM COATED ORAL NIGHTLY
Status: DISCONTINUED | OUTPATIENT
Start: 2023-08-17 | End: 2023-08-23 | Stop reason: HOSPADM

## 2023-08-17 RX ORDER — ASPIRIN 81 MG/1
81 TABLET ORAL DAILY
Status: DISCONTINUED | OUTPATIENT
Start: 2023-08-18 | End: 2023-08-23 | Stop reason: HOSPADM

## 2023-08-17 RX ORDER — IPRATROPIUM BROMIDE AND ALBUTEROL SULFATE 2.5; .5 MG/3ML; MG/3ML
1 SOLUTION RESPIRATORY (INHALATION)
Status: COMPLETED | OUTPATIENT
Start: 2023-08-17 | End: 2023-08-17

## 2023-08-17 RX ORDER — BUDESONIDE 0.5 MG/2ML
0.5 INHALANT ORAL
Status: DISCONTINUED | OUTPATIENT
Start: 2023-08-17 | End: 2023-08-23 | Stop reason: HOSPADM

## 2023-08-17 RX ORDER — POLYETHYLENE GLYCOL 3350 17 G/17G
17 POWDER, FOR SOLUTION ORAL DAILY PRN
Status: DISCONTINUED | OUTPATIENT
Start: 2023-08-17 | End: 2023-08-23 | Stop reason: HOSPADM

## 2023-08-17 RX ORDER — ACETAMINOPHEN 650 MG/1
650 SUPPOSITORY RECTAL EVERY 6 HOURS PRN
Status: DISCONTINUED | OUTPATIENT
Start: 2023-08-17 | End: 2023-08-23 | Stop reason: HOSPADM

## 2023-08-17 RX ORDER — SODIUM CHLORIDE 0.9 % (FLUSH) 0.9 %
5-40 SYRINGE (ML) INJECTION PRN
Status: DISCONTINUED | OUTPATIENT
Start: 2023-08-17 | End: 2023-08-23 | Stop reason: HOSPADM

## 2023-08-17 RX ORDER — ONDANSETRON 2 MG/ML
4 INJECTION INTRAMUSCULAR; INTRAVENOUS EVERY 6 HOURS PRN
Status: DISCONTINUED | OUTPATIENT
Start: 2023-08-17 | End: 2023-08-23 | Stop reason: HOSPADM

## 2023-08-17 RX ORDER — MONTELUKAST SODIUM 10 MG/1
10 TABLET ORAL NIGHTLY
Status: DISCONTINUED | OUTPATIENT
Start: 2023-08-17 | End: 2023-08-23 | Stop reason: HOSPADM

## 2023-08-17 RX ORDER — IPRATROPIUM BROMIDE AND ALBUTEROL SULFATE 2.5; .5 MG/3ML; MG/3ML
1 SOLUTION RESPIRATORY (INHALATION) EVERY 4 HOURS PRN
Status: DISCONTINUED | OUTPATIENT
Start: 2023-08-17 | End: 2023-08-19

## 2023-08-17 RX ORDER — IPRATROPIUM BROMIDE AND ALBUTEROL SULFATE 2.5; .5 MG/3ML; MG/3ML
1 SOLUTION RESPIRATORY (INHALATION)
Status: DISCONTINUED | OUTPATIENT
Start: 2023-08-18 | End: 2023-08-19

## 2023-08-17 RX ORDER — 0.9 % SODIUM CHLORIDE 0.9 %
500 INTRAVENOUS SOLUTION INTRAVENOUS ONCE
Status: COMPLETED | OUTPATIENT
Start: 2023-08-17 | End: 2023-08-17

## 2023-08-17 RX ORDER — MAGNESIUM SULFATE IN WATER 40 MG/ML
2000 INJECTION, SOLUTION INTRAVENOUS
Status: COMPLETED | OUTPATIENT
Start: 2023-08-17 | End: 2023-08-17

## 2023-08-17 RX ORDER — DEXTROSE MONOHYDRATE 100 MG/ML
INJECTION, SOLUTION INTRAVENOUS CONTINUOUS PRN
Status: DISCONTINUED | OUTPATIENT
Start: 2023-08-17 | End: 2023-08-23 | Stop reason: HOSPADM

## 2023-08-17 RX ORDER — DULOXETIN HYDROCHLORIDE 30 MG/1
30 CAPSULE, DELAYED RELEASE ORAL DAILY
Status: DISCONTINUED | OUTPATIENT
Start: 2023-08-18 | End: 2023-08-23 | Stop reason: HOSPADM

## 2023-08-17 RX ORDER — ARFORMOTEROL TARTRATE 15 UG/2ML
15 SOLUTION RESPIRATORY (INHALATION)
Status: DISCONTINUED | OUTPATIENT
Start: 2023-08-17 | End: 2023-08-23 | Stop reason: HOSPADM

## 2023-08-17 RX ORDER — 0.9 % SODIUM CHLORIDE 0.9 %
500 INTRAVENOUS SOLUTION INTRAVENOUS ONCE
Status: DISCONTINUED | OUTPATIENT
Start: 2023-08-17 | End: 2023-08-17

## 2023-08-17 RX ORDER — SODIUM CHLORIDE 9 MG/ML
INJECTION, SOLUTION INTRAVENOUS PRN
Status: DISCONTINUED | OUTPATIENT
Start: 2023-08-17 | End: 2023-08-23 | Stop reason: HOSPADM

## 2023-08-17 RX ORDER — FUROSEMIDE 10 MG/ML
40 INJECTION INTRAMUSCULAR; INTRAVENOUS 2 TIMES DAILY
Status: DISCONTINUED | OUTPATIENT
Start: 2023-08-17 | End: 2023-08-17

## 2023-08-17 RX ORDER — ONDANSETRON 4 MG/1
4 TABLET, ORALLY DISINTEGRATING ORAL EVERY 8 HOURS PRN
Status: DISCONTINUED | OUTPATIENT
Start: 2023-08-17 | End: 2023-08-23 | Stop reason: HOSPADM

## 2023-08-17 RX ORDER — DEXAMETHASONE SODIUM PHOSPHATE 10 MG/ML
10 INJECTION, SOLUTION INTRAMUSCULAR; INTRAVENOUS
Status: COMPLETED | OUTPATIENT
Start: 2023-08-17 | End: 2023-08-17

## 2023-08-17 RX ORDER — ACETAMINOPHEN 325 MG/1
650 TABLET ORAL EVERY 6 HOURS PRN
Status: DISCONTINUED | OUTPATIENT
Start: 2023-08-17 | End: 2023-08-23 | Stop reason: HOSPADM

## 2023-08-17 RX ADMIN — ATORVASTATIN CALCIUM 40 MG: 20 TABLET, FILM COATED ORAL at 22:24

## 2023-08-17 RX ADMIN — INSULIN GLARGINE 14 UNITS: 100 INJECTION, SOLUTION SUBCUTANEOUS at 22:32

## 2023-08-17 RX ADMIN — SODIUM CHLORIDE, PRESERVATIVE FREE 10 ML: 5 INJECTION INTRAVENOUS at 23:05

## 2023-08-17 RX ADMIN — MONTELUKAST 10 MG: 10 TABLET, FILM COATED ORAL at 22:24

## 2023-08-17 RX ADMIN — RANOLAZINE 500 MG: 500 TABLET, FILM COATED, EXTENDED RELEASE ORAL at 22:24

## 2023-08-17 RX ADMIN — BUDESONIDE INHALATION 500 MCG: 0.5 SUSPENSION RESPIRATORY (INHALATION) at 23:06

## 2023-08-17 RX ADMIN — FUROSEMIDE 40 MG: 10 INJECTION, SOLUTION INTRAMUSCULAR; INTRAVENOUS at 22:27

## 2023-08-17 RX ADMIN — IPRATROPIUM BROMIDE AND ALBUTEROL SULFATE 1 DOSE: .5; 2.5 SOLUTION RESPIRATORY (INHALATION) at 16:26

## 2023-08-17 RX ADMIN — SODIUM CHLORIDE 500 ML: 9 INJECTION, SOLUTION INTRAVENOUS at 14:12

## 2023-08-17 RX ADMIN — ENOXAPARIN SODIUM 40 MG: 100 INJECTION SUBCUTANEOUS at 22:26

## 2023-08-17 RX ADMIN — ARFORMOTEROL TARTRATE 15 MCG: 15 SOLUTION RESPIRATORY (INHALATION) at 23:06

## 2023-08-17 RX ADMIN — MAGNESIUM SULFATE HEPTAHYDRATE 2000 MG: 40 INJECTION, SOLUTION INTRAVENOUS at 14:11

## 2023-08-17 RX ADMIN — DOXYCYCLINE 100 MG: 100 INJECTION, POWDER, LYOPHILIZED, FOR SOLUTION INTRAVENOUS at 22:39

## 2023-08-17 RX ADMIN — DEXAMETHASONE SODIUM PHOSPHATE 10 MG: 10 INJECTION, SOLUTION INTRAMUSCULAR; INTRAVENOUS at 14:11

## 2023-08-17 RX ADMIN — IOPAMIDOL 100 ML: 755 INJECTION, SOLUTION INTRAVENOUS at 17:03

## 2023-08-17 RX ADMIN — SODIUM CHLORIDE 40 MG: 9 INJECTION INTRAMUSCULAR; INTRAVENOUS; SUBCUTANEOUS at 22:28

## 2023-08-17 RX ADMIN — WATER 80 MG: 1 INJECTION INTRAMUSCULAR; INTRAVENOUS; SUBCUTANEOUS at 22:30

## 2023-08-17 ASSESSMENT — LIFESTYLE VARIABLES
HOW MANY STANDARD DRINKS CONTAINING ALCOHOL DO YOU HAVE ON A TYPICAL DAY: PATIENT DOES NOT DRINK
HOW OFTEN DO YOU HAVE A DRINK CONTAINING ALCOHOL: NEVER

## 2023-08-17 NOTE — ED NOTES
Pt ambulated in hallway 12-15 steps before becoming significantly SOB. PT Unable to remain standing, had to sit down on Stretcher in hallway. Pt SpO2 remained 93% despite significant SOB. Pt assisted into wheelchair and back to room.       Tim Kohler RN  08/17/23 [id number]

## 2023-08-17 NOTE — ED PROVIDER NOTES
were completed with voice recognition software. Quite often unanticipated grammatical, syntax, homophones, and other interpretive errors are inadvertently transcribed by the computer software. Please disregards these errors.  Please excuse any errors that have escaped final proofreading.)       Enedina Haas PA-C  08/17/23 2007

## 2023-08-17 NOTE — ED TRIAGE NOTES
Pt brought in by EMS form home with increased SOB. Pt reports that he got up to use the restroom and get dressed and felt more SOB then usual. Pt with Hx COPD, Asthma, emphysema. Hx of cardiac stents.      Pt placed on 2L NC by EMS

## 2023-08-17 NOTE — ED NOTES
Pt on RA at this time, SpO2 %. Pt provided with additional blanket for comfort. No further needs at this time. Call moreau in reach.       Albert Muñoz RN  08/17/23 [id number]

## 2023-08-17 NOTE — H&P
12:35 PM    AST 30 08/17/2023 12:35 PM    ALT 22 08/17/2023 12:35 PM      Last 3 CMP:   Recent Labs     08/17/23  1235 08/17/23 1948     --    K 4.2  --      --    CO2 28  --    BUN 16  --    CREATININE 1.43* 1.26   GLUCOSE 117*  --    CALCIUM 8.6  --    PROT 6.3*  --    LABALBU 3.0*  --    ALKPHOS 69  --    AST 30  --    ALT 22  --       Glucose:   Lab Results   Component Value Date/Time    GLUCOSE 117 08/17/2023 12:35 PM      Last 3 Glucose:   Recent Labs     08/17/23  1235   GLUCOSE 117*      POC Glucose:   Lab Results   Component Value Date/Time    POCGLU 167 08/17/2023 07:48 PM      Last 3 POC Glucose:   Recent Labs     08/17/23 1948   POCGLU 167*      Last 3 CK, CKMB, Troponin: No results for input(s): CKTOTAL, CKMB, TROPONINI in the last 72 hours.    CBC:   Lab Results   Component Value Date/Time    WBC 8.5 08/17/2023 12:35 PM    RBC 3.83 08/17/2023 12:35 PM    HGB 9.2 08/17/2023 12:35 PM    HCT 31.0 08/17/2023 12:35 PM    MCV 80.9 08/17/2023 12:35 PM    MCH 24.0 08/17/2023 12:35 PM    MCHC 29.7 08/17/2023 12:35 PM    RDW 22.9 08/17/2023 12:35 PM     08/17/2023 12:35 PM    MPV 9.2 08/17/2023 12:35 PM      Last 3 CBC:   Recent Labs     08/17/23  1235   WBC 8.5   RBC 3.83*   HGB 9.2*   HCT 31.0*   MCV 80.9   MCH 24.0   MCHC 29.7*   RDW 22.9*      MPV 9.2      WBC:   Lab Results   Component Value Date/Time    WBC 8.5 08/17/2023 12:35 PM      Last 3 WBC:   Recent Labs     08/17/23  1235   WBC 8.5      Platelets:   Lab Results   Component Value Date/Time     08/17/2023 12:35 PM      Last 3 Platelets:   Recent Labs     08/17/23  1235         Hemoglobin/Hematocrit:   Lab Results   Component Value Date/Time    HGB 9.2 08/17/2023 12:35 PM    HCT 31.0 08/17/2023 12:35 PM      Last 3 Hemoglobin/Hematocrit:   Recent Labs     08/17/23  1235   HGB 9.2*   HCT 31.0*      Hepatic Function Panel:   Lab Results   Component Value Date/Time    ALKPHOS 69 08/17/2023 12:35 PM    ALKPHOS 88

## 2023-08-17 NOTE — ED NOTES
PT resting on stretcher no needs voiced at this time. Call bell in reach.       Ally Franz RN  08/17/23 4973

## 2023-08-18 ENCOUNTER — APPOINTMENT (OUTPATIENT)
Facility: HOSPITAL | Age: 68
DRG: 191 | End: 2023-08-18
Attending: INTERNAL MEDICINE
Payer: MEDICARE

## 2023-08-18 PROBLEM — J45.909 ASTHMA: Status: RESOLVED | Noted: 2018-04-24 | Resolved: 2023-08-18

## 2023-08-18 PROBLEM — R06.00 DYSPNEA: Status: RESOLVED | Noted: 2023-08-17 | Resolved: 2023-08-18

## 2023-08-18 PROBLEM — R29.898 MUSCULAR DECONDITIONING: Status: ACTIVE | Noted: 2023-08-18

## 2023-08-18 PROBLEM — J45.50 SEVERE PERSISTENT ASTHMA: Status: RESOLVED | Noted: 2020-02-21 | Resolved: 2023-08-18

## 2023-08-18 LAB
ALBUMIN SERPL-MCNC: 3.1 G/DL (ref 3.4–5)
ALBUMIN/GLOB SERPL: 0.9 (ref 0.8–1.7)
ALP SERPL-CCNC: 68 U/L (ref 45–117)
ALT SERPL-CCNC: 21 U/L (ref 16–61)
ANION GAP SERPL CALC-SCNC: 8 MMOL/L (ref 3–18)
AST SERPL-CCNC: 26 U/L (ref 10–38)
BILIRUB SERPL-MCNC: 0.8 MG/DL (ref 0.2–1)
BUN SERPL-MCNC: 18 MG/DL (ref 7–18)
BUN/CREAT SERPL: 11 (ref 12–20)
CALCIUM SERPL-MCNC: 8.7 MG/DL (ref 8.5–10.1)
CHLORIDE SERPL-SCNC: 105 MMOL/L (ref 100–111)
CO2 SERPL-SCNC: 23 MMOL/L (ref 21–32)
CREAT SERPL-MCNC: 1.63 MG/DL (ref 0.6–1.3)
ECHO BSA: 2.19 M2
EKG ATRIAL RATE: 73 BPM
EKG DIAGNOSIS: NORMAL
EKG P AXIS: 73 DEGREES
EKG P-R INTERVAL: 206 MS
EKG Q-T INTERVAL: 456 MS
EKG QRS DURATION: 90 MS
EKG QTC CALCULATION (BAZETT): 502 MS
EKG R AXIS: 50 DEGREES
EKG T AXIS: 86 DEGREES
EKG VENTRICULAR RATE: 73 BPM
ERYTHROCYTE [DISTWIDTH] IN BLOOD BY AUTOMATED COUNT: 23 % (ref 11.6–14.5)
EST. AVERAGE GLUCOSE BLD GHB EST-MCNC: 140 MG/DL
FERRITIN SERPL-MCNC: 12 NG/ML (ref 8–388)
FOLATE SERPL-MCNC: 12.4 NG/ML (ref 3.1–17.5)
GLOBULIN SER CALC-MCNC: 3.4 G/DL (ref 2–4)
GLUCOSE BLD STRIP.AUTO-MCNC: 269 MG/DL (ref 70–110)
GLUCOSE BLD STRIP.AUTO-MCNC: 274 MG/DL (ref 70–110)
GLUCOSE BLD STRIP.AUTO-MCNC: 284 MG/DL (ref 70–110)
GLUCOSE BLD STRIP.AUTO-MCNC: 304 MG/DL (ref 70–110)
GLUCOSE BLD STRIP.AUTO-MCNC: 331 MG/DL (ref 70–110)
GLUCOSE SERPL-MCNC: 247 MG/DL (ref 74–99)
HBA1C MFR BLD: 6.5 % (ref 4.2–5.6)
HCT VFR BLD AUTO: 31.2 % (ref 36–48)
HGB BLD-MCNC: 9.3 G/DL (ref 13–16)
IRON SATN MFR SERPL: 7 % (ref 20–50)
IRON SERPL-MCNC: 27 UG/DL (ref 50–175)
MAGNESIUM SERPL-MCNC: 2.2 MG/DL (ref 1.6–2.6)
MCH RBC QN AUTO: 23.8 PG (ref 24–34)
MCHC RBC AUTO-ENTMCNC: 29.8 G/DL (ref 31–37)
MCV RBC AUTO: 80 FL (ref 78–100)
NRBC # BLD: 0 K/UL (ref 0–0.01)
NRBC BLD-RTO: 0 PER 100 WBC
PLATELET # BLD AUTO: 376 K/UL (ref 135–420)
PMV BLD AUTO: 9.2 FL (ref 9.2–11.8)
POTASSIUM SERPL-SCNC: 5 MMOL/L (ref 3.5–5.5)
PROCALCITONIN SERPL-MCNC: <0.05 NG/ML
PROT SERPL-MCNC: 6.5 G/DL (ref 6.4–8.2)
RBC # BLD AUTO: 3.9 M/UL (ref 4.35–5.65)
SODIUM SERPL-SCNC: 136 MMOL/L (ref 136–145)
TIBC SERPL-MCNC: 413 UG/DL (ref 250–450)
TROPONIN I SERPL HS-MCNC: 5 NG/L (ref 0–78)
TROPONIN I SERPL HS-MCNC: 6 NG/L (ref 0–78)
VIT B12 SERPL-MCNC: 586 PG/ML (ref 211–911)
WBC # BLD AUTO: 9.8 K/UL (ref 4.6–13.2)

## 2023-08-18 PROCEDURE — 97166 OT EVAL MOD COMPLEX 45 MIN: CPT

## 2023-08-18 PROCEDURE — 1100000003 HC PRIVATE W/ TELEMETRY

## 2023-08-18 PROCEDURE — 93010 ELECTROCARDIOGRAM REPORT: CPT | Performed by: INTERNAL MEDICINE

## 2023-08-18 PROCEDURE — 6370000000 HC RX 637 (ALT 250 FOR IP): Performed by: INTERNAL MEDICINE

## 2023-08-18 PROCEDURE — 97162 PT EVAL MOD COMPLEX 30 MIN: CPT

## 2023-08-18 PROCEDURE — A4216 STERILE WATER/SALINE, 10 ML: HCPCS | Performed by: INTERNAL MEDICINE

## 2023-08-18 PROCEDURE — 36415 COLL VENOUS BLD VENIPUNCTURE: CPT

## 2023-08-18 PROCEDURE — 2580000003 HC RX 258: Performed by: INTERNAL MEDICINE

## 2023-08-18 PROCEDURE — 97530 THERAPEUTIC ACTIVITIES: CPT

## 2023-08-18 PROCEDURE — 85027 COMPLETE CBC AUTOMATED: CPT

## 2023-08-18 PROCEDURE — 84484 ASSAY OF TROPONIN QUANT: CPT

## 2023-08-18 PROCEDURE — 94640 AIRWAY INHALATION TREATMENT: CPT

## 2023-08-18 PROCEDURE — 6360000002 HC RX W HCPCS: Performed by: INTERNAL MEDICINE

## 2023-08-18 PROCEDURE — 82962 GLUCOSE BLOOD TEST: CPT

## 2023-08-18 PROCEDURE — C9113 INJ PANTOPRAZOLE SODIUM, VIA: HCPCS | Performed by: INTERNAL MEDICINE

## 2023-08-18 PROCEDURE — 2700000000 HC OXYGEN THERAPY PER DAY

## 2023-08-18 PROCEDURE — 93970 EXTREMITY STUDY: CPT

## 2023-08-18 PROCEDURE — 80053 COMPREHEN METABOLIC PANEL: CPT

## 2023-08-18 PROCEDURE — 83735 ASSAY OF MAGNESIUM: CPT

## 2023-08-18 PROCEDURE — 2500000003 HC RX 250 WO HCPCS: Performed by: INTERNAL MEDICINE

## 2023-08-18 RX ORDER — FERROUS SULFATE 325(65) MG
325 TABLET ORAL 2 TIMES DAILY WITH MEALS
Status: DISCONTINUED | OUTPATIENT
Start: 2023-08-18 | End: 2023-08-23 | Stop reason: HOSPADM

## 2023-08-18 RX ADMIN — DULOXETINE HYDROCHLORIDE 30 MG: 30 CAPSULE, DELAYED RELEASE ORAL at 08:29

## 2023-08-18 RX ADMIN — BUDESONIDE INHALATION 500 MCG: 0.5 SUSPENSION RESPIRATORY (INHALATION) at 19:21

## 2023-08-18 RX ADMIN — IPRATROPIUM BROMIDE AND ALBUTEROL SULFATE 1 DOSE: .5; 3 SOLUTION RESPIRATORY (INHALATION) at 19:21

## 2023-08-18 RX ADMIN — RANOLAZINE 500 MG: 500 TABLET, FILM COATED, EXTENDED RELEASE ORAL at 21:20

## 2023-08-18 RX ADMIN — IPRATROPIUM BROMIDE AND ALBUTEROL SULFATE 1 DOSE: .5; 3 SOLUTION RESPIRATORY (INHALATION) at 12:06

## 2023-08-18 RX ADMIN — ATORVASTATIN CALCIUM 40 MG: 20 TABLET, FILM COATED ORAL at 21:20

## 2023-08-18 RX ADMIN — SODIUM CHLORIDE 40 MG: 9 INJECTION INTRAMUSCULAR; INTRAVENOUS; SUBCUTANEOUS at 08:27

## 2023-08-18 RX ADMIN — SODIUM CHLORIDE, PRESERVATIVE FREE 10 ML: 5 INJECTION INTRAVENOUS at 08:37

## 2023-08-18 RX ADMIN — INSULIN LISPRO 4 UNITS: 100 INJECTION, SOLUTION INTRAVENOUS; SUBCUTANEOUS at 08:31

## 2023-08-18 RX ADMIN — IPRATROPIUM BROMIDE AND ALBUTEROL SULFATE 1 DOSE: .5; 2.5 SOLUTION RESPIRATORY (INHALATION) at 07:26

## 2023-08-18 RX ADMIN — DOXYCYCLINE 100 MG: 100 INJECTION, POWDER, LYOPHILIZED, FOR SOLUTION INTRAVENOUS at 21:21

## 2023-08-18 RX ADMIN — ARFORMOTEROL TARTRATE 15 MCG: 15 SOLUTION RESPIRATORY (INHALATION) at 19:21

## 2023-08-18 RX ADMIN — ARFORMOTEROL TARTRATE 15 MCG: 15 SOLUTION RESPIRATORY (INHALATION) at 07:26

## 2023-08-18 RX ADMIN — INSULIN GLARGINE 14 UNITS: 100 INJECTION, SOLUTION SUBCUTANEOUS at 21:21

## 2023-08-18 RX ADMIN — BUDESONIDE INHALATION 500 MCG: 0.5 SUSPENSION RESPIRATORY (INHALATION) at 07:26

## 2023-08-18 RX ADMIN — ASPIRIN 81 MG: 81 TABLET, COATED ORAL at 08:29

## 2023-08-18 RX ADMIN — IPRATROPIUM BROMIDE AND ALBUTEROL SULFATE 1 DOSE: .5; 3 SOLUTION RESPIRATORY (INHALATION) at 16:10

## 2023-08-18 RX ADMIN — FOLIC ACID 1 MG: 1 TABLET ORAL at 08:29

## 2023-08-18 RX ADMIN — MONTELUKAST 10 MG: 10 TABLET, FILM COATED ORAL at 21:20

## 2023-08-18 RX ADMIN — ENOXAPARIN SODIUM 40 MG: 100 INJECTION SUBCUTANEOUS at 08:28

## 2023-08-18 RX ADMIN — INSULIN LISPRO 6 UNITS: 100 INJECTION, SOLUTION INTRAVENOUS; SUBCUTANEOUS at 12:23

## 2023-08-18 RX ADMIN — FERROUS SULFATE TAB 325 MG (65 MG ELEMENTAL FE) 325 MG: 325 (65 FE) TAB at 17:22

## 2023-08-18 RX ADMIN — FUROSEMIDE 40 MG: 10 INJECTION, SOLUTION INTRAMUSCULAR; INTRAVENOUS at 17:22

## 2023-08-18 RX ADMIN — PROPRANOLOL HYDROCHLORIDE 10 MG: 20 TABLET ORAL at 08:29

## 2023-08-18 RX ADMIN — DOXYCYCLINE 100 MG: 100 INJECTION, POWDER, LYOPHILIZED, FOR SOLUTION INTRAVENOUS at 08:28

## 2023-08-18 RX ADMIN — SODIUM CHLORIDE, PRESERVATIVE FREE 10 ML: 5 INJECTION INTRAVENOUS at 21:25

## 2023-08-18 RX ADMIN — WATER 80 MG: 1 INJECTION INTRAMUSCULAR; INTRAVENOUS; SUBCUTANEOUS at 04:12

## 2023-08-18 RX ADMIN — WATER 80 MG: 1 INJECTION INTRAMUSCULAR; INTRAVENOUS; SUBCUTANEOUS at 12:24

## 2023-08-18 RX ADMIN — INSULIN LISPRO 6 UNITS: 100 INJECTION, SOLUTION INTRAVENOUS; SUBCUTANEOUS at 17:22

## 2023-08-18 RX ADMIN — FUROSEMIDE 40 MG: 10 INJECTION, SOLUTION INTRAMUSCULAR; INTRAVENOUS at 08:28

## 2023-08-18 RX ADMIN — RANOLAZINE 500 MG: 500 TABLET, FILM COATED, EXTENDED RELEASE ORAL at 08:29

## 2023-08-18 NOTE — PLAN OF CARE
Problem: Discharge Planning  Goal: Discharge to home or other facility with appropriate resources  Outcome: Progressing  Flowsheets (Taken 8/17/2023 2100)  Discharge to home or other facility with appropriate resources:   Identify barriers to discharge with patient and caregiver   Identify discharge learning needs (meds, wound care, etc)     Problem: Skin/Tissue Integrity  Goal: Absence of new skin breakdown  Description: 1. Monitor for areas of redness and/or skin breakdown  2. Assess vascular access sites hourly  3. Every 4-6 hours minimum:  Change oxygen saturation probe site  4. Every 4-6 hours:  If on nasal continuous positive airway pressure, respiratory therapy assess nares and determine need for appliance change or resting period.   Outcome: Progressing     Problem: Safety - Adult  Goal: Free from fall injury  Outcome: Progressing     Problem: ABCDS Injury Assessment  Goal: Absence of physical injury  Outcome: Progressing

## 2023-08-18 NOTE — PLAN OF CARE
Problem: Discharge Planning  Goal: Discharge to home or other facility with appropriate resources  Recent Flowsheet Documentation  Taken 8/18/2023 0800 by Belinda Rosen RN  Discharge to home or other facility with appropriate resources:   Identify barriers to discharge with patient and caregiver   Identify discharge learning needs (meds, wound care, etc)   Refer to discharge planning if patient needs post-hospital services based on physician order or complex needs related to functional status, cognitive ability or social support system  8/18/2023 0114 by Jose Son RN  Outcome: Progressing  Flowsheets (Taken 8/17/2023 2100)  Discharge to home or other facility with appropriate resources:   Identify barriers to discharge with patient and caregiver   Identify discharge learning needs (meds, wound care, etc)     Problem: Skin/Tissue Integrity  Goal: Absence of new skin breakdown  Description: 1. Monitor for areas of redness and/or skin breakdown  2. Assess vascular access sites hourly  3. Every 4-6 hours minimum:  Change oxygen saturation probe site  4. Every 4-6 hours:  If on nasal continuous positive airway pressure, respiratory therapy assess nares and determine need for appliance change or resting period.   8/18/2023 0114 by Jose Son RN  Outcome: Progressing     Problem: Safety - Adult  Goal: Free from fall injury  8/18/2023 0114 by Jose Son RN  Outcome: Progressing     Problem: Respiratory - Adult  Goal: Achieves optimal ventilation and oxygenation  Flowsheets (Taken 8/18/2023 1323)  Achieves optimal ventilation and oxygenation:   Assess for changes in respiratory status   Assess for changes in mentation and behavior   Position to facilitate oxygenation and minimize respiratory effort   Encourage broncho-pulmonary hygiene including cough, deep breathe, incentive spirometry   Assess and instruct to report shortness of breath or any respiratory difficulty   Respiratory therapy support as

## 2023-08-19 ENCOUNTER — APPOINTMENT (OUTPATIENT)
Facility: HOSPITAL | Age: 68
DRG: 191 | End: 2023-08-19
Attending: INTERNAL MEDICINE
Payer: MEDICARE

## 2023-08-19 LAB
ALBUMIN SERPL-MCNC: 3 G/DL (ref 3.4–5)
ALBUMIN/GLOB SERPL: 1 (ref 0.8–1.7)
ALP SERPL-CCNC: 67 U/L (ref 45–117)
ALT SERPL-CCNC: 19 U/L (ref 16–61)
ANION GAP SERPL CALC-SCNC: 7 MMOL/L (ref 3–18)
APPEARANCE UR: CLEAR
AST SERPL-CCNC: 17 U/L (ref 10–38)
BILIRUB SERPL-MCNC: 0.4 MG/DL (ref 0.2–1)
BILIRUB UR QL: NEGATIVE
BUN SERPL-MCNC: 33 MG/DL (ref 7–18)
BUN/CREAT SERPL: 16 (ref 12–20)
CALCIUM SERPL-MCNC: 8.4 MG/DL (ref 8.5–10.1)
CHLORIDE SERPL-SCNC: 102 MMOL/L (ref 100–111)
CK SERPL-CCNC: 71 U/L (ref 39–308)
CO2 SERPL-SCNC: 27 MMOL/L (ref 21–32)
COLOR UR: YELLOW
CREAT SERPL-MCNC: 2.06 MG/DL (ref 0.6–1.3)
ECHO AV AREA PEAK VELOCITY: 2.5 CM2
ECHO AV AREA VTI: 2.8 CM2
ECHO AV AREA/BSA PEAK VELOCITY: 1.2 CM2/M2
ECHO AV AREA/BSA VTI: 1.3 CM2/M2
ECHO AV MEAN GRADIENT: 4 MMHG
ECHO AV MEAN VELOCITY: 0.9 M/S
ECHO AV PEAK GRADIENT: 7 MMHG
ECHO AV PEAK VELOCITY: 1.3 M/S
ECHO AV VELOCITY RATIO: 0.85
ECHO AV VTI: 22.9 CM
ECHO BSA: 2.2 M2
ECHO IVC PROX: 2 CM
ECHO LA DIAMETER INDEX: 1.61 CM/M2
ECHO LA DIAMETER: 3.4 CM
ECHO LA VOL 4C: 19 ML (ref 18–58)
ECHO LA VOLUME INDEX A4C: 9 ML/M2 (ref 16–34)
ECHO LV E' LATERAL VELOCITY: 9 CM/S
ECHO LV E' SEPTAL VELOCITY: 10 CM/S
ECHO LV EDV A4C: 60 ML
ECHO LV EDV INDEX A4C: 28 ML/M2
ECHO LV EJECTION FRACTION A4C: 60 %
ECHO LV ESV A4C: 24 ML
ECHO LV ESV INDEX A4C: 11 ML/M2
ECHO LV FRACTIONAL SHORTENING: 29 % (ref 28–44)
ECHO LV INTERNAL DIMENSION DIASTOLE INDEX: 2.32 CM/M2
ECHO LV INTERNAL DIMENSION DIASTOLIC: 4.9 CM (ref 4.2–5.9)
ECHO LV INTERNAL DIMENSION SYSTOLIC INDEX: 1.66 CM/M2
ECHO LV INTERNAL DIMENSION SYSTOLIC: 3.5 CM
ECHO LV IVSD: 0.9 CM (ref 0.6–1)
ECHO LV MASS 2D: 141.9 G (ref 88–224)
ECHO LV MASS INDEX 2D: 67.3 G/M2 (ref 49–115)
ECHO LV POSTERIOR WALL DIASTOLIC: 0.8 CM (ref 0.6–1)
ECHO LV RELATIVE WALL THICKNESS RATIO: 0.33
ECHO LVOT AREA: 3.1 CM2
ECHO LVOT AV VTI INDEX: 0.92
ECHO LVOT DIAM: 2 CM
ECHO LVOT MEAN GRADIENT: 3 MMHG
ECHO LVOT PEAK GRADIENT: 5 MMHG
ECHO LVOT PEAK VELOCITY: 1.1 M/S
ECHO LVOT STROKE VOLUME INDEX: 31.4 ML/M2
ECHO LVOT SV: 66.3 ML
ECHO LVOT VTI: 21.1 CM
ECHO MV A VELOCITY: 1.17 M/S
ECHO MV E DECELERATION TIME (DT): 148.1 MS
ECHO MV E VELOCITY: 0.83 M/S
ECHO MV E/A RATIO: 0.71
ECHO MV E/E' LATERAL: 9.22
ECHO MV E/E' RATIO (AVERAGED): 8.76
ECHO MV E/E' SEPTAL: 8.3
ECHO RV FREE WALL PEAK S': 15 CM/S
ECHO RV TAPSE: 2.1 CM (ref 1.7–?)
ERYTHROCYTE [DISTWIDTH] IN BLOOD BY AUTOMATED COUNT: 22.5 % (ref 11.6–14.5)
ERYTHROCYTE [SEDIMENTATION RATE] IN BLOOD: 30 MM/HR (ref 0–20)
GLOBULIN SER CALC-MCNC: 3 G/DL (ref 2–4)
GLUCOSE BLD STRIP.AUTO-MCNC: 163 MG/DL (ref 70–110)
GLUCOSE BLD STRIP.AUTO-MCNC: 222 MG/DL (ref 70–110)
GLUCOSE BLD STRIP.AUTO-MCNC: 237 MG/DL (ref 70–110)
GLUCOSE BLD STRIP.AUTO-MCNC: 241 MG/DL (ref 70–110)
GLUCOSE BLD STRIP.AUTO-MCNC: 250 MG/DL (ref 70–110)
GLUCOSE SERPL-MCNC: 222 MG/DL (ref 74–99)
GLUCOSE UR STRIP.AUTO-MCNC: NEGATIVE MG/DL
HCT VFR BLD AUTO: 24.2 % (ref 36–48)
HCT VFR BLD AUTO: 25.8 % (ref 36–48)
HGB BLD-MCNC: 7.5 G/DL (ref 13–16)
HGB BLD-MCNC: 7.8 G/DL (ref 13–16)
HGB UR QL STRIP: NEGATIVE
KETONES UR QL STRIP.AUTO: ABNORMAL MG/DL
LEUKOCYTE ESTERASE UR QL STRIP.AUTO: NEGATIVE
MAGNESIUM SERPL-MCNC: 2 MG/DL (ref 1.6–2.6)
MCH RBC QN AUTO: 23.8 PG (ref 24–34)
MCHC RBC AUTO-ENTMCNC: 30.2 G/DL (ref 31–37)
MCV RBC AUTO: 78.7 FL (ref 78–100)
NITRITE UR QL STRIP.AUTO: NEGATIVE
NRBC # BLD: 0.02 K/UL (ref 0–0.01)
NRBC BLD-RTO: 0.1 PER 100 WBC
PH UR STRIP: 5.5 (ref 5–8)
PLATELET # BLD AUTO: 372 K/UL (ref 135–420)
PMV BLD AUTO: 9.6 FL (ref 9.2–11.8)
POTASSIUM SERPL-SCNC: 4.2 MMOL/L (ref 3.5–5.5)
PROCALCITONIN SERPL-MCNC: 0.08 NG/ML
PROT SERPL-MCNC: 6 G/DL (ref 6.4–8.2)
PROT UR STRIP-MCNC: NEGATIVE MG/DL
RBC # BLD AUTO: 3.28 M/UL (ref 4.35–5.65)
SODIUM SERPL-SCNC: 136 MMOL/L (ref 136–145)
SP GR UR REFRACTOMETRY: 1.02 (ref 1–1.03)
T4 FREE SERPL-MCNC: 1 NG/DL (ref 0.7–1.5)
TSH SERPL DL<=0.05 MIU/L-ACNC: 1.23 UIU/ML (ref 0.36–3.74)
UROBILINOGEN UR QL STRIP.AUTO: 1 EU/DL (ref 0.2–1)
WBC # BLD AUTO: 16.7 K/UL (ref 4.6–13.2)

## 2023-08-19 PROCEDURE — 82962 GLUCOSE BLOOD TEST: CPT

## 2023-08-19 PROCEDURE — 85027 COMPLETE CBC AUTOMATED: CPT

## 2023-08-19 PROCEDURE — 2500000003 HC RX 250 WO HCPCS: Performed by: INTERNAL MEDICINE

## 2023-08-19 PROCEDURE — 83735 ASSAY OF MAGNESIUM: CPT

## 2023-08-19 PROCEDURE — 86235 NUCLEAR ANTIGEN ANTIBODY: CPT

## 2023-08-19 PROCEDURE — 86225 DNA ANTIBODY NATIVE: CPT

## 2023-08-19 PROCEDURE — 85018 HEMOGLOBIN: CPT

## 2023-08-19 PROCEDURE — 83519 RIA NONANTIBODY: CPT

## 2023-08-19 PROCEDURE — 82550 ASSAY OF CK (CPK): CPT

## 2023-08-19 PROCEDURE — 81003 URINALYSIS AUTO W/O SCOPE: CPT

## 2023-08-19 PROCEDURE — 82085 ASSAY OF ALDOLASE: CPT

## 2023-08-19 PROCEDURE — 84145 PROCALCITONIN (PCT): CPT

## 2023-08-19 PROCEDURE — 84443 ASSAY THYROID STIM HORMONE: CPT

## 2023-08-19 PROCEDURE — 6360000004 HC RX CONTRAST MEDICATION: Performed by: INTERNAL MEDICINE

## 2023-08-19 PROCEDURE — 6370000000 HC RX 637 (ALT 250 FOR IP): Performed by: INTERNAL MEDICINE

## 2023-08-19 PROCEDURE — 2700000000 HC OXYGEN THERAPY PER DAY

## 2023-08-19 PROCEDURE — 1100000003 HC PRIVATE W/ TELEMETRY

## 2023-08-19 PROCEDURE — P9047 ALBUMIN (HUMAN), 25%, 50ML: HCPCS | Performed by: INTERNAL MEDICINE

## 2023-08-19 PROCEDURE — 6360000002 HC RX W HCPCS: Performed by: INTERNAL MEDICINE

## 2023-08-19 PROCEDURE — 85014 HEMATOCRIT: CPT

## 2023-08-19 PROCEDURE — 85652 RBC SED RATE AUTOMATED: CPT

## 2023-08-19 PROCEDURE — 93306 TTE W/DOPPLER COMPLETE: CPT

## 2023-08-19 PROCEDURE — 86200 CCP ANTIBODY: CPT

## 2023-08-19 PROCEDURE — 2580000003 HC RX 258: Performed by: INTERNAL MEDICINE

## 2023-08-19 PROCEDURE — 83520 IMMUNOASSAY QUANT NOS NONAB: CPT

## 2023-08-19 PROCEDURE — 80053 COMPREHEN METABOLIC PANEL: CPT

## 2023-08-19 PROCEDURE — 94640 AIRWAY INHALATION TREATMENT: CPT

## 2023-08-19 PROCEDURE — 36415 COLL VENOUS BLD VENIPUNCTURE: CPT

## 2023-08-19 PROCEDURE — 86431 RHEUMATOID FACTOR QUANT: CPT

## 2023-08-19 PROCEDURE — 83516 IMMUNOASSAY NONANTIBODY: CPT

## 2023-08-19 PROCEDURE — 84439 ASSAY OF FREE THYROXINE: CPT

## 2023-08-19 RX ORDER — IPRATROPIUM BROMIDE AND ALBUTEROL SULFATE 2.5; .5 MG/3ML; MG/3ML
1 SOLUTION RESPIRATORY (INHALATION) EVERY 4 HOURS PRN
Status: DISCONTINUED | OUTPATIENT
Start: 2023-08-19 | End: 2023-08-23 | Stop reason: HOSPADM

## 2023-08-19 RX ORDER — ALBUMIN (HUMAN) 12.5 G/50ML
25 SOLUTION INTRAVENOUS ONCE
Status: COMPLETED | OUTPATIENT
Start: 2023-08-19 | End: 2023-08-19

## 2023-08-19 RX ADMIN — ENOXAPARIN SODIUM 40 MG: 100 INJECTION SUBCUTANEOUS at 09:11

## 2023-08-19 RX ADMIN — FOLIC ACID 1 MG: 1 TABLET ORAL at 09:11

## 2023-08-19 RX ADMIN — ACETAMINOPHEN 650 MG: 325 TABLET ORAL at 15:53

## 2023-08-19 RX ADMIN — INSULIN GLARGINE 14 UNITS: 100 INJECTION, SOLUTION SUBCUTANEOUS at 21:58

## 2023-08-19 RX ADMIN — CEFTRIAXONE 1000 MG: 1 INJECTION, POWDER, FOR SOLUTION INTRAMUSCULAR; INTRAVENOUS at 14:04

## 2023-08-19 RX ADMIN — ASPIRIN 81 MG: 81 TABLET, COATED ORAL at 09:11

## 2023-08-19 RX ADMIN — ALBUMIN (HUMAN) 25 G: 0.25 INJECTION, SOLUTION INTRAVENOUS at 12:10

## 2023-08-19 RX ADMIN — SODIUM CHLORIDE, PRESERVATIVE FREE 10 ML: 5 INJECTION INTRAVENOUS at 12:00

## 2023-08-19 RX ADMIN — SODIUM CHLORIDE, PRESERVATIVE FREE 10 ML: 5 INJECTION INTRAVENOUS at 20:57

## 2023-08-19 RX ADMIN — INSULIN LISPRO 2 UNITS: 100 INJECTION, SOLUTION INTRAVENOUS; SUBCUTANEOUS at 12:16

## 2023-08-19 RX ADMIN — ARFORMOTEROL TARTRATE 15 MCG: 15 SOLUTION RESPIRATORY (INHALATION) at 19:09

## 2023-08-19 RX ADMIN — INSULIN LISPRO 2 UNITS: 100 INJECTION, SOLUTION INTRAVENOUS; SUBCUTANEOUS at 18:01

## 2023-08-19 RX ADMIN — ARFORMOTEROL TARTRATE 15 MCG: 15 SOLUTION RESPIRATORY (INHALATION) at 07:21

## 2023-08-19 RX ADMIN — PERFLUTREN 1.5 ML: 6.52 INJECTION, SUSPENSION INTRAVENOUS at 13:49

## 2023-08-19 RX ADMIN — IPRATROPIUM BROMIDE AND ALBUTEROL SULFATE 1 DOSE: .5; 3 SOLUTION RESPIRATORY (INHALATION) at 07:21

## 2023-08-19 RX ADMIN — PROPRANOLOL HYDROCHLORIDE 10 MG: 20 TABLET ORAL at 09:12

## 2023-08-19 RX ADMIN — FERROUS SULFATE TAB 325 MG (65 MG ELEMENTAL FE) 325 MG: 325 (65 FE) TAB at 09:12

## 2023-08-19 RX ADMIN — INSULIN LISPRO 4 UNITS: 100 INJECTION, SOLUTION INTRAVENOUS; SUBCUTANEOUS at 09:11

## 2023-08-19 RX ADMIN — DOXYCYCLINE 100 MG: 100 INJECTION, POWDER, LYOPHILIZED, FOR SOLUTION INTRAVENOUS at 09:10

## 2023-08-19 RX ADMIN — ONDANSETRON 4 MG: 2 INJECTION INTRAMUSCULAR; INTRAVENOUS at 18:01

## 2023-08-19 RX ADMIN — RANOLAZINE 500 MG: 500 TABLET, FILM COATED, EXTENDED RELEASE ORAL at 20:40

## 2023-08-19 RX ADMIN — MONTELUKAST 10 MG: 10 TABLET, FILM COATED ORAL at 20:40

## 2023-08-19 RX ADMIN — ATORVASTATIN CALCIUM 40 MG: 20 TABLET, FILM COATED ORAL at 20:40

## 2023-08-19 RX ADMIN — BUDESONIDE INHALATION 500 MCG: 0.5 SUSPENSION RESPIRATORY (INHALATION) at 07:21

## 2023-08-19 RX ADMIN — RANOLAZINE 500 MG: 500 TABLET, FILM COATED, EXTENDED RELEASE ORAL at 09:11

## 2023-08-19 RX ADMIN — BUDESONIDE INHALATION 500 MCG: 0.5 SUSPENSION RESPIRATORY (INHALATION) at 19:09

## 2023-08-19 RX ADMIN — DULOXETINE HYDROCHLORIDE 30 MG: 30 CAPSULE, DELAYED RELEASE ORAL at 09:11

## 2023-08-19 ASSESSMENT — PAIN DESCRIPTION - LOCATION: LOCATION: HEAD

## 2023-08-19 ASSESSMENT — PAIN SCALES - GENERAL: PAINLEVEL_OUTOF10: 5

## 2023-08-19 NOTE — PLAN OF CARE
Problem: Discharge Planning  Goal: Discharge to home or other facility with appropriate resources  Outcome: Progressing  Flowsheets (Taken 8/18/2023 2100)  Discharge to home or other facility with appropriate resources:   Identify barriers to discharge with patient and caregiver   Identify discharge learning needs (meds, wound care, etc)     Problem: Skin/Tissue Integrity  Goal: Absence of new skin breakdown  Description: 1. Monitor for areas of redness and/or skin breakdown  2. Assess vascular access sites hourly  3. Every 4-6 hours minimum:  Change oxygen saturation probe site  4. Every 4-6 hours:  If on nasal continuous positive airway pressure, respiratory therapy assess nares and determine need for appliance change or resting period.   Outcome: Progressing     Problem: Safety - Adult  Goal: Free from fall injury  Outcome: Progressing     Problem: ABCDS Injury Assessment  Goal: Absence of physical injury  Outcome: Progressing     Problem: Respiratory - Adult  Goal: Achieves optimal ventilation and oxygenation  8/19/2023 0303 by Nadia Domingo RN  Outcome: Progressing  Flowsheets (Taken 8/18/2023 2100)  Achieves optimal ventilation and oxygenation:   Assess for changes in respiratory status   Assess for changes in mentation and behavior   Position to facilitate oxygenation and minimize respiratory effort  8/18/2023 1323 by Devyn Neri RN  Flowsheets (Taken 8/18/2023 1323)  Achieves optimal ventilation and oxygenation:   Assess for changes in respiratory status   Assess for changes in mentation and behavior   Position to facilitate oxygenation and minimize respiratory effort   Encourage broncho-pulmonary hygiene including cough, deep breathe, incentive spirometry   Assess and instruct to report shortness of breath or any respiratory difficulty   Respiratory therapy support as indicated

## 2023-08-19 NOTE — CARE COORDINATION
CM notified by  that the pt's insurance is out of network for [facility]. CM notified Dr. Saira Subramanian to call the transfer center [phone] to request a transfer for the pt to a hospital that is in network w/ his insurance. CM attempted to notify pt of possible transfer. Pt is sleeping.

## 2023-08-20 LAB
ALBUMIN SERPL-MCNC: 3.1 G/DL (ref 3.4–5)
ALBUMIN/GLOB SERPL: 1.1 (ref 0.8–1.7)
ALDOLASE SERPL-CCNC: 4.4 U/L (ref 3.3–10.3)
ALP SERPL-CCNC: 66 U/L (ref 45–117)
ALT SERPL-CCNC: 17 U/L (ref 16–61)
ANION GAP SERPL CALC-SCNC: 5 MMOL/L (ref 3–18)
AST SERPL-CCNC: 19 U/L (ref 10–38)
BILIRUB SERPL-MCNC: 0.3 MG/DL (ref 0.2–1)
BUN SERPL-MCNC: 44 MG/DL (ref 7–18)
BUN/CREAT SERPL: 22 (ref 12–20)
CALCIUM SERPL-MCNC: 8.2 MG/DL (ref 8.5–10.1)
CHLORIDE SERPL-SCNC: 104 MMOL/L (ref 100–111)
CO2 SERPL-SCNC: 28 MMOL/L (ref 21–32)
CREAT SERPL-MCNC: 1.96 MG/DL (ref 0.6–1.3)
ERYTHROCYTE [DISTWIDTH] IN BLOOD BY AUTOMATED COUNT: 22.6 % (ref 11.6–14.5)
GLOBULIN SER CALC-MCNC: 2.7 G/DL (ref 2–4)
GLUCOSE BLD STRIP.AUTO-MCNC: 125 MG/DL (ref 70–110)
GLUCOSE BLD STRIP.AUTO-MCNC: 130 MG/DL (ref 70–110)
GLUCOSE BLD STRIP.AUTO-MCNC: 159 MG/DL (ref 70–110)
GLUCOSE BLD STRIP.AUTO-MCNC: 200 MG/DL (ref 70–110)
GLUCOSE SERPL-MCNC: 179 MG/DL (ref 74–99)
HCT VFR BLD AUTO: 23.9 % (ref 36–48)
HGB BLD-MCNC: 7.2 G/DL (ref 13–16)
MAGNESIUM SERPL-MCNC: 2 MG/DL (ref 1.6–2.6)
MCH RBC QN AUTO: 24 PG (ref 24–34)
MCHC RBC AUTO-ENTMCNC: 30.1 G/DL (ref 31–37)
MCV RBC AUTO: 79.7 FL (ref 78–100)
NRBC # BLD: 0.02 K/UL (ref 0–0.01)
NRBC BLD-RTO: 0.2 PER 100 WBC
PLATELET # BLD AUTO: 307 K/UL (ref 135–420)
PMV BLD AUTO: 9.4 FL (ref 9.2–11.8)
POTASSIUM SERPL-SCNC: 4.3 MMOL/L (ref 3.5–5.5)
PROT SERPL-MCNC: 5.8 G/DL (ref 6.4–8.2)
RBC # BLD AUTO: 3 M/UL (ref 4.35–5.65)
SODIUM SERPL-SCNC: 137 MMOL/L (ref 136–145)
WBC # BLD AUTO: 11.4 K/UL (ref 4.6–13.2)

## 2023-08-20 PROCEDURE — 6370000000 HC RX 637 (ALT 250 FOR IP): Performed by: INTERNAL MEDICINE

## 2023-08-20 PROCEDURE — 2700000000 HC OXYGEN THERAPY PER DAY

## 2023-08-20 PROCEDURE — 83735 ASSAY OF MAGNESIUM: CPT

## 2023-08-20 PROCEDURE — 6360000002 HC RX W HCPCS: Performed by: INTERNAL MEDICINE

## 2023-08-20 PROCEDURE — 80053 COMPREHEN METABOLIC PANEL: CPT

## 2023-08-20 PROCEDURE — 2580000003 HC RX 258: Performed by: INTERNAL MEDICINE

## 2023-08-20 PROCEDURE — 1100000003 HC PRIVATE W/ TELEMETRY

## 2023-08-20 PROCEDURE — 82962 GLUCOSE BLOOD TEST: CPT

## 2023-08-20 PROCEDURE — 94640 AIRWAY INHALATION TREATMENT: CPT

## 2023-08-20 PROCEDURE — 36415 COLL VENOUS BLD VENIPUNCTURE: CPT

## 2023-08-20 PROCEDURE — 85027 COMPLETE CBC AUTOMATED: CPT

## 2023-08-20 RX ORDER — PANTOPRAZOLE SODIUM 40 MG/1
40 TABLET, DELAYED RELEASE ORAL
Status: DISCONTINUED | OUTPATIENT
Start: 2023-08-20 | End: 2023-08-23 | Stop reason: HOSPADM

## 2023-08-20 RX ADMIN — METOPROLOL TARTRATE 25 MG: 25 TABLET, FILM COATED ORAL at 20:10

## 2023-08-20 RX ADMIN — SODIUM CHLORIDE, PRESERVATIVE FREE 10 ML: 5 INJECTION INTRAVENOUS at 08:28

## 2023-08-20 RX ADMIN — FERROUS SULFATE TAB 325 MG (65 MG ELEMENTAL FE) 325 MG: 325 (65 FE) TAB at 08:28

## 2023-08-20 RX ADMIN — RANOLAZINE 500 MG: 500 TABLET, FILM COATED, EXTENDED RELEASE ORAL at 20:10

## 2023-08-20 RX ADMIN — INSULIN GLARGINE 14 UNITS: 100 INJECTION, SOLUTION SUBCUTANEOUS at 21:24

## 2023-08-20 RX ADMIN — BUDESONIDE INHALATION 500 MCG: 0.5 SUSPENSION RESPIRATORY (INHALATION) at 20:21

## 2023-08-20 RX ADMIN — BUDESONIDE INHALATION 500 MCG: 0.5 SUSPENSION RESPIRATORY (INHALATION) at 07:59

## 2023-08-20 RX ADMIN — FOLIC ACID 1 MG: 1 TABLET ORAL at 08:27

## 2023-08-20 RX ADMIN — ARFORMOTEROL TARTRATE 15 MCG: 15 SOLUTION RESPIRATORY (INHALATION) at 20:16

## 2023-08-20 RX ADMIN — ATORVASTATIN CALCIUM 40 MG: 20 TABLET, FILM COATED ORAL at 20:10

## 2023-08-20 RX ADMIN — ACETAMINOPHEN 650 MG: 325 TABLET ORAL at 16:44

## 2023-08-20 RX ADMIN — MONTELUKAST 10 MG: 10 TABLET, FILM COATED ORAL at 21:24

## 2023-08-20 RX ADMIN — METOPROLOL TARTRATE 25 MG: 25 TABLET, FILM COATED ORAL at 08:28

## 2023-08-20 RX ADMIN — INSULIN LISPRO 2 UNITS: 100 INJECTION, SOLUTION INTRAVENOUS; SUBCUTANEOUS at 08:27

## 2023-08-20 RX ADMIN — DULOXETINE HYDROCHLORIDE 30 MG: 30 CAPSULE, DELAYED RELEASE ORAL at 08:28

## 2023-08-20 RX ADMIN — ARFORMOTEROL TARTRATE 15 MCG: 15 SOLUTION RESPIRATORY (INHALATION) at 07:59

## 2023-08-20 RX ADMIN — RANOLAZINE 500 MG: 500 TABLET, FILM COATED, EXTENDED RELEASE ORAL at 08:27

## 2023-08-20 RX ADMIN — FERROUS SULFATE TAB 325 MG (65 MG ELEMENTAL FE) 325 MG: 325 (65 FE) TAB at 16:43

## 2023-08-20 RX ADMIN — SODIUM CHLORIDE, PRESERVATIVE FREE 10 ML: 5 INJECTION INTRAVENOUS at 21:00

## 2023-08-20 ASSESSMENT — PAIN SCALES - GENERAL
PAINLEVEL_OUTOF10: 6
PAINLEVEL_OUTOF10: 0

## 2023-08-20 ASSESSMENT — PAIN DESCRIPTION - LOCATION: LOCATION: HEAD

## 2023-08-21 LAB
CENTROMERE B AB SER-ACNC: <0.2 AI (ref 0–0.9)
CHROMATIN AB SERPL-ACNC: <0.2 AI (ref 0–0.9)
DSDNA AB SER-ACNC: <1 IU/ML (ref 0–9)
ENA JO1 AB SER-ACNC: <0.2 AI (ref 0–0.9)
ENA RNP AB SER-ACNC: <0.2 AI (ref 0–0.9)
ENA SCL70 AB SER-ACNC: <0.2 AI (ref 0–0.9)
ENA SM AB SER-ACNC: <0.2 AI (ref 0–0.9)
ENA SM+RNP AB SER-ACNC: <0.2 AI (ref 0–0.9)
ENA SS-A AB SER-ACNC: <0.2 AI (ref 0–0.9)
ENA SS-B AB SER-ACNC: <0.2 AI (ref 0–0.9)
GLUCOSE BLD STRIP.AUTO-MCNC: 114 MG/DL (ref 70–110)
GLUCOSE BLD STRIP.AUTO-MCNC: 115 MG/DL (ref 70–110)
GLUCOSE BLD STRIP.AUTO-MCNC: 137 MG/DL (ref 70–110)
GLUCOSE BLD STRIP.AUTO-MCNC: 188 MG/DL (ref 70–110)
RIBOSOMAL P AB SER-ACNC: <0.2 AI (ref 0–0.9)
SEE BELOW:, 164879: NORMAL

## 2023-08-21 PROCEDURE — 2580000003 HC RX 258: Performed by: INTERNAL MEDICINE

## 2023-08-21 PROCEDURE — 6360000002 HC RX W HCPCS: Performed by: INTERNAL MEDICINE

## 2023-08-21 PROCEDURE — 97535 SELF CARE MNGMENT TRAINING: CPT

## 2023-08-21 PROCEDURE — 6370000000 HC RX 637 (ALT 250 FOR IP): Performed by: INTERNAL MEDICINE

## 2023-08-21 PROCEDURE — 1100000003 HC PRIVATE W/ TELEMETRY

## 2023-08-21 PROCEDURE — 97116 GAIT TRAINING THERAPY: CPT

## 2023-08-21 PROCEDURE — 97530 THERAPEUTIC ACTIVITIES: CPT

## 2023-08-21 PROCEDURE — 97110 THERAPEUTIC EXERCISES: CPT

## 2023-08-21 PROCEDURE — 94640 AIRWAY INHALATION TREATMENT: CPT

## 2023-08-21 PROCEDURE — 2700000000 HC OXYGEN THERAPY PER DAY

## 2023-08-21 PROCEDURE — 82962 GLUCOSE BLOOD TEST: CPT

## 2023-08-21 RX ADMIN — DULOXETINE HYDROCHLORIDE 30 MG: 30 CAPSULE, DELAYED RELEASE ORAL at 09:47

## 2023-08-21 RX ADMIN — FERROUS SULFATE TAB 325 MG (65 MG ELEMENTAL FE) 325 MG: 325 (65 FE) TAB at 16:57

## 2023-08-21 RX ADMIN — FOLIC ACID 1 MG: 1 TABLET ORAL at 09:48

## 2023-08-21 RX ADMIN — INSULIN GLARGINE 14 UNITS: 100 INJECTION, SOLUTION SUBCUTANEOUS at 21:30

## 2023-08-21 RX ADMIN — MONTELUKAST 10 MG: 10 TABLET, FILM COATED ORAL at 20:38

## 2023-08-21 RX ADMIN — ACETAMINOPHEN 650 MG: 325 TABLET ORAL at 22:15

## 2023-08-21 RX ADMIN — RANOLAZINE 500 MG: 500 TABLET, FILM COATED, EXTENDED RELEASE ORAL at 09:47

## 2023-08-21 RX ADMIN — ATORVASTATIN CALCIUM 40 MG: 20 TABLET, FILM COATED ORAL at 20:38

## 2023-08-21 RX ADMIN — FERROUS SULFATE TAB 325 MG (65 MG ELEMENTAL FE) 325 MG: 325 (65 FE) TAB at 09:48

## 2023-08-21 RX ADMIN — BUDESONIDE INHALATION 500 MCG: 0.5 SUSPENSION RESPIRATORY (INHALATION) at 07:56

## 2023-08-21 RX ADMIN — SODIUM CHLORIDE, PRESERVATIVE FREE 10 ML: 5 INJECTION INTRAVENOUS at 20:43

## 2023-08-21 RX ADMIN — ARFORMOTEROL TARTRATE 15 MCG: 15 SOLUTION RESPIRATORY (INHALATION) at 20:38

## 2023-08-21 RX ADMIN — METOPROLOL TARTRATE 25 MG: 25 TABLET, FILM COATED ORAL at 09:48

## 2023-08-21 RX ADMIN — RANOLAZINE 500 MG: 500 TABLET, FILM COATED, EXTENDED RELEASE ORAL at 20:38

## 2023-08-21 RX ADMIN — SODIUM CHLORIDE, PRESERVATIVE FREE 10 ML: 5 INJECTION INTRAVENOUS at 09:48

## 2023-08-21 RX ADMIN — ARFORMOTEROL TARTRATE 15 MCG: 15 SOLUTION RESPIRATORY (INHALATION) at 07:56

## 2023-08-21 RX ADMIN — METOPROLOL TARTRATE 25 MG: 25 TABLET, FILM COATED ORAL at 20:38

## 2023-08-21 RX ADMIN — PANTOPRAZOLE SODIUM 40 MG: 40 TABLET, DELAYED RELEASE ORAL at 06:02

## 2023-08-21 RX ADMIN — BUDESONIDE INHALATION 500 MCG: 0.5 SUSPENSION RESPIRATORY (INHALATION) at 20:47

## 2023-08-21 ASSESSMENT — PAIN SCALES - GENERAL
PAINLEVEL_OUTOF10: 0
PAINLEVEL_OUTOF10: 7
PAINLEVEL_OUTOF10: 3

## 2023-08-21 ASSESSMENT — PAIN SCALES - WONG BAKER: WONGBAKER_NUMERICALRESPONSE: 2

## 2023-08-21 ASSESSMENT — PAIN DESCRIPTION - DESCRIPTORS: DESCRIPTORS: ACHING;THROBBING

## 2023-08-21 ASSESSMENT — PAIN DESCRIPTION - LOCATION: LOCATION: HEAD

## 2023-08-21 ASSESSMENT — PAIN DESCRIPTION - ORIENTATION: ORIENTATION: RIGHT

## 2023-08-21 NOTE — ACP (ADVANCE CARE PLANNING)
Advance Care Planning     Advance Care Planning Inpatient Note  [facility] Department    Today's Date: 8/21/2023  Unit: THE [address] CARDIAC/MEDICAL    Received request from rounding. Upon review of chart and communication with care team, patient's decision making abilities are not in question. . Patient was/were present in the room during visit. Goals of ACP Conversation:  Discuss advance care planning documents    Health Care Decision Makers:       Primary Decision Maker: Moisés Mcclure - [facility] - [phone]  Summary:  No Decision Maker named by patient at this time    Advance Care Planning Documents (Patient Wishes):  None     Assessment:  Patient was calm and resting.  and patient had discussion regarding legal next of kin and advance care planning documents. Patient stated he had  an ex-wife and his son was Autistic and unable to be his spokesperson. He wanted to discuss with his three siblings about being his spokespersons. He was given copies of documents to review and discuss with them.     Interventions:  Provided education on documents for clarity and greater understanding  Discussed and provided education on state decision maker hierarchy  Encouraged ongoing ACP conversation with future decision makers and loved ones    Care Preferences Communicated:   No    Outcomes/Plan:  ACP Discussion: Postponed    Electronically signed by Chaplain Lorenza on 8/21/2023 at 11:27 AM
no

## 2023-08-22 ENCOUNTER — APPOINTMENT (OUTPATIENT)
Facility: HOSPITAL | Age: 68
DRG: 191 | End: 2023-08-22
Payer: MEDICARE

## 2023-08-22 LAB
GLUCOSE BLD STRIP.AUTO-MCNC: 117 MG/DL (ref 70–110)
GLUCOSE BLD STRIP.AUTO-MCNC: 156 MG/DL (ref 70–110)
GLUCOSE BLD STRIP.AUTO-MCNC: 174 MG/DL (ref 70–110)
GLUCOSE BLD STRIP.AUTO-MCNC: 199 MG/DL (ref 70–110)

## 2023-08-22 PROCEDURE — 97116 GAIT TRAINING THERAPY: CPT

## 2023-08-22 PROCEDURE — 70551 MRI BRAIN STEM W/O DYE: CPT

## 2023-08-22 PROCEDURE — 1100000003 HC PRIVATE W/ TELEMETRY

## 2023-08-22 PROCEDURE — 6370000000 HC RX 637 (ALT 250 FOR IP): Performed by: INTERNAL MEDICINE

## 2023-08-22 PROCEDURE — 2700000000 HC OXYGEN THERAPY PER DAY

## 2023-08-22 PROCEDURE — 97530 THERAPEUTIC ACTIVITIES: CPT

## 2023-08-22 PROCEDURE — 97535 SELF CARE MNGMENT TRAINING: CPT

## 2023-08-22 PROCEDURE — 2580000003 HC RX 258: Performed by: INTERNAL MEDICINE

## 2023-08-22 PROCEDURE — 6360000002 HC RX W HCPCS: Performed by: INTERNAL MEDICINE

## 2023-08-22 PROCEDURE — 94640 AIRWAY INHALATION TREATMENT: CPT

## 2023-08-22 PROCEDURE — 82962 GLUCOSE BLOOD TEST: CPT

## 2023-08-22 RX ADMIN — ACETAMINOPHEN 650 MG: 325 TABLET ORAL at 21:54

## 2023-08-22 RX ADMIN — RANOLAZINE 500 MG: 500 TABLET, FILM COATED, EXTENDED RELEASE ORAL at 21:05

## 2023-08-22 RX ADMIN — FOLIC ACID 1 MG: 1 TABLET ORAL at 08:14

## 2023-08-22 RX ADMIN — ARFORMOTEROL TARTRATE 15 MCG: 15 SOLUTION RESPIRATORY (INHALATION) at 08:01

## 2023-08-22 RX ADMIN — SODIUM CHLORIDE, PRESERVATIVE FREE 10 ML: 5 INJECTION INTRAVENOUS at 21:05

## 2023-08-22 RX ADMIN — METOPROLOL TARTRATE 25 MG: 25 TABLET, FILM COATED ORAL at 21:05

## 2023-08-22 RX ADMIN — FERROUS SULFATE TAB 325 MG (65 MG ELEMENTAL FE) 325 MG: 325 (65 FE) TAB at 08:14

## 2023-08-22 RX ADMIN — ARFORMOTEROL TARTRATE 15 MCG: 15 SOLUTION RESPIRATORY (INHALATION) at 20:23

## 2023-08-22 RX ADMIN — ATORVASTATIN CALCIUM 40 MG: 20 TABLET, FILM COATED ORAL at 21:05

## 2023-08-22 RX ADMIN — METOPROLOL TARTRATE 25 MG: 25 TABLET, FILM COATED ORAL at 08:14

## 2023-08-22 RX ADMIN — INSULIN GLARGINE 14 UNITS: 100 INJECTION, SOLUTION SUBCUTANEOUS at 21:51

## 2023-08-22 RX ADMIN — MONTELUKAST 10 MG: 10 TABLET, FILM COATED ORAL at 21:05

## 2023-08-22 RX ADMIN — SODIUM CHLORIDE, PRESERVATIVE FREE 10 ML: 5 INJECTION INTRAVENOUS at 08:15

## 2023-08-22 RX ADMIN — RANOLAZINE 500 MG: 500 TABLET, FILM COATED, EXTENDED RELEASE ORAL at 08:14

## 2023-08-22 RX ADMIN — BUDESONIDE INHALATION 500 MCG: 0.5 SUSPENSION RESPIRATORY (INHALATION) at 08:01

## 2023-08-22 RX ADMIN — DULOXETINE HYDROCHLORIDE 30 MG: 30 CAPSULE, DELAYED RELEASE ORAL at 08:14

## 2023-08-22 RX ADMIN — PANTOPRAZOLE SODIUM 40 MG: 40 TABLET, DELAYED RELEASE ORAL at 06:23

## 2023-08-22 RX ADMIN — BUDESONIDE INHALATION 500 MCG: 0.5 SUSPENSION RESPIRATORY (INHALATION) at 20:23

## 2023-08-22 RX ADMIN — FERROUS SULFATE TAB 325 MG (65 MG ELEMENTAL FE) 325 MG: 325 (65 FE) TAB at 21:09

## 2023-08-22 ASSESSMENT — PAIN SCALES - GENERAL: PAINLEVEL_OUTOF10: 5

## 2023-08-22 ASSESSMENT — PAIN DESCRIPTION - LOCATION: LOCATION: HEAD

## 2023-08-22 ASSESSMENT — PAIN DESCRIPTION - DESCRIPTORS: DESCRIPTORS: ACHING

## 2023-08-22 NOTE — CONSULTS
Extremities normal, atraumatic, no cyanosis +2edema. Pulses: 2+ and symmetric all extremities. Skin:  No rashes or lesions   Neurologic: AAOx3, No focal motor or sensory deficit.        Cardiographics     Telemetry: normal sinus rhythm  ECG: No acute change  Echocardiogram: Pending    Recent radiology, intake/output and wt reviewed    Labs:    Lab Results   Component Value Date/Time     08/19/2023 05:34 AM    K 4.2 08/19/2023 05:34 AM     08/19/2023 05:34 AM    CO2 27 08/19/2023 05:34 AM    BUN 33 08/19/2023 05:34 AM    CREATININE 2.06 08/19/2023 05:34 AM    GLUCOSE 222 08/19/2023 05:34 AM    CALCIUM 8.4 08/19/2023 05:34 AM       Lab Results   Component Value Date     08/19/2023    K 4.2 08/19/2023     08/19/2023    CO2 27 08/19/2023    BUN 33 (H) 08/19/2023    CREATININE 2.06 (H) 08/19/2023    GLUCOSE 222 (H) 08/19/2023    CALCIUM 8.4 (L) 08/19/2023    PROT 6.0 (L) 08/19/2023    LABALBU 3.0 (L) 08/19/2023    BILITOT 0.4 08/19/2023    ALKPHOS 67 08/19/2023    AST 17 08/19/2023    ALT 19 08/19/2023    LABGLOM 34 (L) 08/19/2023    GFRAA 37 (L) 02/10/2022    AGRATIO 0.9 10/18/2022    GLOB 3.0 08/19/2023         Rashaad Mendieta MD
CHEST W WO CONTRAST    Result Date: 8/17/2023  EXAM:  CTA CHEST W WO CONTRAST INDICATION: Shortness of breath COMPARISON: 8/7/5873 TECHNIQUE: Helical thin section chest CT following uneventful intravenous administration of nonionic contrast 100 mL of isovue 370 according to departmental PE protocol. Coronal and sagittal reformats were performed. 3D post processing was performed. CT dose reduction was achieved through the use of a standardized protocol tailored for this examination and automatic exposure control for dose modulation. FINDINGS: This is a good quality study for the evaluation of pulmonary embolism to the first subsegmental arterial level. There is no pulmonary embolism to this level. THYROID: No nodule. MEDIASTINUM: No mass or lymphadenopathy. CHUYITA: No mass or lymphadenopathy. THORACIC AORTA: No aneurysm. HEART: Normal in size. ESOPHAGUS: No wall thickening or dilatation. TRACHEA/BRONCHI: Patent. PLEURA: No effusion or pneumothorax. LUNGS: No nodule, mass, or airspace disease. UPPER ABDOMEN: Hepatic cysts are noted. The gallbladder is surgically absent. BONES: Degenerative changes are seen in the thoracic spine. No acute process or evidence of pulmonary embolism. XR CHEST PORTABLE    Result Date: 8/17/2023  EXAM:  XR CHEST PORTABLE INDICATION: Shortness of breath COMPARISON: 10/18/2022 TECHNIQUE: portable chest AP view FINDINGS: The cardiac silhouette is within normal limits. The pulmonary vasculature is within normal limits. The lungs and pleural spaces are clear. There is mild rightward curvature of the lower thoracic spine. No acute process on portable chest.     Transthoracic echocardiogram (TTE) complete with contrast, bubble, strain, and 3D PRN    Result Date: 8/19/2023    Contrast used: Definity. Technically difficult study due to patient's body habitus. Left Ventricle: Normal left ventricular systolic function with a visually estimated EF of 65 - 70%.  Left ventricle size is
Phosphatase 68 45 - 117 U/L    Total Protein 6.5 6.4 - 8.2 g/dL    Albumin 3.1 (L) 3.4 - 5.0 g/dL    Globulin 3.4 2.0 - 4.0 g/dL    Albumin/Globulin Ratio 0.9 0.8 - 1.7     Magnesium    Collection Time: 08/18/23  1:31 AM   Result Value Ref Range    Magnesium 2.2 1.6 - 2.6 mg/dL   Troponin    Collection Time: 08/18/23  1:31 AM   Result Value Ref Range    Troponin, High Sensitivity 6 0 - 78 ng/L   POCT Glucose    Collection Time: 08/18/23  6:24 AM   Result Value Ref Range    POC Glucose 284 (H) 70 - 110 mg/dL   POCT Glucose    Collection Time: 08/18/23  6:25 AM   Result Value Ref Range    POC Glucose 269 (H) 70 - 110 mg/dL   Troponin    Collection Time: 08/18/23  8:20 AM   Result Value Ref Range    Troponin, High Sensitivity 5 0 - 78 ng/L   Vascular duplex lower extremity venous bilateral    Collection Time: 08/18/23 11:37 AM   Result Value Ref Range    Body Surface Area 2.19 m2   POCT Glucose    Collection Time: 08/18/23 12:03 PM   Result Value Ref Range    POC Glucose 304 (H) 70 - 110 mg/dL         Chemistry Recent Labs     08/17/23  1235 08/18/23  0131    136   K 4.2 5.0    105   CO2 28 23   BUN 16 18   MG 1.8 2.2   GLOB 3.3 3.4        Lactic Acid      Liver Enzymes Globulin   Date Value Ref Range Status   08/18/2023 3.4 2.0 - 4.0 g/dL Final     Recent Labs     08/17/23  1235 08/18/23  0131   GLOB 3.3 3.4        CBC w/Diff Recent Labs     08/17/23  1235 08/18/23  0131   WBC 8.5 9.8   RBC 3.83* 3.90*   HGB 9.2* 9.3*   HCT 31.0* 31.2*    376            Results       Procedure Component Value Units Date/Time    Culture, Respiratory [[id number]]     Order Status: Sent Specimen: Sputum Induced     Legionella antigen, urine [[id number]]     Order Status: Sent Specimen: Urine     Strep Pneumoniae Antigen [[id number]]     Order Status: Sent Specimen: Urine, clean catch     COVID-19 & Influenza Combo [[id number]] Collected: 08/17/23 1422    Order Status: Completed Specimen: Nasopharyngeal Updated:

## 2023-08-22 NOTE — PLAN OF CARE
Problem: Safety - Adult  Goal: Free from fall injury  8/21/2023 1555 by Burt Tobin RN  Outcome: Progressing     Problem: ABCDS Injury Assessment  Goal: Absence of physical injury  8/21/2023 1555 by Burt Tobin RN  Outcome: Progressing     Problem: Respiratory - Adult  Goal: Achieves optimal ventilation and oxygenation  8/21/2023 1555 by Burt Tobin RN  Outcome: Progressing     Problem: Pain  Goal: Verbalizes/displays adequate comfort level or baseline comfort level  Recent Flowsheet Documentation  Taken 8/21/2023 2011 by Grady Blair RN  Verbalizes/displays adequate comfort level or baseline comfort level:   Encourage patient to monitor pain and request assistance   Assess pain using appropriate pain scale   Administer analgesics based on type and severity of pain and evaluate response   Implement non-pharmacological measures as appropriate and evaluate response  8/21/2023 1555 by Burt Tobin RN  Outcome: Progressing     Problem: Discharge Planning  Goal: Discharge to home or other facility with appropriate resources  8/21/2023 1555 by Burt Tobin RN  Outcome: Progressing     Problem: Skin/Tissue Integrity  Goal: Absence of new skin breakdown  Description: 1. Monitor for areas of redness and/or skin breakdown  2. Assess vascular access sites hourly  3. Every 4-6 hours minimum:  Change oxygen saturation probe site  4. Every 4-6 hours:  If on nasal continuous positive airway pressure, respiratory therapy assess nares and determine need for appliance change or resting period.   8/21/2023 1555 by Burt Tobin RN  Outcome: Progressing

## 2023-08-23 VITALS
BODY MASS INDEX: 38.71 KG/M2 | TEMPERATURE: 97.6 F | SYSTOLIC BLOOD PRESSURE: 137 MMHG | DIASTOLIC BLOOD PRESSURE: 76 MMHG | HEART RATE: 76 BPM | HEIGHT: 65 IN | WEIGHT: 232.37 LBS | RESPIRATION RATE: 20 BRPM | OXYGEN SATURATION: 100 %

## 2023-08-23 LAB
ACHR AB SER-SCNC: <0.03 NMOL/L (ref 0–0.24)
ACHR BLOCK AB SER-ACNC: 13 % (ref 0–25)
ACHR MOD AB/ACHR TOTAL SFR SER: NORMAL %
ACHR MODULATING AB: 0 % (ref 0–45)
GLUCOSE BLD STRIP.AUTO-MCNC: 133 MG/DL (ref 70–110)
GLUCOSE BLD STRIP.AUTO-MCNC: 144 MG/DL (ref 70–110)

## 2023-08-23 PROCEDURE — 6370000000 HC RX 637 (ALT 250 FOR IP): Performed by: INTERNAL MEDICINE

## 2023-08-23 PROCEDURE — 2580000003 HC RX 258: Performed by: INTERNAL MEDICINE

## 2023-08-23 PROCEDURE — 82962 GLUCOSE BLOOD TEST: CPT

## 2023-08-23 PROCEDURE — 2700000000 HC OXYGEN THERAPY PER DAY

## 2023-08-23 PROCEDURE — 94640 AIRWAY INHALATION TREATMENT: CPT

## 2023-08-23 PROCEDURE — 6360000002 HC RX W HCPCS: Performed by: INTERNAL MEDICINE

## 2023-08-23 RX ORDER — FERROUS SULFATE 325(65) MG
325 TABLET ORAL 2 TIMES DAILY WITH MEALS
Qty: 30 TABLET | Refills: 0
Start: 2023-08-23

## 2023-08-23 RX ORDER — IPRATROPIUM BROMIDE AND ALBUTEROL SULFATE 2.5; .5 MG/3ML; MG/3ML
3 SOLUTION RESPIRATORY (INHALATION) EVERY 4 HOURS PRN
Qty: 360 ML | Refills: 0
Start: 2023-08-23

## 2023-08-23 RX ORDER — INSULIN GLARGINE 100 [IU]/ML
14 INJECTION, SOLUTION SUBCUTANEOUS NIGHTLY
Qty: 10 ML | Refills: 0
Start: 2023-08-23

## 2023-08-23 RX ADMIN — PANTOPRAZOLE SODIUM 40 MG: 40 TABLET, DELAYED RELEASE ORAL at 06:18

## 2023-08-23 RX ADMIN — SODIUM CHLORIDE, PRESERVATIVE FREE 10 ML: 5 INJECTION INTRAVENOUS at 09:10

## 2023-08-23 RX ADMIN — RANOLAZINE 500 MG: 500 TABLET, FILM COATED, EXTENDED RELEASE ORAL at 09:05

## 2023-08-23 RX ADMIN — FOLIC ACID 1 MG: 1 TABLET ORAL at 09:06

## 2023-08-23 RX ADMIN — FERROUS SULFATE TAB 325 MG (65 MG ELEMENTAL FE) 325 MG: 325 (65 FE) TAB at 09:06

## 2023-08-23 RX ADMIN — DULOXETINE HYDROCHLORIDE 30 MG: 30 CAPSULE, DELAYED RELEASE ORAL at 09:06

## 2023-08-23 RX ADMIN — BUDESONIDE INHALATION 500 MCG: 0.5 SUSPENSION RESPIRATORY (INHALATION) at 07:04

## 2023-08-23 RX ADMIN — METOPROLOL TARTRATE 25 MG: 25 TABLET, FILM COATED ORAL at 09:06

## 2023-08-23 RX ADMIN — ARFORMOTEROL TARTRATE 15 MCG: 15 SOLUTION RESPIRATORY (INHALATION) at 07:04

## 2023-08-23 NOTE — DISCHARGE SUMMARY
[facility] SUMMARY    Name:  Shannon Hampton  MR#:   [id number]  :  1955  ACCOUNT #:  [de-identified]  ADMIT DATE:  2023  DISCHARGE DATE:  2023    DISCHARGE DIAGNOSES:  1. Shortness of breath with exertion. 2.  Asthma and chronic obstructive pulmonary disease overlap. 3.  Coronary artery disease with previous stenting. 4.  Chronic kidney disease. 5.  Diabetes mellitus. 6.  Rheumatoid arthritis. 7.  Severe weakness and muscle deconditioning. HOSPITAL CONSULTANTS:  Dr. Isaiah Strong, Pulmonology. Dr. Rody Lozano, Cardiology. Dr. Helen Ha, Neurology. HOSPITAL SUMMARY:  The patient was admitted to the hospital on the aforementioned date. He is a gentleman, who has been progressively weakening at home. He does have an extensive medical history as noted above and he came in with shortness of breath with minimal exertion. He had chest pain radiating into his left arm, similar to a prior MI, but did not seek help, apparently that was one and a half weeks before this admission. The patient does not use a CPAP machine. He recently stopped inhalers for his COPD due to something with his mouth. So well here, he was seen by the aforementioned consultants. Pulmonology followed and recommended that he needed continued nebulizer treatments, physical conditioning, nighttime oxygen at least.  He has declined CPAP therapy. He had been scheduled for EMG as an outpatient that cannot be done as an inpatient. He was not able to get to workups for that due to weakness. He does have chronic diastolic heart failure and is on Lasix. He has chronic anemia, but his I83 and folic acid are normal.  His iron is low. He is now on oral iron therapy and he has a history of smoking for 22 pack-years. He stopped in . Per Cardiology, he was seen with echocardiogram and medication recommendations were updated. Echo showed normal systolic and diastolic function.   It would appear that his dyspnea

## 2023-08-27 LAB
14-3-3 ETA AG SER IA-MCNC: <0.2 NG/ML
CCP IGA+IGG SERPL IA-ACNC: NORMAL UNITS
RHEUMATOID FACT SERPL-ACNC: <14 UNITS/ML

## 2023-08-28 LAB
14-3-3 ETA AG SER IA-MCNC: <0.2 NG/ML
CCP IGA+IGG SERPL IA-ACNC: 40 UNITS
RHEUMATOID FACT SERPL-ACNC: <14 UNITS/ML

## 2023-09-12 ENCOUNTER — APPOINTMENT (OUTPATIENT)
Facility: HOSPITAL | Age: 68
End: 2023-09-12
Payer: MEDICARE

## 2023-09-12 ENCOUNTER — HOSPITAL ENCOUNTER (EMERGENCY)
Facility: HOSPITAL | Age: 68
Discharge: HOME | End: 2023-09-12
Attending: EMERGENCY MEDICINE
Payer: MEDICARE

## 2023-09-12 VITALS
HEART RATE: 52 BPM | BODY MASS INDEX: 35.82 KG/M2 | DIASTOLIC BLOOD PRESSURE: 69 MMHG | SYSTOLIC BLOOD PRESSURE: 128 MMHG | HEIGHT: 65 IN | WEIGHT: 215 LBS | TEMPERATURE: 98.7 F | OXYGEN SATURATION: 100 % | RESPIRATION RATE: 12 BRPM

## 2023-09-12 DIAGNOSIS — R53.83 OTHER FATIGUE: Primary | ICD-10-CM

## 2023-09-12 LAB
ALBUMIN SERPL-MCNC: 3 G/DL (ref 3.4–5)
ALBUMIN/GLOB SERPL: 1 (ref 0.8–1.7)
ALP SERPL-CCNC: 65 U/L (ref 45–117)
ALT SERPL-CCNC: 35 U/L (ref 16–61)
ANION GAP SERPL CALC-SCNC: 3 MMOL/L (ref 3–18)
APPEARANCE UR: CLEAR
AST SERPL-CCNC: 27 U/L (ref 10–38)
BASOPHILS # BLD: 0.1 K/UL (ref 0–0.1)
BASOPHILS NFR BLD: 1 % (ref 0–2)
BILIRUB SERPL-MCNC: 0.6 MG/DL (ref 0.2–1)
BILIRUB UR QL: NEGATIVE
BUN SERPL-MCNC: 17 MG/DL (ref 7–18)
BUN/CREAT SERPL: 9 (ref 12–20)
CALCIUM SERPL-MCNC: 8.6 MG/DL (ref 8.5–10.1)
CHLORIDE SERPL-SCNC: 100 MMOL/L (ref 100–111)
CO2 SERPL-SCNC: 33 MMOL/L (ref 21–32)
COLOR UR: YELLOW
CREAT SERPL-MCNC: 1.96 MG/DL (ref 0.6–1.3)
DIFFERENTIAL METHOD BLD: ABNORMAL
EOSINOPHIL # BLD: 0.6 K/UL (ref 0–0.4)
EOSINOPHIL NFR BLD: 8 % (ref 0–5)
ERYTHROCYTE [DISTWIDTH] IN BLOOD BY AUTOMATED COUNT: 25.2 % (ref 11.6–14.5)
GLOBULIN SER CALC-MCNC: 3 G/DL (ref 2–4)
GLUCOSE SERPL-MCNC: 151 MG/DL (ref 74–99)
GLUCOSE UR STRIP.AUTO-MCNC: NEGATIVE MG/DL
HCT VFR BLD AUTO: 32.6 % (ref 36–48)
HGB BLD-MCNC: 10.2 G/DL (ref 13–16)
HGB UR QL STRIP: NEGATIVE
IMM GRANULOCYTES # BLD AUTO: 0.1 K/UL (ref 0–0.04)
IMM GRANULOCYTES NFR BLD AUTO: 1 % (ref 0–0.5)
KETONES UR QL STRIP.AUTO: NEGATIVE MG/DL
LEUKOCYTE ESTERASE UR QL STRIP.AUTO: NEGATIVE
LYMPHOCYTES # BLD: 1.8 K/UL (ref 0.9–3.6)
LYMPHOCYTES NFR BLD: 22 % (ref 21–52)
MAGNESIUM SERPL-MCNC: 1.6 MG/DL (ref 1.6–2.6)
MCH RBC QN AUTO: 27 PG (ref 24–34)
MCHC RBC AUTO-ENTMCNC: 31.3 G/DL (ref 31–37)
MCV RBC AUTO: 86.2 FL (ref 78–100)
MONOCYTES # BLD: 1.2 K/UL (ref 0.05–1.2)
MONOCYTES NFR BLD: 15 % (ref 3–10)
NEUTS SEG # BLD: 4.3 K/UL (ref 1.8–8)
NEUTS SEG NFR BLD: 53 % (ref 40–73)
NITRITE UR QL STRIP.AUTO: NEGATIVE
NRBC # BLD: 0 K/UL (ref 0–0.01)
NRBC BLD-RTO: 0 PER 100 WBC
PH UR STRIP: 5 (ref 5–8)
PLATELET # BLD AUTO: 344 K/UL (ref 135–420)
PLATELET COMMENT: ABNORMAL
PMV BLD AUTO: 9.2 FL (ref 9.2–11.8)
POTASSIUM SERPL-SCNC: 4 MMOL/L (ref 3.5–5.5)
PROT SERPL-MCNC: 6 G/DL (ref 6.4–8.2)
PROT UR STRIP-MCNC: NEGATIVE MG/DL
RBC # BLD AUTO: 3.78 M/UL (ref 4.35–5.65)
RBC MORPH BLD: ABNORMAL
SARS-COV-2 RDRP RESP QL NAA+PROBE: NOT DETECTED
SODIUM SERPL-SCNC: 136 MMOL/L (ref 136–145)
SOURCE: NORMAL
SP GR UR REFRACTOMETRY: 1.01 (ref 1–1.03)
TROPONIN I SERPL HS-MCNC: 9 NG/L (ref 0–78)
TSH SERPL DL<=0.05 MIU/L-ACNC: 2.87 UIU/ML (ref 0.36–3.74)
UROBILINOGEN UR QL STRIP.AUTO: 0.2 EU/DL (ref 0.2–1)
WBC # BLD AUTO: 8.1 K/UL (ref 4.6–13.2)

## 2023-09-12 PROCEDURE — 85025 COMPLETE CBC W/AUTO DIFF WBC: CPT

## 2023-09-12 PROCEDURE — 81003 URINALYSIS AUTO W/O SCOPE: CPT

## 2023-09-12 PROCEDURE — 87635 SARS-COV-2 COVID-19 AMP PRB: CPT

## 2023-09-12 PROCEDURE — 71045 X-RAY EXAM CHEST 1 VIEW: CPT

## 2023-09-12 PROCEDURE — 99285 EMERGENCY DEPT VISIT HI MDM: CPT

## 2023-09-12 PROCEDURE — 80053 COMPREHEN METABOLIC PANEL: CPT

## 2023-09-12 PROCEDURE — 93005 ELECTROCARDIOGRAM TRACING: CPT | Performed by: EMERGENCY MEDICINE

## 2023-09-12 PROCEDURE — 84484 ASSAY OF TROPONIN QUANT: CPT

## 2023-09-12 PROCEDURE — 2580000003 HC RX 258: Performed by: EMERGENCY MEDICINE

## 2023-09-12 PROCEDURE — 83735 ASSAY OF MAGNESIUM: CPT

## 2023-09-12 PROCEDURE — 84443 ASSAY THYROID STIM HORMONE: CPT

## 2023-09-12 RX ORDER — 0.9 % SODIUM CHLORIDE 0.9 %
500 INTRAVENOUS SOLUTION INTRAVENOUS ONCE
Status: COMPLETED | OUTPATIENT
Start: 2023-09-12 | End: 2023-09-12

## 2023-09-12 RX ADMIN — SODIUM CHLORIDE 500 ML: 9 INJECTION, SOLUTION INTRAVENOUS at 13:27

## 2023-09-12 ASSESSMENT — PAIN - FUNCTIONAL ASSESSMENT: PAIN_FUNCTIONAL_ASSESSMENT: NONE - DENIES PAIN

## 2023-09-12 NOTE — ED NOTES
Sister Hoffman Brittany) notified that patient will be returning home, asked if she could inform patients son (caregiver).  Pat informed of patients medical status, permission from patient given, and states she will let his son know the ETA for arrival.      Sagrario Cisse RN  09/12/23 9650

## 2023-09-12 NOTE — ED TRIAGE NOTES
Generalized weakness/body aches starting yesterday, having increased difficulty ambulating and doing ADLs at home per son who is caregiver.  Pt states increased fatigue, no SOB/CP, no pain in any specific area

## 2023-09-13 LAB
EKG ATRIAL RATE: 52 BPM
EKG DIAGNOSIS: NORMAL
EKG P AXIS: 65 DEGREES
EKG P-R INTERVAL: 192 MS
EKG Q-T INTERVAL: 474 MS
EKG QRS DURATION: 84 MS
EKG QTC CALCULATION (BAZETT): 440 MS
EKG R AXIS: 36 DEGREES
EKG T AXIS: 74 DEGREES
EKG VENTRICULAR RATE: 52 BPM

## 2023-09-18 NOTE — ED PROVIDER NOTES
EMERGENCY DEPARTMENT HISTORY AND PHYSICAL EXAM      Date: 9/12/2023  Patient Name: Telma Mc    History of Presenting Illness     Chief Complaint   Patient presents with    Generalized Body Aches       History Provided By: Patient    HPI: Telma Mc, 69 y.o. male with PMHx as noted below presents the emergency department with chief complaint of generalized weakness and body aches. Reports increased fatigue at home. Pt denies any other alleviating or exacerbating factors. Additionally, pt specifically denies any recent fever, chills, headache, nausea, vomiting, abdominal pain, CP, SOB, lightheadedness, dizziness, numbness, weakness, BLE swelling, heart palpitations, urinary sxs, diarrhea, constipation, melena, hematochezia, cough, or congestion. PCP: COURTNEY Hamilton NP    No current facility-administered medications for this encounter.      Current Outpatient Medications   Medication Sig Dispense Refill    metoprolol tartrate (LOPRESSOR) 25 MG tablet Take 1 tablet by mouth 2 times daily 60 tablet 0    ipratropium 0.5 mg-albuterol 2.5 mg (DUONEB) 0.5-2.5 (3) MG/3ML SOLN nebulizer solution Inhale 3 mLs into the lungs every 4 hours as needed for Shortness of Breath 360 mL 0    insulin glargine (LANTUS) 100 UNIT/ML injection vial Inject 14 Units into the skin nightly 10 mL 0    ferrous sulfate (IRON 325) 325 (65 Fe) MG tablet Take 1 tablet by mouth 2 times daily (with meals) 30 tablet 0    aspirin 81 MG EC tablet Take 1 tablet by mouth daily      atorvastatin (LIPITOR) 40 MG tablet Take 1 tablet by mouth daily      DULoxetine (CYMBALTA) 30 MG extended release capsule Take 1 capsule by mouth daily      folic acid (FOLVITE) 1 MG tablet Take 1 tablet by mouth daily      furosemide (LASIX) 20 MG tablet Take 1 tablet by mouth daily      montelukast (SINGULAIR) 10 MG tablet Take 1 tablet by mouth      pantoprazole (PROTONIX) 40 MG tablet Take 1 tablet by mouth daily      ranolazine (RANEXA) 500 MG extended

## 2024-12-30 ENCOUNTER — HOSPITAL ENCOUNTER (INPATIENT)
Facility: HOSPITAL | Age: 69
LOS: 1 days | Discharge: HOME | DRG: 637 | End: 2025-01-01
Attending: EMERGENCY MEDICINE | Admitting: HOSPITALIST
Payer: MEDICARE

## 2024-12-30 ENCOUNTER — APPOINTMENT (OUTPATIENT)
Facility: HOSPITAL | Age: 69
DRG: 637 | End: 2024-12-30
Payer: MEDICARE

## 2024-12-30 DIAGNOSIS — E16.2 HYPOGLYCEMIA: Primary | ICD-10-CM

## 2024-12-30 DIAGNOSIS — E87.6 ACUTE HYPOKALEMIA: ICD-10-CM

## 2024-12-30 DIAGNOSIS — N18.32 STAGE 3B CHRONIC KIDNEY DISEASE (HCC): ICD-10-CM

## 2024-12-30 DIAGNOSIS — U07.1 COVID: ICD-10-CM

## 2024-12-30 PROBLEM — R00.1 SINUS BRADYCARDIA: Status: ACTIVE | Noted: 2024-12-30

## 2024-12-30 LAB
ALBUMIN SERPL-MCNC: 2.9 G/DL (ref 3.4–5)
ALBUMIN/GLOB SERPL: 0.9 (ref 0.8–1.7)
ALP SERPL-CCNC: 128 U/L (ref 45–117)
ALT SERPL-CCNC: 25 U/L (ref 16–61)
ANION GAP SERPL CALC-SCNC: 7 MMOL/L (ref 3–18)
AST SERPL-CCNC: 42 U/L (ref 10–38)
BASOPHILS # BLD: 0 K/UL (ref 0–0.1)
BASOPHILS NFR BLD: 0 % (ref 0–2)
BILIRUB SERPL-MCNC: 0.5 MG/DL (ref 0.2–1)
BUN SERPL-MCNC: 18 MG/DL (ref 7–18)
BUN/CREAT SERPL: 10 (ref 12–20)
CALCIUM SERPL-MCNC: 8.2 MG/DL (ref 8.5–10.1)
CHLORIDE SERPL-SCNC: 100 MMOL/L (ref 100–111)
CHP ED QC CHECK: YES
CO2 SERPL-SCNC: 27 MMOL/L (ref 21–32)
CREAT SERPL-MCNC: 1.8 MG/DL (ref 0.6–1.3)
DIFFERENTIAL METHOD BLD: ABNORMAL
EOSINOPHIL # BLD: 0 K/UL (ref 0–0.4)
EOSINOPHIL NFR BLD: 1 % (ref 0–5)
ERYTHROCYTE [DISTWIDTH] IN BLOOD BY AUTOMATED COUNT: 14.3 % (ref 11.6–14.5)
FLUAV AG NPH QL IA: NEGATIVE
FLUBV AG NOSE QL IA: NEGATIVE
GLOBULIN SER CALC-MCNC: 3.4 G/DL (ref 2–4)
GLUCOSE BLD STRIP.AUTO-MCNC: 114 MG/DL (ref 70–110)
GLUCOSE BLD STRIP.AUTO-MCNC: 127 MG/DL (ref 70–110)
GLUCOSE BLD STRIP.AUTO-MCNC: 152 MG/DL (ref 70–110)
GLUCOSE BLD STRIP.AUTO-MCNC: 171 MG/DL (ref 70–110)
GLUCOSE BLD STRIP.AUTO-MCNC: 49 MG/DL (ref 70–110)
GLUCOSE BLD STRIP.AUTO-MCNC: 51 MG/DL (ref 70–110)
GLUCOSE BLD STRIP.AUTO-MCNC: 54 MG/DL (ref 70–110)
GLUCOSE BLD STRIP.AUTO-MCNC: 65 MG/DL (ref 70–110)
GLUCOSE BLD STRIP.AUTO-MCNC: 83 MG/DL (ref 70–110)
GLUCOSE BLD STRIP.AUTO-MCNC: 85 MG/DL (ref 70–110)
GLUCOSE BLD STRIP.AUTO-MCNC: 86 MG/DL (ref 70–110)
GLUCOSE BLD-MCNC: 49 MG/DL
GLUCOSE SERPL-MCNC: 86 MG/DL (ref 74–99)
HCT VFR BLD AUTO: 31.8 % (ref 36–48)
HGB BLD-MCNC: 10.5 G/DL (ref 13–16)
IMM GRANULOCYTES # BLD AUTO: 0.1 K/UL (ref 0–0.04)
IMM GRANULOCYTES NFR BLD AUTO: 1 % (ref 0–0.5)
LYMPHOCYTES # BLD: 1.1 K/UL (ref 0.9–3.6)
LYMPHOCYTES NFR BLD: 15 % (ref 21–52)
MAGNESIUM SERPL-MCNC: 1.9 MG/DL (ref 1.6–2.6)
MCH RBC QN AUTO: 30.1 PG (ref 24–34)
MCHC RBC AUTO-ENTMCNC: 33 G/DL (ref 31–37)
MCV RBC AUTO: 91.1 FL (ref 78–100)
MONOCYTES # BLD: 1.1 K/UL (ref 0.05–1.2)
MONOCYTES NFR BLD: 14 % (ref 3–10)
NEUTS SEG # BLD: 5.5 K/UL (ref 1.8–8)
NEUTS SEG NFR BLD: 70 % (ref 40–73)
NRBC # BLD: 0 K/UL (ref 0–0.01)
NRBC BLD-RTO: 0 PER 100 WBC
PLATELET # BLD AUTO: 164 K/UL (ref 135–420)
PMV BLD AUTO: 9.5 FL (ref 9.2–11.8)
POTASSIUM SERPL-SCNC: 3.3 MMOL/L (ref 3.5–5.5)
PROT SERPL-MCNC: 6.3 G/DL (ref 6.4–8.2)
RBC # BLD AUTO: 3.49 M/UL (ref 4.35–5.65)
SARS-COV-2 RDRP RESP QL NAA+PROBE: DETECTED
SODIUM SERPL-SCNC: 134 MMOL/L (ref 136–145)
SOURCE: ABNORMAL
TROPONIN I SERPL HS-MCNC: 8 NG/L (ref 0–78)
TSH SERPL DL<=0.05 MIU/L-ACNC: 2.4 UIU/ML (ref 0.36–3.74)
WBC # BLD AUTO: 7.8 K/UL (ref 4.6–13.2)

## 2024-12-30 PROCEDURE — 96372 THER/PROPH/DIAG INJ SC/IM: CPT

## 2024-12-30 PROCEDURE — 96375 TX/PRO/DX INJ NEW DRUG ADDON: CPT

## 2024-12-30 PROCEDURE — 2500000003 HC RX 250 WO HCPCS: Performed by: HOSPITALIST

## 2024-12-30 PROCEDURE — 96365 THER/PROPH/DIAG IV INF INIT: CPT

## 2024-12-30 PROCEDURE — 82533 TOTAL CORTISOL: CPT

## 2024-12-30 PROCEDURE — 87635 SARS-COV-2 COVID-19 AMP PRB: CPT

## 2024-12-30 PROCEDURE — 2580000003 HC RX 258: Performed by: EMERGENCY MEDICINE

## 2024-12-30 PROCEDURE — 83036 HEMOGLOBIN GLYCOSYLATED A1C: CPT

## 2024-12-30 PROCEDURE — 36415 COLL VENOUS BLD VENIPUNCTURE: CPT

## 2024-12-30 PROCEDURE — 71045 X-RAY EXAM CHEST 1 VIEW: CPT

## 2024-12-30 PROCEDURE — 2500000003 HC RX 250 WO HCPCS: Performed by: EMERGENCY MEDICINE

## 2024-12-30 PROCEDURE — 87804 INFLUENZA ASSAY W/OPTIC: CPT

## 2024-12-30 PROCEDURE — 84484 ASSAY OF TROPONIN QUANT: CPT

## 2024-12-30 PROCEDURE — G0378 HOSPITAL OBSERVATION PER HR: HCPCS

## 2024-12-30 PROCEDURE — 96374 THER/PROPH/DIAG INJ IV PUSH: CPT

## 2024-12-30 PROCEDURE — 85025 COMPLETE CBC W/AUTO DIFF WBC: CPT

## 2024-12-30 PROCEDURE — 84443 ASSAY THYROID STIM HORMONE: CPT

## 2024-12-30 PROCEDURE — 6370000000 HC RX 637 (ALT 250 FOR IP): Performed by: HOSPITALIST

## 2024-12-30 PROCEDURE — 6360000002 HC RX W HCPCS: Performed by: HOSPITALIST

## 2024-12-30 PROCEDURE — 80053 COMPREHEN METABOLIC PANEL: CPT

## 2024-12-30 PROCEDURE — 96376 TX/PRO/DX INJ SAME DRUG ADON: CPT

## 2024-12-30 PROCEDURE — 99285 EMERGENCY DEPT VISIT HI MDM: CPT

## 2024-12-30 PROCEDURE — 83735 ASSAY OF MAGNESIUM: CPT

## 2024-12-30 PROCEDURE — 96361 HYDRATE IV INFUSION ADD-ON: CPT

## 2024-12-30 PROCEDURE — 82962 GLUCOSE BLOOD TEST: CPT

## 2024-12-30 PROCEDURE — 6360000002 HC RX W HCPCS: Performed by: EMERGENCY MEDICINE

## 2024-12-30 PROCEDURE — 96366 THER/PROPH/DIAG IV INF ADDON: CPT

## 2024-12-30 RX ORDER — ENOXAPARIN SODIUM 100 MG/ML
40 INJECTION SUBCUTANEOUS DAILY
Status: DISCONTINUED | OUTPATIENT
Start: 2024-12-30 | End: 2024-12-30

## 2024-12-30 RX ORDER — ATORVASTATIN CALCIUM 20 MG/1
40 TABLET, FILM COATED ORAL NIGHTLY
Status: DISCONTINUED | OUTPATIENT
Start: 2024-12-30 | End: 2024-12-30

## 2024-12-30 RX ORDER — GLIPIZIDE 10 MG/1
10 TABLET ORAL
Status: ON HOLD | COMMUNITY
End: 2025-01-01 | Stop reason: HOSPADM

## 2024-12-30 RX ORDER — RANOLAZINE 500 MG/1
500 TABLET, EXTENDED RELEASE ORAL 2 TIMES DAILY
Status: DISCONTINUED | OUTPATIENT
Start: 2024-12-30 | End: 2024-12-30

## 2024-12-30 RX ORDER — DEXTROSE MONOHYDRATE 100 MG/ML
INJECTION, SOLUTION INTRAVENOUS CONTINUOUS
Status: DISCONTINUED | OUTPATIENT
Start: 2024-12-30 | End: 2024-12-31

## 2024-12-30 RX ORDER — HYDRALAZINE HYDROCHLORIDE 20 MG/ML
10 INJECTION INTRAMUSCULAR; INTRAVENOUS EVERY 6 HOURS PRN
Status: DISCONTINUED | OUTPATIENT
Start: 2024-12-30 | End: 2025-01-01 | Stop reason: HOSPADM

## 2024-12-30 RX ORDER — NITROGLYCERIN 0.4 MG/1
0.4 TABLET SUBLINGUAL EVERY 5 MIN PRN
COMMUNITY

## 2024-12-30 RX ORDER — GUAIFENESIN 600 MG/1
600 TABLET, EXTENDED RELEASE ORAL 2 TIMES DAILY
Status: DISCONTINUED | OUTPATIENT
Start: 2024-12-30 | End: 2025-01-01 | Stop reason: HOSPADM

## 2024-12-30 RX ORDER — IPRATROPIUM BROMIDE AND ALBUTEROL SULFATE 2.5; .5 MG/3ML; MG/3ML
1 SOLUTION RESPIRATORY (INHALATION)
Status: DISCONTINUED | OUTPATIENT
Start: 2024-12-30 | End: 2024-12-30

## 2024-12-30 RX ORDER — DULOXETIN HYDROCHLORIDE 30 MG/1
30 CAPSULE, DELAYED RELEASE ORAL DAILY
Status: DISCONTINUED | OUTPATIENT
Start: 2024-12-30 | End: 2025-01-01 | Stop reason: HOSPADM

## 2024-12-30 RX ORDER — ONDANSETRON 2 MG/ML
4 INJECTION INTRAMUSCULAR; INTRAVENOUS
Status: COMPLETED | OUTPATIENT
Start: 2024-12-30 | End: 2024-12-30

## 2024-12-30 RX ORDER — SODIUM CHLORIDE 0.9 % (FLUSH) 0.9 %
5-40 SYRINGE (ML) INJECTION EVERY 12 HOURS SCHEDULED
Status: DISCONTINUED | OUTPATIENT
Start: 2024-12-30 | End: 2025-01-01 | Stop reason: HOSPADM

## 2024-12-30 RX ORDER — PREDNISONE 20 MG/1
40 TABLET ORAL DAILY
Status: DISCONTINUED | OUTPATIENT
Start: 2024-12-31 | End: 2024-12-31

## 2024-12-30 RX ORDER — GUAIFENESIN 200 MG/10ML
200 LIQUID ORAL EVERY 4 HOURS PRN
Status: DISCONTINUED | OUTPATIENT
Start: 2024-12-30 | End: 2025-01-01 | Stop reason: HOSPADM

## 2024-12-30 RX ORDER — PROPRANOLOL HYDROCHLORIDE 60 MG/1
60 CAPSULE, EXTENDED RELEASE ORAL DAILY
Status: ON HOLD | COMMUNITY
End: 2025-01-01 | Stop reason: HOSPADM

## 2024-12-30 RX ORDER — DULOXETIN HYDROCHLORIDE 30 MG/1
30 CAPSULE, DELAYED RELEASE ORAL DAILY
Status: DISCONTINUED | OUTPATIENT
Start: 2024-12-30 | End: 2024-12-30

## 2024-12-30 RX ORDER — ASPIRIN 81 MG/1
81 TABLET, CHEWABLE ORAL DAILY
Status: DISCONTINUED | OUTPATIENT
Start: 2024-12-30 | End: 2024-12-30

## 2024-12-30 RX ORDER — ALBUTEROL SULFATE 90 UG/1
1 INHALANT RESPIRATORY (INHALATION) EVERY 4 HOURS PRN
COMMUNITY

## 2024-12-30 RX ORDER — DEXTROSE MONOHYDRATE 25 G/50ML
25 INJECTION, SOLUTION INTRAVENOUS PRN
Status: DISCONTINUED | OUTPATIENT
Start: 2024-12-30 | End: 2025-01-01 | Stop reason: HOSPADM

## 2024-12-30 RX ORDER — PANTOPRAZOLE SODIUM 40 MG/1
40 TABLET, DELAYED RELEASE ORAL DAILY
Status: DISCONTINUED | OUTPATIENT
Start: 2024-12-30 | End: 2025-01-01 | Stop reason: HOSPADM

## 2024-12-30 RX ORDER — NITROGLYCERIN 0.4 MG/1
0.4 TABLET SUBLINGUAL EVERY 5 MIN PRN
Status: DISCONTINUED | OUTPATIENT
Start: 2024-12-30 | End: 2025-01-01 | Stop reason: HOSPADM

## 2024-12-30 RX ORDER — DEXTROSE MONOHYDRATE 25 G/50ML
25 INJECTION, SOLUTION INTRAVENOUS ONCE
Status: COMPLETED | OUTPATIENT
Start: 2024-12-30 | End: 2024-12-30

## 2024-12-30 RX ORDER — POLYETHYLENE GLYCOL 3350 17 G/17G
17 POWDER, FOR SOLUTION ORAL DAILY PRN
Status: DISCONTINUED | OUTPATIENT
Start: 2024-12-30 | End: 2025-01-01 | Stop reason: HOSPADM

## 2024-12-30 RX ORDER — ONDANSETRON 2 MG/ML
4 INJECTION INTRAMUSCULAR; INTRAVENOUS EVERY 6 HOURS PRN
Status: DISCONTINUED | OUTPATIENT
Start: 2024-12-30 | End: 2025-01-01 | Stop reason: HOSPADM

## 2024-12-30 RX ORDER — ALBUTEROL SULFATE 0.83 MG/ML
SOLUTION RESPIRATORY (INHALATION) EVERY 4 HOURS PRN
Status: DISCONTINUED | OUTPATIENT
Start: 2024-12-30 | End: 2024-12-31 | Stop reason: SDUPTHER

## 2024-12-30 RX ORDER — SODIUM CHLORIDE 0.9 % (FLUSH) 0.9 %
5-40 SYRINGE (ML) INJECTION PRN
Status: DISCONTINUED | OUTPATIENT
Start: 2024-12-30 | End: 2025-01-01 | Stop reason: HOSPADM

## 2024-12-30 RX ORDER — ACETAMINOPHEN 650 MG/1
650 SUPPOSITORY RECTAL EVERY 6 HOURS PRN
Status: DISCONTINUED | OUTPATIENT
Start: 2024-12-30 | End: 2025-01-01 | Stop reason: HOSPADM

## 2024-12-30 RX ORDER — SODIUM CHLORIDE 9 MG/ML
INJECTION, SOLUTION INTRAVENOUS PRN
Status: DISCONTINUED | OUTPATIENT
Start: 2024-12-30 | End: 2025-01-01 | Stop reason: HOSPADM

## 2024-12-30 RX ORDER — ACETAMINOPHEN 325 MG/1
650 TABLET ORAL EVERY 6 HOURS PRN
Status: DISCONTINUED | OUTPATIENT
Start: 2024-12-30 | End: 2025-01-01 | Stop reason: HOSPADM

## 2024-12-30 RX ORDER — PANTOPRAZOLE SODIUM 40 MG/1
40 TABLET, DELAYED RELEASE ORAL
Status: DISCONTINUED | OUTPATIENT
Start: 2024-12-31 | End: 2024-12-30

## 2024-12-30 RX ORDER — ONDANSETRON 4 MG/1
4 TABLET, ORALLY DISINTEGRATING ORAL EVERY 8 HOURS PRN
Status: DISCONTINUED | OUTPATIENT
Start: 2024-12-30 | End: 2025-01-01 | Stop reason: HOSPADM

## 2024-12-30 RX ORDER — MONTELUKAST SODIUM 10 MG/1
10 TABLET ORAL NIGHTLY
Status: DISCONTINUED | OUTPATIENT
Start: 2024-12-30 | End: 2024-12-30

## 2024-12-30 RX ORDER — MONTELUKAST SODIUM 10 MG/1
10 TABLET ORAL NIGHTLY
Status: DISCONTINUED | OUTPATIENT
Start: 2024-12-30 | End: 2025-01-01 | Stop reason: HOSPADM

## 2024-12-30 RX ORDER — ATORVASTATIN CALCIUM 20 MG/1
40 TABLET, FILM COATED ORAL DAILY
Status: DISCONTINUED | OUTPATIENT
Start: 2024-12-30 | End: 2025-01-01 | Stop reason: HOSPADM

## 2024-12-30 RX ORDER — IPRATROPIUM BROMIDE AND ALBUTEROL SULFATE 2.5; .5 MG/3ML; MG/3ML
1 SOLUTION RESPIRATORY (INHALATION) EVERY 4 HOURS PRN
Status: DISCONTINUED | OUTPATIENT
Start: 2024-12-30 | End: 2025-01-01 | Stop reason: HOSPADM

## 2024-12-30 RX ORDER — ALBUTEROL SULFATE 0.83 MG/ML
SOLUTION RESPIRATORY (INHALATION) EVERY 4 HOURS PRN
Status: DISCONTINUED | OUTPATIENT
Start: 2024-12-30 | End: 2025-01-01 | Stop reason: HOSPADM

## 2024-12-30 RX ORDER — CELECOXIB 200 MG/1
200 CAPSULE ORAL DAILY
Status: ON HOLD | COMMUNITY
End: 2025-01-01 | Stop reason: HOSPADM

## 2024-12-30 RX ORDER — ASPIRIN 81 MG/1
81 TABLET ORAL DAILY
Status: DISCONTINUED | OUTPATIENT
Start: 2024-12-30 | End: 2025-01-01 | Stop reason: HOSPADM

## 2024-12-30 RX ADMIN — ACETAMINOPHEN 650 MG: 325 TABLET ORAL at 13:13

## 2024-12-30 RX ADMIN — DULOXETINE HYDROCHLORIDE 30 MG: 30 CAPSULE, DELAYED RELEASE ORAL at 13:39

## 2024-12-30 RX ADMIN — APIXABAN 5 MG: 5 TABLET, FILM COATED ORAL at 20:36

## 2024-12-30 RX ADMIN — ASPIRIN 81 MG: 81 TABLET, COATED ORAL at 18:52

## 2024-12-30 RX ADMIN — DEXTROSE MONOHYDRATE 250 ML: 100 INJECTION, SOLUTION INTRAVENOUS at 07:16

## 2024-12-30 RX ADMIN — ONDANSETRON 4 MG: 2 INJECTION INTRAMUSCULAR; INTRAVENOUS at 13:05

## 2024-12-30 RX ADMIN — DEXTROSE MONOHYDRATE 25 G: 25 INJECTION, SOLUTION INTRAVENOUS at 06:35

## 2024-12-30 RX ADMIN — POTASSIUM BICARBONATE 40 MEQ: 782 TABLET, EFFERVESCENT ORAL at 13:05

## 2024-12-30 RX ADMIN — ASPIRIN 81 MG: 81 TABLET, CHEWABLE ORAL at 11:32

## 2024-12-30 RX ADMIN — DULOXETINE HYDROCHLORIDE 30 MG: 30 CAPSULE, DELAYED RELEASE ORAL at 18:52

## 2024-12-30 RX ADMIN — PANTOPRAZOLE SODIUM 40 MG: 40 TABLET, DELAYED RELEASE ORAL at 18:52

## 2024-12-30 RX ADMIN — ENOXAPARIN SODIUM 40 MG: 100 INJECTION SUBCUTANEOUS at 11:32

## 2024-12-30 RX ADMIN — DEXTROSE MONOHYDRATE 250 ML: 100 INJECTION, SOLUTION INTRAVENOUS at 09:50

## 2024-12-30 RX ADMIN — ATORVASTATIN CALCIUM 40 MG: 20 TABLET, FILM COATED ORAL at 18:52

## 2024-12-30 RX ADMIN — ONDANSETRON 4 MG: 4 TABLET, ORALLY DISINTEGRATING ORAL at 19:32

## 2024-12-30 RX ADMIN — SODIUM CHLORIDE, PRESERVATIVE FREE 10 ML: 5 INJECTION INTRAVENOUS at 11:34

## 2024-12-30 RX ADMIN — GUAIFENESIN 600 MG: 600 TABLET, EXTENDED RELEASE ORAL at 20:36

## 2024-12-30 RX ADMIN — DEXTROSE MONOHYDRATE: 100 INJECTION, SOLUTION INTRAVENOUS at 09:49

## 2024-12-30 RX ADMIN — MONTELUKAST 10 MG: 10 TABLET, FILM COATED ORAL at 20:36

## 2024-12-30 RX ADMIN — SODIUM CHLORIDE, PRESERVATIVE FREE 10 ML: 5 INJECTION INTRAVENOUS at 20:40

## 2024-12-30 ASSESSMENT — PAIN SCALES - GENERAL: PAINLEVEL_OUTOF10: 0

## 2024-12-30 ASSESSMENT — PAIN - FUNCTIONAL ASSESSMENT: PAIN_FUNCTIONAL_ASSESSMENT: 0-10

## 2024-12-30 ASSESSMENT — PAIN DESCRIPTION - LOCATION: LOCATION: THROAT

## 2024-12-30 NOTE — ED PROVIDER NOTES
EMERGENCY DEPARTMENT HISTORY AND PHYSICAL EXAM      Date: 12/30/2024  Patient Name: Reinier Mcclure      History of Presenting Illness     Chief Complaint   Patient presents with    Hypoglycemia    Bradycardia       Location/Duration/Severity/Modifying factors   Chief Complaint   Patient presents with    Hypoglycemia    Bradycardia       HPI:  Reinier Mcclure is a 69 y.o. male with PMH significant for non-insulin Diabetes Mellitus presents via EMS with symptomatic hypoglycemia. Recalls going to bed, falling asleep, woke up on the floor \"fighting\" with the paramedics. Pt lives with his son who was present. Pt  denies skipping any meals yesterday. Takes Glipizide only for DM in the morning. Denies ever having issues with low blood sugar. Denies recent changes in dosing. Does recall feeling \"funny\" last night. Pt feeling better now. Denies CP after waking up. Treatments attempted at home include none. EMS administered D50 250 mL for glucose 36 which brought glucose up to 169. Pt was also bradycardic to 30s. 1 mg Atropine was administered which increased HR.    PCP: Cecilia Figueroa APRN - NP    No current facility-administered medications for this encounter.     Current Outpatient Medications   Medication Sig Dispense Refill    metoprolol tartrate (LOPRESSOR) 25 MG tablet Take 1 tablet by mouth 2 times daily 60 tablet 0    ipratropium 0.5 mg-albuterol 2.5 mg (DUONEB) 0.5-2.5 (3) MG/3ML SOLN nebulizer solution Inhale 3 mLs into the lungs every 4 hours as needed for Shortness of Breath 360 mL 0    insulin glargine (LANTUS) 100 UNIT/ML injection vial Inject 14 Units into the skin nightly 10 mL 0    ferrous sulfate (IRON 325) 325 (65 Fe) MG tablet Take 1 tablet by mouth 2 times daily (with meals) 30 tablet 0    aspirin 81 MG EC tablet Take 1 tablet by mouth daily      atorvastatin (LIPITOR) 40 MG tablet Take 1 tablet by mouth daily      DULoxetine (CYMBALTA) 30 MG extended release capsule Take 1 capsule by mouth daily

## 2024-12-30 NOTE — ED TRIAGE NOTES
Pt arrived via EMS from home. EMS reports the pt was altered with a BGL of 36, D10 250ml was given and BGL came up to 169. Pt then felt nauseous and unwell, pt's HR decreased from 90 to 30, 1mg of Atropine given and 4mg Zofran, pt now 69. Pt A&Ox4. Pt denies CP. Pt on 4L of O2 at baseline.

## 2024-12-30 NOTE — ED NOTES
Patient alert and oriented x4 upon initial assessment. NAD noted. VSS. Patient sitting comfortably in bed. Pt on cardiac monitor. Patient eating john crackers and drinking juice, states throat feels dry. Patient BS checked and provider made aware. Will administer new medication ordered per provider. Call bell within reach.

## 2024-12-30 NOTE — H&P
intake/output data recorded.   No intake/output data recorded.  General:  Awake, alert, oriented x 4, cooperative, in no distress  CV:  RRR, S1S2.     Resp:  Decreased air movement, rhonchorous cough. No tachypnea or distress   On 3L NC  GI:  Soft, nondistended, nontender to palpation.      Extrem:  No c/c/e.     Neuro:  CN 2-12 intact, speech and mentation appropriate.        Laboratory Data  (current)    Lab Results   Component Value Date/Time     12/30/2024 05:36 AM    K 3.3 12/30/2024 05:36 AM     12/30/2024 05:36 AM    CO2 27 12/30/2024 05:36 AM    BUN 18 12/30/2024 05:36 AM    CREATININE 1.80 12/30/2024 05:36 AM    GLUCOSE 49 12/30/2024 06:30 AM    CALCIUM 8.2 12/30/2024 05:36 AM    BILITOT 0.5 12/30/2024 05:36 AM    AST 42 12/30/2024 05:36 AM    ALT 25 12/30/2024 05:36 AM      Glucose x 3:   Recent Labs     12/30/24  0536 12/30/24  0630   GLUCOSE 86 49      POC Glucose:   Lab Results   Component Value Date/Time    POCGLU 114 12/30/2024 10:48 AM      Last 3 POC Glucose:   Recent Labs     12/30/24  0813 12/30/24  0940 12/30/24  1048   POCGLU 85 65* 114*      Last 3 CK, CKMB, Troponin: No results for input(s): \"CKTOTAL\", \"CKMB\", \"TROPONINI\" in the last 72 hours.   CBC:   Lab Results   Component Value Date/Time    WBC 7.8 12/30/2024 05:36 AM    RBC 3.49 12/30/2024 05:36 AM    HGB 10.5 12/30/2024 05:36 AM    HCT 31.8 12/30/2024 05:36 AM    MCV 91.1 12/30/2024 05:36 AM    MCH 30.1 12/30/2024 05:36 AM    MCHC 33.0 12/30/2024 05:36 AM    RDW 14.3 12/30/2024 05:36 AM     12/30/2024 05:36 AM    MPV 9.5 12/30/2024 05:36 AM      Last 3 Hemoglobin/Hematocrit:   Recent Labs     12/30/24  0536   HGB 10.5*   HCT 31.8*      Recent Labs     12/30/24  0536   ALKPHOS 128*   ALT 25   AST 42*   BILITOT 0.5      Albumin: No results found for: \"LABALBU\"   Calcium:   Lab Results   Component Value Date/Time    CALCIUM 8.2 12/30/2024 05:36 AM      Ionized Calcium: No components found for: \"IONCA\"

## 2024-12-30 NOTE — ED NOTES
Pt's BGL was rechecked, hypoglycemia noted. Provider notified, new orders placed. Pt given apple juice and john crackers.

## 2024-12-30 NOTE — ED PROVIDER NOTES
EMERGENCY DEPARTMENT HISTORY AND PHYSICAL EXAM      Date: 12/30/2024  Patient Name: Reinier Mcclure    History of Presenting Illness     Chief Complaint   Patient presents with    Hypoglycemia    Bradycardia     Received signout on patient from Dr. Perez.  History Provided By: Patient    HPI: Reinier Mcclure, 69 y.o. male with PMHx as noted below presents the emergency department for evaluation of hypoglycemia.  Patient states he has been feeling generally unwell for the last couple days with cough congestion and fatigue.  Patient was found altered this morning and on EMS arrival had blood glucose of 36.  Was given D10 prior to arrival with improvement.    PCP: Cecilia Figueroa, APRN - NP    Current Facility-Administered Medications   Medication Dose Route Frequency Provider Last Rate Last Admin    dextrose 10 % infusion   IntraVENous Continuous Woo Swenson  mL/hr at 12/30/24 0949 New Bag at 12/30/24 0949    sodium chloride flush 0.9 % injection 5-40 mL  5-40 mL IntraVENous 2 times per day Anne Marie Bentley,    10 mL at 12/30/24 1134    sodium chloride flush 0.9 % injection 5-40 mL  5-40 mL IntraVENous PRN Anne Marie Bentley, DO        0.9 % sodium chloride infusion   IntraVENous PRN Ximena Bentleyica, DO        ondansetron (ZOFRAN-ODT) disintegrating tablet 4 mg  4 mg Oral Q8H PRN Anne Marie Bentley, DO        Or    ondansetron (ZOFRAN) injection 4 mg  4 mg IntraVENous Q6H PRN Mc, Anne Marie, DO        polyethylene glycol (GLYCOLAX) packet 17 g  17 g Oral Daily PRN Ximena Bentleyica, DO        acetaminophen (TYLENOL) tablet 650 mg  650 mg Oral Q6H PRN Ximena Bentleyica, DO        Or    acetaminophen (TYLENOL) suppository 650 mg  650 mg Rectal Q6H PRN Mc Anne Marie, DO        enoxaparin (LOVENOX) injection 40 mg  40 mg SubCUTAneous Daily Anne Marie Bentley, DO   40 mg at 12/30/24 1132    dextrose 50 % IV solution  25 g IntraVENous PRN Anne Marie Bentley, DO        DULoxetine (CYMBALTA) extended release capsule 30 mg  30 mg Oral Daily

## 2024-12-31 ENCOUNTER — APPOINTMENT (OUTPATIENT)
Facility: HOSPITAL | Age: 69
DRG: 637 | End: 2024-12-31
Attending: HOSPITALIST
Payer: MEDICARE

## 2024-12-31 LAB
ANION GAP SERPL CALC-SCNC: 3 MMOL/L (ref 3–18)
BUN SERPL-MCNC: 14 MG/DL (ref 7–18)
BUN/CREAT SERPL: 8 (ref 12–20)
CALCIUM SERPL-MCNC: 7.5 MG/DL (ref 8.5–10.1)
CHLORIDE SERPL-SCNC: 97 MMOL/L (ref 100–111)
CO2 SERPL-SCNC: 30 MMOL/L (ref 21–32)
CORTIS AM PEAK SERPL-MCNC: 33.1 UG/DL (ref 4.3–22.45)
CORTIS SERPL-MCNC: 29.3 UG/DL
CREAT SERPL-MCNC: 1.75 MG/DL (ref 0.6–1.3)
CRP SERPL-MCNC: 6.8 MG/DL (ref 0–0.3)
ERYTHROCYTE [DISTWIDTH] IN BLOOD BY AUTOMATED COUNT: 14 % (ref 11.6–14.5)
EST. AVERAGE GLUCOSE BLD GHB EST-MCNC: 100 MG/DL
GLUCOSE BLD STRIP.AUTO-MCNC: 141 MG/DL (ref 70–110)
GLUCOSE BLD STRIP.AUTO-MCNC: 146 MG/DL (ref 70–110)
GLUCOSE BLD STRIP.AUTO-MCNC: 160 MG/DL (ref 70–110)
GLUCOSE BLD STRIP.AUTO-MCNC: 264 MG/DL (ref 70–110)
GLUCOSE BLD STRIP.AUTO-MCNC: 278 MG/DL (ref 70–110)
GLUCOSE SERPL-MCNC: 153 MG/DL (ref 74–99)
HBA1C MFR BLD: 5.1 % (ref 4.2–5.6)
HCT VFR BLD AUTO: 28.2 % (ref 36–48)
HGB BLD-MCNC: 9.1 G/DL (ref 13–16)
MAGNESIUM SERPL-MCNC: 1.8 MG/DL (ref 1.6–2.6)
MCH RBC QN AUTO: 29.4 PG (ref 24–34)
MCHC RBC AUTO-ENTMCNC: 32.3 G/DL (ref 31–37)
MCV RBC AUTO: 91 FL (ref 78–100)
NRBC # BLD: 0 K/UL (ref 0–0.01)
NRBC BLD-RTO: 0 PER 100 WBC
PLATELET # BLD AUTO: 155 K/UL (ref 135–420)
PMV BLD AUTO: 9.8 FL (ref 9.2–11.8)
POTASSIUM SERPL-SCNC: 3.3 MMOL/L (ref 3.5–5.5)
RBC # BLD AUTO: 3.1 M/UL (ref 4.35–5.65)
SODIUM SERPL-SCNC: 130 MMOL/L (ref 136–145)
WBC # BLD AUTO: 5.7 K/UL (ref 4.6–13.2)

## 2024-12-31 PROCEDURE — 6370000000 HC RX 637 (ALT 250 FOR IP): Performed by: HOSPITALIST

## 2024-12-31 PROCEDURE — 83735 ASSAY OF MAGNESIUM: CPT

## 2024-12-31 PROCEDURE — 6360000002 HC RX W HCPCS: Performed by: HOSPITALIST

## 2024-12-31 PROCEDURE — 2700000000 HC OXYGEN THERAPY PER DAY

## 2024-12-31 PROCEDURE — G0378 HOSPITAL OBSERVATION PER HR: HCPCS

## 2024-12-31 PROCEDURE — 94640 AIRWAY INHALATION TREATMENT: CPT

## 2024-12-31 PROCEDURE — 86140 C-REACTIVE PROTEIN: CPT

## 2024-12-31 PROCEDURE — 85027 COMPLETE CBC AUTOMATED: CPT

## 2024-12-31 PROCEDURE — 96361 HYDRATE IV INFUSION ADD-ON: CPT

## 2024-12-31 PROCEDURE — 2500000003 HC RX 250 WO HCPCS: Performed by: HOSPITALIST

## 2024-12-31 PROCEDURE — 36415 COLL VENOUS BLD VENIPUNCTURE: CPT

## 2024-12-31 PROCEDURE — 80048 BASIC METABOLIC PNL TOTAL CA: CPT

## 2024-12-31 PROCEDURE — 82962 GLUCOSE BLOOD TEST: CPT

## 2024-12-31 PROCEDURE — 93970 EXTREMITY STUDY: CPT

## 2024-12-31 RX ORDER — ENOXAPARIN SODIUM 100 MG/ML
40 INJECTION SUBCUTANEOUS DAILY
Status: CANCELLED | OUTPATIENT
Start: 2024-12-31

## 2024-12-31 RX ORDER — ACETAMINOPHEN 325 MG/1
650 TABLET ORAL EVERY 6 HOURS PRN
Status: CANCELLED | OUTPATIENT
Start: 2024-12-31

## 2024-12-31 RX ORDER — SODIUM CHLORIDE 0.9 % (FLUSH) 0.9 %
5-40 SYRINGE (ML) INJECTION EVERY 12 HOURS SCHEDULED
Status: CANCELLED | OUTPATIENT
Start: 2024-12-31

## 2024-12-31 RX ORDER — POTASSIUM CHLORIDE 1500 MG/1
40 TABLET, EXTENDED RELEASE ORAL PRN
Status: CANCELLED | OUTPATIENT
Start: 2024-12-31

## 2024-12-31 RX ORDER — POTASSIUM CHLORIDE 1500 MG/1
40 TABLET, EXTENDED RELEASE ORAL EVERY 4 HOURS
Status: DISCONTINUED | OUTPATIENT
Start: 2024-12-31 | End: 2024-12-31

## 2024-12-31 RX ORDER — SODIUM CHLORIDE 0.9 % (FLUSH) 0.9 %
5-40 SYRINGE (ML) INJECTION PRN
Status: CANCELLED | OUTPATIENT
Start: 2024-12-31

## 2024-12-31 RX ORDER — ACETAMINOPHEN 650 MG/1
650 SUPPOSITORY RECTAL EVERY 6 HOURS PRN
Status: CANCELLED | OUTPATIENT
Start: 2024-12-31

## 2024-12-31 RX ORDER — POLYETHYLENE GLYCOL 3350 17 G/17G
17 POWDER, FOR SOLUTION ORAL DAILY PRN
Status: CANCELLED | OUTPATIENT
Start: 2024-12-31

## 2024-12-31 RX ORDER — POTASSIUM CHLORIDE 7.45 MG/ML
10 INJECTION INTRAVENOUS PRN
Status: CANCELLED | OUTPATIENT
Start: 2024-12-31

## 2024-12-31 RX ORDER — DEXAMETHASONE 4 MG/1
6 TABLET ORAL DAILY
Status: DISCONTINUED | OUTPATIENT
Start: 2024-12-31 | End: 2025-01-01 | Stop reason: HOSPADM

## 2024-12-31 RX ORDER — ALBUTEROL SULFATE 90 UG/1
2 INHALANT RESPIRATORY (INHALATION) EVERY 4 HOURS
Status: DISCONTINUED | OUTPATIENT
Start: 2024-12-31 | End: 2024-12-31 | Stop reason: CLARIF

## 2024-12-31 RX ORDER — RANOLAZINE 500 MG/1
500 TABLET, EXTENDED RELEASE ORAL 2 TIMES DAILY
Status: DISCONTINUED | OUTPATIENT
Start: 2024-12-31 | End: 2025-01-01 | Stop reason: HOSPADM

## 2024-12-31 RX ORDER — MAGNESIUM SULFATE IN WATER 40 MG/ML
2000 INJECTION, SOLUTION INTRAVENOUS PRN
Status: CANCELLED | OUTPATIENT
Start: 2024-12-31

## 2024-12-31 RX ORDER — ALBUTEROL SULFATE 0.83 MG/ML
2.5 SOLUTION RESPIRATORY (INHALATION)
Status: DISCONTINUED | OUTPATIENT
Start: 2024-12-31 | End: 2025-01-01 | Stop reason: HOSPADM

## 2024-12-31 RX ORDER — SODIUM CHLORIDE 9 MG/ML
INJECTION, SOLUTION INTRAVENOUS PRN
Status: CANCELLED | OUTPATIENT
Start: 2024-12-31

## 2024-12-31 RX ORDER — POTASSIUM CHLORIDE 1500 MG/1
40 TABLET, EXTENDED RELEASE ORAL ONCE
Status: COMPLETED | OUTPATIENT
Start: 2024-12-31 | End: 2024-12-31

## 2024-12-31 RX ORDER — ONDANSETRON 2 MG/ML
4 INJECTION INTRAMUSCULAR; INTRAVENOUS EVERY 6 HOURS PRN
Status: CANCELLED | OUTPATIENT
Start: 2024-12-31

## 2024-12-31 RX ORDER — ONDANSETRON 4 MG/1
4 TABLET, ORALLY DISINTEGRATING ORAL EVERY 8 HOURS PRN
Status: CANCELLED | OUTPATIENT
Start: 2024-12-31

## 2024-12-31 RX ADMIN — ATORVASTATIN CALCIUM 40 MG: 20 TABLET, FILM COATED ORAL at 09:41

## 2024-12-31 RX ADMIN — GUAIFENESIN 600 MG: 600 TABLET, EXTENDED RELEASE ORAL at 09:41

## 2024-12-31 RX ADMIN — POTASSIUM CHLORIDE 40 MEQ: 1500 TABLET, EXTENDED RELEASE ORAL at 09:41

## 2024-12-31 RX ADMIN — PREDNISONE 40 MG: 20 TABLET ORAL at 09:41

## 2024-12-31 RX ADMIN — ALBUTEROL SULFATE 2.5 MG: 2.5 SOLUTION RESPIRATORY (INHALATION) at 20:47

## 2024-12-31 RX ADMIN — GUAIFENESIN 600 MG: 600 TABLET, EXTENDED RELEASE ORAL at 20:52

## 2024-12-31 RX ADMIN — APIXABAN 5 MG: 5 TABLET, FILM COATED ORAL at 20:52

## 2024-12-31 RX ADMIN — RANOLAZINE 500 MG: 500 TABLET, FILM COATED, EXTENDED RELEASE ORAL at 20:52

## 2024-12-31 RX ADMIN — MONTELUKAST 10 MG: 10 TABLET, FILM COATED ORAL at 20:52

## 2024-12-31 RX ADMIN — PANTOPRAZOLE SODIUM 40 MG: 40 TABLET, DELAYED RELEASE ORAL at 09:41

## 2024-12-31 RX ADMIN — ASPIRIN 81 MG: 81 TABLET, COATED ORAL at 09:41

## 2024-12-31 RX ADMIN — DULOXETINE HYDROCHLORIDE 30 MG: 30 CAPSULE, DELAYED RELEASE ORAL at 09:41

## 2024-12-31 RX ADMIN — APIXABAN 5 MG: 5 TABLET, FILM COATED ORAL at 09:41

## 2024-12-31 RX ADMIN — IPRATROPIUM BROMIDE AND ALBUTEROL SULFATE 1 DOSE: .5; 2.5 SOLUTION RESPIRATORY (INHALATION) at 20:47

## 2024-12-31 RX ADMIN — DEXAMETHASONE 6 MG: 4 TABLET ORAL at 19:59

## 2024-12-31 RX ADMIN — SODIUM CHLORIDE, PRESERVATIVE FREE 10 ML: 5 INJECTION INTRAVENOUS at 20:53

## 2024-12-31 ASSESSMENT — PAIN SCALES - GENERAL: PAINLEVEL_OUTOF10: 0

## 2024-12-31 NOTE — CARE COORDINATION
12/31/24 1316   /Social Work Whiteboard Notes   /Social Work Whiteboard IA attempted x2       SW presented to bedside, no family is present at this time. SW placed call to pts son Bryan Mitchell   [phone]  however, received no answer. SW left a VM requesting a call back.    Lacy Baumann, [facility]  Case Management Department

## 2024-12-31 NOTE — PLAN OF CARE
Problem: Chronic Conditions and Co-morbidities  Goal: Patient's chronic conditions and co-morbidity symptoms are monitored and maintained or improved  12/31/2024 0152 by Darvin Carreon RN  Outcome: Progressing  12/30/2024 1832 by Alex Melendrez RN  Outcome: Progressing     Problem: Pain  Goal: Verbalizes/displays adequate comfort level or baseline comfort level  12/31/2024 0152 by Darvin Carreon RN  Outcome: Progressing  Flowsheets  Taken 12/31/2024 0152  Verbalizes/displays adequate comfort level or baseline comfort level:   Assess pain using appropriate pain scale   Encourage patient to monitor pain and request assistance   Administer analgesics based on type and severity of pain and evaluate response   Implement non-pharmacological measures as appropriate and evaluate response  Taken 12/30/2024 2000  Verbalizes/displays adequate comfort level or baseline comfort level:   Encourage patient to monitor pain and request assistance   Assess pain using appropriate pain scale   Administer analgesics based on type and severity of pain and evaluate response   Implement non-pharmacological measures as appropriate and evaluate response  12/30/2024 1832 by Alex Melendrez RN  Outcome: Progressing     Problem: Skin/Tissue Integrity  Goal: Absence of new skin breakdown  12/31/2024 0152 by Darvin Carreon RN  Outcome: Progressing  12/30/2024 1832 by Alex Melendrez RN  Outcome: Progressing     Problem: Safety - Adult  Goal: Free from fall injury  12/31/2024 0152 by Darvin Carreon RN  Outcome: Progressing  Note: Will use call bell for all needs agrees not to ambulate without staff at bedside  12/30/2024 1832 by Alex Melendrez RN  Outcome: Progressing     Problem: ABCDS Injury Assessment  Goal: Absence of physical injury  12/31/2024 0152 by Darvin Carreon RN  Outcome: Progressing  12/30/2024 1832 by Alex Melendrez RN  Outcome: Progressing

## 2024-12-31 NOTE — PROGRESS NOTES
70 Y/o male full code  Isolation Covid 19 Droplet Plus    Allergies: adhesive tape, ciprofloxacin  Arrived 12/30/24 c/o went to bed last night and woke up on the floor fighting with paramedics, lives with his son that was present  He denied skipping any meals. He takes Glipizide for DM and has never had low blood sugar 36-- EMS gave 250 ml of D50 and bradycardia in the 30s and was given atropine.    Dx hypoglycemia, bradycardia, acute hypokalemia, and stage 3 chronic kidney disease    History: Diabetes, symptomatic hypoglycemia,     Blood sugars are Q3 next due at 0900    Neuro alert and oriented X 4 denies pain no fevers   Respiratory  Cardiac tele monitor   GI adult diet regular   urinal  Skin hyperpigmentation and ecchymosis on extremities  Lines 18 gauge left ac        1915 bedside turnover given to me by RN Alex. Pt is in bed, bed is in the lowest position with the wheels locked and call bell with his personal effects in reach on the bedside table. Denies pain at this time.    1920 Nausea reported, given Zofran, ,per request also given two cups of ice with water, educated until Zofran takes effect and nausea disappears not to drink water or it can cause more nausea and lead to vomiting.

## 2024-12-31 NOTE — CARE COORDINATION
12/31/24 1428   Discharge Planning   Living Arrangements Children   Support Systems Children   Current DME Prior to Arrival Wheelchair;Walker;Oxygen Therapy (Comment)   Potential Assistance Needed N/A   Potential Assistance Purchasing Medications No   Potential DME Needed Other (Comment)  (Pending Recs)   Type of Home Care Services Aide Services   Patient expects to be discharged to: House     SW spoke to pt and pts son  Bryan   [phone] who confirmed the demographics on file. Pt resides with his son in a 1 level home with 0 steps to its entrance. Pt is bed bound and uses 4L o2 at baseline from [facility]. Pt stated he has a WC and a RW however, does not use them.     Pt confirmed His PCP as Dr. Figueroa whom he does virtual visits with. Pt is dependent with all ADLs and receives assistance from his son and [facility] aid that provides 4-5 hours of assistance on Tuesdays and Thursdays.     Pt does not wish to add C services upon discharge and stated that the support from his son and Aid suffices.     Pt will needs Medicaid transport home at discharge. CM team will continue to follow.    [facility] Brush  Case Management Department             DISCHARGE

## 2024-12-31 NOTE — PROGRESS NOTES
Bedside shift change report given to Darvin RN (oncoming nurse) by Alex RN (offgoing nurse). Report included the following information Nurse Handoff Report, Adult Overview, Intake/Output, MAR, Recent Results, and Med Rec Status.

## 2024-12-31 NOTE — WOUND CARE
Wound Care Note:    Chart audited for low soren score of 13, patient with high risk for skin breakdown.  Does patient have documentation of pressure injury?  no.  Soren score order set started on 12/30/2024     Skin Care & Pressure Relief Recommendations  Minimize layers of linen  Pads under patient to optimize support surface  Turn/reposition approximately every 2 hours  Use pillow wedges to maintain positioning but do not put pillow directly over wounds  Manage incontinence   Promote continence; Skin Protective lotion/cream to buttocks and sacrum daily and as needed with incontinence care  Offload heels pillows    Consult wound care if any wounds noted during admission

## 2025-01-01 VITALS
DIASTOLIC BLOOD PRESSURE: 74 MMHG | HEART RATE: 84 BPM | RESPIRATION RATE: 18 BRPM | HEIGHT: 65 IN | BODY MASS INDEX: 32.4 KG/M2 | WEIGHT: 194.5 LBS | OXYGEN SATURATION: 99 % | TEMPERATURE: 98.8 F | SYSTOLIC BLOOD PRESSURE: 159 MMHG

## 2025-01-01 LAB
ANION GAP SERPL CALC-SCNC: 8 MMOL/L (ref 3–18)
BUN SERPL-MCNC: 18 MG/DL (ref 7–18)
BUN/CREAT SERPL: 11 (ref 12–20)
CALCIUM SERPL-MCNC: 8.4 MG/DL (ref 8.5–10.1)
CHLORIDE SERPL-SCNC: 100 MMOL/L (ref 100–111)
CO2 SERPL-SCNC: 26 MMOL/L (ref 21–32)
CREAT SERPL-MCNC: 1.68 MG/DL (ref 0.6–1.3)
CRP SERPL-MCNC: 4.8 MG/DL (ref 0–0.3)
ERYTHROCYTE [DISTWIDTH] IN BLOOD BY AUTOMATED COUNT: 13.7 % (ref 11.6–14.5)
GLUCOSE BLD STRIP.AUTO-MCNC: 191 MG/DL (ref 70–110)
GLUCOSE BLD STRIP.AUTO-MCNC: 222 MG/DL (ref 70–110)
GLUCOSE SERPL-MCNC: 210 MG/DL (ref 74–99)
HCT VFR BLD AUTO: 28.1 % (ref 36–48)
HGB BLD-MCNC: 9.4 G/DL (ref 13–16)
MCH RBC QN AUTO: 30.3 PG (ref 24–34)
MCHC RBC AUTO-ENTMCNC: 33.5 G/DL (ref 31–37)
MCV RBC AUTO: 90.6 FL (ref 78–100)
NRBC # BLD: 0 K/UL (ref 0–0.01)
NRBC BLD-RTO: 0 PER 100 WBC
PLATELET # BLD AUTO: 176 K/UL (ref 135–420)
PMV BLD AUTO: 9.4 FL (ref 9.2–11.8)
POTASSIUM SERPL-SCNC: 3.9 MMOL/L (ref 3.5–5.5)
RBC # BLD AUTO: 3.1 M/UL (ref 4.35–5.65)
SODIUM SERPL-SCNC: 134 MMOL/L (ref 136–145)
WBC # BLD AUTO: 5.9 K/UL (ref 4.6–13.2)

## 2025-01-01 PROCEDURE — 94640 AIRWAY INHALATION TREATMENT: CPT

## 2025-01-01 PROCEDURE — 97162 PT EVAL MOD COMPLEX 30 MIN: CPT

## 2025-01-01 PROCEDURE — 80048 BASIC METABOLIC PNL TOTAL CA: CPT

## 2025-01-01 PROCEDURE — 82962 GLUCOSE BLOOD TEST: CPT

## 2025-01-01 PROCEDURE — 2700000000 HC OXYGEN THERAPY PER DAY

## 2025-01-01 PROCEDURE — 86140 C-REACTIVE PROTEIN: CPT

## 2025-01-01 PROCEDURE — 85027 COMPLETE CBC AUTOMATED: CPT

## 2025-01-01 PROCEDURE — 97530 THERAPEUTIC ACTIVITIES: CPT

## 2025-01-01 PROCEDURE — 1100000000 HC RM PRIVATE

## 2025-01-01 PROCEDURE — 36415 COLL VENOUS BLD VENIPUNCTURE: CPT

## 2025-01-01 PROCEDURE — 6370000000 HC RX 637 (ALT 250 FOR IP): Performed by: HOSPITALIST

## 2025-01-01 PROCEDURE — 6360000002 HC RX W HCPCS: Performed by: HOSPITALIST

## 2025-01-01 PROCEDURE — G0378 HOSPITAL OBSERVATION PER HR: HCPCS

## 2025-01-01 RX ORDER — INSULIN GLARGINE 100 [IU]/ML
14 INJECTION, SOLUTION SUBCUTANEOUS NIGHTLY
Qty: 10 ML | Refills: 0 | Status: SHIPPED | OUTPATIENT
Start: 2025-01-01

## 2025-01-01 RX ORDER — BLOOD-GLUCOSE METER
1 KIT MISCELLANEOUS DAILY
Qty: 1 KIT | Refills: 0 | Status: SHIPPED | OUTPATIENT
Start: 2025-01-01

## 2025-01-01 RX ORDER — GUAIFENESIN 600 MG/1
600 TABLET, EXTENDED RELEASE ORAL 2 TIMES DAILY
Qty: 14 TABLET | Refills: 0 | Status: SHIPPED | OUTPATIENT
Start: 2025-01-01 | End: 2025-01-08

## 2025-01-01 RX ORDER — DEXAMETHASONE 6 MG/1
6 TABLET ORAL DAILY
Qty: 7 TABLET | Refills: 0 | Status: SHIPPED | OUTPATIENT
Start: 2025-01-02 | End: 2025-01-09

## 2025-01-01 RX ADMIN — ALBUTEROL SULFATE 2.5 MG: 2.5 SOLUTION RESPIRATORY (INHALATION) at 04:51

## 2025-01-01 RX ADMIN — ALBUTEROL SULFATE 2.5 MG: 2.5 SOLUTION RESPIRATORY (INHALATION) at 00:59

## 2025-01-01 RX ADMIN — DULOXETINE HYDROCHLORIDE 30 MG: 30 CAPSULE, DELAYED RELEASE ORAL at 09:12

## 2025-01-01 RX ADMIN — ALBUTEROL SULFATE 2.5 MG: 2.5 SOLUTION RESPIRATORY (INHALATION) at 12:48

## 2025-01-01 RX ADMIN — DEXAMETHASONE 6 MG: 4 TABLET ORAL at 09:12

## 2025-01-01 RX ADMIN — ALBUTEROL SULFATE 2.5 MG: 2.5 SOLUTION RESPIRATORY (INHALATION) at 08:59

## 2025-01-01 RX ADMIN — RANOLAZINE 500 MG: 500 TABLET, FILM COATED, EXTENDED RELEASE ORAL at 09:11

## 2025-01-01 RX ADMIN — PANTOPRAZOLE SODIUM 40 MG: 40 TABLET, DELAYED RELEASE ORAL at 09:12

## 2025-01-01 RX ADMIN — ASPIRIN 81 MG: 81 TABLET, COATED ORAL at 09:13

## 2025-01-01 RX ADMIN — APIXABAN 5 MG: 5 TABLET, FILM COATED ORAL at 09:10

## 2025-01-01 RX ADMIN — GUAIFENESIN 600 MG: 600 TABLET, EXTENDED RELEASE ORAL at 09:13

## 2025-01-01 RX ADMIN — ATORVASTATIN CALCIUM 40 MG: 20 TABLET, FILM COATED ORAL at 09:13

## 2025-01-01 ASSESSMENT — PAIN SCALES - GENERAL: PAINLEVEL_OUTOF10: 0

## 2025-01-01 NOTE — DISCHARGE SUMMARY
Hospitalist Discharge Summary    Patient: Reinier Mcclure MRN: [id number]  CSN: [id number]    YOB: 1955  Age: 69 y.o.  Sex: male    DOA: 12/30/2024 LOS:  LOS: 0 days   Discharge Date:      Primary Care Provider:  Cecilia Figueroa APRN - NP    Admission Diagnoses: Acute hypokalemia [E87.6]  Hypoglycemia [E16.2]  Stage 3b chronic kidney disease (HCC) [N18.32]    Discharge Diagnoses:    Active Hospital Problems    Diagnosis     Hypoglycemia [E16.2]     Sinus bradycardia [R00.1]     COVID-19 [U07.1]     Noncompliance with medication regimen [Z91.148]     Type II diabetes mellitus (HCC) [E11.9]        Discharge Medications:        Medication List        START taking these medications      dexAMETHasone 6 MG tablet  Commonly known as: DECADRON  Take 1 tablet by mouth daily for 7 days  Start taking on: January 2, 2025     glucose monitoring kit  1 kit by Does not apply route daily     guaiFENesin 600 MG extended release tablet  Commonly known as: MUCINEX  Take 1 tablet by mouth 2 times daily for 7 days            CONTINUE taking these medications      albuterol sulfate  (90 Base) MCG/ACT inhaler  Commonly known as: PROVENTIL;VENTOLIN;PROAIR     aspirin 81 MG EC tablet     atorvastatin 40 MG tablet  Commonly known as: LIPITOR     DULoxetine 30 MG extended release capsule  Commonly known as: CYMBALTA     furosemide 20 MG tablet  Commonly known as: LASIX     insulin glargine 100 UNIT/ML injection vial  Commonly known as: LANTUS  Inject 14 Units into the skin nightly     montelukast 10 MG tablet  Commonly known as: SINGULAIR     nitroGLYCERIN 0.4 MG SL tablet  Commonly known as: NITROSTAT     pantoprazole 40 MG tablet  Commonly known as: PROTONIX     ranolazine 500 MG extended release tablet  Commonly known as: RANEXA            STOP taking these medications      celecoxib 200

## 2025-01-01 NOTE — CARE COORDINATION
Pt discussed in IDR. Pt medically stable to dc.     MSW scheduled transport with [facility] for 1/1 at 1400    [facility] Patel, Supervisee in Social Work  Inpatient Case Mananger

## 2025-01-01 NOTE — PLAN OF CARE
Problem: Chronic Conditions and Co-morbidities  Goal: Patient's chronic conditions and co-morbidity symptoms are monitored and maintained or improved  Outcome: Progressing  Flowsheets (Taken 12/30/2024 2100 by Darvin Carreon, RN)  Care Plan - Patient's Chronic Conditions and Co-Morbidity Symptoms are Monitored and Maintained or Improved:   Monitor and assess patient's chronic conditions and comorbid symptoms for stability, deterioration, or improvement   Collaborate with multidisciplinary team to address chronic and comorbid conditions and prevent exacerbation or deterioration   Update acute care plan with appropriate goals if chronic or comorbid symptoms are exacerbated and prevent overall improvement and discharge     Problem: Pain  Goal: Verbalizes/displays adequate comfort level or baseline comfort level  Outcome: Progressing  Flowsheets (Taken 12/31/2024 0152 by Darvin Carreon, RN)  Verbalizes/displays adequate comfort level or baseline comfort level:   Assess pain using appropriate pain scale   Encourage patient to monitor pain and request assistance   Administer analgesics based on type and severity of pain and evaluate response   Implement non-pharmacological measures as appropriate and evaluate response     Problem: Skin/Tissue Integrity  Goal: Absence of new skin breakdown  Description: 1.  Monitor for areas of redness and/or skin breakdown  2.  Assess vascular access sites hourly  3.  Every 4-6 hours minimum:  Change oxygen saturation probe site  4.  Every 4-6 hours:  If on nasal continuous positive airway pressure, respiratory therapy assess nares and determine need for appliance change or resting period.  Outcome: Progressing     Problem: Safety - Adult  Goal: Free from fall injury  Outcome: Progressing  Flowsheets (Taken 1/1/2025 1251)  Free From Fall Injury: Instruct family/caregiver on patient safety     Problem: ABCDS Injury Assessment  Goal: Absence of physical injury  Outcome:

## 2025-01-01 NOTE — PROGRESS NOTES
Hospitalist Progress Note      Patient name: Reinier Mcclure.  MRN: [id number]          PCP: Cecilia Figueroa APRN - NP  General Risk Score: 4   Values used to calculate this score:    Points  Metrics       1        Age: 69       1        Hospital Admissions: 1       0        ED Visits: 0       1        Has Chronic Obstructive Pulmonary Disease: Yes       1        Has Diabetes: Yes       0        Has Congestive Heart Failure: No       0        Has Liver Disease: No       0        Has Depression: No       0        Current PCP: COURTNEY Goss NP       0        Has Medicaid: No      Summary:          Reinier Mcclure is a 69 y.o. male with PMHx of DM type 2,  CKD 3b, CAD w/ stents x 5, asthma, R.A, hx of PE/DVT, HTN who presented to [facility] 12/30/2024 by ambulance for AMS, hypoglycemia and bradycardia.  911 was called at home when family found him crawling on the floor, w/ mumbled speech and confusion.  Per EMS, upon arrival, he was altered and found to have glucose 36.  With administration of D50, his mentation improved.  Also found to have a low HR in the 30's- for which he was given Atropine.  HR improved to 60s.        In the ED, his glucose dropped again to 49, thus he was given D50 and started on D10 drip- with glucose remaining stable.  His HR also remained stable in the 60's.    Later found to have COVID but is on baseline O2. He was admitted for glucose/HR monitoring and supportive care.    Assessment/Plan:  Medical Complexity: High-1 acute medical problem with high risk threat, at least 3 follow up items    # Hypoglycemia, recurrent-  Glucose 36 per EMS s/p D10.   Later dropped again in ED to 49- treated again, placed on D10 drip.   Pt has CKD3b & higher risk of hypoglycemia on sulfonylureas.  Adamantly denies taking excess glipizide as he uses pill box.   Found to have acute viral infection w/ Covid.      - cortisol level    - continue D10 drip;  accuchecks q2-3 hrs.   Holding all diabetic medications.

## 2025-01-01 NOTE — PROGRESS NOTES
1930-Bedside report received from JASON Oliver. Pt A & O X 4. Pain at 0. Pt without any issues. Call light within reach.  2100-Pt resting in bed at this time. IV site to R AC  patent and intact. Pt A & O x 4. LS dim, on 4 LNC.. + CSM. Pain at 0. + BS to all 4 quadrants. Pt denies nausea. Call light within raech. Pt denies any needs at this time.  0040-Contacted DR Garcia about the q3 POCT order, MD said to check at [facility].  Pt had an uneventful shift.  No issues/concerns at this time. Call bell within reach

## 2025-01-01 NOTE — CARE COORDINATION
MSW called pt son Yuri to update on transport home     ENID Patel, Supervisee in Social Work  Inpatient Case Mananger

## 2025-01-01 NOTE — PROGRESS NOTES
Physical Therapy Goals:  Initiated 1/1/2025 to be met within 7-10 days.  Short Term Goals  Short Term Goal 1: Pt will be able to transfer supine<>sit independent to improve functional mobility.  Short Term Goal 2: Pt will be able to transfer sit<>stand mod I to improve independence.  Short Term Goal 3: Pt will be able to ambulate >10' w/ RW, SBA to improve ability to negotiate environment.      PHYSICAL THERAPY EVALUATION    Patient: Reinier Mcclure (69 y.o. male)  Date: 1/1/2025  Primary Diagnosis: Acute hypokalemia [E87.6]  Hypoglycemia [E16.2]  Stage 3b chronic kidney disease (HCC) [N18.32]       Precautions: Isolation, Fall Risk,  ,  ,  ,  ,  ,  ,    PLOF:  lives w/ son whom is able to A as needed; short distance ambulator only bed<>bathroom due to B knee L>R pain (OA); uses 4L O2  ASSESSMENT :  PPE used for encounter due to isolation precautions.  Introduced self, ed on PT role in acute setting and pt agreeable to PT evaluation.  Based on objective findings as described below pt presents w/ baseline functional mobility and independence in regards to bed mobility, transfers and safety.  Generalized weakness, dec B knee AROM, dec sensation of B feet (neuropathy), dec openness to PT (hx of PT and pt reports PT too painful) also impacting functional mobility.  Pt supine in bed upon arrival on O2 support.  Pt voiced has been getting up to BSC during hospitalization w/o much difficulty.  Pt does not want BSC for home as he reports \"I can make it to the bathroom and I use this as my exercise.\"  Pt reports pain in L>R knee preventing him from walking distances, rating pain 9/10 using numerical pain scale during mobility.  Pt ed on strategies for safe mobility at home, energy conservation, HEP, strategies for COVID recovery impacting physical activity and cont to need use call bell for activity.  Rolling mod I and able to reposition self in bed.  Pt declined theraband program.  Pt limits PT interventions due to prior

## 2025-01-02 LAB — ECHO BSA: 2.01 M2

## 2025-01-04 LAB
EKG ATRIAL RATE: 69 BPM
EKG DIAGNOSIS: NORMAL
EKG P AXIS: 67 DEGREES
EKG P-R INTERVAL: 166 MS
EKG Q-T INTERVAL: 464 MS
EKG QRS DURATION: 102 MS
EKG QTC CALCULATION (BAZETT): 497 MS
EKG R AXIS: 31 DEGREES
EKG T AXIS: 39 DEGREES
EKG VENTRICULAR RATE: 69 BPM

## (undated) DEVICE — TUBING PRSS MON L24IN PVC RIG NONEXPANDING M TO FEM CONN

## (undated) DEVICE — BAND RADIAL COMPR ARTERY 27CM -- LNG BX/10

## (undated) DEVICE — SENSOR PLSE OXMTR AD CBL L36IN ADH FRM FIT SPO2 DISP

## (undated) DEVICE — PACK PROCEDURE SURG CATH CUST

## (undated) DEVICE — TORCON NB, ADVANTAGE CATHETER: Brand: TORCON NB

## (undated) DEVICE — Device

## (undated) DEVICE — SWAN-GANZ POLYMER BLEND TRUE SIZE C-TIP CONTROLCATH TD CATHETER: Brand: SWAN-GANZ CONTROLCATH TRUE SIZE

## (undated) DEVICE — DRAPE,ANGIO,BRACH,STERILE,38X44: Brand: MEDLINE

## (undated) DEVICE — GUIDEWIRE VASC L260CM DIA0.035IN RAD 3MM J TIP L7CM PTFE

## (undated) DEVICE — CATH DIAG FR4 EXPO 5FRX100CM -- EXPO

## (undated) DEVICE — CATH DIAG F5 AR I MOD 100CM -- INFINITI - ORDER AS EA

## (undated) DEVICE — PROCEDURE KIT FLUID MGMT 10 FR CUST MAINFOLD

## (undated) DEVICE — SHIELD RAD 14X16 IN W/ SCOOP ABSORBER

## (undated) DEVICE — GLIDESHEATH SLENDER STAINLESS STEEL KIT: Brand: GLIDESHEATH SLENDER

## (undated) DEVICE — HEARTSTREAM ADULT/CHILD RADIOLUCENT PADS

## (undated) DEVICE — BAND RADIAL COMPR ARTERY 24CM -- REG BX/10

## (undated) DEVICE — PRESSURE MONITORING SET: Brand: TRUWAVE

## (undated) DEVICE — SPLINT WR POS F/ARTERIAL ACC -- BX/10

## (undated) DEVICE — STOPCOCK TRNSDUC 500PSI 3 W ROT M LUER LT BLU OFF HNDL R

## (undated) DEVICE — GLIDEACCESS SYSTEM PERCUTANEOUS SHEATH INTRODUCER KIT: Brand: GLIDEACCESS